# Patient Record
Sex: MALE | Race: WHITE | NOT HISPANIC OR LATINO | URBAN - METROPOLITAN AREA
[De-identification: names, ages, dates, MRNs, and addresses within clinical notes are randomized per-mention and may not be internally consistent; named-entity substitution may affect disease eponyms.]

---

## 2017-01-06 ENCOUNTER — OFFICE (OUTPATIENT)
Dept: URBAN - METROPOLITAN AREA CLINIC 64 | Facility: CLINIC | Age: 67
End: 2017-01-06
Payer: COMMERCIAL

## 2017-01-06 VITALS
HEIGHT: 67 IN | HEART RATE: 109 BPM | DIASTOLIC BLOOD PRESSURE: 72 MMHG | SYSTOLIC BLOOD PRESSURE: 107 MMHG | WEIGHT: 139 LBS

## 2017-01-06 DIAGNOSIS — Z12.11 ENCOUNTER FOR SCREENING FOR MALIGNANT NEOPLASM OF COLON: ICD-10-CM

## 2017-01-06 DIAGNOSIS — R63.4 ABNORMAL WEIGHT LOSS: ICD-10-CM

## 2017-01-06 DIAGNOSIS — K59.1 FUNCTIONAL DIARRHEA: ICD-10-CM

## 2017-01-06 DIAGNOSIS — R11.2 NAUSEA WITH VOMITING, UNSPECIFIED: ICD-10-CM

## 2017-01-06 DIAGNOSIS — R19.4 CHANGE IN BOWEL HABIT: ICD-10-CM

## 2017-01-06 PROCEDURE — 99204 OFFICE O/P NEW MOD 45 MIN: CPT | Performed by: NURSE PRACTITIONER

## 2017-01-20 ENCOUNTER — HOSPITAL ENCOUNTER (OUTPATIENT)
Dept: PREOP | Facility: HOSPITAL | Age: 67
Setting detail: HOSPITAL OUTPATIENT SURGERY
Discharge: HOME | End: 2017-01-20
Attending: INTERNAL MEDICINE | Admitting: INTERNAL MEDICINE

## 2017-01-20 ENCOUNTER — ON CAMPUS - OUTPATIENT (OUTPATIENT)
Dept: URBAN - METROPOLITAN AREA HOSPITAL 85 | Facility: HOSPITAL | Age: 67
End: 2017-01-20
Payer: COMMERCIAL

## 2017-01-20 DIAGNOSIS — R11.2 NAUSEA WITH VOMITING, UNSPECIFIED: ICD-10-CM

## 2017-01-20 DIAGNOSIS — R19.7 DIARRHEA, UNSPECIFIED: ICD-10-CM

## 2017-01-20 DIAGNOSIS — R63.4 ABNORMAL WEIGHT LOSS: ICD-10-CM

## 2017-01-20 PROCEDURE — 45378 DIAGNOSTIC COLONOSCOPY: CPT | Performed by: INTERNAL MEDICINE

## 2017-01-20 PROCEDURE — 43235 EGD DIAGNOSTIC BRUSH WASH: CPT | Performed by: INTERNAL MEDICINE

## 2017-03-13 ENCOUNTER — OFFICE (OUTPATIENT)
Dept: URBAN - METROPOLITAN AREA CLINIC 64 | Facility: CLINIC | Age: 67
End: 2017-03-13
Payer: COMMERCIAL

## 2017-03-13 VITALS
DIASTOLIC BLOOD PRESSURE: 71 MMHG | SYSTOLIC BLOOD PRESSURE: 109 MMHG | HEIGHT: 67 IN | HEART RATE: 99 BPM | WEIGHT: 139 LBS

## 2017-03-13 DIAGNOSIS — K64.8 OTHER HEMORRHOIDS: ICD-10-CM

## 2017-03-13 DIAGNOSIS — K59.00 CONSTIPATION, UNSPECIFIED: ICD-10-CM

## 2017-03-13 DIAGNOSIS — R10.9 UNSPECIFIED ABDOMINAL PAIN: ICD-10-CM

## 2017-03-13 PROCEDURE — 99213 OFFICE O/P EST LOW 20 MIN: CPT | Performed by: NURSE PRACTITIONER

## 2017-03-13 RX ORDER — HYDROCORTISONE ACETATE 25 MG/1
25 SUPPOSITORY RECTAL
Qty: 14 | Refills: 1 | Status: ACTIVE
Start: 2017-03-13

## 2017-05-05 ENCOUNTER — HOSPITAL ENCOUNTER (OUTPATIENT)
Dept: PHYSICAL THERAPY | Facility: HOSPITAL | Age: 67
Setting detail: RECURRING SERIES
Discharge: HOME | End: 2017-06-20
Attending: NURSE PRACTITIONER | Admitting: NURSE PRACTITIONER

## 2017-06-22 ENCOUNTER — HOSPITAL ENCOUNTER (OUTPATIENT)
Dept: PHYSICAL THERAPY | Facility: HOSPITAL | Age: 67
Setting detail: RECURRING SERIES
Discharge: HOME | End: 2017-08-11
Attending: NURSE PRACTITIONER | Admitting: NURSE PRACTITIONER

## 2017-11-30 ENCOUNTER — HOSPITAL ENCOUNTER (OUTPATIENT)
Dept: ORTHOPEDIC SURGERY | Facility: CLINIC | Age: 67
Discharge: HOME | End: 2017-11-30
Attending: PHYSICIAN ASSISTANT | Admitting: PHYSICIAN ASSISTANT

## 2017-12-28 ENCOUNTER — HOSPITAL ENCOUNTER (OUTPATIENT)
Dept: ORTHOPEDIC SURGERY | Facility: CLINIC | Age: 67
Discharge: HOME | End: 2017-12-28
Attending: PHYSICIAN ASSISTANT | Admitting: PHYSICIAN ASSISTANT

## 2022-06-09 ENCOUNTER — OFFICE VISIT (OUTPATIENT)
Dept: FAMILY MEDICINE CLINIC | Facility: CLINIC | Age: 72
End: 2022-06-09

## 2022-06-09 VITALS
HEIGHT: 68 IN | HEART RATE: 58 BPM | WEIGHT: 176 LBS | SYSTOLIC BLOOD PRESSURE: 130 MMHG | OXYGEN SATURATION: 98 % | RESPIRATION RATE: 16 BRPM | DIASTOLIC BLOOD PRESSURE: 71 MMHG | TEMPERATURE: 97.8 F | BODY MASS INDEX: 26.67 KG/M2

## 2022-06-09 DIAGNOSIS — F03.91 DEMENTIA WITH BEHAVIORAL DISTURBANCE, UNSPECIFIED DEMENTIA TYPE: Primary | ICD-10-CM

## 2022-06-09 DIAGNOSIS — I70.90 ATHEROSCLEROSIS: ICD-10-CM

## 2022-06-09 DIAGNOSIS — R25.2 MUSCLE CRAMPS: ICD-10-CM

## 2022-06-09 PROBLEM — S32.020D WEDGE COMPRESSION FRACTURE OF SECOND LUMBAR VERTEBRA, SUBSEQUENT ENCOUNTER FOR FRACTURE WITH ROUTINE HEALING: Status: ACTIVE | Noted: 2017-11-30

## 2022-06-09 PROBLEM — M54.9 BACK PAIN: Status: ACTIVE | Noted: 2017-11-30

## 2022-06-09 PROCEDURE — 99203 OFFICE O/P NEW LOW 30 MIN: CPT | Performed by: PHYSICIAN ASSISTANT

## 2022-06-09 RX ORDER — RISPERIDONE 0.5 MG/1
TABLET ORAL
COMMUNITY
Start: 2022-06-03

## 2022-06-09 RX ORDER — DOCUSATE SODIUM 100 MG/1
CAPSULE, LIQUID FILLED ORAL
COMMUNITY
Start: 2022-05-13

## 2022-06-09 RX ORDER — CHLORAL HYDRATE 500 MG
CAPSULE ORAL
COMMUNITY

## 2022-06-09 RX ORDER — TAMSULOSIN HYDROCHLORIDE 0.4 MG/1
CAPSULE ORAL
COMMUNITY
Start: 2022-05-27

## 2022-06-09 RX ORDER — ATENOLOL 25 MG/1
TABLET ORAL
COMMUNITY
Start: 2022-06-03

## 2022-06-09 RX ORDER — RISPERIDONE 0.25 MG/1
TABLET ORAL
COMMUNITY
Start: 2022-06-03

## 2022-06-09 RX ORDER — DONEPEZIL HYDROCHLORIDE 23 MG/1
TABLET, FILM COATED ORAL
COMMUNITY
Start: 2022-05-13

## 2022-06-09 RX ORDER — MEMANTINE HYDROCHLORIDE 28 MG/1
CAPSULE, EXTENDED RELEASE ORAL
COMMUNITY
Start: 2022-06-03

## 2022-06-09 NOTE — PROGRESS NOTES
Arina Wray is a 71 y.o. male.     Pt presents as a new patient to establish. He is accompanied by his wife.  He has dementia and is a poor historian.  Wife doesn't know who has been managing his current medications but he does have them refilled through the pharmacy at [facility].  He is able to feed himself, bathe, change his clothes but they administer his medications there.  He doesn't cook and eats his meals there.  They are not sure what type of dementia he has but state that it seems stable on his current medications.  She thinks he was prescribed the risperdal for anxiety but isn't sure if he only takes it at night or also takes it during the day.     He complains of muscle cramps in both lower legs in calf area for the past year.  It bothers him the most at night when he goes to bed. It also bothers him if he is walking a lot.  He also complains of headaches but not as bad as they used to be.  He used to have a provider that came to his house.  He now lives at [facility] and has for the past 3 years. He has been getting medications refilled through [facility].  He thinks he had labs done at [facility] about 1 month ago.         The following portions of the patient's history were reviewed and updated as appropriate: allergies, current medications, past family history, past medical history, past social history, past surgical history and problem list.  Past Medical History:   Diagnosis Date   • Dementia (HCC)      History reviewed. No pertinent surgical history.  History reviewed. No pertinent family history.  Social History     Socioeconomic History   • Marital status: Single   Tobacco Use   • Smoking status: Never Smoker   • Smokeless tobacco: Never Used   Vaping Use   • Vaping Use: Never used   Substance and Sexual Activity   • Alcohol use: Yes   • Drug use: Never   • Sexual activity: Defer         Current Outpatient Medications:   •  atenolol (TENORMIN) 25 MG tablet, , Disp: , Rfl:   •  docusate  "sodium (COLACE) 100 MG capsule, , Disp: , Rfl:   •  donepezil (ARICEPT) 23 MG tablet, , Disp: , Rfl:   •  memantine (NAMENDA XR) 28 MG capsule sustained-release 24 hr extended release capsule, , Disp: , Rfl:   •  Omega-3 Fatty Acids (fish oil) 1000 MG capsule capsule, Take  by mouth Daily With Breakfast., Disp: , Rfl:   •  risperiDONE (risperDAL) 0.25 MG tablet, , Disp: , Rfl:   •  risperiDONE (risperDAL) 0.5 MG tablet, , Disp: , Rfl:   •  tamsulosin (FLOMAX) 0.4 MG capsule 24 hr capsule, , Disp: , Rfl:     Review of Systems   Constitutional: Negative for activity change, appetite change, chills, diaphoresis, fatigue, fever, unexpected weight gain and unexpected weight loss.   Eyes: Negative for blurred vision and visual disturbance.   Respiratory: Negative for chest tightness and shortness of breath.    Cardiovascular: Negative for chest pain, palpitations and leg swelling.   Gastrointestinal: Negative for abdominal pain, constipation, diarrhea, nausea and vomiting.   Genitourinary: Negative for difficulty urinating and dysuria.   Musculoskeletal: Positive for myalgias. Negative for arthralgias, back pain, gait problem and bursitis.   Neurological: Positive for memory problem and confusion. Negative for dizziness, tremors, seizures, syncope, speech difficulty, weakness, light-headedness and headache.   Psychiatric/Behavioral: Negative for sleep disturbance and depressed mood. The patient is nervous/anxious.      /71 (BP Location: Left arm, Patient Position: Sitting, Cuff Size: Adult)   Pulse 58   Temp 97.8 °F (36.6 °C) (Temporal)   Resp 16   Ht 171.5 cm (67.5\")   Wt 79.8 kg (176 lb)   SpO2 98%   BMI 27.16 kg/m²       Objective   Physical Exam  Vitals and nursing note reviewed.   Constitutional:       Appearance: Normal appearance. He is overweight.   HENT:      Head: Normocephalic and atraumatic.   Eyes:      Extraocular Movements: Extraocular movements intact.   Neck:      Vascular: No carotid bruit. "   Cardiovascular:      Rate and Rhythm: Normal rate and regular rhythm.      Heart sounds: Normal heart sounds.   Pulmonary:      Effort: Pulmonary effort is normal.      Breath sounds: Normal breath sounds.   Abdominal:      General: Abdomen is flat. Bowel sounds are normal.      Palpations: Abdomen is soft.      Tenderness: There is no abdominal tenderness. There is no right CVA tenderness or left CVA tenderness.   Musculoskeletal:         General: Normal range of motion.      Cervical back: Normal range of motion and neck supple.      Right lower leg: No edema.      Left lower leg: No edema.   Skin:     General: Skin is warm and dry.   Neurological:      General: No focal deficit present.      Mental Status: He is alert and oriented to person, place, and time.      Cranial Nerves: No cranial nerve deficit.      Sensory: No sensory deficit.      Motor: No weakness.      Coordination: Coordination normal.      Gait: Gait normal.      Deep Tendon Reflexes: Reflexes normal.   Psychiatric:         Mood and Affect: Mood normal.         Behavior: Behavior normal.         Thought Content: Thought content normal.         Procedures       Diagnoses and all orders for this visit:    1. Dementia with behavioral disturbance, unspecified dementia type (HCC) (Primary)  Comments:  Will try to get records from [facility] to see if we can get last labs, med list, diagnosis list.  Orders:  -     CBC w AUTO Differential  -     POCT urinalysis dipstick, automated    2. Muscle cramps  Comments:  Will check labs and call with results.  Orders:  -     Comprehensive metabolic panel  -     Magnesium  -     CK    3. Atherosclerosis  Comments:  Will check labs and call with results.  Orders:  -     Lipid panel        I spent 30 minutes of patient care including reviewing pt's previous hx, reviewing current symptoms, performing exam, ordering tests, discussing treatment plan and completing my note.

## 2022-06-10 ENCOUNTER — LAB (OUTPATIENT)
Dept: FAMILY MEDICINE CLINIC | Facility: CLINIC | Age: 72
End: 2022-06-10

## 2022-06-10 PROCEDURE — 85025 COMPLETE CBC W/AUTO DIFF WBC: CPT | Performed by: PHYSICIAN ASSISTANT

## 2022-06-10 PROCEDURE — 80061 LIPID PANEL: CPT | Performed by: PHYSICIAN ASSISTANT

## 2022-06-10 PROCEDURE — 82550 ASSAY OF CK (CPK): CPT | Performed by: PHYSICIAN ASSISTANT

## 2022-06-10 PROCEDURE — 36415 COLL VENOUS BLD VENIPUNCTURE: CPT | Performed by: PHYSICIAN ASSISTANT

## 2022-06-10 PROCEDURE — 80053 COMPREHEN METABOLIC PANEL: CPT | Performed by: PHYSICIAN ASSISTANT

## 2022-06-10 PROCEDURE — 83735 ASSAY OF MAGNESIUM: CPT | Performed by: PHYSICIAN ASSISTANT

## 2022-06-11 DIAGNOSIS — E78.00 HYPERCHOLESTEROLEMIA: ICD-10-CM

## 2022-06-11 DIAGNOSIS — R79.89 SERUM CREATININE RAISED: Primary | ICD-10-CM

## 2022-06-11 LAB
ALBUMIN SERPL-MCNC: 3.8 G/DL (ref 3.5–5.2)
ALBUMIN/GLOB SERPL: 1 G/DL
ALP SERPL-CCNC: 78 U/L (ref 39–117)
ALT SERPL W P-5'-P-CCNC: 19 U/L (ref 1–41)
ANION GAP SERPL CALCULATED.3IONS-SCNC: 11.5 MMOL/L (ref 5–15)
AST SERPL-CCNC: 27 U/L (ref 1–40)
BASOPHILS # BLD AUTO: 0.05 10*3/MM3 (ref 0–0.2)
BASOPHILS NFR BLD AUTO: 0.6 % (ref 0–1.5)
BILIRUB SERPL-MCNC: 0.3 MG/DL (ref 0–1.2)
BUN SERPL-MCNC: 14 MG/DL (ref 8–23)
BUN/CREAT SERPL: 10.6 (ref 7–25)
CALCIUM SPEC-SCNC: 9.4 MG/DL (ref 8.6–10.5)
CHLORIDE SERPL-SCNC: 104 MMOL/L (ref 98–107)
CHOLEST SERPL-MCNC: 199 MG/DL (ref 0–200)
CK SERPL-CCNC: 38 U/L (ref 20–200)
CO2 SERPL-SCNC: 23.5 MMOL/L (ref 22–29)
CREAT SERPL-MCNC: 1.32 MG/DL (ref 0.76–1.27)
DEPRECATED RDW RBC AUTO: 38.9 FL (ref 37–54)
EGFRCR SERPLBLD CKD-EPI 2021: 58.4 ML/MIN/1.73
EOSINOPHIL # BLD AUTO: 0.19 10*3/MM3 (ref 0–0.4)
EOSINOPHIL NFR BLD AUTO: 2.1 % (ref 0.3–6.2)
ERYTHROCYTE [DISTWIDTH] IN BLOOD BY AUTOMATED COUNT: 12.8 % (ref 12.3–15.4)
GLOBULIN UR ELPH-MCNC: 3.7 GM/DL
GLUCOSE SERPL-MCNC: 81 MG/DL (ref 65–99)
HCT VFR BLD AUTO: 41.9 % (ref 37.5–51)
HDLC SERPL-MCNC: 38 MG/DL (ref 40–60)
HGB BLD-MCNC: 14 G/DL (ref 13–17.7)
IMM GRANULOCYTES # BLD AUTO: 0.04 10*3/MM3 (ref 0–0.05)
IMM GRANULOCYTES NFR BLD AUTO: 0.4 % (ref 0–0.5)
LDLC SERPL CALC-MCNC: 101 MG/DL (ref 0–100)
LDLC/HDLC SERPL: 2.36 {RATIO}
LYMPHOCYTES # BLD AUTO: 1.79 10*3/MM3 (ref 0.7–3.1)
LYMPHOCYTES NFR BLD AUTO: 19.9 % (ref 19.6–45.3)
MAGNESIUM SERPL-MCNC: 2.3 MG/DL (ref 1.6–2.4)
MCH RBC QN AUTO: 28.3 PG (ref 26.6–33)
MCHC RBC AUTO-ENTMCNC: 33.4 G/DL (ref 31.5–35.7)
MCV RBC AUTO: 84.6 FL (ref 79–97)
MONOCYTES # BLD AUTO: 0.68 10*3/MM3 (ref 0.1–0.9)
MONOCYTES NFR BLD AUTO: 7.5 % (ref 5–12)
NEUTROPHILS NFR BLD AUTO: 6.26 10*3/MM3 (ref 1.7–7)
NEUTROPHILS NFR BLD AUTO: 69.5 % (ref 42.7–76)
NRBC BLD AUTO-RTO: 0 /100 WBC (ref 0–0.2)
PLATELET # BLD AUTO: 467 10*3/MM3 (ref 140–450)
PMV BLD AUTO: 9.7 FL (ref 6–12)
POTASSIUM SERPL-SCNC: 4.9 MMOL/L (ref 3.5–5.2)
PROT SERPL-MCNC: 7.5 G/DL (ref 6–8.5)
RBC # BLD AUTO: 4.95 10*6/MM3 (ref 4.14–5.8)
SODIUM SERPL-SCNC: 139 MMOL/L (ref 136–145)
TRIGL SERPL-MCNC: 357 MG/DL (ref 0–150)
VLDLC SERPL-MCNC: 60 MG/DL (ref 5–40)
WBC NRBC COR # BLD: 9.01 10*3/MM3 (ref 3.4–10.8)

## 2022-09-15 ENCOUNTER — LAB (OUTPATIENT)
Dept: FAMILY MEDICINE CLINIC | Facility: CLINIC | Age: 72
End: 2022-09-15

## 2022-09-15 DIAGNOSIS — E78.00 HYPERCHOLESTEROLEMIA: ICD-10-CM

## 2022-09-15 DIAGNOSIS — R79.89 SERUM CREATININE RAISED: ICD-10-CM

## 2022-09-15 LAB
ANION GAP SERPL CALCULATED.3IONS-SCNC: 10.2 MMOL/L (ref 5–15)
BUN SERPL-MCNC: 8 MG/DL (ref 8–23)
BUN/CREAT SERPL: 5.8 (ref 7–25)
CALCIUM SPEC-SCNC: 9.5 MG/DL (ref 8.6–10.5)
CHLORIDE SERPL-SCNC: 100 MMOL/L (ref 98–107)
CHOLEST SERPL-MCNC: 182 MG/DL (ref 0–200)
CO2 SERPL-SCNC: 27.8 MMOL/L (ref 22–29)
CREAT SERPL-MCNC: 1.39 MG/DL (ref 0.76–1.27)
EGFRCR SERPLBLD CKD-EPI 2021: 54.9 ML/MIN/1.73
GLUCOSE SERPL-MCNC: 96 MG/DL (ref 65–99)
HDLC SERPL-MCNC: 34 MG/DL (ref 40–60)
LDLC SERPL CALC-MCNC: 99 MG/DL (ref 0–100)
LDLC/HDLC SERPL: 2.65 {RATIO}
POTASSIUM SERPL-SCNC: 4.8 MMOL/L (ref 3.5–5.2)
SODIUM SERPL-SCNC: 138 MMOL/L (ref 136–145)
TRIGL SERPL-MCNC: 290 MG/DL (ref 0–150)
VLDLC SERPL-MCNC: 49 MG/DL (ref 5–40)

## 2022-09-15 PROCEDURE — 36415 COLL VENOUS BLD VENIPUNCTURE: CPT

## 2022-09-15 PROCEDURE — 80048 BASIC METABOLIC PNL TOTAL CA: CPT | Performed by: PHYSICIAN ASSISTANT

## 2022-09-15 PROCEDURE — 80061 LIPID PANEL: CPT | Performed by: PHYSICIAN ASSISTANT

## 2022-09-18 DIAGNOSIS — E78.00 HYPERCHOLESTEROLEMIA: Primary | ICD-10-CM

## 2022-09-18 DIAGNOSIS — R79.89 SERUM CREATININE RAISED: ICD-10-CM

## 2022-09-19 ENCOUNTER — TELEPHONE (OUTPATIENT)
Dept: FAMILY MEDICINE CLINIC | Facility: CLINIC | Age: 72
End: 2022-09-19

## 2022-09-19 DIAGNOSIS — E78.00 HYPERCHOLESTEROLEMIA: Primary | ICD-10-CM

## 2022-09-19 RX ORDER — ATORVASTATIN CALCIUM 20 MG/1
20 TABLET, FILM COATED ORAL DAILY
Qty: 90 TABLET | Refills: 1 | Status: SHIPPED | OUTPATIENT
Start: 2022-09-19

## 2022-09-19 NOTE — TELEPHONE ENCOUNTER
Caller: GINA COLLAZO    Relationship: Emergency Contact    What was the call regarding: PATIENTS SIGNIFICANT OTHER CALLING TO GIVE A FAX NUMBER FOR [facility] TO SEND ANY PRESCRIPTIONS THAT IS NEEDED FOR THE PATIENT.    FAX:[facility]  [phone]

## 2022-10-21 ENCOUNTER — TELEPHONE (OUTPATIENT)
Dept: FAMILY MEDICINE CLINIC | Facility: CLINIC | Age: 72
End: 2022-10-21

## 2022-10-21 NOTE — TELEPHONE ENCOUNTER
Caller: GINA COLLAZO    Relationship: Emergency Contact    Best call back number: [phone]    What is the best time to reach you: ASAP    Who are you requesting to speak with (clinical staff, provider,  specific staff member)  STAFF    Do you know the name of the person who called: GINA    What was the call regarding: PATIENT WOULD LIKE TO COME IN AND GET A SHINGLE SHOT AND A PNEUMONIA SHOT THIS AFTERNOON.  PLEASE CALL AND ADVISE IF THAT IS OK.     Do you require a callback: YES

## 2023-01-13 ENCOUNTER — OFFICE VISIT (OUTPATIENT)
Dept: FAMILY MEDICINE CLINIC | Facility: CLINIC | Age: 73
End: 2023-01-13
Payer: MEDICARE

## 2023-01-13 ENCOUNTER — LAB (OUTPATIENT)
Dept: FAMILY MEDICINE CLINIC | Facility: CLINIC | Age: 73
End: 2023-01-13
Payer: MEDICARE

## 2023-01-13 VITALS
SYSTOLIC BLOOD PRESSURE: 118 MMHG | RESPIRATION RATE: 16 BRPM | HEART RATE: 57 BPM | DIASTOLIC BLOOD PRESSURE: 73 MMHG | TEMPERATURE: 97.5 F | OXYGEN SATURATION: 98 % | HEIGHT: 68 IN | WEIGHT: 166 LBS | BODY MASS INDEX: 25.16 KG/M2

## 2023-01-13 DIAGNOSIS — G89.29 CHRONIC BILATERAL LOW BACK PAIN WITHOUT SCIATICA: ICD-10-CM

## 2023-01-13 DIAGNOSIS — E78.00 HYPERCHOLESTEROLEMIA: ICD-10-CM

## 2023-01-13 DIAGNOSIS — R25.2 MUSCLE CRAMPS: ICD-10-CM

## 2023-01-13 DIAGNOSIS — I73.9 CLAUDICATION: Primary | ICD-10-CM

## 2023-01-13 DIAGNOSIS — R79.89 SERUM CREATININE RAISED: ICD-10-CM

## 2023-01-13 DIAGNOSIS — R35.0 FREQUENT URINATION: ICD-10-CM

## 2023-01-13 DIAGNOSIS — M54.50 CHRONIC BILATERAL LOW BACK PAIN WITHOUT SCIATICA: ICD-10-CM

## 2023-01-13 LAB
BILIRUB BLD-MCNC: NEGATIVE MG/DL
CLARITY, POC: CLEAR
COLOR UR: YELLOW
EXPIRATION DATE: NORMAL
GLUCOSE UR STRIP-MCNC: NEGATIVE MG/DL
KETONES UR QL: NEGATIVE
LEUKOCYTE EST, POC: NEGATIVE
Lab: NORMAL
NITRITE UR-MCNC: NEGATIVE MG/ML
PH UR: 6 [PH] (ref 5–8)
PROT UR STRIP-MCNC: NEGATIVE MG/DL
RBC # UR STRIP: NEGATIVE /UL
SP GR UR: 1.01 (ref 1–1.03)
UROBILINOGEN UR QL: NORMAL

## 2023-01-13 PROCEDURE — 81003 URINALYSIS AUTO W/O SCOPE: CPT | Performed by: PHYSICIAN ASSISTANT

## 2023-01-13 PROCEDURE — 80053 COMPREHEN METABOLIC PANEL: CPT | Performed by: PHYSICIAN ASSISTANT

## 2023-01-13 PROCEDURE — 99213 OFFICE O/P EST LOW 20 MIN: CPT | Performed by: PHYSICIAN ASSISTANT

## 2023-01-13 PROCEDURE — 36415 COLL VENOUS BLD VENIPUNCTURE: CPT | Performed by: PHYSICIAN ASSISTANT

## 2023-01-13 PROCEDURE — 80061 LIPID PANEL: CPT | Performed by: PHYSICIAN ASSISTANT

## 2023-01-13 PROCEDURE — 82550 ASSAY OF CK (CPK): CPT | Performed by: PHYSICIAN ASSISTANT

## 2023-01-13 RX ORDER — ACETAMINOPHEN 160 MG
TABLET,DISINTEGRATING ORAL
COMMUNITY
Start: 2022-12-17

## 2023-01-13 NOTE — PROGRESS NOTES
Arina Wray is a 72 y.o. male.     History of Present Illness  Pt presents with leg pain in thighs and calves after walking.  Episodes mostly occur when he is walking for a while like in the grocery store.  Pain resolves with rest.  He does complain of back pain in lower back.  Denies any bowel or bladder issues.  He denies any new injury.  He also complains of feet feeling cold at times.         The following portions of the patient's history were reviewed and updated as appropriate: allergies, current medications, past family history, past medical history, past social history, past surgical history and problem list.  Past Medical History:   Diagnosis Date   • Dementia (HCC)      No past surgical history on file.  No family history on file.  Social History     Socioeconomic History   • Marital status: Single   Tobacco Use   • Smoking status: Never   • Smokeless tobacco: Never   Vaping Use   • Vaping Use: Never used   Substance and Sexual Activity   • Alcohol use: Yes   • Drug use: Never   • Sexual activity: Defer         Current Outpatient Medications:   •  atenolol (TENORMIN) 25 MG tablet, , Disp: , Rfl:   •  atorvastatin (LIPITOR) 20 MG tablet, Take 1 tablet by mouth Daily., Disp: 90 tablet, Rfl: 1  •  docusate sodium (COLACE) 100 MG capsule, , Disp: , Rfl:   •  donepezil (ARICEPT) 23 MG tablet, , Disp: , Rfl:   •  memantine (NAMENDA XR) 28 MG capsule sustained-release 24 hr extended release capsule, , Disp: , Rfl:   •  Omega-3 Fatty Acids (fish oil) 1000 MG capsule capsule, Take  by mouth Daily With Breakfast., Disp: , Rfl:   •  risperiDONE (risperDAL) 0.25 MG tablet, , Disp: , Rfl:   •  risperiDONE (risperDAL) 0.5 MG tablet, , Disp: , Rfl:   •  tamsulosin (FLOMAX) 0.4 MG capsule 24 hr capsule, , Disp: , Rfl:   •  Cholecalciferol (Vitamin D3) 50 MCG (2000 UT) capsule, , Disp: , Rfl:     Review of Systems   Constitutional: Negative for activity change, appetite change, chills, diaphoresis,  "fatigue, fever, unexpected weight gain and unexpected weight loss.   HENT: Negative for trouble swallowing.    Respiratory: Negative for chest tightness and shortness of breath.    Cardiovascular: Negative for chest pain, palpitations and leg swelling.   Gastrointestinal: Negative for abdominal pain, constipation, diarrhea, nausea and vomiting.   Genitourinary: Negative for urinary incontinence.   Musculoskeletal: Positive for back pain, gait problem and myalgias.   Neurological: Negative for dizziness, tremors, syncope, weakness, light-headedness, headache and confusion.   Psychiatric/Behavioral: Negative for sleep disturbance.     /73 (BP Location: Left arm, Patient Position: Sitting, Cuff Size: Large Adult)   Pulse 57   Temp 97.5 °F (36.4 °C) (Temporal)   Resp 16   Ht 171.5 cm (67.5\")   Wt 75.3 kg (166 lb)   SpO2 98%   BMI 25.62 kg/m²       Objective   Physical Exam  Vitals and nursing note reviewed.   Constitutional:       Appearance: Normal appearance.   HENT:      Head: Normocephalic and atraumatic.   Eyes:      Conjunctiva/sclera: Conjunctivae normal.      Pupils: Pupils are equal, round, and reactive to light.   Neck:      Thyroid: No thyroid mass, thyromegaly or thyroid tenderness.      Vascular: No carotid bruit.   Cardiovascular:      Rate and Rhythm: Normal rate and regular rhythm.      Heart sounds: Normal heart sounds.   Pulmonary:      Effort: Pulmonary effort is normal.      Breath sounds: Normal breath sounds.   Musculoskeletal:      Cervical back: Normal range of motion and neck supple.      Lumbar back: Decreased range of motion. Negative right straight leg raise test and negative left straight leg raise test.      Right lower leg: No edema.      Left lower leg: No edema.   Skin:     General: Skin is warm and dry.   Neurological:      General: No focal deficit present.      Mental Status: He is alert and oriented to person, place, and time.   Psychiatric:         Mood and Affect: Mood " normal.         Behavior: Behavior normal.         Thought Content: Thought content normal.         Procedures       Diagnoses and all orders for this visit:    1. Claudication (HCC) (Primary)  Comments:  Possible neurogenic vs vascular claudication.  Will check labs to ensure no other reason as well as lumbar xrays.  May need MELANIE study.    2. Hypercholesterolemia  Comments:  Pt to get labs done and will contact with results.  Orders:  -     Lipid panel    3. Serum creatinine raised  Comments:  Will recheck.  Orders:  -     Comprehensive metabolic panel    4. Muscle cramps  Comments:  Will check labs.  Orders:  -     CK    5. Frequent urination  Comments:  Will check urine for any signs of infection.  Urine dip normal.  Orders:  -     POCT urinalysis dipstick, automated    6. Chronic bilateral low back pain without sciatica  Comments:  Will check xray to look for any signs of stenosis.  Orders:  -     XR Spine Lumbar 2 or 3 View; Future              Call Liane with results.

## 2023-01-14 LAB
ALBUMIN SERPL-MCNC: 4.4 G/DL (ref 3.5–5.2)
ALBUMIN/GLOB SERPL: 1.4 G/DL
ALP SERPL-CCNC: 116 U/L (ref 39–117)
ALT SERPL W P-5'-P-CCNC: 12 U/L (ref 1–41)
ANION GAP SERPL CALCULATED.3IONS-SCNC: 9 MMOL/L (ref 5–15)
AST SERPL-CCNC: 19 U/L (ref 1–40)
BILIRUB SERPL-MCNC: 0.4 MG/DL (ref 0–1.2)
BUN SERPL-MCNC: 10 MG/DL (ref 8–23)
BUN/CREAT SERPL: 8.1 (ref 7–25)
CALCIUM SPEC-SCNC: 9.7 MG/DL (ref 8.6–10.5)
CHLORIDE SERPL-SCNC: 99 MMOL/L (ref 98–107)
CHOLEST SERPL-MCNC: 120 MG/DL (ref 0–200)
CK SERPL-CCNC: 34 U/L (ref 20–200)
CO2 SERPL-SCNC: 29 MMOL/L (ref 22–29)
CREAT SERPL-MCNC: 1.24 MG/DL (ref 0.76–1.27)
EGFRCR SERPLBLD CKD-EPI 2021: 62.5 ML/MIN/1.73
GLOBULIN UR ELPH-MCNC: 3.1 GM/DL
GLUCOSE SERPL-MCNC: 87 MG/DL (ref 65–99)
HDLC SERPL-MCNC: 39 MG/DL (ref 40–60)
LDLC SERPL CALC-MCNC: 58 MG/DL (ref 0–100)
LDLC/HDLC SERPL: 1.42 {RATIO}
POTASSIUM SERPL-SCNC: 4.4 MMOL/L (ref 3.5–5.2)
PROT SERPL-MCNC: 7.5 G/DL (ref 6–8.5)
SODIUM SERPL-SCNC: 137 MMOL/L (ref 136–145)
TRIGL SERPL-MCNC: 129 MG/DL (ref 0–150)
VLDLC SERPL-MCNC: 23 MG/DL (ref 5–40)

## 2023-01-17 DIAGNOSIS — M54.50 CHRONIC BILATERAL LOW BACK PAIN WITHOUT SCIATICA: ICD-10-CM

## 2023-01-17 DIAGNOSIS — G89.29 CHRONIC BILATERAL LOW BACK PAIN WITHOUT SCIATICA: ICD-10-CM

## 2023-01-19 ENCOUNTER — TELEPHONE (OUTPATIENT)
Dept: FAMILY MEDICINE CLINIC | Facility: CLINIC | Age: 73
End: 2023-01-19
Payer: MEDICARE

## 2023-01-19 DIAGNOSIS — I70.0 ATHEROSCLEROSIS OF ABDOMINAL AORTA: ICD-10-CM

## 2023-01-19 DIAGNOSIS — I73.9 CLAUDICATION: ICD-10-CM

## 2023-01-19 DIAGNOSIS — I70.8 ATHEROSCLEROSIS OF AORTIC BIFURCATION AND COMMON ILIAC ARTERIES: ICD-10-CM

## 2023-01-19 DIAGNOSIS — I70.0 ATHEROSCLEROSIS OF AORTIC BIFURCATION AND COMMON ILIAC ARTERIES: ICD-10-CM

## 2023-01-19 DIAGNOSIS — S32.020A COMPRESSION FRACTURE OF L2 VERTEBRA, INITIAL ENCOUNTER: Primary | ICD-10-CM

## 2023-01-19 NOTE — TELEPHONE ENCOUNTER
Hub staff attempted to follow warm transfer process and was unsuccessful     Caller: GINA COLLAZO    Relationship to patient: Emergency Contact    Best call back number:[phone]     Patient is needing: TO SPEAK WITH OFFICE REGARDING HIS LAB RESULTS, HE MISSED THE PHONE CALL FROM OFFICE

## 2023-02-22 ENCOUNTER — HOSPITAL ENCOUNTER (OUTPATIENT)
Dept: CT IMAGING | Facility: HOSPITAL | Age: 73
Discharge: HOME | End: 2023-02-22
Payer: MEDICARE

## 2023-02-22 ENCOUNTER — HOSPITAL ENCOUNTER (OUTPATIENT)
Dept: MRI IMAGING | Facility: HOSPITAL | Age: 73
Discharge: HOME | End: 2023-02-22
Payer: MEDICARE

## 2023-02-22 DIAGNOSIS — I73.9 CLAUDICATION: ICD-10-CM

## 2023-02-22 DIAGNOSIS — S32.020A COMPRESSION FRACTURE OF L2 VERTEBRA, INITIAL ENCOUNTER: ICD-10-CM

## 2023-02-22 DIAGNOSIS — I77.1 ILIAC ARTERY STENOSIS, BILATERAL: Primary | ICD-10-CM

## 2023-02-22 DIAGNOSIS — I70.0 ATHEROSCLEROSIS OF AORTIC BIFURCATION AND COMMON ILIAC ARTERIES: ICD-10-CM

## 2023-02-22 DIAGNOSIS — I70.0 ATHEROSCLEROSIS OF ABDOMINAL AORTA: ICD-10-CM

## 2023-02-22 DIAGNOSIS — I70.8 ATHEROSCLEROSIS OF AORTIC BIFURCATION AND COMMON ILIAC ARTERIES: ICD-10-CM

## 2023-02-22 LAB
CREAT BLDA-MCNC: 1.4 MG/DL (ref 0.6–1.3)
EGFRCR SERPLBLD CKD-EPI 2021: 54.1 ML/MIN/1.73

## 2023-02-22 PROCEDURE — 82565 ASSAY OF CREATININE: CPT

## 2023-02-22 PROCEDURE — 0 IOPAMIDOL PER 1 ML: Performed by: PHYSICIAN ASSISTANT

## 2023-02-22 PROCEDURE — 72148 MRI LUMBAR SPINE W/O DYE: CPT

## 2023-02-22 PROCEDURE — 75635 CT ANGIO ABDOMINAL ARTERIES: CPT

## 2023-02-22 RX ADMIN — IOPAMIDOL 100 ML: 755 INJECTION, SOLUTION INTRAVENOUS at 14:59

## 2023-02-28 DIAGNOSIS — M54.50 CHRONIC BILATERAL LOW BACK PAIN WITHOUT SCIATICA: Primary | ICD-10-CM

## 2023-02-28 DIAGNOSIS — G89.29 CHRONIC BILATERAL LOW BACK PAIN WITHOUT SCIATICA: Primary | ICD-10-CM

## 2023-02-28 RX ORDER — CELECOXIB 100 MG/1
100 CAPSULE ORAL 2 TIMES DAILY PRN
Qty: 60 CAPSULE | Refills: 1 | Status: SHIPPED | OUTPATIENT
Start: 2023-02-28 | End: 2023-03-06

## 2023-03-06 ENCOUNTER — TELEPHONE (OUTPATIENT)
Dept: FAMILY MEDICINE CLINIC | Facility: CLINIC | Age: 73
End: 2023-03-06
Payer: MEDICARE

## 2023-03-06 ENCOUNTER — APPOINTMENT (OUTPATIENT)
Dept: VASCULAR SURGERY | Facility: HOSPITAL | Age: 73
End: 2023-03-06
Payer: MEDICARE

## 2023-03-06 ENCOUNTER — TRANSCRIBE ORDERS (OUTPATIENT)
Dept: ADMINISTRATIVE | Facility: HOSPITAL | Age: 73
End: 2023-03-06
Payer: MEDICARE

## 2023-03-06 DIAGNOSIS — M54.50 CHRONIC BILATERAL LOW BACK PAIN WITHOUT SCIATICA: ICD-10-CM

## 2023-03-06 DIAGNOSIS — G89.29 CHRONIC BILATERAL LOW BACK PAIN WITHOUT SCIATICA: ICD-10-CM

## 2023-03-06 DIAGNOSIS — I73.9 PERIPHERAL VASCULAR DISEASE, UNSPECIFIED: Primary | ICD-10-CM

## 2023-03-06 PROCEDURE — G0463 HOSPITAL OUTPT CLINIC VISIT: HCPCS

## 2023-03-06 PROCEDURE — A9270 NON-COVERED ITEM OR SERVICE: HCPCS | Performed by: SURGERY

## 2023-03-06 PROCEDURE — 63710000001 ASPIRIN 81 MG TABLET DELAYED-RELEASE: Performed by: SURGERY

## 2023-03-06 RX ORDER — CILOSTAZOL 100 MG/1
100 TABLET ORAL 2 TIMES DAILY
Qty: 60 TABLET | Refills: 11 | Status: SHIPPED | OUTPATIENT
Start: 2023-03-06

## 2023-03-06 RX ORDER — CELECOXIB 100 MG/1
100 CAPSULE ORAL 2 TIMES DAILY
Qty: 60 CAPSULE | Refills: 1 | Status: SHIPPED | OUTPATIENT
Start: 2023-03-06 | End: 2023-03-10 | Stop reason: SDUPTHER

## 2023-03-06 RX ORDER — ASPIRIN 81 MG/1
81 TABLET ORAL DAILY
Status: DISPENSED | OUTPATIENT
Start: 2023-03-06

## 2023-03-06 NOTE — TELEPHONE ENCOUNTER
Per nurse at his assisted living [facility] did not receive the script for Celebrex. I have tried calling [facility] several times and can not get through to a human. Can this be resent?

## 2023-03-10 ENCOUNTER — LAB (OUTPATIENT)
Dept: FAMILY MEDICINE CLINIC | Facility: CLINIC | Age: 73
End: 2023-03-10
Payer: MEDICARE

## 2023-03-10 ENCOUNTER — TELEPHONE (OUTPATIENT)
Dept: FAMILY MEDICINE CLINIC | Facility: CLINIC | Age: 73
End: 2023-03-10
Payer: MEDICARE

## 2023-03-10 DIAGNOSIS — E78.00 HYPERCHOLESTEROLEMIA: ICD-10-CM

## 2023-03-10 DIAGNOSIS — R79.89 SERUM CREATININE RAISED: ICD-10-CM

## 2023-03-10 DIAGNOSIS — G89.29 CHRONIC BILATERAL LOW BACK PAIN WITHOUT SCIATICA: ICD-10-CM

## 2023-03-10 DIAGNOSIS — M54.50 CHRONIC BILATERAL LOW BACK PAIN WITHOUT SCIATICA: ICD-10-CM

## 2023-03-10 LAB
ALBUMIN SERPL-MCNC: 4.6 G/DL (ref 3.5–5.2)
ALBUMIN/GLOB SERPL: 1.4 G/DL
ALP SERPL-CCNC: 113 U/L (ref 39–117)
ALT SERPL W P-5'-P-CCNC: 18 U/L (ref 1–41)
ANION GAP SERPL CALCULATED.3IONS-SCNC: 13.4 MMOL/L (ref 5–15)
AST SERPL-CCNC: 20 U/L (ref 1–40)
BILIRUB SERPL-MCNC: 0.4 MG/DL (ref 0–1.2)
BILIRUB UR QL STRIP: NEGATIVE
BUN SERPL-MCNC: 15 MG/DL (ref 8–23)
BUN/CREAT SERPL: 10.5 (ref 7–25)
CALCIUM SPEC-SCNC: 9.7 MG/DL (ref 8.6–10.5)
CHLORIDE SERPL-SCNC: 100 MMOL/L (ref 98–107)
CHOLEST SERPL-MCNC: 123 MG/DL (ref 0–200)
CLARITY UR: CLEAR
CO2 SERPL-SCNC: 25.6 MMOL/L (ref 22–29)
COLOR UR: ABNORMAL
CREAT SERPL-MCNC: 1.43 MG/DL (ref 0.76–1.27)
EGFRCR SERPLBLD CKD-EPI 2021: 52.7 ML/MIN/1.73
GLOBULIN UR ELPH-MCNC: 3.3 GM/DL
GLUCOSE SERPL-MCNC: 78 MG/DL (ref 65–99)
GLUCOSE UR STRIP-MCNC: NEGATIVE MG/DL
HDLC SERPL-MCNC: 47 MG/DL (ref 40–60)
HGB UR QL STRIP.AUTO: NEGATIVE
KETONES UR QL STRIP: ABNORMAL
LDLC SERPL CALC-MCNC: 50 MG/DL (ref 0–100)
LDLC/HDLC SERPL: 0.95 {RATIO}
LEUKOCYTE ESTERASE UR QL STRIP.AUTO: NEGATIVE
NITRITE UR QL STRIP: NEGATIVE
PH UR STRIP.AUTO: 6 [PH] (ref 5–8)
POTASSIUM SERPL-SCNC: 4.3 MMOL/L (ref 3.5–5.2)
PROT SERPL-MCNC: 7.9 G/DL (ref 6–8.5)
PROT UR QL STRIP: ABNORMAL
SODIUM SERPL-SCNC: 139 MMOL/L (ref 136–145)
SP GR UR STRIP: >=1.03 (ref 1–1.03)
TRIGL SERPL-MCNC: 156 MG/DL (ref 0–150)
UROBILINOGEN UR QL STRIP: ABNORMAL
VLDLC SERPL-MCNC: 26 MG/DL (ref 5–40)

## 2023-03-10 PROCEDURE — 81003 URINALYSIS AUTO W/O SCOPE: CPT | Performed by: PHYSICIAN ASSISTANT

## 2023-03-10 PROCEDURE — 80061 LIPID PANEL: CPT | Performed by: PHYSICIAN ASSISTANT

## 2023-03-10 PROCEDURE — 80053 COMPREHEN METABOLIC PANEL: CPT | Performed by: PHYSICIAN ASSISTANT

## 2023-03-10 PROCEDURE — 36415 COLL VENOUS BLD VENIPUNCTURE: CPT

## 2023-03-10 RX ORDER — CELECOXIB 100 MG/1
100 CAPSULE ORAL 2 TIMES DAILY
Qty: 60 CAPSULE | Refills: 1 | Status: SHIPPED | OUTPATIENT
Start: 2023-03-10

## 2023-03-10 NOTE — TELEPHONE ENCOUNTER
Pt states that pharmacy still has not received RX for Celebrex.  Can we please resend to [facility], attn : Hardy Amaya, fax# [phone].

## 2023-03-14 ENCOUNTER — TELEPHONE (OUTPATIENT)
Dept: FAMILY MEDICINE CLINIC | Facility: CLINIC | Age: 73
End: 2023-03-14
Payer: MEDICARE

## 2023-03-14 NOTE — TELEPHONE ENCOUNTER
LM to return call about patient getting his new medication sent to the nursing home. Patient s/o (Liane) said they were having issues getting his medication delivered to there.

## 2023-03-15 NOTE — TELEPHONE ENCOUNTER
I have left 2 message for the DON [facility]. This regarding patient getting his medication sent to where he lives. Patient s/o stated that patient having a hard time getting medication sent there. I'm trying to get their help with this matter. Patient provider would like to put him on a medication. I need the information that will help patient get his medication.    Today I called and left a message on the  line. No response yet.

## 2023-03-16 NOTE — TELEPHONE ENCOUNTER
Called and LM for Yuli MARTINEZ of [facility] about his provider want to add a medication called Farxiga

## 2023-03-17 DIAGNOSIS — N18.31 STAGE 3A CHRONIC KIDNEY DISEASE: Primary | ICD-10-CM

## 2023-03-17 RX ORDER — DAPAGLIFLOZIN 10 MG/1
1 TABLET, FILM COATED ORAL DAILY
Qty: 30 TABLET | Refills: 5 | Status: SHIPPED | OUTPATIENT
Start: 2023-03-17

## 2023-03-17 NOTE — TELEPHONE ENCOUNTER
Spoke to the DON of [facility] to figure out a way to send patient medication there. MICHELLE Roldan said she just need the order fax over to her and she will take care of it.   Fax# [phone], Attn: Yuli

## 2023-04-07 ENCOUNTER — OFFICE VISIT (OUTPATIENT)
Dept: FAMILY MEDICINE CLINIC | Facility: CLINIC | Age: 73
End: 2023-04-07
Payer: MEDICARE

## 2023-04-07 VITALS
HEART RATE: 85 BPM | DIASTOLIC BLOOD PRESSURE: 70 MMHG | BODY MASS INDEX: 27.13 KG/M2 | SYSTOLIC BLOOD PRESSURE: 111 MMHG | OXYGEN SATURATION: 94 % | WEIGHT: 175.8 LBS | TEMPERATURE: 97.5 F

## 2023-04-07 DIAGNOSIS — Z23 IMMUNIZATION DUE: ICD-10-CM

## 2023-04-07 DIAGNOSIS — Z00.00 MEDICARE ANNUAL WELLNESS VISIT, SUBSEQUENT: Primary | ICD-10-CM

## 2023-04-07 NOTE — PROGRESS NOTES
The ABCs of the Annual Wellness Visit  Subsequent Medicare Wellness Visit    Arina Wray is a 72 y.o. male who presents for a Subsequent Medicare Wellness Visit.    The following portions of the patient's history were reviewed and   updated as appropriate: allergies, current medications, past family history, past medical history, past social history, past surgical history and problem list.    Compared to one year ago, the patient feels his physical   health is better.    Compared to one year ago, the patient feels his mental   health is the same.    Recent Hospitalizations:  He was not admitted to the hospital during the last year.       Current Medical Providers:  Patient Care Team:  Kajal Horowitz PA as PCP - General (Physician Assistant)    Outpatient Medications Prior to Visit   Medication Sig Dispense Refill   • atenolol (TENORMIN) 25 MG tablet      • atorvastatin (LIPITOR) 20 MG tablet Take 1 tablet by mouth Daily. 90 tablet 1   • celecoxib (CeleBREX) 100 MG capsule Take 1 capsule by mouth 2 (Two) Times a Day. 60 capsule 1   • Cholecalciferol (Vitamin D3) 50 MCG (2000 UT) capsule      • cilostazol (PLETAL) 100 MG tablet Take 1 tablet by mouth 2 (Two) Times a Day. 60 tablet 11   • dapagliflozin Propanediol (Farxiga) 10 MG tablet Take 10 mg by mouth Daily. 30 tablet 5   • docusate sodium (COLACE) 100 MG capsule      • donepezil (ARICEPT) 23 MG tablet      • memantine (NAMENDA XR) 28 MG capsule sustained-release 24 hr extended release capsule      • Omega-3 Fatty Acids (fish oil) 1000 MG capsule capsule Take  by mouth Daily With Breakfast.     • risperiDONE (risperDAL) 0.25 MG tablet      • risperiDONE (risperDAL) 0.5 MG tablet      • tamsulosin (FLOMAX) 0.4 MG capsule 24 hr capsule        Facility-Administered Medications Prior to Visit   Medication Dose Route Frequency Provider Last Rate Last Admin   • aspirin EC tablet 81 mg  81 mg Oral Daily David Carson MD           No opioid  "medication identified on active medication list. I have reviewed chart for other potential  high risk medication/s and harmful drug interactions in the elderly.          Aspirin is on active medication list. Aspirin use is indicated based on review of current medical condition/s. Pros and cons of this therapy have been discussed today. Benefits of this medication outweigh potential harm.  Patient has been encouraged to continue taking this medication.  .      Patient Active Problem List   Diagnosis   • Back pain   • Wedge compression fracture of second lumbar vertebra, subsequent encounter for fracture with routine healing     Advance Care Planning   Advance Care Planning     Advance Directive is not on file.  ACP discussion was held with the patient during this visit. Patient does not have an advance directive, information provided.     Objective    Vitals:    04/07/23 1258   BP: 111/70   BP Location: Left arm   Patient Position: Sitting   Cuff Size: Large Adult   Pulse: 85   Temp: 97.5 °F (36.4 °C)   TempSrc: Temporal   SpO2: 94%   Weight: 79.7 kg (175 lb 12.8 oz)     Estimated body mass index is 27.13 kg/m² as calculated from the following:    Height as of 1/13/23: 171.5 cm (67.5\").    Weight as of this encounter: 79.7 kg (175 lb 12.8 oz).    BMI is >= 25 and <30. (Overweight) The following options were offered after discussion;: exercise counseling/recommendations and nutrition counseling/recommendations      Does the patient have evidence of cognitive impairment?   Yes: Continue current medications.    Lab Results   Component Value Date    TRIG 156 (H) 03/10/2023    HDL 47 03/10/2023    LDL 50 03/10/2023    VLDL 26 03/10/2023          HEALTH RISK ASSESSMENT    Smoking Status:  Social History     Tobacco Use   Smoking Status Never   Smokeless Tobacco Never     Alcohol Consumption:  Social History     Substance and Sexual Activity   Alcohol Use Yes     Fall Risk Screen:    SHUKRIADI Fall Risk Assessment was " completed, and patient is at LOW risk for falls.Assessment completed on:4/7/2023    Depression Screening:  PHQ-2/PHQ-9 Depression Screening 4/7/2023   Little Interest or Pleasure in Doing Things 0-->not at all   Feeling Down, Depressed or Hopeless 0-->not at all   PHQ-9: Brief Depression Severity Measure Score 0       Health Habits and Functional and Cognitive Screening:  Functional & Cognitive Status 4/7/2023   Do you have difficulty preparing food and eating? Yes   Do you have difficulty bathing yourself, getting dressed or grooming yourself? No   Do you have difficulty using the toilet? No   Do you have difficulty moving around from place to place? No   Do you have trouble with steps or getting out of a bed or a chair? No   Dental Exam Up to date        Dental Exam Comment wears dentures   Eye Exam Not up to date   Exercise (times per week) 0 times per week   Current Exercises Include Walking   Do you need help using the phone?  No   Are you deaf or do you have serious difficulty hearing?  No   Do you need help with transportation? No   Do you need help shopping? Yes   Do you need help preparing meals?  Yes   Do you need help with housework?  Yes   Do you need help with laundry? Yes   Do you need help taking your medications? Yes   Do you need help managing money? Yes   Do you ever drive or ride in a car without wearing a seat belt? No   Have you felt unusual stress, anger or loneliness in the last month? No   Who do you live with? Community   If you need help, do you have trouble finding someone available to you? No   Have you been bothered in the last four weeks by sexual problems? No   Do you have difficulty concentrating, remembering or making decisions? Yes       Age-appropriate Screening Schedule:  Refer to the list below for future screening recommendations based on patient's age, sex and/or medical conditions. Orders for these recommended tests are listed in the plan section. The patient has been provided  with a written plan.    Health Maintenance   Topic Date Due   • TDAP/TD VACCINES (1 - Tdap) Never done   • ZOSTER VACCINE (1 of 2) Never done   • HEPATITIS C SCREENING  Never done   • ANNUAL WELLNESS VISIT  Never done   • COVID-19 Vaccine (4 - Booster) 07/22/2022   • INFLUENZA VACCINE  08/01/2023   • LIPID PANEL  03/10/2024   • COLORECTAL CANCER SCREENING  01/20/2027   • Pneumococcal Vaccine 65+  Completed                  CMS Preventative Services Quick Reference  Risk Factors Identified During Encounter:    Immunizations Discussed/Encouraged: Tdap, Prevnar 20 (Pneumococcal 20-valent conjugate), Shingrix and COVID19    The above risks/problems have been discussed with the patient.  Pertinent information has been shared with the patient in the After Visit Summary.    Diagnoses and all orders for this visit:    1. Medicare annual wellness visit, subsequent (Primary)  Comments:  Pt is doing well without any current complaints.  Recommended staying as active as possible for 150 minutes per week and healthy diet.      2. Immunization due  Comments:  Updated on pneumonia vaccine today.  Orders:  -     Pneumococcal Conjugate Vaccine 20-Valent All        Follow Up:   Next Medicare Wellness visit to be scheduled in 1 year.      An After Visit Summary and PPPS were made available to the patient.

## 2023-04-13 ENCOUNTER — TELEPHONE (OUTPATIENT)
Dept: FAMILY MEDICINE CLINIC | Facility: CLINIC | Age: 73
End: 2023-04-13
Payer: MEDICARE

## 2023-04-13 NOTE — TELEPHONE ENCOUNTER
Pts wife says that you needed to know the pharmacy.. it is     [facility]   [address] [place], [place]    Phone# [phone]

## 2023-06-07 ENCOUNTER — HOSPITAL ENCOUNTER (OUTPATIENT)
Dept: CARDIOLOGY | Facility: HOSPITAL | Age: 73
End: 2023-06-07
Payer: MEDICARE

## 2023-06-07 ENCOUNTER — HOSPITAL ENCOUNTER (OUTPATIENT)
Dept: CARDIOLOGY | Facility: HOSPITAL | Age: 73
Discharge: HOME | End: 2023-06-07
Admitting: SURGERY
Payer: MEDICARE

## 2023-06-07 DIAGNOSIS — I73.9 PERIPHERAL VASCULAR DISEASE, UNSPECIFIED: ICD-10-CM

## 2023-06-07 LAB
BH CV LOWER ARTERIAL LEFT ABI RATIO: 0.57
BH CV LOWER ARTERIAL LEFT CALF RATIO: 0.58
BH CV LOWER ARTERIAL LEFT DORSALIS PEDIS SYS MAX: 65
BH CV LOWER ARTERIAL LEFT GREAT TOE SYS MAX: 108
BH CV LOWER ARTERIAL LEFT LOW THIGH RATIO: 0.87
BH CV LOWER ARTERIAL LEFT LOW THIGH SYS MAX: 111
BH CV LOWER ARTERIAL LEFT POPLITEAL SYS MAX: 74
BH CV LOWER ARTERIAL LEFT POST TIBIAL SYS MAX: 73
BH CV LOWER ARTERIAL LEFT TBI RATIO: 0.85
BH CV LOWER ARTERIAL RIGHT ABI RATIO: 0.49
BH CV LOWER ARTERIAL RIGHT CALF RATIO: 0.94
BH CV LOWER ARTERIAL RIGHT DORSALIS PEDIS SYS MAX: 18
BH CV LOWER ARTERIAL RIGHT GREAT TOE SYS MAX: 71
BH CV LOWER ARTERIAL RIGHT LOW THIGH RATIO: 1.59
BH CV LOWER ARTERIAL RIGHT LOW THIGH SYS MAX: 202
BH CV LOWER ARTERIAL RIGHT POPLITEAL SYS MAX: 120
BH CV LOWER ARTERIAL RIGHT POST TIBIAL SYS MAX: 62
BH CV LOWER ARTERIAL RIGHT TBI RATIO: 0.56
UPPER ARTERIAL LEFT ARM BRACHIAL SYS MAX: 127 MMHG
UPPER ARTERIAL RIGHT ARM BRACHIAL SYS MAX: 104 MMHG

## 2023-06-07 PROCEDURE — 93923 UPR/LXTR ART STDY 3+ LVLS: CPT

## 2023-06-14 ENCOUNTER — TRANSCRIBE ORDERS (OUTPATIENT)
Dept: ADMINISTRATIVE | Facility: HOSPITAL | Age: 73
End: 2023-06-14
Payer: MEDICARE

## 2023-06-14 ENCOUNTER — APPOINTMENT (OUTPATIENT)
Dept: VASCULAR SURGERY | Facility: HOSPITAL | Age: 73
End: 2023-06-14
Payer: MEDICARE

## 2023-06-14 DIAGNOSIS — I73.9 PERIPHERAL VASCULAR DISEASE, UNSPECIFIED: Primary | ICD-10-CM

## 2023-06-14 PROCEDURE — G0463 HOSPITAL OUTPT CLINIC VISIT: HCPCS

## 2023-10-12 ENCOUNTER — LAB (OUTPATIENT)
Dept: FAMILY MEDICINE CLINIC | Facility: CLINIC | Age: 73
End: 2023-10-12
Payer: MEDICARE

## 2023-10-12 ENCOUNTER — OFFICE VISIT (OUTPATIENT)
Dept: FAMILY MEDICINE CLINIC | Facility: CLINIC | Age: 73
End: 2023-10-12
Payer: MEDICARE

## 2023-10-12 VITALS
RESPIRATION RATE: 18 BRPM | HEIGHT: 68 IN | SYSTOLIC BLOOD PRESSURE: 103 MMHG | BODY MASS INDEX: 24.86 KG/M2 | HEART RATE: 101 BPM | DIASTOLIC BLOOD PRESSURE: 69 MMHG | WEIGHT: 164 LBS | TEMPERATURE: 98.7 F

## 2023-10-12 DIAGNOSIS — G30.0 MODERATE EARLY ONSET ALZHEIMER'S DEMENTIA WITHOUT BEHAVIORAL DISTURBANCE, PSYCHOTIC DISTURBANCE, MOOD DISTURBANCE, OR ANXIETY: ICD-10-CM

## 2023-10-12 DIAGNOSIS — R35.0 FREQUENT URINATION: ICD-10-CM

## 2023-10-12 DIAGNOSIS — F02.B0 MODERATE EARLY ONSET ALZHEIMER'S DEMENTIA WITHOUT BEHAVIORAL DISTURBANCE, PSYCHOTIC DISTURBANCE, MOOD DISTURBANCE, OR ANXIETY: ICD-10-CM

## 2023-10-12 DIAGNOSIS — G89.29 CHRONIC BILATERAL LOW BACK PAIN WITHOUT SCIATICA: Primary | ICD-10-CM

## 2023-10-12 DIAGNOSIS — Z12.5 SCREENING FOR PROSTATE CANCER: ICD-10-CM

## 2023-10-12 DIAGNOSIS — M51.36 DEGENERATIVE DISC DISEASE, LUMBAR: ICD-10-CM

## 2023-10-12 DIAGNOSIS — I73.9 CLAUDICATION: ICD-10-CM

## 2023-10-12 DIAGNOSIS — M54.50 CHRONIC BILATERAL LOW BACK PAIN WITHOUT SCIATICA: Primary | ICD-10-CM

## 2023-10-12 DIAGNOSIS — N18.31 STAGE 3A CHRONIC KIDNEY DISEASE: ICD-10-CM

## 2023-10-12 PROBLEM — M51.369 DEGENERATIVE DISC DISEASE, LUMBAR: Status: ACTIVE | Noted: 2023-10-12

## 2023-10-12 LAB
ALBUMIN SERPL-MCNC: 4.6 G/DL (ref 3.5–5.2)
ALBUMIN/GLOB SERPL: 1.4 G/DL
ALP SERPL-CCNC: 108 U/L (ref 39–117)
ALT SERPL W P-5'-P-CCNC: 19 U/L (ref 1–41)
ANION GAP SERPL CALCULATED.3IONS-SCNC: 11.8 MMOL/L (ref 5–15)
AST SERPL-CCNC: 25 U/L (ref 1–40)
BILIRUB BLD-MCNC: ABNORMAL MG/DL
BILIRUB SERPL-MCNC: 0.7 MG/DL (ref 0–1.2)
BUN SERPL-MCNC: 13 MG/DL (ref 8–23)
BUN/CREAT SERPL: 9.2 (ref 7–25)
CALCIUM SPEC-SCNC: 10 MG/DL (ref 8.6–10.5)
CHLORIDE SERPL-SCNC: 103 MMOL/L (ref 98–107)
CLARITY, POC: CLEAR
CO2 SERPL-SCNC: 23.2 MMOL/L (ref 22–29)
COLOR UR: YELLOW
CREAT SERPL-MCNC: 1.42 MG/DL (ref 0.76–1.27)
EGFRCR SERPLBLD CKD-EPI 2021: 52.8 ML/MIN/1.73
EXPIRATION DATE: ABNORMAL
GLOBULIN UR ELPH-MCNC: 3.2 GM/DL
GLUCOSE SERPL-MCNC: 85 MG/DL (ref 65–99)
GLUCOSE UR STRIP-MCNC: ABNORMAL MG/DL
KETONES UR QL: NEGATIVE
LEUKOCYTE EST, POC: NEGATIVE
Lab: ABNORMAL
NITRITE UR-MCNC: NEGATIVE MG/ML
PH UR: 5 [PH] (ref 5–8)
POTASSIUM SERPL-SCNC: 4.6 MMOL/L (ref 3.5–5.2)
PROT SERPL-MCNC: 7.8 G/DL (ref 6–8.5)
PROT UR STRIP-MCNC: ABNORMAL MG/DL
PSA SERPL-MCNC: 0.39 NG/ML (ref 0–4)
RBC # UR STRIP: NEGATIVE /UL
SODIUM SERPL-SCNC: 138 MMOL/L (ref 136–145)
SP GR UR: 1.2 (ref 1–1.03)
UROBILINOGEN UR QL: NORMAL

## 2023-10-12 PROCEDURE — 81003 URINALYSIS AUTO W/O SCOPE: CPT | Performed by: PHYSICIAN ASSISTANT

## 2023-10-12 PROCEDURE — 36415 COLL VENOUS BLD VENIPUNCTURE: CPT | Performed by: PHYSICIAN ASSISTANT

## 2023-10-12 PROCEDURE — G0103 PSA SCREENING: HCPCS | Performed by: PHYSICIAN ASSISTANT

## 2023-10-12 PROCEDURE — 1159F MED LIST DOCD IN RCRD: CPT | Performed by: PHYSICIAN ASSISTANT

## 2023-10-12 PROCEDURE — 1160F RVW MEDS BY RX/DR IN RCRD: CPT | Performed by: PHYSICIAN ASSISTANT

## 2023-10-12 PROCEDURE — 99214 OFFICE O/P EST MOD 30 MIN: CPT | Performed by: PHYSICIAN ASSISTANT

## 2023-10-12 PROCEDURE — 80053 COMPREHEN METABOLIC PANEL: CPT | Performed by: PHYSICIAN ASSISTANT

## 2023-10-12 NOTE — PROGRESS NOTES
Arina Wray is a 73 y.o. male.     History of Present Illness  Pt presents to follow up on his leg pain and back pain. He has been seeing vascular but is unsure when he sees them next.  He was prescribed pletal but not sure if he is taking it or not.  Will called Levi to confirm medication list and pharmacy address since meds have been sent to two different [facility] pharmacy locations. Will resend anything needed at that time.    He continues to have mainly left leg pain and lower back pain. Pain is burning in nature. He does get pain that is worse when walking and does improve some after rest.  He also complains of numbness and tingling of left foot.    Last MRI lumbar:  MRI LUMBAR SPINE WO CONTRAST     Date of Exam: 2/22/2023 1:30 PM EST     Indication: wedge compression deformity lumbar L2.     Comparison: X-ray lumbar spine January 16, 2023     Technique:  Routine multiplanar/multisequence sequence images of the lumbar spine were obtained without contrast administration.       Findings:  There is a wedge compression deformity of L2 of 46%. The signal within the marrow of this area suggests that this is more chronic. The conus is around the T12-L1 level. There is signal in the L4 vertebral body on the right that could relate to   hemangioma.     T12-L1: There is no definite disc herniation or intervertebral foraminal stenosis.     L1-L2: There is bulging of the annulus. The intervertebral foramina appear patent. There is some facet arthropathy.     L2-L3: There is minimal bulging of the annulus. The intervertebral foramina appear patent. There is some facet arthropathy.     L3-L4: No definite disc herniation or intervertebral foraminal stenosis. There is some facet arthropathy.     L4-L5: There is at least mild narrowing of the right intervertebral foramina. The left intervertebral foramen appears patent. There is some facet arthropathy. There is no large disc herniation.     L5-S1: There is  broad-based bulging of the annulus superimposed on probably more of an osseous bar. This extends asymmetrically laterally to the left where there may be a marginal osteophyte. There is moderate narrowing of the left intervertebral   foramina. There is mild narrowing of the right intervertebral foramina.     IMPRESSION:  Impression:  1.Wedge compression deformity of L2 that appears more chronic.  2.Underlying degenerative changes as noted.     Electronically Signed: Rupert Haile    2/22/2023 3:56 PM EST    Workstation ID: [id number]           Past Medical History:   Diagnosis Date    Dementia      No past surgical history on file.  No family history on file.  Social History     Socioeconomic History    Marital status: Single   Tobacco Use    Smoking status: Former     Types: Cigarettes    Smokeless tobacco: Never   Vaping Use    Vaping Use: Never used   Substance and Sexual Activity    Alcohol use: Yes    Drug use: Never    Sexual activity: Defer         Current Outpatient Medications:     atenolol (TENORMIN) 25 MG tablet, , Disp: , Rfl:     atorvastatin (LIPITOR) 20 MG tablet, Take 1 tablet by mouth Daily., Disp: 90 tablet, Rfl: 1    celecoxib (CeleBREX) 100 MG capsule, Take 1 capsule by mouth 2 (Two) Times a Day., Disp: 60 capsule, Rfl: 1    Cholecalciferol (Vitamin D3) 50 MCG (2000 UT) capsule, , Disp: , Rfl:     cilostazol (PLETAL) 100 MG tablet, Take 1 tablet by mouth 2 (Two) Times a Day., Disp: 60 tablet, Rfl: 11    dapagliflozin Propanediol (Farxiga) 10 MG tablet, Take 10 mg by mouth Daily., Disp: 30 tablet, Rfl: 5    docusate sodium (COLACE) 100 MG capsule, , Disp: , Rfl:     donepezil (ARICEPT) 23 MG tablet, , Disp: , Rfl:     memantine (NAMENDA XR) 28 MG capsule sustained-release 24 hr extended release capsule, , Disp: , Rfl:     Omega-3 Fatty Acids (fish oil) 1000 MG capsule capsule, Take  by mouth Daily With Breakfast., Disp: , Rfl:     risperiDONE (risperDAL) 0.25 MG tablet, , Disp: , Rfl:     risperiDONE  "(risperDAL) 0.5 MG tablet, , Disp: , Rfl:     tamsulosin (FLOMAX) 0.4 MG capsule 24 hr capsule, , Disp: , Rfl:     Current Facility-Administered Medications:     aspirin EC tablet 81 mg, 81 mg, Oral, Daily, David Carson MD    Review of Systems   Constitutional:  Negative for activity change, appetite change, chills, diaphoresis, fatigue, fever, unexpected weight gain and unexpected weight loss.   Eyes:  Negative for visual disturbance.   Respiratory:  Negative for chest tightness, shortness of breath and wheezing.    Cardiovascular:  Negative for chest pain, palpitations and leg swelling.   Gastrointestinal:  Negative for abdominal pain, constipation, diarrhea, nausea and vomiting.   Genitourinary:  Positive for nocturia. Negative for difficulty urinating and dysuria.   Musculoskeletal:  Positive for arthralgias and back pain.   Neurological:  Positive for memory problem. Negative for dizziness, tremors, weakness, light-headedness, headache and confusion.   Psychiatric/Behavioral:  Negative for sleep disturbance and depressed mood. The patient is not nervous/anxious.      /69 (BP Location: Left arm, Patient Position: Sitting, Cuff Size: Adult)   Pulse 101   Temp 98.7 øF (37.1 øC) (Temporal)   Resp 18   Ht 171.5 cm (67.52\")   Wt 74.4 kg (164 lb)   BMI 25.29 kg/mý       Objective   Physical Exam  Vitals and nursing note reviewed.   Constitutional:       Appearance: Normal appearance.   HENT:      Head: Normocephalic and atraumatic.   Eyes:      Conjunctiva/sclera: Conjunctivae normal.      Pupils: Pupils are equal, round, and reactive to light.   Neck:      Thyroid: No thyroid mass, thyromegaly or thyroid tenderness.      Vascular: No carotid bruit.   Cardiovascular:      Rate and Rhythm: Normal rate and regular rhythm.      Heart sounds: Normal heart sounds.   Pulmonary:      Effort: Pulmonary effort is normal.      Breath sounds: Normal breath sounds.   Musculoskeletal:         General: Normal range " of motion.      Cervical back: Normal range of motion and neck supple.      Lumbar back: Tenderness present. Decreased range of motion.      Right lower leg: No edema.      Left lower leg: No edema.   Skin:     General: Skin is warm and dry.   Neurological:      General: No focal deficit present.      Mental Status: He is alert and oriented to person, place, and time.   Psychiatric:         Mood and Affect: Mood normal.         Behavior: Behavior normal.         Thought Content: Thought content normal.         Procedures     Assessment    Diagnoses and all orders for this visit:    1. Chronic bilateral low back pain without sciatica (Primary)  Comments:  Pt would benefit from a lift chair due to difficulty going from sitting to standing with his chronic low back pain from degenerative disc disease.  Orders:  -     Patient Lift    2. Degenerative disc disease, lumbar  Comments:  Pt would benefit from a lift chair due to difficulty going from sitting to standing with his chronic low back pain from degenerative disc disease.  Orders:  -     Patient Lift    3. Stage 3a chronic kidney disease  Comments:  Will recheck labs.  Orders:  -     Comprehensive metabolic panel    4. Frequent urination  Comments:  Will check urine today.  Orders:  -     POCT urinalysis dipstick, automated    5. Moderate early onset Alzheimer's dementia without behavioral disturbance, psychotic disturbance, mood disturbance, or anxiety  -     Ambulatory Referral to Neurology    6. Claudication  Comments:  Possibly vascular vs neurogenic from lumbar disc disease.  Will see if he is taking the pletal.  If not, will send it.  If he is taking it, will add lyrica.  Orders:  -     Patient Lift    7. Screening for prostate cancer  Comments:  Will check labs .  Orders:  -     PSA SCREENING                I spent 34 minutes caring for Oscar on this date of service. This time includes time spent by me in the following activities: preparing for the visit,  reviewing tests, obtaining and/or reviewing a separately obtained history, performing a medically appropriate examination and/or evaluation, counseling and educating the patient/family/caregiver, ordering medications, tests, or procedures, referring and communicating with other health care professionals, and documenting information in the medical record.

## 2023-10-16 DIAGNOSIS — M79.2 NEUROPATHIC PAIN: Primary | ICD-10-CM

## 2023-10-16 RX ORDER — PREGABALIN 25 MG/1
25 CAPSULE ORAL 2 TIMES DAILY
Qty: 60 CAPSULE | Refills: 2 | Status: SHIPPED | OUTPATIENT
Start: 2023-10-16

## 2023-10-17 ENCOUNTER — TELEPHONE (OUTPATIENT)
Dept: FAMILY MEDICINE CLINIC | Facility: CLINIC | Age: 73
End: 2023-10-17
Payer: MEDICARE

## 2023-10-17 NOTE — TELEPHONE ENCOUNTER
Caller: GINA COLLAZO    Relationship: Emergency Contact    Best call back number:     [phone] (       What was the call regarding: RETURNED CALL TO YOSEF

## 2023-12-27 ENCOUNTER — HOSPITAL ENCOUNTER (OUTPATIENT)
Dept: CARDIOLOGY | Facility: HOSPITAL | Age: 73
Discharge: HOME | End: 2023-12-27
Payer: MEDICARE

## 2023-12-27 ENCOUNTER — HOSPITAL ENCOUNTER (INPATIENT)
Facility: HOSPITAL | Age: 73
LOS: 12 days | Discharge: NURSING, REHAB OR LONG-TERM CARE | DRG: 271 | End: 2024-01-08
Attending: EMERGENCY MEDICINE | Admitting: INTERNAL MEDICINE
Payer: MEDICARE

## 2023-12-27 ENCOUNTER — APPOINTMENT (OUTPATIENT)
Dept: CARDIOLOGY | Facility: HOSPITAL | Age: 73
DRG: 271 | End: 2023-12-27
Payer: MEDICARE

## 2023-12-27 ENCOUNTER — APPOINTMENT (OUTPATIENT)
Dept: CT IMAGING | Facility: HOSPITAL | Age: 73
DRG: 271 | End: 2023-12-27
Payer: MEDICARE

## 2023-12-27 DIAGNOSIS — I73.9 PERIPHERAL VASCULAR DISEASE, UNSPECIFIED: ICD-10-CM

## 2023-12-27 DIAGNOSIS — I73.9 PVD (PERIPHERAL VASCULAR DISEASE): ICD-10-CM

## 2023-12-27 DIAGNOSIS — I99.8 ISCHEMIA OF BOTH LOWER EXTREMITIES: ICD-10-CM

## 2023-12-27 DIAGNOSIS — I77.9 PERIPHERAL ARTERIAL OCCLUSIVE DISEASE: Primary | ICD-10-CM

## 2023-12-27 PROBLEM — I70.223 CRITICAL LIMB ISCHEMIA OF BOTH LOWER EXTREMITIES: Status: ACTIVE | Noted: 2023-12-27

## 2023-12-27 LAB
ALBUMIN SERPL-MCNC: 4.3 G/DL (ref 3.5–5.2)
ALBUMIN/GLOB SERPL: 1.3 G/DL
ALP SERPL-CCNC: 106 U/L (ref 39–117)
ALT SERPL W P-5'-P-CCNC: 13 U/L (ref 1–41)
ANION GAP SERPL CALCULATED.3IONS-SCNC: 15 MMOL/L (ref 5–15)
APTT PPP: 113 SECONDS (ref 61–76.5)
APTT PPP: 31.6 SECONDS (ref 61–76.5)
AST SERPL-CCNC: 18 U/L (ref 1–40)
BASOPHILS # BLD AUTO: 0.1 10*3/MM3 (ref 0–0.2)
BASOPHILS NFR BLD AUTO: 0.8 % (ref 0–1.5)
BH CV LOWER ARTERIAL LEFT ABI RATIO: 0
BH CV LOWER ARTERIAL LEFT DORSALIS PEDIS SYS MAX: 0
BH CV LOWER ARTERIAL LEFT GREAT TOE SYS MAX: 0
BH CV LOWER ARTERIAL LEFT POST TIBIAL SYS MAX: 0
BH CV LOWER ARTERIAL LEFT TBI RATIO: 0
BH CV LOWER ARTERIAL RIGHT ABI RATIO: 0.2
BH CV LOWER ARTERIAL RIGHT DORSALIS PEDIS SYS MAX: 0
BH CV LOWER ARTERIAL RIGHT GREAT TOE SYS MAX: 0
BH CV LOWER ARTERIAL RIGHT POST TIBIAL SYS MAX: 33
BH CV LOWER ARTERIAL RIGHT TBI RATIO: 0
BH CV XLRA MEAS - DIST GSV CALF DIST LEFT: 0.32 CM
BH CV XLRA MEAS - DIST GSV CALF DIST RIGHT: 0.3 CM
BH CV XLRA MEAS - DIST GSV THIGH DIST LEFT: 0.41 CM
BH CV XLRA MEAS - DIST GSV THIGH DIST RIGHT: 0.25 CM
BH CV XLRA MEAS - MID GSV CALF LEFT: 0.28 CM
BH CV XLRA MEAS - MID GSV CALF RIGHT: 0.25 CM
BH CV XLRA MEAS - MID GSV THIGH  LEFT: 0.33 CM
BH CV XLRA MEAS - MID GSV THIGH  RIGHT: 0.33 CM
BH CV XLRA MEAS - PROX GSV CALF DIST LEFT: 0.28 CM
BH CV XLRA MEAS - PROX GSV CALF DIST RIGHT: 0.24 CM
BH CV XLRA MEAS - PROX GSV THIGH  LEFT: 0.38 CM
BH CV XLRA MEAS - PROX GSV THIGH  RIGHT: 0.41 CM
BILIRUB SERPL-MCNC: 0.6 MG/DL (ref 0–1.2)
BUN SERPL-MCNC: 15 MG/DL (ref 8–23)
BUN/CREAT SERPL: 10 (ref 7–25)
CALCIUM SPEC-SCNC: 9.7 MG/DL (ref 8.6–10.5)
CHLORIDE SERPL-SCNC: 97 MMOL/L (ref 98–107)
CO2 SERPL-SCNC: 23 MMOL/L (ref 22–29)
CREAT SERPL-MCNC: 1.5 MG/DL (ref 0.76–1.27)
DEPRECATED RDW RBC AUTO: 44.6 FL (ref 37–54)
EGFRCR SERPLBLD CKD-EPI 2021: 49.5 ML/MIN/1.73
EOSINOPHIL # BLD AUTO: 0.1 10*3/MM3 (ref 0–0.4)
EOSINOPHIL NFR BLD AUTO: 0.6 % (ref 0.3–6.2)
ERYTHROCYTE [DISTWIDTH] IN BLOOD BY AUTOMATED COUNT: 14.9 % (ref 12.3–15.4)
GLOBULIN UR ELPH-MCNC: 3.3 GM/DL
GLUCOSE SERPL-MCNC: 134 MG/DL (ref 65–99)
HBA1C MFR BLD: 5.6 % (ref 4.8–5.6)
HCT VFR BLD AUTO: 43.9 % (ref 37.5–51)
HGB BLD-MCNC: 14.3 G/DL (ref 13–17.7)
HOLD SPECIMEN: NORMAL
INR PPP: 1.03 (ref 0.93–1.1)
LYMPHOCYTES # BLD AUTO: 0.9 10*3/MM3 (ref 0.7–3.1)
LYMPHOCYTES NFR BLD AUTO: 6.5 % (ref 19.6–45.3)
MCH RBC QN AUTO: 28.3 PG (ref 26.6–33)
MCHC RBC AUTO-ENTMCNC: 32.5 G/DL (ref 31.5–35.7)
MCV RBC AUTO: 87 FL (ref 79–97)
MONOCYTES # BLD AUTO: 1 10*3/MM3 (ref 0.1–0.9)
MONOCYTES NFR BLD AUTO: 7.1 % (ref 5–12)
NEUTROPHILS NFR BLD AUTO: 12.1 10*3/MM3 (ref 1.7–7)
NEUTROPHILS NFR BLD AUTO: 85 % (ref 42.7–76)
NRBC BLD AUTO-RTO: 0.1 /100 WBC (ref 0–0.2)
PLATELET # BLD AUTO: 646 10*3/MM3 (ref 140–450)
PMV BLD AUTO: 7.6 FL (ref 6–12)
POTASSIUM SERPL-SCNC: 4.2 MMOL/L (ref 3.5–5.2)
PROT SERPL-MCNC: 7.6 G/DL (ref 6–8.5)
PROTHROMBIN TIME: 11.2 SECONDS (ref 9.6–11.7)
RBC # BLD AUTO: 5.05 10*6/MM3 (ref 4.14–5.8)
SODIUM SERPL-SCNC: 135 MMOL/L (ref 136–145)
UPPER ARTERIAL LEFT ARM BRACHIAL SYS MAX: 169
UPPER ARTERIAL RIGHT ARM BRACHIAL SYS MAX: 152
WBC NRBC COR # BLD AUTO: 14.3 10*3/MM3 (ref 3.4–10.8)

## 2023-12-27 PROCEDURE — 83036 HEMOGLOBIN GLYCOSYLATED A1C: CPT | Performed by: HOSPITALIST

## 2023-12-27 PROCEDURE — 25010000002 HEPARIN (PORCINE) 25000-0.45 UT/250ML-% SOLUTION: Performed by: EMERGENCY MEDICINE

## 2023-12-27 PROCEDURE — 85730 THROMBOPLASTIN TIME PARTIAL: CPT | Performed by: STUDENT IN AN ORGANIZED HEALTH CARE EDUCATION/TRAINING PROGRAM

## 2023-12-27 PROCEDURE — 80053 COMPREHEN METABOLIC PANEL: CPT | Performed by: PHYSICIAN ASSISTANT

## 2023-12-27 PROCEDURE — 85610 PROTHROMBIN TIME: CPT | Performed by: PHYSICIAN ASSISTANT

## 2023-12-27 PROCEDURE — 99285 EMERGENCY DEPT VISIT HI MDM: CPT

## 2023-12-27 PROCEDURE — 85025 COMPLETE CBC W/AUTO DIFF WBC: CPT | Performed by: PHYSICIAN ASSISTANT

## 2023-12-27 PROCEDURE — 25510000001 IOPAMIDOL PER 1 ML: Performed by: EMERGENCY MEDICINE

## 2023-12-27 PROCEDURE — 75635 CT ANGIO ABDOMINAL ARTERIES: CPT

## 2023-12-27 PROCEDURE — 93922 UPR/L XTREMITY ART 2 LEVELS: CPT

## 2023-12-27 PROCEDURE — 93970 EXTREMITY STUDY: CPT

## 2023-12-27 RX ORDER — MULTIPLE VITAMINS W/ MINERALS TAB 9MG-400MCG
1 TAB ORAL DAILY
COMMUNITY

## 2023-12-27 RX ORDER — BISACODYL 5 MG/1
5 TABLET, DELAYED RELEASE ORAL DAILY PRN
Status: DISCONTINUED | OUTPATIENT
Start: 2023-12-27 | End: 2023-12-30

## 2023-12-27 RX ORDER — SODIUM CHLORIDE 0.9 % (FLUSH) 0.9 %
10 SYRINGE (ML) INJECTION AS NEEDED
Status: DISCONTINUED | OUTPATIENT
Start: 2023-12-27 | End: 2024-01-08 | Stop reason: HOSPADM

## 2023-12-27 RX ORDER — ACETAMINOPHEN 325 MG/1
650 TABLET ORAL EVERY 6 HOURS PRN
COMMUNITY

## 2023-12-27 RX ORDER — BISACODYL 10 MG
10 SUPPOSITORY, RECTAL RECTAL DAILY PRN
Status: DISCONTINUED | OUTPATIENT
Start: 2023-12-27 | End: 2023-12-30

## 2023-12-27 RX ORDER — ACETAMINOPHEN 325 MG/1
650 TABLET ORAL EVERY 4 HOURS PRN
Status: DISCONTINUED | OUTPATIENT
Start: 2023-12-27 | End: 2024-01-01

## 2023-12-27 RX ORDER — AMOXICILLIN 250 MG
2 CAPSULE ORAL 2 TIMES DAILY
Status: DISCONTINUED | OUTPATIENT
Start: 2023-12-27 | End: 2023-12-30

## 2023-12-27 RX ORDER — ACETAMINOPHEN 650 MG/1
650 SUPPOSITORY RECTAL EVERY 4 HOURS PRN
Status: DISCONTINUED | OUTPATIENT
Start: 2023-12-27 | End: 2024-01-01

## 2023-12-27 RX ORDER — HEPARIN SODIUM 10000 [USP'U]/100ML
16.8 INJECTION, SOLUTION INTRAVENOUS
Status: DISCONTINUED | OUTPATIENT
Start: 2023-12-27 | End: 2023-12-30 | Stop reason: ALTCHOICE

## 2023-12-27 RX ORDER — SODIUM CHLORIDE 9 MG/ML
40 INJECTION, SOLUTION INTRAVENOUS AS NEEDED
Status: DISCONTINUED | OUTPATIENT
Start: 2023-12-27 | End: 2024-01-08 | Stop reason: HOSPADM

## 2023-12-27 RX ORDER — NITROGLYCERIN 0.4 MG/1
0.4 TABLET SUBLINGUAL
Status: DISCONTINUED | OUTPATIENT
Start: 2023-12-27 | End: 2024-01-04

## 2023-12-27 RX ORDER — SODIUM CHLORIDE 0.9 % (FLUSH) 0.9 %
10 SYRINGE (ML) INJECTION EVERY 12 HOURS SCHEDULED
Status: DISCONTINUED | OUTPATIENT
Start: 2023-12-27 | End: 2024-01-08 | Stop reason: HOSPADM

## 2023-12-27 RX ORDER — POLYETHYLENE GLYCOL 3350 17 G/17G
17 POWDER, FOR SOLUTION ORAL DAILY PRN
Status: DISCONTINUED | OUTPATIENT
Start: 2023-12-27 | End: 2023-12-30

## 2023-12-27 RX ORDER — ACETAMINOPHEN 160 MG/5ML
650 SOLUTION ORAL EVERY 4 HOURS PRN
Status: DISCONTINUED | OUTPATIENT
Start: 2023-12-27 | End: 2024-01-01

## 2023-12-27 RX ORDER — HEPARIN SODIUM 10000 [USP'U]/100ML
16.8 INJECTION, SOLUTION INTRAVENOUS
Status: DISCONTINUED | OUTPATIENT
Start: 2023-12-27 | End: 2023-12-27

## 2023-12-27 RX ADMIN — IOPAMIDOL 100 ML: 755 INJECTION, SOLUTION INTRAVENOUS at 18:08

## 2023-12-27 RX ADMIN — Medication 10 ML: at 21:30

## 2023-12-27 RX ADMIN — HEPARIN SODIUM 16.8 UNITS/KG/HR: 10000 INJECTION, SOLUTION INTRAVENOUS at 17:26

## 2023-12-27 NOTE — ED PROVIDER NOTES
Subjective   Provider in Triage Note  Patient is a 73-year-old male who present to the ED with complaints of bilateral leg pain states has been ongoing but significantly worse today patient did have outpatient ABIs ordered out of facility and came to the ER after the imaging secondary to his pain.  He rates it a 9/10 in severity he denies any discoloration of his legs that he is noticed.  He does report some weakness in his legs that caused him to fall back on [place].  Patient is on no blood thinners.  No chest pain or shortness of breath.  No new swelling.  No injury from this fall.  ABIs were reviewed which showed  Right Sided Findings:  · Right Posterior Tibial: The pulse is detected w/ Doppler.   · Right Dorsalis Pedis: The pulse is 0.   Left Sided Findings:  · Left Posterior Tibial: The pulse is 0.   · Left Dorsalis Pedis: The pulse is 0.     Labs were ordered and vascular consult was placed while in triage.    Due to significant overcrowding in the emergency department patient was initially seen and evaluated in triage.  Provider in triage recommended patient placed in treatment area to initiate therapy and movement to an ER bed as soon as possible.        History of Present Illness  I interviewed the patient HPI and agree with the nurse practitioner provided triage note as noted above  Review of Systems    Past Medical History:   Diagnosis Date    Dementia        No Known Allergies    No past surgical history on file.    No family history on file.    Social History     Socioeconomic History    Marital status: Single   Tobacco Use    Smoking status: Former     Types: Cigarettes    Smokeless tobacco: Never   Vaping Use    Vaping Use: Never used   Substance and Sexual Activity    Alcohol use: Yes    Drug use: Never    Sexual activity: Defer           Objective   Physical Exam  Lungs are clear.  Heart has regular rhythm without murmur.  Abdomen soft.  Extremity exam shows coolness and paleness to both lower  legs with some discoloration of his left foot.  There is no palpable pulses.  Procedures           ED Course  ED Course as of 12/27/23 1704   Wed Dec 27, 2023   1521 Spoke to Dr. Salgado on-call for vascular who states he will come see the patient [AA]      ED Course User Index  [AA] Nancy Pepe PA      Results for orders placed or performed during the hospital encounter of 12/27/23   Comprehensive Metabolic Panel    Specimen: Blood   Result Value Ref Range    Glucose 134 (H) 65 - 99 mg/dL    BUN 15 8 - 23 mg/dL    Creatinine 1.50 (H) 0.76 - 1.27 mg/dL    Sodium 135 (L) 136 - 145 mmol/L    Potassium 4.2 3.5 - 5.2 mmol/L    Chloride 97 (L) 98 - 107 mmol/L    CO2 23.0 22.0 - 29.0 mmol/L    Calcium 9.7 8.6 - 10.5 mg/dL    Total Protein 7.6 6.0 - 8.5 g/dL    Albumin 4.3 3.5 - 5.2 g/dL    ALT (SGPT) 13 1 - 41 U/L    AST (SGOT) 18 1 - 40 U/L    Alkaline Phosphatase 106 39 - 117 U/L    Total Bilirubin 0.6 0.0 - 1.2 mg/dL    Globulin 3.3 gm/dL    A/G Ratio 1.3 g/dL    BUN/Creatinine Ratio 10.0 7.0 - 25.0    Anion Gap 15.0 5.0 - 15.0 mmol/L    eGFR 49.5 (L) >60.0 mL/min/1.73   Protime-INR    Specimen: Blood   Result Value Ref Range    Protime 11.2 9.6 - 11.7 Seconds    INR 1.03 0.93 - 1.10   CBC Auto Differential    Specimen: Blood   Result Value Ref Range    WBC 14.30 (H) 3.40 - 10.80 10*3/mm3    RBC 5.05 4.14 - 5.80 10*6/mm3    Hemoglobin 14.3 13.0 - 17.7 g/dL    Hematocrit 43.9 37.5 - 51.0 %    MCV 87.0 79.0 - 97.0 fL    MCH 28.3 26.6 - 33.0 pg    MCHC 32.5 31.5 - 35.7 g/dL    RDW 14.9 12.3 - 15.4 %    RDW-SD 44.6 37.0 - 54.0 fl    MPV 7.6 6.0 - 12.0 fL    Platelets 646 (H) 140 - 450 10*3/mm3    Neutrophil % 85.0 (H) 42.7 - 76.0 %    Lymphocyte % 6.5 (L) 19.6 - 45.3 %    Monocyte % 7.1 5.0 - 12.0 %    Eosinophil % 0.6 0.3 - 6.2 %    Basophil % 0.8 0.0 - 1.5 %    Neutrophils, Absolute 12.10 (H) 1.70 - 7.00 10*3/mm3    Lymphocytes, Absolute 0.90 0.70 - 3.10 10*3/mm3    Monocytes, Absolute 1.00 (H) 0.10 - 0.90 10*3/mm3     Eosinophils, Absolute 0.10 0.00 - 0.40 10*3/mm3    Basophils, Absolute 0.10 0.00 - 0.20 10*3/mm3    nRBC 0.1 0.0 - 0.2 /100 WBC   aPTT    Specimen: Blood   Result Value Ref Range    PTT 31.6 (L) 61.0 - 76.5 seconds   Gold Top - SST   Result Value Ref Range    Extra Tube Hold for add-ons.    Duplex Vein Mapping Lower Extremity - Bilateral CAR   Result Value Ref Range    PROX GSV THIGH  RIGHT 0.41 cm    MID GSV THIGH  RIGHT 0.33 cm    DIST GSV THIGH DIST RIGHT 0.25 cm    PROX GSV CALF DIST RIGHT 0.24 cm    MID GSV CALF RIGHT 0.25 cm    DIST GSV CALF DIST RIGHT 0.30 cm    PROX GSV THIGH  LEFT 0.38 cm    MID GSV THIGH  LEFT 0.33 cm    DIST GSV THIGH DIST LEFT 0.41 cm    PROX GSV CALF DIST LEFT 0.28 cm    MID GSV CALF LEFT 0.28 cm    DIST GSV CALF DIST LEFT 0.32 cm                                            Medical Decision Making  Patient has MELANIE as noted above with Doppler pulses as noted above.  Patient will have CTA of his aorta with bilateral femoral runoff.  I did speak to the on-call vascular surgeon who recommends admission with IV heparin.  I did speak to the on-call hospitalist.    Amount and/or Complexity of Data Reviewed  Labs: ordered. Decision-making details documented in ED Course.  Radiology: ordered and independent interpretation performed.    Risk  Prescription drug management.  Decision regarding hospitalization.        Final diagnoses:   Peripheral arterial occlusive disease       ED Disposition  ED Disposition       ED Disposition   Decision to Admit    Condition   --    Comment   --               No follow-up provider specified.       Medication List      No changes were made to your prescriptions during this visit.            Isael Thornton MD  12/27/23 [id number]

## 2023-12-27 NOTE — H&P
[facility] Medicine Services  History & Physical    Patient Name: Oscar Wray  : 1950  MRN: [id number]  Primary Care Physician:  Kajal Horowitz PA  Date of admission: 2023  Date and Time of Service: 2023 at 5 pm    Subjective      Chief Complaint: Bilateral lower extremity pain left more than right    History of Present Illness: Oscar Wray is a 73 y.o. male with a CMH of PAD who presented to [facility] on 2023 with pain in the left foot more than right.  ABIs 0 bilaterally but did have some tibial flow noted, however it was quite minimal and is developing ischemic ulcers on toes, is having severe pain at rest.   He has a CTA already ordered by the ER  Vascular surgery has reviewed his prior CTA and he likely will need L femoral endarterectomy and bilateral iliac stents, possible bypass. check vein mapping and plan to get stress test soon as well, started on heparin gtt for now.  Continue home aspirin.   Review of Systems   Unable to perform ROS: Dementia       Personal History     Past Medical History:   Diagnosis Date    Dementia        No past surgical history on file.    Family History: family history is not on file. Otherwise pertinent FHx was reviewed and not pertinent to current issue.    Social History:  reports that he has quit smoking. His smoking use included cigarettes. He has never used smokeless tobacco. He reports current alcohol use. He reports that he does not use drugs.    Home Medications:  Prior to Admission Medications       Prescriptions Last Dose Informant Patient Reported? Taking?    aspirin EC tablet 81 mg   No No    atenolol (TENORMIN) 25 MG tablet   Yes No    atorvastatin (LIPITOR) 20 MG tablet   No No    Take 1 tablet by mouth Daily.    celecoxib (CeleBREX) 100 MG capsule   No No    Take 1 capsule by mouth 2 (Two) Times a Day.    Cholecalciferol (Vitamin D3) 50 MCG (2000) capsule   Yes No    cilostazol (PLETAL) 100 MG tablet   No No     Take 1 tablet by mouth 2 (Two) Times a Day.    dapagliflozin Propanediol (Farxiga) 10 MG tablet   No No    Take 10 mg by mouth Daily.    docusate sodium (COLACE) 100 MG capsule   Yes No    donepezil (ARICEPT) 23 MG tablet   Yes No    memantine (NAMENDA XR) 28 MG capsule sustained-release 24 hr extended release capsule   Yes No    Omega-3 Fatty Acids (fish oil) 1000 MG capsule capsule   Yes No    Take  by mouth Daily With Breakfast.    pregabalin (Lyrica) 25 MG capsule   No No    Take 1 capsule by mouth 2 (Two) Times a Day.    risperiDONE (risperDAL) 0.25 MG tablet   Yes No    risperiDONE (risperDAL) 0.5 MG tablet   Yes No    tamsulosin (FLOMAX) 0.4 MG capsule 24 hr capsule   Yes No              Allergies:  No Known Allergies    Objective      Vitals:   Temp:  [97.3 °F (36.3 °C)] 97.3 °F (36.3 °C)  Heart Rate:  [77-80] 77  Resp:  [20] 20  BP: (102-135)/(59-86) 135/86  Body mass index is 30.9 kg/m².  Physical Exam  Constitutional:       Appearance: Normal appearance. He is obese.   HENT:      Head: Normocephalic.      Right Ear: External ear normal.      Left Ear: External ear normal.   Eyes:      Extraocular Movements: Extraocular movements intact.      Pupils: Pupils are equal, round, and reactive to light.   Cardiovascular:      Heart sounds: Normal heart sounds.   Pulmonary:      Effort: Pulmonary effort is normal.   Abdominal:      General: Abdomen is flat.   Musculoskeletal:      Cervical back: Normal range of motion and neck supple.      Right lower leg: No edema.      Left lower leg: No edema.      Comments: Quality lower extremities/feet, absence of pedal pulses, dusky appearing toes.  Bilaterally   Neurological:      General: No focal deficit present.      Mental Status: He is alert.               Diagnostic Data:  Lab Results (last 24 hours)       Procedure Component Value Units Date/Time    aPTT [[id number]]  (Abnormal) Collected: 12/27/23 1529    Specimen: Blood Updated: 12/27/23 1656     PTT 31.6  seconds     Extra Tubes [[id number]] Collected: 12/27/23 1529    Specimen: Blood, Venous Line Updated: 12/27/23 1631    Narrative:      The following orders were created for panel order Extra Tubes.  Procedure                               Abnormality         Status                     ---------                               -----------         ------                     Gold Top - SST[[id number]]                                   Final result                 Please view results for these tests on the individual orders.    [facility] - SST [[id number]] Collected: 12/27/23 1529    Specimen: Blood Updated: 12/27/23 1631     Extra Tube Hold for add-ons.     Comment: Auto resulted.       Comprehensive Metabolic Panel [[id number]]  (Abnormal) Collected: 12/27/23 1529    Specimen: Blood Updated: 12/27/23 1602     Glucose 134 mg/dL      BUN 15 mg/dL      Creatinine 1.50 mg/dL      Sodium 135 mmol/L      Potassium 4.2 mmol/L      Chloride 97 mmol/L      CO2 23.0 mmol/L      Calcium 9.7 mg/dL      Total Protein 7.6 g/dL      Albumin 4.3 g/dL      ALT (SGPT) 13 U/L      AST (SGOT) 18 U/L      Alkaline Phosphatase 106 U/L      Total Bilirubin 0.6 mg/dL      Globulin 3.3 gm/dL      A/G Ratio 1.3 g/dL      BUN/Creatinine Ratio 10.0     Anion Gap 15.0 mmol/L      eGFR 49.5 mL/min/1.73     Narrative:      GFR Normal >60  Chronic Kidney Disease <60  Kidney Failure <15    The GFR formula is only valid for adults with stable renal function between ages 18 and 70.    Protime-INR [[id number]]  (Normal) Collected: 12/27/23 1529    Specimen: Blood Updated: 12/27/23 1548     Protime 11.2 Seconds      INR 1.03    CBC & Differential [[id number]]  (Abnormal) Collected: 12/27/23 1529    Specimen: Blood Updated: 12/27/23 1544    Narrative:      The following orders were created for panel order CBC & Differential.  Procedure                               Abnormality         Status                     ---------                               -----------          ------                     CBC Auto Differential[[id number]]        Abnormal            Final result                 Please view results for these tests on the individual orders.    CBC Auto Differential [[id number]]  (Abnormal) Collected: 12/27/23 1529    Specimen: Blood Updated: 12/27/23 1544     WBC 14.30 10*3/mm3      RBC 5.05 10*6/mm3      Hemoglobin 14.3 g/dL      Hematocrit 43.9 %      MCV 87.0 fL      MCH 28.3 pg      MCHC 32.5 g/dL      RDW 14.9 %      RDW-SD 44.6 fl      MPV 7.6 fL      Platelets 646 10*3/mm3      Neutrophil % 85.0 %      Lymphocyte % 6.5 %      Monocyte % 7.1 %      Eosinophil % 0.6 %      Basophil % 0.8 %      Neutrophils, Absolute 12.10 10*3/mm3      Lymphocytes, Absolute 0.90 10*3/mm3      Monocytes, Absolute 1.00 10*3/mm3      Eosinophils, Absolute 0.10 10*3/mm3      Basophils, Absolute 0.10 10*3/mm3      nRBC 0.1 /100 WBC              Imaging Results (Last 24 Hours)       ** No results found for the last 24 hours. **              Assessment & Plan        This is a 73 y.o. male with: Critical limb ischemia bilateral lower extremity    Active and Resolved Problems  Active Hospital Problems    Diagnosis  POA    Degenerative disc disease, lumbar [M51.36]  Yes      Resolved Hospital Problems   No resolved problems to display.   Acute on chronic critical limb ischemia bilateral; continue IV heparin and aspirin, vascular surgery following, await CTA results  Dementia; resume home medications when verified  Hyperlipidemia; resume home medications when verified  Hyperglycemia; no history of diabetes, check A1c.  Elevated serum creatinine; KARINA versus CKD, nephrology consulted.      DVT prophylaxis:  Medical DVT prophylaxis orders are present.    The patient desires to be as follows:    CODE STATUS:       Full code    Admission Status:  I believe this patient meets inpatient status.    Expected Length of Stay: 3 to 5 days    PDMP and Medication Dispenses via Sidebar reviewed and consistent  with patient reported medications.    I discussed the patient's findings and my recommendations with patient and family.      Signature:   Electronically signed by Margareth Geiger MD, 12/27/23, 5:27 PM EST.      This document has been electronically signed by Margareth Geiger MD on December 27, 2023 16:56 EST   [facility] Team

## 2023-12-27 NOTE — CONSULTS
Name: Oscar Wray ADMIT: 2023   : 1950  PCP: Kajal Horowitz PA    MRN: [id number] LOS: 0 days   AGE/SEX: 73 y.o. male  ROOM:    [facility]      Patient Care Team:  Kajal Horowitz PA as PCP - General (Physician Assistant)  David Carson MD as Surgeon (Vascular Surgery)  Chief Complaint   Patient presents with    Leg Pain     Pt has bilateral leg weakness, burning to feet pt states it started this morning. Pt had ultrasound on legs today here.  Family brought him over here for the pain. Fall          CC:bilateral leg pain/weakness    Subjective     Inpatient Vascular Surgery Consult  Consult performed by: Nura Salgado II, MD  Consult ordered by: Nancy Pepe PA        Leg Pain       Patient is a pleasant 73-year-old gentleman with a known history of peripheral arterial disease previously with claudication that has progressed to rest pain over the last month.  The patient had previously been seen by one of my partners earlier this year and had routine follow-up ABIs ordered for today.  At the time of his ABIs he was noted to have minimal flow in both feet with severe pain.  Patient was subsequently brought to the emergency room due to the severity of the pain.  The patient reports constant pain in both of his feet slightly worse on the left than the right.  He is also developing ischemic sores on the toes bilaterally. He denies any significatn prior medical history. He denies having any prior vascular interventions/surgeries.     Review of Systems    Past Medical History:   Diagnosis Date    Dementia      No past surgical history on file.  No family history on file.    Social History     Tobacco Use    Smoking status: Former     Types: Cigarettes    Smokeless tobacco: Never   Vaping Use    Vaping Use: Never used   Substance Use Topics    Alcohol use: Yes    Drug use: Never     (Not in a hospital admission)    aspirin EC, 81 mg, Oral, Daily           [COMPLETED] Insert  "Peripheral IV **AND** sodium chloride  Patient has no known allergies.    Objective     Physical Exam:  Physical Exam  Constitutional:       General: He is not in acute distress.  HENT:      Head: Normocephalic and atraumatic.   Eyes:      General: No scleral icterus.  Cardiovascular:      Rate and Rhythm: Normal rate and regular rhythm.      Comments: Monophasic R PT signal present  Unable to insonate L DP or PT signal  Pulmonary:      Effort: Pulmonary effort is normal. No respiratory distress.   Abdominal:      General: There is no distension.      Palpations: Abdomen is soft.      Tenderness: There is no abdominal tenderness.   Musculoskeletal:         General: No swelling.   Skin:     Comments: Legs equally warm to the ankle, L foot slightly cooler than right.   Pallid upon removal of socks but color improves thereafter. Toes mildly cyanotic bilaterally but worse on left than right.    Neurological:      Mental Status: He is alert.      Comments: 5/5 strength on plantarflexion and dorsiflexion bilaterally.   Sensation intact to light touch in both feet.    Psychiatric:         Mood and Affect: Mood normal.         Behavior: Behavior normal.                   Vital Signs and Labs:  Vital Signs Patient Vitals for the past 24 hrs:   BP Temp Temp src Pulse Resp SpO2 Height Weight   12/27/23 1617 -- -- -- 77 -- 95 % -- --   12/27/23 1616 135/86 -- -- -- -- -- -- --   12/27/23 1602 -- -- -- 80 -- 94 % -- --   12/27/23 1601 102/68 -- -- -- -- -- -- --   12/27/23 1553 102/64 -- -- -- -- -- -- --   12/27/23 1509 123/59 -- -- -- -- -- -- --   12/27/23 1507 -- -- -- -- -- -- 170.2 cm (67\") 89.5 kg (197 lb 5 oz)   12/27/23 1505 -- 97.3 °F (36.3 °C) Oral 77 20 95 % -- --     I/O:  No intake/output data recorded.    CBC    Results from last 7 days   Lab Units 12/27/23  1529   WBC 10*3/mm3 14.30*   HEMOGLOBIN g/dL 14.3   PLATELETS 10*3/mm3 646*     BMP   Results from last 7 days   Lab Units 12/27/23  1529   SODIUM mmol/L 135* " "  POTASSIUM mmol/L 4.2   CHLORIDE mmol/L 97*   CO2 mmol/L 23.0   BUN mg/dL 15   CREATININE mg/dL 1.50*   GLUCOSE mg/dL 134*     Cr Clearance Estimated Creatinine Clearance: 48.2 mL/min (A) (by C-G formula based on SCr of 1.5 mg/dL (H)).  Coag   Results from last 7 days   Lab Units 12/27/23  1529   INR  1.03     HbA1C No results found for: \"HGBA1C\"  Blood Glucose No results found for: \"POCGLU\"  Infection     CMP   Results from last 7 days   Lab Units 12/27/23  1529   SODIUM mmol/L 135*   POTASSIUM mmol/L 4.2   CHLORIDE mmol/L 97*   CO2 mmol/L 23.0   BUN mg/dL 15   CREATININE mg/dL 1.50*   GLUCOSE mg/dL 134*   ALBUMIN g/dL 4.3   BILIRUBIN mg/dL 0.6   ALK PHOS U/L 106   AST (SGOT) U/L 18   ALT (SGPT) U/L 13     ABG      UA      NANI  No results found for: \"POCMETH\", \"POCAMPHET\", \"POCBARBITUR\", \"POCBENZO\", \"POCCOCAINE\", \"POCOPIATES\", \"POCOXYCODO\", \"POCPHENCYC\", \"POCPROPOXY\", \"POCTHC\", \"POCTRICYC\"  Lysis Labs   Results from last 7 days   Lab Units 12/27/23  1529   INR  1.03   HEMOGLOBIN g/dL 14.3   PLATELETS 10*3/mm3 646*   CREATININE mg/dL 1.50*     Radiology(recent) No radiology results for the last day    There are no hospital problems to display for this patient.    Problem Points:  2:  Patient has an established problem, worsening  4:  Patient has a new problem, with additional work-up planned  Total problem points:4 or more    Data Points:  1:  I have reviewed or order clinical lab test  1:  I have reviewed or order radiology test (except heart catheterization or echo)  2:  I have personally and independently review of image, tracing, or specimen  2:  I have reviewed and summation of old records and/or discussed the patients care with another health care provider  Total data points:4 or more    Risk Points:  High:  Any illness that poses threat to life or body funciton    MDM requires 2/3 (Problem points, Data points and Risk)  MDM Prob point Data point Risk   SF 1 1 Minimal   Low 2 2 Low   Mod 3 3 Moderate   High 4 " 4 High     Code requires 3/3 (MDM, History and Exam)  Code MDM History Exam Time   12796 SF/Low Detailed Detailed 30   79612 Mod Comprehensive Comprehensive 50   01708 High Comprehensive Comprehensive 70     Detailed history:  4 elements HPI or status of 3 chronic problems; 2-9 system ROS  Comprehensive:  4 elements HPI or status of 3 chronic problems;  10 system ROS    Detailed Exam:  12 findings from any organ system  Comprehensive Exam:  2 findings from each of 9 systems.   47720    Assessment & Plan       * No active hospital problems. *      73 y.o. male with severe chronic limb threatening ischemia of his legs, worse on the left than right.   ABIs 0 bilaterally but did have some tibial flow noted, however it was quite minimal and is developing ischemic ulcers on toes, is having severe pain at rest.   I discussed potential outpatient management vs hospital admission with the patient and although he would prefer to go home I do think that considering the rapid progression of his symptoms in the last month as well as the coolness and discoloration of the left foot that the safest route forward in terms of maximizing likelihood of limb salvage will be to admit patient to the hospital.   He has a CTA already ordered by the ER and I will follow up the results of that. I've reviewed his prior CTA and I think he likely will need L femoral endarterectomy and bilateral iliac stents, possible bypass.   Will check vein mapping and plan to get stress test soon as well.   Recommend initiating heparin gtt for now. Will order.   Continue home aspirin.   Will follow. Anticipate surgery later this week or early next week.     I discussed the patients findings and my recommendations with patient, family, and nursing staff.    Nura Salgado II, MD  12/27/23  16:27 EST    Please call my office with any question: [phone]

## 2023-12-27 NOTE — ED NOTES
PTT for 1736 was a duplicate order. Collected specimen. Will contact hospitalist to d/c duplicate order.

## 2023-12-28 ENCOUNTER — APPOINTMENT (OUTPATIENT)
Dept: NUCLEAR MEDICINE | Facility: HOSPITAL | Age: 73
DRG: 271 | End: 2023-12-28
Payer: MEDICARE

## 2023-12-28 ENCOUNTER — ANESTHESIA EVENT (OUTPATIENT)
Dept: PERIOP | Facility: HOSPITAL | Age: 73
End: 2023-12-28
Payer: MEDICARE

## 2023-12-28 LAB
ALBUMIN SERPL-MCNC: 4.2 G/DL (ref 3.5–5.2)
ALBUMIN/GLOB SERPL: 1.4 G/DL
ALP SERPL-CCNC: 110 U/L (ref 39–117)
ALT SERPL W P-5'-P-CCNC: 15 U/L (ref 1–41)
ANION GAP SERPL CALCULATED.3IONS-SCNC: 15 MMOL/L (ref 5–15)
ANION GAP SERPL CALCULATED.3IONS-SCNC: 16 MMOL/L (ref 5–15)
APTT PPP: 42.6 SECONDS (ref 61–76.5)
APTT PPP: 48.4 SECONDS (ref 61–76.5)
APTT PPP: 90.9 SECONDS (ref 61–76.5)
AST SERPL-CCNC: 23 U/L (ref 1–40)
BILIRUB SERPL-MCNC: 0.6 MG/DL (ref 0–1.2)
BUN SERPL-MCNC: 16 MG/DL (ref 8–23)
BUN SERPL-MCNC: 18 MG/DL (ref 8–23)
BUN/CREAT SERPL: 11.9 (ref 7–25)
BUN/CREAT SERPL: 12.1 (ref 7–25)
CALCIUM SPEC-SCNC: 8.9 MG/DL (ref 8.6–10.5)
CALCIUM SPEC-SCNC: 9.3 MG/DL (ref 8.6–10.5)
CHLORIDE SERPL-SCNC: 98 MMOL/L (ref 98–107)
CHLORIDE SERPL-SCNC: 99 MMOL/L (ref 98–107)
CO2 SERPL-SCNC: 20 MMOL/L (ref 22–29)
CO2 SERPL-SCNC: 21 MMOL/L (ref 22–29)
CREAT SERPL-MCNC: 1.35 MG/DL (ref 0.76–1.27)
CREAT SERPL-MCNC: 1.49 MG/DL (ref 0.76–1.27)
EGFRCR SERPLBLD CKD-EPI 2021: 49.9 ML/MIN/1.73
EGFRCR SERPLBLD CKD-EPI 2021: 56.1 ML/MIN/1.73
GLOBULIN UR ELPH-MCNC: 3.1 GM/DL
GLUCOSE SERPL-MCNC: 81 MG/DL (ref 65–99)
GLUCOSE SERPL-MCNC: 89 MG/DL (ref 65–99)
POTASSIUM SERPL-SCNC: 4 MMOL/L (ref 3.5–5.2)
POTASSIUM SERPL-SCNC: 4.1 MMOL/L (ref 3.5–5.2)
PROT SERPL-MCNC: 7.3 G/DL (ref 6–8.5)
SODIUM SERPL-SCNC: 134 MMOL/L (ref 136–145)
SODIUM SERPL-SCNC: 135 MMOL/L (ref 136–145)

## 2023-12-28 PROCEDURE — 80053 COMPREHEN METABOLIC PANEL: CPT | Performed by: HOSPITALIST

## 2023-12-28 PROCEDURE — A9502 TC99M TETROFOSMIN: HCPCS | Performed by: HOSPITALIST

## 2023-12-28 PROCEDURE — 85730 THROMBOPLASTIN TIME PARTIAL: CPT | Performed by: HOSPITALIST

## 2023-12-28 PROCEDURE — 78452 HT MUSCLE IMAGE SPECT MULT: CPT | Performed by: INTERNAL MEDICINE

## 2023-12-28 PROCEDURE — 78452 HT MUSCLE IMAGE SPECT MULT: CPT

## 2023-12-28 PROCEDURE — 93017 CV STRESS TEST TRACING ONLY: CPT

## 2023-12-28 PROCEDURE — 0 TECHNETIUM TETROFOSMIN KIT: Performed by: HOSPITALIST

## 2023-12-28 PROCEDURE — 25010000002 REGADENOSON 0.4 MG/5ML SOLUTION: Performed by: HOSPITALIST

## 2023-12-28 PROCEDURE — 85007 BL SMEAR W/DIFF WBC COUNT: CPT | Performed by: HOSPITALIST

## 2023-12-28 PROCEDURE — 25010000002 HEPARIN (PORCINE) 25000-0.45 UT/250ML-% SOLUTION: Performed by: HOSPITALIST

## 2023-12-28 PROCEDURE — 93018 CV STRESS TEST I&R ONLY: CPT | Performed by: INTERNAL MEDICINE

## 2023-12-28 PROCEDURE — 85025 COMPLETE CBC W/AUTO DIFF WBC: CPT | Performed by: HOSPITALIST

## 2023-12-28 RX ORDER — MULTIPLE VITAMINS W/ MINERALS TAB 9MG-400MCG
1 TAB ORAL DAILY
Status: DISCONTINUED | OUTPATIENT
Start: 2023-12-28 | End: 2024-01-08 | Stop reason: HOSPADM

## 2023-12-28 RX ORDER — RISPERIDONE 0.25 MG/1
0.5 TABLET ORAL NIGHTLY
Status: DISCONTINUED | OUTPATIENT
Start: 2023-12-28 | End: 2024-01-08 | Stop reason: HOSPADM

## 2023-12-28 RX ORDER — DONEPEZIL HYDROCHLORIDE 5 MG/1
20 TABLET, FILM COATED ORAL NIGHTLY
Status: DISCONTINUED | OUTPATIENT
Start: 2023-12-28 | End: 2024-01-08 | Stop reason: HOSPADM

## 2023-12-28 RX ORDER — ACETAMINOPHEN 325 MG/1
650 TABLET ORAL EVERY 6 HOURS PRN
Status: DISCONTINUED | OUTPATIENT
Start: 2023-12-28 | End: 2023-12-28 | Stop reason: SDUPTHER

## 2023-12-28 RX ORDER — RISPERIDONE 0.25 MG/1
0.25 TABLET ORAL DAILY
Status: DISCONTINUED | OUTPATIENT
Start: 2023-12-28 | End: 2024-01-08 | Stop reason: HOSPADM

## 2023-12-28 RX ORDER — MEMANTINE HYDROCHLORIDE 10 MG/1
10 TABLET ORAL EVERY 12 HOURS SCHEDULED
Status: DISCONTINUED | OUTPATIENT
Start: 2023-12-28 | End: 2024-01-08 | Stop reason: HOSPADM

## 2023-12-28 RX ORDER — PREGABALIN 25 MG/1
25 CAPSULE ORAL 2 TIMES DAILY
Status: DISCONTINUED | OUTPATIENT
Start: 2023-12-28 | End: 2024-01-02

## 2023-12-28 RX ORDER — ATORVASTATIN CALCIUM 20 MG/1
20 TABLET, FILM COATED ORAL DAILY
Status: DISCONTINUED | OUTPATIENT
Start: 2023-12-28 | End: 2024-01-08 | Stop reason: HOSPADM

## 2023-12-28 RX ORDER — TAMSULOSIN HYDROCHLORIDE 0.4 MG/1
0.4 CAPSULE ORAL DAILY
Status: DISCONTINUED | OUTPATIENT
Start: 2023-12-28 | End: 2024-01-08 | Stop reason: HOSPADM

## 2023-12-28 RX ORDER — DOCUSATE SODIUM 100 MG/1
100 CAPSULE, LIQUID FILLED ORAL DAILY
Status: DISCONTINUED | OUTPATIENT
Start: 2023-12-28 | End: 2024-01-08 | Stop reason: HOSPADM

## 2023-12-28 RX ORDER — ASPIRIN 81 MG/1
81 TABLET ORAL DAILY
Status: DISCONTINUED | OUTPATIENT
Start: 2023-12-28 | End: 2024-01-08 | Stop reason: HOSPADM

## 2023-12-28 RX ORDER — CILOSTAZOL 100 MG/1
100 TABLET ORAL 2 TIMES DAILY
Status: DISCONTINUED | OUTPATIENT
Start: 2023-12-28 | End: 2024-01-05

## 2023-12-28 RX ORDER — ATENOLOL 25 MG/1
12.5 TABLET ORAL DAILY
Status: DISCONTINUED | OUTPATIENT
Start: 2023-12-28 | End: 2023-12-31

## 2023-12-28 RX ORDER — MELATONIN
2000 DAILY
Status: DISCONTINUED | OUTPATIENT
Start: 2023-12-28 | End: 2024-01-08 | Stop reason: HOSPADM

## 2023-12-28 RX ORDER — REGADENOSON 0.08 MG/ML
0.4 INJECTION, SOLUTION INTRAVENOUS
Status: COMPLETED | OUTPATIENT
Start: 2023-12-28 | End: 2023-12-28

## 2023-12-28 RX ADMIN — Medication 10 ML: at 07:25

## 2023-12-28 RX ADMIN — Medication 10 ML: at 21:06

## 2023-12-28 RX ADMIN — ATORVASTATIN CALCIUM 20 MG: 20 TABLET, FILM COATED ORAL at 09:45

## 2023-12-28 RX ADMIN — ACETAMINOPHEN 650 MG: 325 TABLET, FILM COATED ORAL at 23:08

## 2023-12-28 RX ADMIN — TAMSULOSIN HYDROCHLORIDE 0.4 MG: 0.4 CAPSULE ORAL at 10:43

## 2023-12-28 RX ADMIN — MEMANTINE 10 MG: 10 TABLET ORAL at 10:43

## 2023-12-28 RX ADMIN — ACETAMINOPHEN 650 MG: 325 TABLET, FILM COATED ORAL at 14:08

## 2023-12-28 RX ADMIN — PREGABALIN 25 MG: 25 CAPSULE ORAL at 21:06

## 2023-12-28 RX ADMIN — Medication 1 TABLET: at 10:43

## 2023-12-28 RX ADMIN — RISPERIDONE 0.25 MG: 0.25 TABLET, FILM COATED ORAL at 10:42

## 2023-12-28 RX ADMIN — PREGABALIN 25 MG: 25 CAPSULE ORAL at 10:42

## 2023-12-28 RX ADMIN — REGADENOSON 0.4 MG: 0.08 INJECTION, SOLUTION INTRAVENOUS at 09:10

## 2023-12-28 RX ADMIN — HEPARIN SODIUM 15.8 UNITS/KG/HR: 10000 INJECTION, SOLUTION INTRAVENOUS at 06:45

## 2023-12-28 RX ADMIN — ASPIRIN 81 MG: 81 TABLET, COATED ORAL at 09:45

## 2023-12-28 RX ADMIN — Medication 2000 UNITS: at 10:43

## 2023-12-28 RX ADMIN — RISPERIDONE 0.5 MG: 0.25 TABLET, FILM COATED ORAL at 21:06

## 2023-12-28 RX ADMIN — HEPARIN SODIUM 17.8 UNITS/KG/HR: 10000 INJECTION, SOLUTION INTRAVENOUS at 21:41

## 2023-12-28 RX ADMIN — MEMANTINE 10 MG: 10 TABLET ORAL at 21:06

## 2023-12-28 RX ADMIN — DONEPEZIL HYDROCHLORIDE 20 MG: 5 TABLET, FILM COATED ORAL at 21:06

## 2023-12-28 RX ADMIN — CILOSTAZOL 100 MG: 100 TABLET ORAL at 21:06

## 2023-12-28 RX ADMIN — TETROFOSMIN 1 DOSE: 1.38 INJECTION, POWDER, LYOPHILIZED, FOR SOLUTION INTRAVENOUS at 06:30

## 2023-12-28 RX ADMIN — TETROFOSMIN 1 DOSE: 1.38 INJECTION, POWDER, LYOPHILIZED, FOR SOLUTION INTRAVENOUS at 09:10

## 2023-12-28 RX ADMIN — ATENOLOL 12.5 MG: 25 TABLET ORAL at 10:43

## 2023-12-28 RX ADMIN — DOCUSATE SODIUM 100 MG: 100 CAPSULE, LIQUID FILLED ORAL at 10:43

## 2023-12-28 RX ADMIN — CILOSTAZOL 100 MG: 100 TABLET ORAL at 10:43

## 2023-12-28 NOTE — CASE MANAGEMENT/SOCIAL WORK
Discharge Planning Assessment   Peter     Patient Name: Oscar Wray  MRN: [id number]  Today's Date: 12/28/2023    Admit Date: 12/27/2023    Plan: DC PLAN: From [facility]. Pending PT/OT eval.     Discharge Needs Assessment       Row Name 12/28/23 1300       Living Environment    Current Living Arrangements assisted living facility    Potentially Unsafe Housing Conditions none    In the past 12 months has the electric, gas, oil, or water company threatened to shut off services in your home? No    Primary Care Provided by self    Able to Return to Prior Arrangements yes       Resource/Environmental Concerns    Resource/Environmental Concerns none       Food Insecurity    Within the past 12 months, you worried that your food would run out before you got the money to buy more. Never true    Within the past 12 months, the food you bought just didn't last and you didn't have money to get more. Never true       Transition Planning    Patient/Family Anticipates Transition to home    Patient/Family Anticipated Services at Transition none    Transportation Anticipated family or friend will provide       Discharge Needs Assessment    Equipment Currently Used at Home none                   Discharge Plan       Row Name 12/28/23 1301       Plan    Plan DC PLAN: From [facility]. Pending PT/OT eval.    Patient/Family in Agreement with Plan yes    Plan Comments Met with patient at bedside, From [email or web address]. Independent with ADL's no DME. PCP is [facility] and unsure of pharmacy. Agreeable to use [facility]. Pending PT/OT eval regarding return. Denies any further concerns Pending Surgery. Possible Bypass for 12/29 or early this week.                  Continued Care and Services - Admitted Since 12/27/2023    Coordination has not been started for this encounter.       Expected Discharge Date and Time       Expected Discharge Date Expected Discharge Time    Dec 29, 2023            Demographic  Summary       Row Name 12/28/23 1259       General Information    Admission Type inpatient    Referral Source admission list    Reason for Consult discharge planning    Preferred Language English       Contact Information    Permission Granted to Share Info With     Contact Information Obtained for                    Functional Status       Row Name 12/28/23 1300       Functional Status    Usual Activity Tolerance moderate    Current Activity Tolerance moderate       Assessment of Health Literacy    How often do you have someone help you read hospital materials? Sometimes    How often do you have problems learning about your medical condition because of difficulty understanding written information? Sometimes    How often do you have a problem understanding what is told to you about your medical condition? Sometimes    How confident are you filling out medical forms by yourself? Somewhat    Health Literacy Moderate       Functional Status, IADL    Medications assistive person    Meal Preparation assistive person    Housekeeping assistive person    Laundry assistive person    Shopping assistive person       Mental Status    General Appearance WDL WDL                       Patient Forms       Row Name 12/28/23 1246       Patient Forms    Important Message from Medicare (IMM) Delivered  IMM 12/27/23 per registration    Delivered to Patient    Method of delivery In person                    Ju Welch RN

## 2023-12-28 NOTE — PROGRESS NOTES
[facility] Medicine Services  Daily progress note  Patient Name: Oscar Wray  : 1950  MRN: [id number]  Primary Care Physician:  Kajal Horowitz PA  Date of admission: 2023       Subjective       Chief Complaint: Bilateral lower extremity pain left more than right     History of Present Illness: Oscar Wray is a 73 y.o. male with a CMH of PAD who presented to [facility] on 2023 with pain in the left foot more than right.  ABIs 0 bilaterally but did have some tibial flow noted, however it was quite minimal and is developing ischemic ulcers on toes, is having severe pain at rest.   He has a CTA already ordered by the ER  Vascular surgery has reviewed his prior CTA and he likely will need L femoral endarterectomy and bilateral iliac stents, possible bypass. check vein mapping and plan to get stress test soon as well, started on heparin gtt for now.  Continue home aspirin.   Review of Systems/Hospital course  Unable to perform ROS: Dementia    2023; afebrile vital signs are stable lying in treatment comfortably, patient is pleasantly confused no family at bedside, spoke with the bedside RN.     Personal History      Medical History        Past Medical History:   Diagnosis Date    Dementia              Surgical History   No past surgical history on file.        Family History: family history is not on file. Otherwise pertinent FHx was reviewed and not pertinent to current issue.     Social History:  reports that he has quit smoking. His smoking use included cigarettes. He has never used smokeless tobacco. He reports current alcohol use. He reports that he does not use drugs.     Home Medications:  Prior to Admission Medications         Prescriptions Last Dose Informant Patient Reported? Taking?     aspirin EC tablet 81 mg     No No     atenolol (TENORMIN) 25 MG tablet     Yes No     atorvastatin (LIPITOR) 20 MG tablet     No No     Take 1 tablet by mouth Daily.      celecoxib (CeleBREX) 100 MG capsule     No No     Take 1 capsule by mouth 2 (Two) Times a Day.     Cholecalciferol (Vitamin D3) 50 MCG (2000 UT) capsule     Yes No     cilostazol (PLETAL) 100 MG tablet     No No     Take 1 tablet by mouth 2 (Two) Times a Day.     dapagliflozin Propanediol (Farxiga) 10 MG tablet     No No     Take 10 mg by mouth Daily.     docusate sodium (COLACE) 100 MG capsule     Yes No     donepezil (ARICEPT) 23 MG tablet     Yes No     memantine (NAMENDA XR) 28 MG capsule sustained-release 24 hr extended release capsule     Yes No     Omega-3 Fatty Acids (fish oil) 1000 MG capsule capsule     Yes No     Take  by mouth Daily With Breakfast.     pregabalin (Lyrica) 25 MG capsule     No No     Take 1 capsule by mouth 2 (Two) Times a Day.     risperiDONE (risperDAL) 0.25 MG tablet     Yes No     risperiDONE (risperDAL) 0.5 MG tablet     Yes No     tamsulosin (FLOMAX) 0.4 MG capsule 24 hr capsule     Yes No                   Allergies:  No Known Allergies     Objective       Vitals:   Temp:  [97.3 °F (36.3 °C)-98.1 °F (36.7 °C)] 97.8 °F (36.6 °C)  Heart Rate:  [77-85] 84  Resp:  [8-24] 19  BP: ()/(43-86) 124/67   Physical Exam  Constitutional:       Appearance: Normal appearance. He is obese.   HENT:      Head: Normocephalic.      Right Ear: External ear normal.      Left Ear: External ear normal.   Eyes:      Extraocular Movements: Extraocular movements intact.      Pupils: Pupils are equal, round, and reactive to light.   Cardiovascular:      Heart sounds: Normal heart sounds.   Pulmonary:      Effort: Pulmonary effort is normal.   Abdominal:      General: Abdomen is flat.   Musculoskeletal:      Cervical back: Normal range of motion and neck supple.      Right lower leg: No edema.      Left lower leg: No edema.      Comments: COLD  lower extremities/feet, absence of pedal pulses, dusky appearing toes.  Bilaterally   Neurological:      General: No focal deficit present.      Mental Status: He  is alert.                   Diagnostic Data:  Lab Results (last 24 hours)         Procedure Component Value Units Date/Time     aPTT [[id number]]  (Abnormal) Collected: 12/27/23 1529     Specimen: Blood Updated: 12/27/23 1656       PTT 31.6 seconds       Extra Tubes [[id number]] Collected: 12/27/23 1529     Specimen: Blood, Venous Line Updated: 12/27/23 1631     Narrative:       The following orders were created for panel order Extra Tubes.  Procedure                               Abnormality         Status                     ---------                               -----------         ------                     Gold Top - SST[[id number]]                                   Final result                  Please view results for these tests on the individual orders.     Gold Top - SST [[id number]] Collected: 12/27/23 1529     Specimen: Blood Updated: 12/27/23 1631       Extra Tube Hold for add-ons.       Comment: Auto resulted.        Comprehensive Metabolic Panel [[id number]]  (Abnormal) Collected: 12/27/23 1529     Specimen: Blood Updated: 12/27/23 1602       Glucose 134 mg/dL         BUN 15 mg/dL         Creatinine 1.50 mg/dL         Sodium 135 mmol/L         Potassium 4.2 mmol/L         Chloride 97 mmol/L         CO2 23.0 mmol/L         Calcium 9.7 mg/dL         Total Protein 7.6 g/dL         Albumin 4.3 g/dL         ALT (SGPT) 13 U/L         AST (SGOT) 18 U/L         Alkaline Phosphatase 106 U/L         Total Bilirubin 0.6 mg/dL         Globulin 3.3 gm/dL         A/G Ratio 1.3 g/dL         BUN/Creatinine Ratio 10.0       Anion Gap 15.0 mmol/L         eGFR 49.5 mL/min/1.73       Narrative:       GFR Normal >60  Chronic Kidney Disease <60  Kidney Failure <15     The GFR formula is only valid for adults with stable renal function between ages 18 and 70.     Protime-INR [[id number]]  (Normal) Collected: 12/27/23 1529     Specimen: Blood Updated: 12/27/23 1548       Protime 11.2 Seconds         INR 1.03     CBC & Differential  [[id number]]  (Abnormal) Collected: 12/27/23 1529     Specimen: Blood Updated: 12/27/23 1544     Narrative:       The following orders were created for panel order CBC & Differential.  Procedure                               Abnormality         Status                     ---------                               -----------         ------                     CBC Auto Differential[[id number]]        Abnormal            Final result                  Please view results for these tests on the individual orders.     CBC Auto Differential [[id number]]  (Abnormal) Collected: 12/27/23 1529     Specimen: Blood Updated: 12/27/23 1544       WBC 14.30 10*3/mm3         RBC 5.05 10*6/mm3         Hemoglobin 14.3 g/dL         Hematocrit 43.9 %         MCV 87.0 fL         MCH 28.3 pg         MCHC 32.5 g/dL         RDW 14.9 %         RDW-SD 44.6 fl         MPV 7.6 fL         Platelets 646 10*3/mm3         Neutrophil % 85.0 %         Lymphocyte % 6.5 %         Monocyte % 7.1 %         Eosinophil % 0.6 %         Basophil % 0.8 %         Neutrophils, Absolute 12.10 10*3/mm3         Lymphocytes, Absolute 0.90 10*3/mm3         Monocytes, Absolute 1.00 10*3/mm3         Eosinophils, Absolute 0.10 10*3/mm3         Basophils, Absolute 0.10 10*3/mm3         nRBC 0.1 /100 WBC                             Imaging Results (Last 24 Hours)         ** No results found for the last 24 hours. **           CT Angio Abdominal Aorta Bilateral Iliofem Runoff    Result Date: 12/27/2023  Impression: 1. 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 Bilateral common, internal and external iliac arteries are nonopacified. Distal reconstitution at the level of the bilateral common femoral arteries. 2. Poor runoff to left foot with peroneal artery and posterior tibial artery visualized to lower leg with no significant contrast below  the level of the left ankle, worsened from prior study. 3. Two-vessel runoff to the right foot via peroneal and posterior tibial arteries. 4. Segmental occlusion of left and right superficial femoral arteries and left popliteal artery as seen on prior study. 5. Additional incidental findings above. Electronically Signed: Cristian Woo MD  12/27/2023 7:22 PM EST  Workstation ID: [id number]          Assessment & Plan          This is a 73 y.o. male with: Critical limb ischemia bilateral lower extremity     Active and Resolved Problems        Active Hospital Problems     Diagnosis   POA    Degenerative disc disease, lumbar [M51.36]   Yes       Resolved Hospital Problems   No resolved problems to display.   Acute critical limb ischemia bilateral; continue IV heparin and aspirin, vascular surgery following, CTA results as above, stress myocardial perfusion test results awaited.  Dementia; resume home medications;  Hyperlipidemia; resume home medications  History of diabetes mellitus; hold oral hypoglycemic agents Accu-Chek and SSI; A1c 5.60  Elevated serum creatinine; KARINA versus CKD, nephrology consulted.        DVT prophylaxis:  Medical DVT prophylaxis orders are present.     The patient desires to be as follows:     CODE STATUS:    Full code     Admission Status:  I believe this patient meets inpatient status.     Expected Length of Stay: 3 to 5 days     PDMP and Medication Dispenses via Sidebar reviewed and consistent with patient reported medications.     I discussed the patient's findings and my recommendations with patient and family.        Signature:   Electronically signed by Margareth Geiger MD, 12/28/23, 10:25 AM EST.

## 2023-12-28 NOTE — NURSING NOTE
Pt admitted via ER with heparin gtt running, based on weight entered in ER. Upon admission, pt weight found to be only 74 kg using standing scale, difference of 15.5 kg from weight entered in ER. Contacted pharmacy to see if MAR orders needed to be adjusted for gtt, per pharmacy continuing running & titrating based on labs results, and pharmacist will look at orders tomorrow for possible need to adjust based on corrected weight.

## 2023-12-28 NOTE — PLAN OF CARE
Problem: Adult Inpatient Plan of Care  Goal: Plan of Care Review  Outcome: Ongoing, Progressing   Goal Outcome Evaluation:        Pt admitted via ER for BLE ischemia, started on heparin gtt in ER. VSS. Pt had no c/o pain. Pt made NPO at midnight for pending myoview. Pt educated on current plan of care, verbalized understanding.

## 2023-12-28 NOTE — CONSULTS
Consults  [facility] Consult Note      Patient Name: Oscar Wray  : 1950  MRN: [id number]  Primary Care Physician:  Kajal Horowitz PA  Referring Physician: PERI Sims  Date of admission: 2023    Subjective     Reason for Consult:  krystal  HPI:   Oscar Wray is a 73 y.o. male admitted with bilateral foot pain.  He was found to have an elevated serum creatinine level prompting renal consultation. He suffers from dementia and is a pleasantly poor historian. He is frustrated because he doesn't currently have his usual clothes and doesn't like hospital gowns. He was found by cta to have severe pad and dr Salgado is following.  A stress test is planned for operative risk stratification. He was taking no nsaids.  He has had no soa n/v or cp.  He denies dizziness or orthopnea.    Review of Systems:   14 point review of systems is otherwise negative except for mentioned above on HPI    Personal History     Past Medical History:   Diagnosis Date    Dementia        History reviewed. No pertinent surgical history.    Family History: family history is not on file.    Social History:  reports that he has quit smoking. His smoking use included cigarettes. He has never used smokeless tobacco. He reports that he does not currently use alcohol. He reports that he does not use drugs.    Home Medications:  Prior to Admission medications    Medication Sig Start Date End Date Taking? Authorizing Provider   atenolol (TENORMIN) 25 MG tablet Take 0.5 tablets by mouth Daily. 6/3/22  Yes ProviderMolly MD   atorvastatin (LIPITOR) 20 MG tablet Take 1 tablet by mouth Daily. 22  Yes Kajal Horowitz PA   celecoxib (CeleBREX) 100 MG capsule Take 1 capsule by mouth 2 (Two) Times a Day. 3/10/23  Yes Kajal Horowitz PA   Cholecalciferol (Vitamin D3) 50 MCG ( UT) capsule Take 1 capsule by mouth Daily. 22  Yes ProviderMolly MD   cilostazol (PLETAL) 100 MG tablet Take 1 tablet  by mouth 2 (Two) Times a Day. 3/6/23  Yes David Carson MD   dapagliflozin Propanediol (Farxiga) 10 MG tablet Take 10 mg by mouth Daily. 3/17/23  Yes Kajal Horowitz PA   docusate sodium (COLACE) 100 MG capsule Take 1 capsule by mouth Daily. 5/13/22  Yes Molly Vora MD   donepezil (ARICEPT) 23 MG tablet Take 1 tablet by mouth Every Night. 5/13/22  Yes Molly Vora MD   memantine (NAMENDA XR) 28 MG capsule sustained-release 24 hr extended release capsule Take 1 capsule by mouth Daily. 6/3/22  Yes Molly Vora MD   multivitamin with minerals tablet tablet Take 1 tablet by mouth Daily.   Yes Molly Vora MD   Omega-3 Fatty Acids (fish oil) 1000 MG capsule capsule Take 2 capsules by mouth 2 (Two) Times a Day With Meals.   Yes Molly Vora MD   pregabalin (Lyrica) 25 MG capsule Take 1 capsule by mouth 2 (Two) Times a Day. 10/16/23  Yes Kajal Horowitz PA   risperiDONE (risperDAL) 0.25 MG tablet Take 1 tablet by mouth Daily. 6/3/22  Yes Molly Vora MD   risperiDONE (risperDAL) 0.5 MG tablet Take 1 tablet by mouth Every Night. 6/3/22  Yes Molly Vora MD   tamsulosin (FLOMAX) 0.4 MG capsule 24 hr capsule Take 1 capsule by mouth Daily. 5/27/22  Yes Molly Vora MD   acetaminophen (TYLENOL) 325 MG tablet Take 2 tablets by mouth Every 6 (Six) Hours As Needed for Mild Pain.    Molly Vora MD       Allergies:  No Known Allergies    Objective     Vitals:   Temp:  [97.2 °F (36.2 °C)-98.1 °F (36.7 °C)] 97.2 °F (36.2 °C)  Heart Rate:  [] 78  Resp:  [8-24] 12  BP: ()/(43-86) 113/67    Intake/Output Summary (Last 24 hours) at 12/28/2023 1404  Last data filed at 12/28/2023 1327  Gross per 24 hour   Intake 460 ml   Output 770 ml   Net -310 ml       Physical Exam:    General Appearance: alert, oriented x 3, no acute distress   Skin: warm and dry  HEENT: oral mucosa normal, nonicteric sclera  Neck: supple, no JVD  Lungs: CTA  Heart: RRR,  normal S1 and S2  Abdomen: soft, nontender, nondistended  : no palpable bladder  Extremities: no edema, cyanosis or clubbing  Neuro: normal speech and mental status     Scheduled Meds:     aspirin, 81 mg, Oral, Daily  atenolol, 12.5 mg, Oral, Daily  atorvastatin, 20 mg, Oral, Daily  cholecalciferol, 2,000 Units, Oral, Daily  cilostazol, 100 mg, Oral, BID  docusate sodium, 100 mg, Oral, Daily  donepezil, 20 mg, Oral, Nightly  memantine, 10 mg, Oral, Q12H  multivitamin with minerals, 1 tablet, Oral, Daily  pregabalin, 25 mg, Oral, BID  risperiDONE, 0.25 mg, Oral, Daily  risperiDONE, 0.5 mg, Oral, Nightly  senna-docusate sodium, 2 tablet, Oral, BID  sodium chloride, 10 mL, Intravenous, Q12H  tamsulosin, 0.4 mg, Oral, Daily      IV Meds:   heparin, 16.8 Units/kg/hr, Last Rate: 15.8 Units/kg/hr (12/28/23 0645)        Results Reviewed:   I have personally reviewed the results from the time of this admission to 12/28/2023 14:04 EST     Lab Results   Component Value Date    GLUCOSE 89 12/28/2023    CALCIUM 9.3 12/28/2023     (L) 12/28/2023    K 4.0 12/28/2023    CO2 21.0 (L) 12/28/2023    CL 98 12/28/2023    BUN 16 12/28/2023    CREATININE 1.35 (H) 12/28/2023    BCR 11.9 12/28/2023    ANIONGAP 16.0 (H) 12/28/2023      Lab Results   Component Value Date    MG 2.3 06/10/2022    ALBUMIN 4.2 12/28/2023           Assessment / Plan     ASSESSMENT:  Paul-prerenal, resolving with ivf; no evidence of contrast nephropathy   Benign hypertension with ckd-labile, mostly controlled  Dementia with altered mental status  Bilateral lower limb ischemia    PLAN:  Avoid nephrotoxins as able  Renally dose medicines  Continue ivf  Check lab in am    Thank you for involving us in the care of Oscar Wray.  Please feel free to call with any questions.    Mahamed Graham MD  12/28/23  14:04 EST    [facility]  [phone]

## 2023-12-28 NOTE — PROGRESS NOTES
"Name: Oscar Wray ADMIT: 2023   : 1950  PCP: Kajal Horowitz PA    MRN: [id number] LOS: 1 days   AGE/SEX: 73 y.o. male  ROOM:  [facility] 266/1     CC: Bilateral leg/foot pain  Interval History: Patient reports pain is improved this morning.  No acute events overnight  Subjective   Subjective     Review of Systems  Objective   Objective     Vitals:   Temp:  [97.3 °F (36.3 °C)-98.1 °F (36.7 °C)] 97.8 °F (36.6 °C)  Heart Rate:  [77-85] 84  Resp:  [8-24] 19  BP: ()/(43-86) 124/67    No intake/output data recorded.    Scheduled Meds:     senna-docusate sodium, 2 tablet, Oral, BID  sodium chloride, 10 mL, Intravenous, Q12H  technetium tetrofosmin, 1 dose, Intravenous, Once in imaging      IV Meds:   heparin, 16.8 Units/kg/hr, Last Rate: 15.8 Units/kg/hr (23 0645)        Physical Exam  NAD  NCAT  RRR  Respirations unlabored  Legs warm to level of ankle, left foot still cooler than right.   5/5 strength in both feet, equal bilaterally and equal sensation to light touch bilaterally.     Data Reviewed:  CBC    Results from last 7 days   Lab Units 23  1529   WBC 10*3/mm3 14.30*   HEMOGLOBIN g/dL 14.3   PLATELETS 10*3/mm3 646*     BMP   Results from last 7 days   Lab Units 23  0337 23  1529   SODIUM mmol/L 135* 135*   POTASSIUM mmol/L 4.0 4.2   CHLORIDE mmol/L 98 97*   CO2 mmol/L 21.0* 23.0   BUN mg/dL 16 15   CREATININE mg/dL 1.35* 1.50*   GLUCOSE mg/dL 89 134*     Cr Clearance Estimated Creatinine Clearance: 52.5 mL/min (A) (by C-G formula based on SCr of 1.35 mg/dL (H)).  Coag   Results from last 7 days   Lab Units 23  0337 23  1925 23  1529   INR   --   --  1.03   APTT seconds 42.6* 113.0* 31.6*     HbA1C   Lab Results   Component Value Date    HGBA1C 5.60 2023     Blood Glucose No results found for: \"POCGLU\"  Infection     CMP   Results from last 7 days   Lab Units 23  0337 23  1529   SODIUM mmol/L 135* 135*   POTASSIUM mmol/L 4.0 4.2 " "  CHLORIDE mmol/L 98 97*   CO2 mmol/L 21.0* 23.0   BUN mg/dL 16 15   CREATININE mg/dL 1.35* 1.50*   GLUCOSE mg/dL 89 134*   ALBUMIN g/dL 4.2 4.3   BILIRUBIN mg/dL 0.6 0.6   ALK PHOS U/L 110 106   AST (SGOT) U/L 23 18   ALT (SGPT) U/L 15 13     ABG      UA      NANI  No results found for: \"POCMETH\", \"POCAMPHET\", \"POCBARBITUR\", \"POCBENZO\", \"POCCOCAINE\", \"POCOPIATES\", \"POCOXYCODO\", \"POCPHENCYC\", \"POCPROPOXY\", \"POCTHC\", \"POCTRICYC\"  Lysis Labs   Results from last 7 days   Lab Units 12/28/23  0337 12/27/23  1925 12/27/23  1529   INR   --   --  1.03   APTT seconds 42.6* 113.0* 31.6*   HEMOGLOBIN g/dL  --   --  14.3   PLATELETS 10*3/mm3  --   --  646*   CREATININE mg/dL 1.35*  --  1.50*     Radiology(recent) CT Angio Abdominal Aorta Bilateral Iliofem Runoff    Result Date: 12/27/2023  Impression: 1. 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 Bilateral common, internal and external iliac arteries are nonopacified. Distal reconstitution at the level of the bilateral common femoral arteries. 2. Poor runoff to left foot with peroneal artery and posterior tibial artery visualized to lower leg with no significant contrast below the level of the left ankle, worsened from prior study. 3. Two-vessel runoff to the right foot via peroneal and posterior tibial arteries. 4. Segmental occlusion of left and right superficial femoral arteries and left popliteal artery as seen on prior study. 5. Additional incidental findings above. Electronically Signed: Cristian Woo MD  12/27/2023 7:22 PM EST  Workstation ID: [id number]     Most notable findings include: Previously noted aortoiliac disease now occluded bilaterally on CTA.  Reconstitution at common femoral arteries noted.  Mild KARINA noted.     Active Hospital Problems:   Active Hospital Problems    Diagnosis  POA    **Critical limb ischemia of both lower " extremities [I70.223]  Yes    Degenerative disc disease, lumbar [M51.36]  Yes      Resolved Hospital Problems   No resolved problems to display.      Assessment & Plan   Billin, Subsequent Hospital Care  Assessment / Plan     73-year-old gentleman with subacute presentation of acute on chronic peripheral arterial disease with rest pain in his feet bilaterally.  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  Pain is improved this morning, exam largely unchanged.  Anticipate patient will need bilateral femoral artery cutdowns, attempted thrombectomy, bilateral iliac stents and femoral endarterectomies.   Stress test pending today  Discussed with OR, questionable availability tomorrow.  Have tentatively added on but seems more likely he will end up having surgery early next week.  Continue heparin gtt  I've ordered his home does of aspirin and lipitor.   Ok from my standpoint to have a diet once his stress test is completed. Will need cardiology evaluation if stress test is abnormal.   Will follow up primary team plans.     Nura Salgado II, MD  23  08:23 EST  Office Number [phone]

## 2023-12-28 NOTE — PLAN OF CARE
Goal Outcome Evaluation:  Plan of Care Reviewed With: patient        Progress: no change  Outcome Evaluation: Pt. with discoloration & ischemia to BLE. Vascular surgery following. Myoview completed this AM & was negative. To have surgery in near future. Wants to return to [facility] upon d/c

## 2023-12-29 ENCOUNTER — ANCILLARY PROCEDURE (OUTPATIENT)
Dept: PERIOP | Facility: HOSPITAL | Age: 73
DRG: 271 | End: 2023-12-29
Payer: MEDICARE

## 2023-12-29 ENCOUNTER — ANESTHESIA (OUTPATIENT)
Dept: PERIOP | Facility: HOSPITAL | Age: 73
End: 2023-12-29
Payer: MEDICARE

## 2023-12-29 LAB
ACT BLD: 120 SECONDS (ref 89–137)
ACT BLD: 239 SECONDS (ref 89–137)
ACT BLD: 244 SECONDS (ref 89–137)
ACT BLD: 255 SECONDS (ref 89–137)
ACT BLD: 260 SECONDS (ref 89–137)
ACT BLD: 266 SECONDS (ref 89–137)
ACT BLD: 304 SECONDS (ref 89–137)
ACT BLD: 325 SECONDS (ref 89–137)
ACT BLD: 363 SECONDS (ref 89–137)
ACT BLD: 363 SECONDS (ref 89–137)
ANION GAP SERPL CALCULATED.3IONS-SCNC: 15 MMOL/L (ref 5–15)
ANISOCYTOSIS BLD QL: ABNORMAL
APTT PPP: 54.7 SECONDS (ref 61–76.5)
APTT PPP: >139 SECONDS (ref 61–76.5)
ARTERIAL PATENCY WRIST A: ABNORMAL
ATMOSPHERIC PRESS: ABNORMAL MM[HG]
BASE DEFICIT: ABNORMAL
BASE EXCESS BLDA CALC-SCNC: -6.5 MMOL/L (ref 0–3)
BASE EXCESS BLDA CALC-SCNC: <0 MMOL/L (ref 0–3)
BASOPHILS # BLD MANUAL: 0.12 10*3/MM3 (ref 0–0.2)
BASOPHILS NFR BLD MANUAL: 1 % (ref 0–1.5)
BDY SITE: ABNORMAL
BH CV REST NUCLEAR ISOTOPE DOSE: 11 MCI
BH CV STRESS BP STAGE 1: NORMAL
BH CV STRESS BP STAGE 2: NORMAL
BH CV STRESS COMMENTS STAGE 1: NORMAL
BH CV STRESS COMMENTS STAGE 2: NORMAL
BH CV STRESS DOSE REGADENOSON STAGE 1: 0.4
BH CV STRESS DURATION MIN STAGE 1: 0
BH CV STRESS DURATION MIN STAGE 2: 4
BH CV STRESS DURATION SEC STAGE 1: 10
BH CV STRESS DURATION SEC STAGE 2: 0
BH CV STRESS HR STAGE 1: 81
BH CV STRESS HR STAGE 2: 90
BH CV STRESS NUCLEAR ISOTOPE DOSE: 33 MCI
BH CV STRESS PROTOCOL 1: NORMAL
BH CV STRESS RECOVERY BP: NORMAL MMHG
BH CV STRESS RECOVERY HR: 90 BPM
BH CV STRESS STAGE 1: 1
BH CV STRESS STAGE 2: 2
BUN SERPL-MCNC: 13 MG/DL (ref 8–23)
BUN/CREAT SERPL: 11.5 (ref 7–25)
BURR CELLS BLD QL SMEAR: ABNORMAL
CA-I BLDA-SCNC: 1.1 MMOL/L (ref 1.12–1.32)
CALCIUM SPEC-SCNC: 9 MG/DL (ref 8.6–10.5)
CHLORIDE SERPL-SCNC: 101 MMOL/L (ref 98–107)
CO2 BLDA-SCNC: 20.2 MMOL/L (ref 22–29)
CO2 BLDA-SCNC: 23 MMOL/L (ref 23–27)
CO2 SERPL-SCNC: 22 MMOL/L (ref 22–29)
CREAT SERPL-MCNC: 1.13 MG/DL (ref 0.76–1.27)
D-LACTATE SERPL-SCNC: 1.3 MMOL/L (ref 0.5–2)
D-LACTATE SERPL-SCNC: 2.1 MMOL/L (ref 0.5–2)
DACRYOCYTES BLD QL SMEAR: ABNORMAL
DEPRECATED RDW RBC AUTO: 45.9 FL (ref 37–54)
DEPRECATED RDW RBC AUTO: 45.9 FL (ref 37–54)
EGFRCR SERPLBLD CKD-EPI 2021: 69.5 ML/MIN/1.73
EOSINOPHIL # BLD MANUAL: 0.24 10*3/MM3 (ref 0–0.4)
EOSINOPHIL NFR BLD MANUAL: 2 % (ref 0.3–6.2)
ERYTHROCYTE [DISTWIDTH] IN BLOOD BY AUTOMATED COUNT: 14.9 % (ref 12.3–15.4)
ERYTHROCYTE [DISTWIDTH] IN BLOOD BY AUTOMATED COUNT: 15.2 % (ref 12.3–15.4)
GLUCOSE BLDC GLUCOMTR-MCNC: 124 MG/DL (ref 70–105)
GLUCOSE SERPL-MCNC: 132 MG/DL (ref 65–99)
HCO3 BLDA-SCNC: 19 MMOL/L (ref 21–28)
HCO3 BLDA-SCNC: 21.9 MMOL/L (ref 22–26)
HCT VFR BLD AUTO: 39 % (ref 37.5–51)
HCT VFR BLD AUTO: 43.4 % (ref 37.5–51)
HCT VFR BLDA CALC: 30 % (ref 38–51)
HEMODILUTION: NO
HGB BLD-MCNC: 13.3 G/DL (ref 13–17.7)
HGB BLD-MCNC: 14.1 G/DL (ref 13–17.7)
HGB BLDA-MCNC: 10.2 G/DL (ref 12–17)
INHALED O2 CONCENTRATION: 21 %
INR PPP: 1.1 (ref 0.93–1.1)
LARGE PLATELETS: ABNORMAL
LV EF NUC BP: 69 %
LYMPHOCYTES # BLD MANUAL: 1.19 10*3/MM3 (ref 0.7–3.1)
LYMPHOCYTES NFR BLD MANUAL: 7 % (ref 5–12)
MAXIMAL PREDICTED HEART RATE: 149 BPM
MCH RBC QN AUTO: 28.5 PG (ref 26.6–33)
MCH RBC QN AUTO: 28.7 PG (ref 26.6–33)
MCHC RBC AUTO-ENTMCNC: 32.6 G/DL (ref 31.5–35.7)
MCHC RBC AUTO-ENTMCNC: 34 G/DL (ref 31.5–35.7)
MCV RBC AUTO: 84.4 FL (ref 79–97)
MCV RBC AUTO: 87.7 FL (ref 79–97)
MICROCYTES BLD QL: ABNORMAL
MODALITY: ABNORMAL
MONOCYTES # BLD: 0.83 10*3/MM3 (ref 0.1–0.9)
NEUTROPHILS # BLD AUTO: 9.52 10*3/MM3 (ref 1.7–7)
NEUTROPHILS NFR BLD MANUAL: 74 % (ref 42.7–76)
NEUTS BAND NFR BLD MANUAL: 6 % (ref 0–5)
PCO2 BLDA: 37.3 MM HG (ref 35–48)
PCO2 BLDA: 47.3 MM HG (ref 35–45)
PERCENT MAX PREDICTED HR: 69.13 %
PH BLDA: 7.27 PH UNITS (ref 7.35–7.45)
PH BLDA: 7.32 PH UNITS (ref 7.35–7.45)
PLATELET # BLD AUTO: 561 10*3/MM3 (ref 140–450)
PLATELET # BLD AUTO: 586 10*3/MM3 (ref 140–450)
PMV BLD AUTO: 7.4 FL (ref 6–12)
PMV BLD AUTO: 8.1 FL (ref 6–12)
PO2 BLDA: 182 MM HG (ref 80–105)
PO2 BLDA: 89.4 MM HG (ref 83–108)
POIKILOCYTOSIS BLD QL SMEAR: ABNORMAL
POTASSIUM BLDA-SCNC: 3.9 MMOL/L (ref 3.5–4.9)
POTASSIUM SERPL-SCNC: 4.2 MMOL/L (ref 3.5–5.2)
PROTHROMBIN TIME: 11.9 SECONDS (ref 9.6–11.7)
RBC # BLD AUTO: 4.63 10*6/MM3 (ref 4.14–5.8)
RBC # BLD AUTO: 4.95 10*6/MM3 (ref 4.14–5.8)
SAO2 % BLDCOA: 96.1 % (ref 94–98)
SAO2 % BLDCOA: 99 % (ref 95–98)
SCAN SLIDE: NORMAL
SMALL PLATELETS BLD QL SMEAR: ABNORMAL
SODIUM BLD-SCNC: 136 MMOL/L (ref 138–146)
SODIUM SERPL-SCNC: 138 MMOL/L (ref 136–145)
STRESS BASELINE BP: NORMAL MMHG
STRESS BASELINE HR: 81 BPM
STRESS PERCENT HR: 81 %
STRESS POST PEAK BP: NORMAL MMHG
STRESS POST PEAK HR: 103 BPM
STRESS TARGET HR: 127 BPM
VARIANT LYMPHS NFR BLD MANUAL: 10 % (ref 19.6–45.3)
WBC MORPH BLD: NORMAL
WBC NRBC COR # BLD AUTO: 11.9 10*3/MM3 (ref 3.4–10.8)
WBC NRBC COR # BLD AUTO: 17.9 10*3/MM3 (ref 3.4–10.8)
WHOLE BLOOD HOLD SPECIMEN: NORMAL

## 2023-12-29 PROCEDURE — C1725 CATH, TRANSLUMIN NON-LASER: HCPCS | Performed by: STUDENT IN AN ORGANIZED HEALTH CARE EDUCATION/TRAINING PROGRAM

## 2023-12-29 PROCEDURE — 04CL0ZZ EXTIRPATION OF MATTER FROM LEFT FEMORAL ARTERY, OPEN APPROACH: ICD-10-PCS | Performed by: STUDENT IN AN ORGANIZED HEALTH CARE EDUCATION/TRAINING PROGRAM

## 2023-12-29 PROCEDURE — 25010000002 HEPARIN (PORCINE) 25000-0.45 UT/250ML-% SOLUTION: Performed by: HOSPITALIST

## 2023-12-29 PROCEDURE — 25010000002 MORPHINE PER 10 MG: Performed by: ANESTHESIOLOGY

## 2023-12-29 PROCEDURE — 25010000002 CEFAZOLIN PER 500 MG: Performed by: STUDENT IN AN ORGANIZED HEALTH CARE EDUCATION/TRAINING PROGRAM

## 2023-12-29 PROCEDURE — 25010000002 DEXAMETHASONE PER 1 MG: Performed by: NURSE ANESTHETIST, CERTIFIED REGISTERED

## 2023-12-29 PROCEDURE — 25810000003 LACTATED RINGERS PER 1000 ML: Performed by: NURSE ANESTHETIST, CERTIFIED REGISTERED

## 2023-12-29 PROCEDURE — 85014 HEMATOCRIT: CPT

## 2023-12-29 PROCEDURE — 04HJ3DZ INSERTION OF INTRALUMINAL DEVICE INTO LEFT EXTERNAL ILIAC ARTERY, PERCUTANEOUS APPROACH: ICD-10-PCS | Performed by: STUDENT IN AN ORGANIZED HEALTH CARE EDUCATION/TRAINING PROGRAM

## 2023-12-29 PROCEDURE — C1894 INTRO/SHEATH, NON-LASER: HCPCS | Performed by: STUDENT IN AN ORGANIZED HEALTH CARE EDUCATION/TRAINING PROGRAM

## 2023-12-29 PROCEDURE — C1768 GRAFT, VASCULAR: HCPCS | Performed by: STUDENT IN AN ORGANIZED HEALTH CARE EDUCATION/TRAINING PROGRAM

## 2023-12-29 PROCEDURE — C1769 GUIDE WIRE: HCPCS | Performed by: STUDENT IN AN ORGANIZED HEALTH CARE EDUCATION/TRAINING PROGRAM

## 2023-12-29 PROCEDURE — 25010000002 ONDANSETRON PER 1 MG: Performed by: NURSE ANESTHETIST, CERTIFIED REGISTERED

## 2023-12-29 PROCEDURE — 82947 ASSAY GLUCOSE BLOOD QUANT: CPT

## 2023-12-29 PROCEDURE — 85730 THROMBOPLASTIN TIME PARTIAL: CPT | Performed by: HOSPITALIST

## 2023-12-29 PROCEDURE — B41G1ZZ FLUOROSCOPY OF LEFT LOWER EXTREMITY ARTERIES USING LOW OSMOLAR CONTRAST: ICD-10-PCS | Performed by: STUDENT IN AN ORGANIZED HEALTH CARE EDUCATION/TRAINING PROGRAM

## 2023-12-29 PROCEDURE — 25810000003 SODIUM CHLORIDE 0.9 % SOLUTION 250 ML FLEX CONT: Performed by: NURSE ANESTHETIST, CERTIFIED REGISTERED

## 2023-12-29 PROCEDURE — 25010000002 HEPARIN (PORCINE) PER 1000 UNITS: Performed by: STUDENT IN AN ORGANIZED HEALTH CARE EDUCATION/TRAINING PROGRAM

## 2023-12-29 PROCEDURE — 04UL0KZ SUPPLEMENT LEFT FEMORAL ARTERY WITH NONAUTOLOGOUS TISSUE SUBSTITUTE, OPEN APPROACH: ICD-10-PCS | Performed by: STUDENT IN AN ORGANIZED HEALTH CARE EDUCATION/TRAINING PROGRAM

## 2023-12-29 PROCEDURE — 25010000002 PHENYLEPHRINE 10 MG/ML SOLUTION: Performed by: NURSE ANESTHETIST, CERTIFIED REGISTERED

## 2023-12-29 PROCEDURE — 85347 COAGULATION TIME ACTIVATED: CPT

## 2023-12-29 PROCEDURE — 04CJ0ZZ EXTIRPATION OF MATTER FROM LEFT EXTERNAL ILIAC ARTERY, OPEN APPROACH: ICD-10-PCS | Performed by: STUDENT IN AN ORGANIZED HEALTH CARE EDUCATION/TRAINING PROGRAM

## 2023-12-29 PROCEDURE — 25010000002 ALBUMIN HUMAN 5% PER 50 ML: Performed by: NURSE ANESTHETIST, CERTIFIED REGISTERED

## 2023-12-29 PROCEDURE — 04UK0KZ SUPPLEMENT RIGHT FEMORAL ARTERY WITH NONAUTOLOGOUS TISSUE SUBSTITUTE, OPEN APPROACH: ICD-10-PCS | Performed by: STUDENT IN AN ORGANIZED HEALTH CARE EDUCATION/TRAINING PROGRAM

## 2023-12-29 PROCEDURE — C1887 CATHETER, GUIDING: HCPCS | Performed by: STUDENT IN AN ORGANIZED HEALTH CARE EDUCATION/TRAINING PROGRAM

## 2023-12-29 PROCEDURE — 25010000002 VANCOMYCIN 1 G RECONSTITUTED SOLUTION 1 EACH VIAL: Performed by: STUDENT IN AN ORGANIZED HEALTH CARE EDUCATION/TRAINING PROGRAM

## 2023-12-29 PROCEDURE — 84295 ASSAY OF SERUM SODIUM: CPT

## 2023-12-29 PROCEDURE — 25010000002 SUGAMMADEX 200 MG/2ML SOLUTION: Performed by: NURSE ANESTHETIST, CERTIFIED REGISTERED

## 2023-12-29 PROCEDURE — C1874 STENT, COATED/COV W/DEL SYS: HCPCS | Performed by: STUDENT IN AN ORGANIZED HEALTH CARE EDUCATION/TRAINING PROGRAM

## 2023-12-29 PROCEDURE — 82803 BLOOD GASES ANY COMBINATION: CPT

## 2023-12-29 PROCEDURE — 25810000003 LACTATED RINGERS SOLUTION: Performed by: STUDENT IN AN ORGANIZED HEALTH CARE EDUCATION/TRAINING PROGRAM

## 2023-12-29 PROCEDURE — 85610 PROTHROMBIN TIME: CPT | Performed by: STUDENT IN AN ORGANIZED HEALTH CARE EDUCATION/TRAINING PROGRAM

## 2023-12-29 PROCEDURE — 83605 ASSAY OF LACTIC ACID: CPT | Performed by: STUDENT IN AN ORGANIZED HEALTH CARE EDUCATION/TRAINING PROGRAM

## 2023-12-29 PROCEDURE — 88304 TISSUE EXAM BY PATHOLOGIST: CPT | Performed by: STUDENT IN AN ORGANIZED HEALTH CARE EDUCATION/TRAINING PROGRAM

## 2023-12-29 PROCEDURE — 82330 ASSAY OF CALCIUM: CPT

## 2023-12-29 PROCEDURE — 04CK0ZZ EXTIRPATION OF MATTER FROM RIGHT FEMORAL ARTERY, OPEN APPROACH: ICD-10-PCS | Performed by: STUDENT IN AN ORGANIZED HEALTH CARE EDUCATION/TRAINING PROGRAM

## 2023-12-29 PROCEDURE — 25510000001 IOPAMIDOL PER 1 ML: Performed by: STUDENT IN AN ORGANIZED HEALTH CARE EDUCATION/TRAINING PROGRAM

## 2023-12-29 PROCEDURE — B41C1ZZ FLUOROSCOPY OF PELVIC ARTERIES USING LOW OSMOLAR CONTRAST: ICD-10-PCS | Performed by: STUDENT IN AN ORGANIZED HEALTH CARE EDUCATION/TRAINING PROGRAM

## 2023-12-29 PROCEDURE — 25010000002 HEPARIN (PORCINE) PER 1000 UNITS: Performed by: NURSE ANESTHETIST, CERTIFIED REGISTERED

## 2023-12-29 PROCEDURE — 25810000003 LACTATED RINGERS PER 1000 ML: Performed by: STUDENT IN AN ORGANIZED HEALTH CARE EDUCATION/TRAINING PROGRAM

## 2023-12-29 PROCEDURE — 25010000002 FENTANYL CITRATE (PF) 100 MCG/2ML SOLUTION: Performed by: NURSE ANESTHETIST, CERTIFIED REGISTERED

## 2023-12-29 PROCEDURE — 25810000003 SODIUM CHLORIDE 0.9 % SOLUTION: Performed by: NURSE ANESTHETIST, CERTIFIED REGISTERED

## 2023-12-29 PROCEDURE — 85730 THROMBOPLASTIN TIME PARTIAL: CPT | Performed by: STUDENT IN AN ORGANIZED HEALTH CARE EDUCATION/TRAINING PROGRAM

## 2023-12-29 PROCEDURE — 80048 BASIC METABOLIC PNL TOTAL CA: CPT | Performed by: STUDENT IN AN ORGANIZED HEALTH CARE EDUCATION/TRAINING PROGRAM

## 2023-12-29 PROCEDURE — P9041 ALBUMIN (HUMAN),5%, 50ML: HCPCS | Performed by: NURSE ANESTHETIST, CERTIFIED REGISTERED

## 2023-12-29 PROCEDURE — 25010000002 PHENYLEPHRINE 10 MG/ML SOLUTION 5 ML VIAL: Performed by: NURSE ANESTHETIST, CERTIFIED REGISTERED

## 2023-12-29 PROCEDURE — 04HH3DZ INSERTION OF INTRALUMINAL DEVICE INTO RIGHT EXTERNAL ILIAC ARTERY, PERCUTANEOUS APPROACH: ICD-10-PCS | Performed by: STUDENT IN AN ORGANIZED HEALTH CARE EDUCATION/TRAINING PROGRAM

## 2023-12-29 PROCEDURE — C1757 CATH, THROMBECTOMY/EMBOLECT: HCPCS | Performed by: STUDENT IN AN ORGANIZED HEALTH CARE EDUCATION/TRAINING PROGRAM

## 2023-12-29 PROCEDURE — 84132 ASSAY OF SERUM POTASSIUM: CPT

## 2023-12-29 PROCEDURE — 85027 COMPLETE CBC AUTOMATED: CPT | Performed by: STUDENT IN AN ORGANIZED HEALTH CARE EDUCATION/TRAINING PROGRAM

## 2023-12-29 PROCEDURE — 25010000002 PROPOFOL 200 MG/20ML EMULSION: Performed by: NURSE ANESTHETIST, CERTIFIED REGISTERED

## 2023-12-29 DEVICE — STENTGR ENDOPROSTH VIABAHN RO HEP 7F 8MM 7.5X120CM: Type: IMPLANTABLE DEVICE | Status: FUNCTIONAL

## 2023-12-29 DEVICE — PTCH VASC VASCUGUARD TPR/END 0.8X8CM STRL: Type: IMPLANTABLE DEVICE | Site: LEG | Status: FUNCTIONAL

## 2023-12-29 DEVICE — STENTGR ENDOPROSTH VIABAHN VBX EXP 8F 8X16X59MM 135CM: Type: IMPLANTABLE DEVICE | Site: GROIN | Status: FUNCTIONAL

## 2023-12-29 DEVICE — ABSORBABLE HEMOSTAT (OXIDIZED REGENERATED CELLULOSE, U.S.P.)
Type: IMPLANTABLE DEVICE | Site: LEG | Status: FUNCTIONAL
Brand: SURGICEL FIBRILLAR

## 2023-12-29 DEVICE — STENTGR ENDOPROSTH VIABAHN RO HEP 7F 8MM 10X120CM: Type: IMPLANTABLE DEVICE | Site: GROIN | Status: FUNCTIONAL

## 2023-12-29 RX ORDER — DROPERIDOL 2.5 MG/ML
0.62 INJECTION, SOLUTION INTRAMUSCULAR; INTRAVENOUS
Status: DISCONTINUED | OUTPATIENT
Start: 2023-12-29 | End: 2023-12-29 | Stop reason: HOSPADM

## 2023-12-29 RX ORDER — NALOXONE HCL 0.4 MG/ML
0.2 VIAL (ML) INJECTION AS NEEDED
Status: DISCONTINUED | OUTPATIENT
Start: 2023-12-29 | End: 2023-12-29 | Stop reason: HOSPADM

## 2023-12-29 RX ORDER — MORPHINE SULFATE 2 MG/ML
2 INJECTION, SOLUTION INTRAMUSCULAR; INTRAVENOUS ONCE
Status: COMPLETED | OUTPATIENT
Start: 2023-12-29 | End: 2023-12-29

## 2023-12-29 RX ORDER — IPRATROPIUM BROMIDE AND ALBUTEROL SULFATE 2.5; .5 MG/3ML; MG/3ML
3 SOLUTION RESPIRATORY (INHALATION) ONCE AS NEEDED
Status: DISCONTINUED | OUTPATIENT
Start: 2023-12-29 | End: 2023-12-29 | Stop reason: HOSPADM

## 2023-12-29 RX ORDER — ONDANSETRON 2 MG/ML
INJECTION INTRAMUSCULAR; INTRAVENOUS AS NEEDED
Status: DISCONTINUED | OUTPATIENT
Start: 2023-12-29 | End: 2023-12-29 | Stop reason: SURG

## 2023-12-29 RX ORDER — ONDANSETRON 2 MG/ML
4 INJECTION INTRAMUSCULAR; INTRAVENOUS ONCE AS NEEDED
Status: DISCONTINUED | OUTPATIENT
Start: 2023-12-29 | End: 2023-12-29 | Stop reason: HOSPADM

## 2023-12-29 RX ORDER — FENTANYL CITRATE 50 UG/ML
25 INJECTION, SOLUTION INTRAMUSCULAR; INTRAVENOUS
Status: DISCONTINUED | OUTPATIENT
Start: 2023-12-29 | End: 2023-12-29 | Stop reason: HOSPADM

## 2023-12-29 RX ORDER — NITROGLYCERIN 0.4 MG/1
0.4 TABLET SUBLINGUAL
Status: DISCONTINUED | OUTPATIENT
Start: 2023-12-29 | End: 2024-01-04

## 2023-12-29 RX ORDER — SODIUM CHLORIDE 9 MG/ML
INJECTION, SOLUTION INTRAVENOUS CONTINUOUS PRN
Status: DISCONTINUED | OUTPATIENT
Start: 2023-12-29 | End: 2023-12-29 | Stop reason: SURG

## 2023-12-29 RX ORDER — LIDOCAINE HYDROCHLORIDE 20 MG/ML
INJECTION, SOLUTION EPIDURAL; INFILTRATION; INTRACAUDAL; PERINEURAL AS NEEDED
Status: DISCONTINUED | OUTPATIENT
Start: 2023-12-29 | End: 2023-12-29 | Stop reason: SURG

## 2023-12-29 RX ORDER — FLUMAZENIL 0.1 MG/ML
0.2 INJECTION INTRAVENOUS AS NEEDED
Status: DISCONTINUED | OUTPATIENT
Start: 2023-12-29 | End: 2023-12-29 | Stop reason: HOSPADM

## 2023-12-29 RX ORDER — SODIUM CHLORIDE, SODIUM LACTATE, POTASSIUM CHLORIDE, CALCIUM CHLORIDE 600; 310; 30; 20 MG/100ML; MG/100ML; MG/100ML; MG/100ML
100 INJECTION, SOLUTION INTRAVENOUS CONTINUOUS
Status: DISCONTINUED | OUTPATIENT
Start: 2023-12-29 | End: 2024-01-02

## 2023-12-29 RX ORDER — ALBUMIN, HUMAN INJ 5% 5 %
SOLUTION INTRAVENOUS CONTINUOUS PRN
Status: DISCONTINUED | OUTPATIENT
Start: 2023-12-29 | End: 2023-12-29 | Stop reason: SURG

## 2023-12-29 RX ORDER — PROMETHAZINE HYDROCHLORIDE 25 MG/1
25 SUPPOSITORY RECTAL ONCE AS NEEDED
Status: DISCONTINUED | OUTPATIENT
Start: 2023-12-29 | End: 2023-12-29 | Stop reason: HOSPADM

## 2023-12-29 RX ORDER — ROCURONIUM BROMIDE 10 MG/ML
INJECTION, SOLUTION INTRAVENOUS AS NEEDED
Status: DISCONTINUED | OUTPATIENT
Start: 2023-12-29 | End: 2023-12-29 | Stop reason: SURG

## 2023-12-29 RX ORDER — SODIUM CHLORIDE, SODIUM LACTATE, POTASSIUM CHLORIDE, CALCIUM CHLORIDE 600; 310; 30; 20 MG/100ML; MG/100ML; MG/100ML; MG/100ML
INJECTION, SOLUTION INTRAVENOUS CONTINUOUS PRN
Status: DISCONTINUED | OUTPATIENT
Start: 2023-12-29 | End: 2023-12-29 | Stop reason: SURG

## 2023-12-29 RX ORDER — DEXAMETHASONE SODIUM PHOSPHATE 4 MG/ML
INJECTION, SOLUTION INTRA-ARTICULAR; INTRALESIONAL; INTRAMUSCULAR; INTRAVENOUS; SOFT TISSUE AS NEEDED
Status: DISCONTINUED | OUTPATIENT
Start: 2023-12-29 | End: 2023-12-29 | Stop reason: SURG

## 2023-12-29 RX ORDER — PROPOFOL 10 MG/ML
INJECTION, EMULSION INTRAVENOUS AS NEEDED
Status: DISCONTINUED | OUTPATIENT
Start: 2023-12-29 | End: 2023-12-29 | Stop reason: SURG

## 2023-12-29 RX ORDER — HEPARIN SODIUM 1000 [USP'U]/ML
INJECTION, SOLUTION INTRAVENOUS; SUBCUTANEOUS AS NEEDED
Status: DISCONTINUED | OUTPATIENT
Start: 2023-12-29 | End: 2023-12-29 | Stop reason: SURG

## 2023-12-29 RX ORDER — PROMETHAZINE HYDROCHLORIDE 25 MG/1
25 TABLET ORAL ONCE AS NEEDED
Status: DISCONTINUED | OUTPATIENT
Start: 2023-12-29 | End: 2023-12-29 | Stop reason: HOSPADM

## 2023-12-29 RX ORDER — DIPHENHYDRAMINE HYDROCHLORIDE 50 MG/ML
12.5 INJECTION INTRAMUSCULAR; INTRAVENOUS
Status: DISCONTINUED | OUTPATIENT
Start: 2023-12-29 | End: 2023-12-29 | Stop reason: HOSPADM

## 2023-12-29 RX ORDER — FENTANYL CITRATE 50 UG/ML
INJECTION, SOLUTION INTRAMUSCULAR; INTRAVENOUS AS NEEDED
Status: DISCONTINUED | OUTPATIENT
Start: 2023-12-29 | End: 2023-12-29 | Stop reason: SURG

## 2023-12-29 RX ORDER — PHENYLEPHRINE HYDROCHLORIDE 10 MG/ML
INJECTION INTRAVENOUS AS NEEDED
Status: DISCONTINUED | OUTPATIENT
Start: 2023-12-29 | End: 2023-12-29 | Stop reason: SURG

## 2023-12-29 RX ADMIN — SUGAMMADEX 200 MG: 100 INJECTION, SOLUTION INTRAVENOUS at 16:22

## 2023-12-29 RX ADMIN — CEFAZOLIN 2000 MG: 2 INJECTION, POWDER, FOR SOLUTION INTRAMUSCULAR; INTRAVENOUS at 11:48

## 2023-12-29 RX ADMIN — ROCURONIUM BROMIDE 15 MG: 10 INJECTION, SOLUTION INTRAVENOUS at 14:25

## 2023-12-29 RX ADMIN — SODIUM CHLORIDE: 9 INJECTION, SOLUTION INTRAVENOUS at 09:20

## 2023-12-29 RX ADMIN — HEPARIN SODIUM 3000 UNITS: 1000 INJECTION, SOLUTION INTRAVENOUS; SUBCUTANEOUS at 11:33

## 2023-12-29 RX ADMIN — CEFAZOLIN 2000 MG: 2 INJECTION, POWDER, FOR SOLUTION INTRAMUSCULAR; INTRAVENOUS at 13:54

## 2023-12-29 RX ADMIN — HEPARIN SODIUM 10000 UNITS: 1000 INJECTION, SOLUTION INTRAVENOUS; SUBCUTANEOUS at 10:45

## 2023-12-29 RX ADMIN — DEXAMETHASONE SODIUM PHOSPHATE 8 MG: 4 INJECTION, SOLUTION INTRAMUSCULAR; INTRAVENOUS at 09:41

## 2023-12-29 RX ADMIN — ROCURONIUM BROMIDE 10 MG: 10 INJECTION, SOLUTION INTRAVENOUS at 11:00

## 2023-12-29 RX ADMIN — HEPARIN SODIUM 2000 UNITS: 1000 INJECTION, SOLUTION INTRAVENOUS; SUBCUTANEOUS at 12:07

## 2023-12-29 RX ADMIN — PREGABALIN 25 MG: 25 CAPSULE ORAL at 22:39

## 2023-12-29 RX ADMIN — PHENYLEPHRINE HYDROCHLORIDE 0.2 MCG/KG/MIN: 10 INJECTION INTRAVENOUS at 10:21

## 2023-12-29 RX ADMIN — FENTANYL CITRATE 25 MCG: 50 INJECTION, SOLUTION INTRAMUSCULAR; INTRAVENOUS at 09:27

## 2023-12-29 RX ADMIN — PROPOFOL 100 MG: 10 INJECTION, EMULSION INTRAVENOUS at 09:27

## 2023-12-29 RX ADMIN — FENTANYL CITRATE 25 MCG: 50 INJECTION, SOLUTION INTRAMUSCULAR; INTRAVENOUS at 16:16

## 2023-12-29 RX ADMIN — SODIUM CHLORIDE, POTASSIUM CHLORIDE, SODIUM LACTATE AND CALCIUM CHLORIDE 500 ML: 600; 310; 30; 20 INJECTION, SOLUTION INTRAVENOUS at 22:36

## 2023-12-29 RX ADMIN — SODIUM CHLORIDE, POTASSIUM CHLORIDE, SODIUM LACTATE AND CALCIUM CHLORIDE 100 ML/HR: 600; 310; 30; 20 INJECTION, SOLUTION INTRAVENOUS at 22:36

## 2023-12-29 RX ADMIN — SODIUM CHLORIDE 2000 MG: 900 INJECTION INTRAVENOUS at 22:36

## 2023-12-29 RX ADMIN — ONDANSETRON 4 MG: 2 INJECTION INTRAMUSCULAR; INTRAVENOUS at 16:19

## 2023-12-29 RX ADMIN — ALBUMIN (HUMAN): 12.5 INJECTION, SOLUTION INTRAVENOUS at 13:55

## 2023-12-29 RX ADMIN — CEFAZOLIN 2000 MG: 2 INJECTION, POWDER, FOR SOLUTION INTRAMUSCULAR; INTRAVENOUS at 09:35

## 2023-12-29 RX ADMIN — ROCURONIUM BROMIDE 10 MG: 10 INJECTION, SOLUTION INTRAVENOUS at 11:46

## 2023-12-29 RX ADMIN — PHENYLEPHRINE HYDROCHLORIDE 100 MCG: 10 INJECTION INTRAVENOUS at 09:27

## 2023-12-29 RX ADMIN — HEPARIN SODIUM 2000 UNITS: 1000 INJECTION, SOLUTION INTRAVENOUS; SUBCUTANEOUS at 10:58

## 2023-12-29 RX ADMIN — ROCURONIUM BROMIDE 15 MG: 10 INJECTION, SOLUTION INTRAVENOUS at 13:08

## 2023-12-29 RX ADMIN — FENTANYL CITRATE 25 MCG: 50 INJECTION, SOLUTION INTRAMUSCULAR; INTRAVENOUS at 14:32

## 2023-12-29 RX ADMIN — FENTANYL CITRATE 25 MCG: 50 INJECTION, SOLUTION INTRAMUSCULAR; INTRAVENOUS at 16:30

## 2023-12-29 RX ADMIN — ROCURONIUM BROMIDE 50 MG: 10 INJECTION, SOLUTION INTRAVENOUS at 09:27

## 2023-12-29 RX ADMIN — LIDOCAINE HYDROCHLORIDE 50 MG: 20 INJECTION, SOLUTION EPIDURAL; INFILTRATION; INTRACAUDAL; PERINEURAL at 09:27

## 2023-12-29 RX ADMIN — Medication 10 ML: at 06:57

## 2023-12-29 RX ADMIN — HEPARIN SODIUM 12 UNITS/KG/HR: 10000 INJECTION, SOLUTION INTRAVENOUS at 22:38

## 2023-12-29 RX ADMIN — PHENYLEPHRINE HYDROCHLORIDE 100 MCG: 10 INJECTION INTRAVENOUS at 12:29

## 2023-12-29 RX ADMIN — PHENYLEPHRINE HYDROCHLORIDE 100 MCG: 10 INJECTION INTRAVENOUS at 12:15

## 2023-12-29 RX ADMIN — ACETAMINOPHEN 650 MG: 325 TABLET, FILM COATED ORAL at 03:49

## 2023-12-29 RX ADMIN — SODIUM CHLORIDE, SODIUM LACTATE, POTASSIUM CHLORIDE, AND CALCIUM CHLORIDE: .6; .31; .03; .02 INJECTION, SOLUTION INTRAVENOUS at 09:20

## 2023-12-29 RX ADMIN — ACETAMINOPHEN 650 MG: 650 SUPPOSITORY RECTAL at 06:56

## 2023-12-29 RX ADMIN — HEPARIN SODIUM 5000 UNITS: 1000 INJECTION, SOLUTION INTRAVENOUS; SUBCUTANEOUS at 13:12

## 2023-12-29 RX ADMIN — MORPHINE SULFATE 2 MG: 2 INJECTION, SOLUTION INTRAMUSCULAR; INTRAVENOUS at 08:30

## 2023-12-29 NOTE — PROGRESS NOTES
[facility] Medicine Services  Daily progress note  Patient Name: Oscar Wray  : 1950  MRN: [id number]  Primary Care Physician:  Kajal Horowitz PA  Date of admission: 2023       Subjective       Chief Complaint: Bilateral lower extremity pain left more than right     History of Present Illness: Oscar Wray is a 73 y.o. male with a CMH of PAD who presented to [facility] on 2023 with pain in the left foot more than right.  ABIs 0 bilaterally but did have some tibial flow noted, however it was quite minimal and is developing ischemic ulcers on toes, is having severe pain at rest.   He has a CTA already ordered by the ER  Vascular surgery has reviewed his prior CTA and he likely will need L femoral endarterectomy and bilateral iliac stents, possible bypass. check vein mapping and plan to get stress test soon as well, started on heparin gtt for now.  Continue home aspirin.   Review of Systems/Hospital course  Unable to perform ROS: Dementia    2023; afebrile vital signs are stable lying in treatment comfortably, patient is pleasantly confused no family at bedside, spoke with the bedside RN.  2023; vital signs are stable, patient is being taken for surgery by his vascular surgeon     Personal History      Medical History        Past Medical History:   Diagnosis Date    Dementia              Surgical History   No past surgical history on file.        Family History: family history is not on file. Otherwise pertinent FHx was reviewed and not pertinent to current issue.     Social History:  reports that he has quit smoking. His smoking use included cigarettes. He has never used smokeless tobacco. He reports current alcohol use. He reports that he does not use drugs.     Home Medications:  Prior to Admission Medications         Prescriptions Last Dose Informant Patient Reported? Taking?     aspirin EC tablet 81 mg     No No     atenolol (TENORMIN) 25 MG tablet     Yes  No     atorvastatin (LIPITOR) 20 MG tablet     No No     Take 1 tablet by mouth Daily.     celecoxib (CeleBREX) 100 MG capsule     No No     Take 1 capsule by mouth 2 (Two) Times a Day.     Cholecalciferol (Vitamin D3) 50 MCG (2000 UT) capsule     Yes No     cilostazol (PLETAL) 100 MG tablet     No No     Take 1 tablet by mouth 2 (Two) Times a Day.     dapagliflozin Propanediol (Farxiga) 10 MG tablet     No No     Take 10 mg by mouth Daily.     docusate sodium (COLACE) 100 MG capsule     Yes No     donepezil (ARICEPT) 23 MG tablet     Yes No     memantine (NAMENDA XR) 28 MG capsule sustained-release 24 hr extended release capsule     Yes No     Omega-3 Fatty Acids (fish oil) 1000 MG capsule capsule     Yes No     Take  by mouth Daily With Breakfast.     pregabalin (Lyrica) 25 MG capsule     No No     Take 1 capsule by mouth 2 (Two) Times a Day.     risperiDONE (risperDAL) 0.25 MG tablet     Yes No     risperiDONE (risperDAL) 0.5 MG tablet     Yes No     tamsulosin (FLOMAX) 0.4 MG capsule 24 hr capsule     Yes No                   Allergies:  No Known Allergies     Objective       Vitals:   Temp:  [97.1 °F (36.2 °C)-98.1 °F (36.7 °C)] 97.7 °F (36.5 °C)  Heart Rate:  [71-87] 87  Resp:  [14-24] 14  BP: (112-165)/(53-80) 165/80   Physical Exam  Constitutional:       Appearance: Normal appearance. He is obese.   HENT:      Head: Normocephalic.      Right Ear: External ear normal.      Left Ear: External ear normal.   Eyes:      Extraocular Movements: Extraocular movements intact.      Pupils: Pupils are equal, round, and reactive to light.   Cardiovascular:      Heart sounds: Normal heart sounds.   Pulmonary:      Effort: Pulmonary effort is normal.   Abdominal:      General: Abdomen is flat.   Musculoskeletal:      Cervical back: Normal range of motion and neck supple.      Right lower leg: No edema.      Left lower leg: No edema.      Comments: COLD  lower extremities/feet, absence of pedal pulses, dusky appearing  toes.  Bilaterally   Neurological:      General: No focal deficit present.      Mental Status: He is alert.                   Diagnostic Data:  Lab Results (last 24 hours)         Procedure Component Value Units Date/Time     aPTT [[id number]]  (Abnormal) Collected: 12/27/23 1529     Specimen: Blood Updated: 12/27/23 1656       PTT 31.6 seconds       Extra Tubes [[id number]] Collected: 12/27/23 1529     Specimen: Blood, Venous Line Updated: 12/27/23 1631     Narrative:       The following orders were created for panel order Extra Tubes.  Procedure                               Abnormality         Status                     ---------                               -----------         ------                     Gold Top - SST[[id number]]                                   Final result                  Please view results for these tests on the individual orders.     Gold Top - SST [[id number]] Collected: 12/27/23 1529     Specimen: Blood Updated: 12/27/23 1631       Extra Tube Hold for add-ons.       Comment: Auto resulted.        Comprehensive Metabolic Panel [[id number]]  (Abnormal) Collected: 12/27/23 1529     Specimen: Blood Updated: 12/27/23 1602       Glucose 134 mg/dL         BUN 15 mg/dL         Creatinine 1.50 mg/dL         Sodium 135 mmol/L         Potassium 4.2 mmol/L         Chloride 97 mmol/L         CO2 23.0 mmol/L         Calcium 9.7 mg/dL         Total Protein 7.6 g/dL         Albumin 4.3 g/dL         ALT (SGPT) 13 U/L         AST (SGOT) 18 U/L         Alkaline Phosphatase 106 U/L         Total Bilirubin 0.6 mg/dL         Globulin 3.3 gm/dL         A/G Ratio 1.3 g/dL         BUN/Creatinine Ratio 10.0       Anion Gap 15.0 mmol/L         eGFR 49.5 mL/min/1.73       Narrative:       GFR Normal >60  Chronic Kidney Disease <60  Kidney Failure <15     The GFR formula is only valid for adults with stable renal function between ages 18 and 70.     Protime-INR [[id number]]  (Normal) Collected: 12/27/23 1529      Specimen: Blood Updated: 12/27/23 1548       Protime 11.2 Seconds         INR 1.03     CBC & Differential [[id number]]  (Abnormal) Collected: 12/27/23 1529     Specimen: Blood Updated: 12/27/23 1544     Narrative:       The following orders were created for panel order CBC & Differential.  Procedure                               Abnormality         Status                     ---------                               -----------         ------                     CBC Auto Differential[[id number]]        Abnormal            Final result                  Please view results for these tests on the individual orders.     CBC Auto Differential [[id number]]  (Abnormal) Collected: 12/27/23 1529     Specimen: Blood Updated: 12/27/23 1544       WBC 14.30 10*3/mm3         RBC 5.05 10*6/mm3         Hemoglobin 14.3 g/dL         Hematocrit 43.9 %         MCV 87.0 fL         MCH 28.3 pg         MCHC 32.5 g/dL         RDW 14.9 %         RDW-SD 44.6 fl         MPV 7.6 fL         Platelets 646 10*3/mm3         Neutrophil % 85.0 %         Lymphocyte % 6.5 %         Monocyte % 7.1 %         Eosinophil % 0.6 %         Basophil % 0.8 %         Neutrophils, Absolute 12.10 10*3/mm3         Lymphocytes, Absolute 0.90 10*3/mm3         Monocytes, Absolute 1.00 10*3/mm3         Eosinophils, Absolute 0.10 10*3/mm3         Basophils, Absolute 0.10 10*3/mm3         nRBC 0.1 /100 WBC                             Imaging Results (Last 24 Hours)         ** No results found for the last 24 hours. **           CT Angio Abdominal Aorta Bilateral Iliofem Runoff    Result Date: 12/27/2023  Impression: 1. 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 Bilateral common, internal and external iliac arteries are nonopacified. Distal reconstitution at the level of the bilateral common femoral arteries. 2. Poor runoff to left foot  with peroneal artery and posterior tibial artery visualized to lower leg with no significant contrast below the level of the left ankle, worsened from prior study. 3. Two-vessel runoff to the right foot via peroneal and posterior tibial arteries. 4. Segmental occlusion of left and right superficial femoral arteries and left popliteal artery as seen on prior study. 5. Additional incidental findings above. Electronically Signed: Cristian Woo MD  12/27/2023 7:22 PM EST  Workstation ID: [id number]          Assessment & Plan          This is a 73 y.o. male with: Critical limb ischemia bilateral lower extremity     Active and Resolved Problems        Active Hospital Problems     Diagnosis   POA    Degenerative disc disease, lumbar [M51.36]   Yes       Resolved Hospital Problems   No resolved problems to display.   Acute critical limb ischemia bilateral; continue IV heparin and aspirin, vascular surgery following, CTA results as above, stress myocardial perfusion test results normal, patient is being taken for surgery.  Dementia; resume home medications;  Hyperlipidemia; resume home medications  History of diabetes mellitus; hold oral hypoglycemic agents Accu-Chek and SSI; A1c 5.60  KARINA: Prerenal, resolving with IV fluids.  Appreciate nephrology consult.        DVT prophylaxis:  Medical DVT prophylaxis orders are present.     The patient desires to be as follows:     CODE STATUS:    Full code     Admission Status:  I believe this patient meets inpatient status.     Expected Length of Stay: 3 to 5 days     PDMP and Medication Dispenses via Sidebar reviewed and consistent with patient reported medications.     I discussed the patient's findings and my recommendations with patient and family.        Signature:     Electronically signed by Margareth Geiger MD, 12/29/23, 3:59 PM EST.

## 2023-12-29 NOTE — ANESTHESIA PROCEDURE NOTES
Airway  Urgency: elective    Date/Time: 12/29/2023 9:28 AM  Airway not difficult    General Information and Staff    Patient location during procedure: OR  CRNA/CAA: Juan A Spencer CRNA    Indications and Patient Condition  Indications for airway management: airway protection    Preoxygenated: yes  MILS maintained throughout  Mask difficulty assessment: 1 - vent by mask    Final Airway Details  Final airway type: endotracheal airway      Successful airway: ETT  Cuffed: yes   Successful intubation technique: direct laryngoscopy  Endotracheal tube insertion site: oral  Blade: Heena  Blade size: 3  ETT size (mm): 7.5  Cormack-Lehane Classification: grade I - full view of glottis  Placement verified by: chest auscultation   Measured from: gums  ETT/EBT to gums (cm): 22  Number of attempts at approach: 1  Assessment: lips, teeth, and gum same as pre-op and atraumatic intubation

## 2023-12-29 NOTE — BRIEF OP NOTE
ANGIOPLASTY ILIAC ARTERY POSSIBLE STENT  Progress Note    Oscar Wray  12/29/2023    Pre-op Diagnosis:   Acute on chronic peripheral arterial disease with rest pain, left foot       Post-Op Diagnosis Codes:   See op note    Procedure/CPT® Codes:        Procedure(s):  BILATERAL FEMORAL ENDARTECTOMY AND ILIAC ARTERY STENTS, LEFT LEG ANGIOGRAM              Surgeon(s):  Trisha Coffman MD Bush, Charles A II, MD    Anesthesia: General    Staff:   Cell Saver : Kin Soliz  Circulator: Som Maloney RN; Ijeoma Villarreal RN  Radiology Technologist: Mahamed Braun RRT; Dejah Ellsworth  Scrub Person: Jacquelyn Diaz; Mi Goyal         Estimated Blood Loss: 550 mL    Urine Voided: 850 mL    Specimens:                Thrombus from left iliac artery sent to pathology.           Drains:   Urethral Catheter Silicone 16 Fr. (Active)       Findings: See dictated op note        Complications: none intraoperative    Trisha Coffman MD   was responsible for performing the following activities: Retraction, Suction, Irrigation, Suturing, and Closing and their skilled assistance was necessary for the success of this case.    Nura Salgado II, MD     Date: 12/29/2023  Time: 16:53 EST

## 2023-12-29 NOTE — ANESTHESIA PREPROCEDURE EVALUATION
Anesthesia Evaluation     Patient summary reviewed and Nursing notes reviewed   NPO Solid Status: > 8 hours  NPO Liquid Status: > 8 hours           Airway   Mallampati: II  TM distance: >3 FB  Neck ROM: full  No difficulty expected  Dental - normal exam     Pulmonary - negative pulmonary ROS and normal exam    breath sounds clear to auscultation  Cardiovascular - normal exam  Exercise tolerance: unable to assess    ECG reviewed  Rhythm: regular  Rate: normal    (+) PVD    ROS comment: Stress Sresults Pending    NSR    Neuro/Psych  (+) psychiatric history Depression and Anxiety, dementia  GI/Hepatic/Renal/Endo    (+) renal disease- CRI    Musculoskeletal     (+) back pain  Abdominal  - normal exam   Substance History - negative use     OB/GYN negative ob/gyn ROS         Other   arthritis,     ROS/Med Hx Other: Dr. Salgado  73-year-old gentleman with subacute presentation of acute on chronic peripheral arterial disease with rest pain in his feet bilaterally.  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  Pain is improved this morning, exam largely unchanged.  Anticipate patient will need bilateral femoral artery cutdowns, attempted thrombectomy, bilateral iliac stents and femoral endarterectomies.   Stress test pending today  Discussed with OR, questionable availability tomorrow.  Have tentatively added on but seems more likely he will end up having surgery early next week.  Continue heparin gtt  I've ordered his home does of aspirin and lipitor.   Ok from my standpoint to have a diet once his stress test is completed. Will need cardiology evaluation if stress test is abnormal.   Will follow up primary team plans.      Nura Salgado II, MD    Medical Hx  Resolved Hospital Problems  No resolved problems to display.  Acute critical limb ischemia bilateral; continue IV heparin and aspirin, vascular surgery following, CTA results as above, stress  myocardial perfusion test results awaited.  Dementia; resume home medications;  Hyperlipidemia; resume home medications  History of diabetes mellitus; hold oral hypoglycemic agents Accu-Chek and SSI; A1c 5.60  Elevated serum creatinine; KARINA versus CKD, nephrology consulted.                            Anesthesia Plan    ASA 3     general and [place]     (Stress Results pending - NL Per Dr. Serrnao.  GUILLERMINA)  intravenous induction     Anesthetic plan, risks, benefits, and alternatives have been provided, discussed and informed consent has been obtained with: patient.    CODE STATUS:    Level Of Support Discussed With: Patient  Code Status (Patient has no pulse and is not breathing): CPR (Attempt to Resuscitate)  Medical Interventions (Patient has pulse or is breathing): Full Support

## 2023-12-29 NOTE — CONSULTS
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  S/p bilateral femoral enterectomy and iliac artery stents, left leg angiogram  Heparin to re-start at 2200 per Vascular surgery  Monitor blood pressure closely with arterial line   Maintenance IVF LR at 100 per vascular surgery  Pregabalin 25 mg BID, aspirin, Lipitor  STAT labs pending    Chronic:   Dementia -- Donepezil and memantine continued   CKD II -- Monitor electrolytes, Is/O, monitor blood pressure  Chronic low back pain    Level Of Support Discussed With: Patient  Code Status (Patient has no pulse and is not breathing): CPR (Attempt to Resuscitate)  Medical Interventions (Patient has pulse or is breathing): Full Support       Nutrition: Diet: Regular/House Diet; Texture: Regular Texture (IDDSI 7); Fluid Consistency: Thin (IDDSI 0) Patient isn't on Tube Feeding     DVT prophylaxis:  Medical and mechanical DVT prophylaxis orders are present.     History of Present illness     A 73 y.o. old male patient with PMH of CKD, PAD, DM, Hyperlipidemia,  presents to the hospital with complaints of rest pain in the left foot. Vascular surgery was consulted and recommended a left femoral endarterectomy and bilateral iliac stenting with possible bypass. Today to the patient was taken to OR for procedure. Patient underwent bilateral femoral enterectomy and iliac artery stents, left leg angiogram. Patient will be admitted to ICU for post op monitoring.     ACP: CPR, Full Intervention. Patient has a significant other and brother listed as NOK to make decisions for him in the event he is unable.     Patient was seen and examined on 23 at 17:42 EST .    Subjective / Review of systems      Review of Systems   Unable to perform ROS: Dementia        Past Medical/Surgical/Social/Family History & Allergies     Past Medical History:   Diagnosis Date    Dementia       History reviewed. No pertinent surgical history.   Social History     Socioeconomic History    Marital status: Single   Tobacco Use    Smoking status: Former     Types: Cigarettes    Smokeless tobacco: Never   Vaping Use    Vaping Use: Never used   Substance and Sexual Activity    Alcohol use: Not Currently    Drug use: Never    Sexual activity: Defer      History reviewed. No pertinent family history.   No Known Allergies     Home Medications     Prior to Admission medications    Medication Sig Start Date End Date Taking? Authorizing Provider   atenolol (TENORMIN) 25 MG tablet Take 0.5 tablets by mouth Daily. 6/3/22  Yes Molly Vora MD   atorvastatin (LIPITOR) 20 MG tablet Take 1 tablet by mouth Daily. 9/19/22  Yes Kajal Horowitz PA   celecoxib (CeleBREX) 100 MG capsule Take 1 capsule by mouth 2 (Two) Times a Day. 3/10/23  Yes Kajal Horowitz PA   Cholecalciferol (Vitamin D3) 50 MCG (2000 UT) capsule Take 1 capsule by mouth Daily. 12/17/22  Yes Molly Vora MD   cilostazol (PLETAL) 100 MG tablet Take 1 tablet by mouth 2 (Two) Times a Day. 3/6/23  Yes David Carson MD   dapagliflozin Propanediol (Farxiga) 10 MG tablet Take 10 mg by mouth Daily. 3/17/23  Yes Kajal Horowitz PA   docusate sodium (COLACE) 100 MG capsule Take 1 capsule by mouth Daily. 5/13/22  Yes Molly Vora MD   donepezil (ARICEPT) 23 MG tablet Take 1 tablet by mouth Every Night. 5/13/22  Yes Molly Vora MD   memantine (NAMENDA XR) 28 MG capsule sustained-release 24 hr extended release capsule Take 1 capsule by mouth Daily. 6/3/22  Yes Molly Vora MD   multivitamin with minerals tablet tablet Take 1 tablet by mouth Daily.   Yes Molly Vora MD   Omega-3 Fatty Acids (fish oil) 1000 MG capsule capsule Take 2 capsules  by mouth 2 (Two) Times a Day With Meals.   Yes Molly Vora MD   pregabalin (Lyrica) 25 MG capsule Take 1 capsule by mouth 2 (Two) Times a Day. 10/16/23  Yes Kajal Horowitz PA   risperiDONE (risperDAL) 0.25 MG tablet Take 1 tablet by mouth Daily. 6/3/22  Yes Molly Vora MD   risperiDONE (risperDAL) 0.5 MG tablet Take 1 tablet by mouth Every Night. 6/3/22  Yes Molly Vora MD   tamsulosin (FLOMAX) 0.4 MG capsule 24 hr capsule Take 1 capsule by mouth Daily. 5/27/22  Yes Molly Vora MD   acetaminophen (TYLENOL) 325 MG tablet Take 2 tablets by mouth Every 6 (Six) Hours As Needed for Mild Pain.    Molly Vora MD        Objective / Physical Exam     Vital signs:  Temp: 97.2 °F (36.2 °C)  BP: 99/46  Heart Rate: 83  Resp: 13  SpO2: 96 %  Weight: 73.7 kg (162 lb 7.7 oz)    Admission Weight: Weight: 89.5 kg (197 lb 5 oz)    Physical Exam  Constitutional:       General: He is not in acute distress.     Appearance: He is ill-appearing (Chronically).   HENT:      Head: Normocephalic.      Mouth/Throat:      Mouth: Mucous membranes are moist.      Pharynx: Oropharynx is clear.   Eyes:      Extraocular Movements: Extraocular movements intact.      Conjunctiva/sclera: Conjunctivae normal.      Pupils: Pupils are equal, round, and reactive to light.   Cardiovascular:      Rate and Rhythm: Normal rate and regular rhythm.      Heart sounds: Normal heart sounds.   Pulmonary:      Effort: Pulmonary effort is normal.      Breath sounds: Normal breath sounds.   Abdominal:      General: Bowel sounds are normal.      Palpations: Abdomen is soft.   Musculoskeletal:      Right lower leg: No edema.      Left lower leg: No edema.   Skin:     General: Skin is warm and dry.      Findings: No rash.   Neurological:      Mental Status: He is alert. Mental status is at baseline.          Labs     Results from last 7 days   Lab Units 12/29/23  1453 12/28/23  2244 12/27/23  1529   WBC 10*3/mm3  --  11.90*  14.30*   HEMATOCRIT %  --  39.0 43.9   HEMATOCRIT POC % 30*  --   --    PLATELETS 10*3/mm3  --  586* 646*      Results from last 7 days   Lab Units 12/28/23  2244 12/28/23  0337 12/27/23  1529   SODIUM mmol/L 134* 135* 135*   POTASSIUM mmol/L 4.1 4.0 4.2   CHLORIDE mmol/L 99 98 97*   CO2 mmol/L 20.0* 21.0* 23.0   BUN mg/dL 18 16 15   CREATININE mg/dL 1.49* 1.35* 1.50*        Imaging     CT Angio Abdominal Aorta Bilateral Iliofem Runoff    Result Date: 12/27/2023  Impression: 1. 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 Bilateral common, internal and external iliac arteries are nonopacified. Distal reconstitution at the level of the bilateral common femoral arteries. 2. Poor runoff to left foot with peroneal artery and posterior tibial artery visualized to lower leg with no significant contrast below the level of the left ankle, worsened from prior study. 3. Two-vessel runoff to the right foot via peroneal and posterior tibial arteries. 4. Segmental occlusion of left and right superficial femoral arteries and left popliteal artery as seen on prior study. 5. Additional incidental findings above. Electronically Signed: Cristian Woo MD  12/27/2023 7:22 PM EST  Workstation ID: [id number]          Current Medications     Scheduled Meds:  [MAR Hold] aspirin, 81 mg, Oral, Daily  atenolol, 12.5 mg, Oral, Daily  [MAR Hold] atorvastatin, 20 mg, Oral, Daily  [MAR Hold] cholecalciferol, 2,000 Units, Oral, Daily  [MAR Hold] cilostazol, 100 mg, Oral, BID  [MAR Hold] docusate sodium, 100 mg, Oral, Daily  [MAR Hold] donepezil, 20 mg, Oral, Nightly  lactated ringers, 1,000 mL, Intravenous, Once  [MAR Hold] memantine, 10 mg, Oral, Q12H  [MAR Hold] multivitamin with minerals, 1 tablet, Oral, Daily  pregabalin, 25 mg, Oral, BID  [MAR Hold] risperiDONE, 0.25 mg, Oral, Daily  [MAR Hold] risperiDONE, 0.5  mg, Oral, Nightly  [MAR Hold] senna-docusate sodium, 2 tablet, Oral, BID  [MAR Hold] sodium chloride, 10 mL, Intravenous, Q12H  [MAR Hold] tamsulosin, 0.4 mg, Oral, Daily         Continuous Infusions:  heparin, 16.8 Units/kg/hr, Last Rate: Stopped (12/29/23 0604)         ELOY Bagley   Critical Care  12/29/23   17:42 EST

## 2023-12-29 NOTE — OP NOTE
Date of Admission:  12/27/2023  Today's Date:  12/29/23  Nura Salgado II, MD  [facility]    Preoperative Diagnosis:   Peripheral vascular disease with bilateral leg rest pain and acute limb ischemia left leg    Postoperative Diagnosis:   Same    Procedure Performed:   Bilateral common femoral endarterectomy with bovine pericardial patch angioplasty  Thrombectomy left external iliac artery  Nonselective pelvic arteriogram  Bilateral iliac stents, 0Hy31gr Brutus VBX bilateral kissing iliac stents extending into aorta,  two 4w242yf and two 8x75mm Brutus Viabahn extending from common iliac down to distal external iliac bilateral  Left leg arteriogram    CPT:  98518-41 thromboendarterectomy with patch if performed; common femoral, bilateral  38893 - Thromboendarterectomy, with patch if performed; deep(profunda) femoral.  41439-13 Embolectomy/thrombectomy, with or without catheter, femoral-popliteal, aortoiliac artery. By LEG incision.   55409-60 Iliac stent initial bilateral  21701-34 Iliac stent additional bilateral  72204-53 Unilateral extremity runoff arteriogram (no diagnostic quality arterial imaging prior or significant change in vascular status)        Surgeon:   Nura Salgado II, MD    Assistant:    Trisha Coffman MD, Provided critical assistance in exposure, retraction, and suction that overall decrease blood loss and operative time.    Anesthesia:   General    Estimated Blood Loss:   550    Findings:    Patient with completely occluded external iliac arteries bilaterally and high-grade stenosis of common femoral arteries with calcified plaque bilaterally.  Bilateral common femoral endarterectomies performed, endarterectomy with patch angioplasty of left profundofemoral artery performed as well.  Ester mechanical thrombectomy performed approximately from left side with removal of subacute appearing thrombus, still no antegrade in flow.  Ester mechanical thrombectomy attempted from right femoral artery with no  return of thrombus and unable to pass Ester proximally.  We then accessed the open pericardial patches bilaterally and were able to get wires and catheters into the true lumen of the aorta bilaterally.  Balloon angioplasty performed and then bilateral kissing iliac stents were placed stenting down into the distal external iliac arteries bilaterally  Completion angiography showed thrombus within the right iliac stents and so Ester mechanical thrombectomy was performed there with thrombus removed.  Completion angiogram showed patent bilateral kissing iliac stents.  Following revascularization the patient had improved color in his left leg with 3-second cap refill, but no signals.  Angiogram performed which showed flow to the foot via posterior tibial artery.     Pelvic arteriogram: Proximal infrarenal aorta is patent with calcified atherosclerotic disease of distal aorta and aortic bifurcation noted.  Distal infrarenal aorta completely occludes just distal to the inferior mesenteric artery with combination of chronic plaque and acute thrombus.  This was treated with kissing iliac stents up to the level of the inferior mesenteric artery.  Completion angiography showed diminished flow through the inferior mesenteric artery.  Unclear if this was due to partial occlusion of the origin or having embolized distally from some of the thrombus.  Good collateral flow through the pelvis seen via a very large right lumbar artery and bilateral profundofemoral arteries with flow noted in mesentery of sigmoid colon.  Completion angiography also showed thrombus within the right iliac stents.  This was treated with Ester thrombectomy and completion angiogram following this showed no residual thrombus and good flow through iliac stents bilaterally.    Left leg arteriogram: Widely patent common femoral artery, widely patent profundofemoral artery, no apparent defect with endarterectomy and patch angioplasty.  Superficial femoral  artery completely occluded but does reconstitute above the knee via numerous collaterals.  Below-knee popliteal artery completely occludes distally with numerous collaterals in the lower leg.  Appears to reconstitute only a posterior tibial artery and the lower leg via large collateral with single-vessel runoff to the foot.    Implants:    Implant Name Type Inv. Item Serial No.  Lot No. LRB No. Used Action   CLIPAPPLR M/ ENDO LIGACLIP 20CLP 11IN MD - [id number] Implant CLIPAPPLR M/ ENDO LIGACLIP 20CLP 11IN MD  [facility]  DIV OF J AND J [id number]  1 Implanted   CLIPAPPLR M/ ENDO LIGACLIP 9 3/8IN  - [id number] Implant CLIPAPPLR M/ ENDO LIGACLIP 9 3/8IN [facility] ENDO SURGERY  DIV OF J AND J [id number]  1 Implanted   PTCH VASC VASCUGUARD TPR/END 0.8X8CM STRL - [id number] Implant PTCH VASC VASCUGUARD TPR/END 0.8X8CM STRL  [facility] [id number]  1 Implanted   PTCH VASC VASCUGUARD TPR/END 0.8X8CM STRL - [id number] Implant PTCH VASC VASCUGUARD TPR/END 0.8X8CM STRL  [facility] [id number]  1 Implanted   CLIPAPPLR M/ ENDO LIGACLIP 20CLP 11IN MD - [id number] Implant CLIPAPPLR M/ ENDO LIGACLIP 20CLP 11IN MD  [facility]  DIV OF J AND J [id number]  1 Implanted   HEMOST ABS SURGICEL FIBRILLAR 4X4IN - [id number] Implant HEMOST ABS SURGICEL FIBRILLAR 4X4IN  [employer]  DIV OF J AND J [id number]  1 Implanted   STENTGR ENDOPROSTH VIABAHN VBX EXP 8F [id number] 135CM - [id number] - [id number] Implant STENTGR ENDOPROSTH VIABAHN VBX EXP 8F [id number] 135CM [id number] [employer] .  1 Implanted   STENTGR ENDOPROSTH VIABAHN VBX EXP 8F [id number] 135CM - [id number] - [id number] Implant STENTGR ENDOPROSTH VIABAHN VBX EXP 8F [id number] 135CM [id number] [employer] .  1 Implanted   STENTGR ENDOPROSTH VIABAHN RO HEP 7F 8MM [id number] - [id number] - [id number] Implant STENTGR ENDOPROSTH VIABAHN RO HEP 7F 8MM [id number] [employer] .  1 Implanted   STENTGR ENDOPROSTH VIABAHN RO HEP  7F 8MM [id number] - [id number] - [id number] Implant STENTGR ENDOPROSTH VIABAHN RO HEP 7F 8MM [id number] [employer] .  1 Implanted       Staff:   Cell Saver : Kin Soliz  Circulator: Som Maloney, ONI; Ijeoma Villarreal RN  Radiology Technologist: Mahamed Braun, RRT; Dejah Ellsworth  Scrub Person: Jacquelyn Diaz; Mi Goyal    Specimen:   none    Complications:   none    Dispo:   to PACU    Indication for procedure:   73 y.o. male with history of chronic peripheral arterial disease who had acute worsening of pain in his lower legs within the last month, especially in the left leg.  The patient was seen in the ER after he presented for routine follow-up ABIs earlier this week.  Was having rest pain in his lower legs and was admitted to the hospital and started on heparin drip.  Initially he improved symptomatically and plans were made for surgery this morning however interestingly on the day of surgery he was noted to have a significantly worse pain in the left lower leg which appeared mottled from about the mid calf down with some motor weakness consistent with [place] 2B ischemia.  This had not been present on prior exams.  He did have a preoperative cardiac stress test performed which I personally discussed the results of with Dr. Serrano and it was negative.  The nature of the procedure as well as risks benefits and alternatives were discussed with the patient (who has dementia), and his significant other Jeanette.    Description of procedure:   Patient was taken to the OR and placed supine on the OR table.  General endotracheal anesthesia was begun by the anesthesia service.  He had an arterial line placed.  He also had a Mata catheter placed.  His groins were prepped with chlorhexidine bilaterally and his legs were prepped with chlorhexidine bilaterally circumferentially.  He was draped in sterile fashion.  A timeout was performed prior to beginning the surgery.  We  began procedure working into teams making a oblique incisions over each groin.  Electrocautery was used to dissect down through the subcutaneous tissue and the external abdominal oblique aponeurosis was identified.  I then carried dissection down to the inguinal ligament which was dissected free medially and laterally.  The patient did not have a palpable femoral pulse on either side but due to the heavily calcified nature of his common femoral arteries we were able to easily find them and dissect down directly onto them with Metzenbaum scissors.  This dissection was then carried proximally and distally from the external iliac artery down to the femoral bifurcation circumferential control was obtained of superficial femoral, profundofemoral artery, distal external iliac arteries bilaterally.  The crossing circumflex femoral veins were identified bilaterally and ligated and divided.   The patient clearly had significant chronic disease extending down through the external iliac arteries bilaterally.  The patient was given 10,000 units of heparin and ACT was confirmed over 250 and maintained over 250 for the remainder of the procedure.  Clamps were placed on the distal external iliac artery on the left as well as profundofemoral artery.  Longitudinal arteriotomy was made in the common femoral artery which was extended with Lamb scissors.  This arteriotomy was extended down onto the profundofemoral artery to facilitate endarterectomy there.  An endarterectomy was performed of the common femoral artery up to the external iliac artery and down to, and including the profundofemoral artery.  Plaque was removed and discarded.  We then performed eversion endarterectomy of the distal left external iliac artery.  There was still no antegrade inflow from the external iliac artery.  A Ester was passed approximately for 15 cm and thrombectomy was performed that returned some subacute appearing thrombus, however there was still  no antegrade flow through the external iliac.  There was great backbleeding from the profunda, but there was a small plaque that was tacked up with a tacking suture..  We then brought up our bovine pericardial patch and performed patch angioplasty of the common femoral artery down onto the profundofemoral artery with 6-0 Prolene suture in continuous running fashion..  I did insonate the artery with Doppler and there was flow however it was a weak and nonpulsatile.  We then turned attention to the right common femoral artery where a longitudinal arteriotomy was made after we placed clamps.  The SFA had to be clamped on the right as there was some backbleeding in addition to the profunda.  The arteriotomy was extended with Lamb scissors from the common femoral artery down onto the superficial femoral artery.  We then performed a endarterectomy from common femoral artery, and eversion endarterectomy is of superficial femoral artery origin and profundofemoral artery origin.  We then performed eversion endarterectomy of the distal external iliac artery.  As was the case on the left side,  there was still no inline flow from the external iliac on the right side.  We attempted to pass Ester catheter proximal but it would only proceed approximately 7 cm and no thrombus was returned.  We then brought up another 0.8x8cm bovine pericardial patch and performed patch angioplasty of the right side with 6-0 Prolene suture in continuous running fashion.  There was good backbleeding from the profunda prior to completing this patch angioplasty.  There was some flow through the artery from collaterals but no pulse, similar to the left side.  We then accessed each bovine pericardial patch with micro needles and microwire's were inserted.  Micro sheath were advanced over the wires.  We then used 0.035 stiff Glidewire to advance up the right microsheath and were able to advance this into the aorta with relatively little difficulty.   The microsheath was exchanged for a 10cm 8 Polish sheath.  An angled glide cath was then advanced over the wire into the aorta with good pulsatile backbleeding.  Arteriogram was performed.  A stiff angled Glidewire was advanced up the left iliac system that appeared to be in dissection plane.  The microsheath was exchanged for an 8 Polish sheath.  A Martinez catheter used to attempt to navigate the iliac system but still appeared to be in a dissection plane.  Martinez catheter was exchanged for angled glide cath and eventually we were able to get this into the true lumen of the infrarenal aorta.  Angiogram was performed that showed severe chronic disease in the infrarenal aorta and bilateral iliac arteries as well as some thrombus, as well as the dissections in the bilateral iliacs which were not unexpected.  Lateral views were taken to pollo the origin of the inferior mesenteric artery.  Proximal extent of the occlusion, which proceeded up to the inferior aspect of the inferior mesenteric artery was marked.  We then were able to advance 5 mm angioplasty balloons up to each iliac and perform balloon angioplasty of bilateral common and external iliac arteries, extending into the infrarenal aorta.  We then remove these balloons and exchanged our 10 cm 8 Polish sheath for a 45 cm 8 Polish sheaths.  Two 8L x 59mm [place] VBX stents were obtained and advanced into position based on our angiography extending into the infrarenal aorta at the level of the inferior mesenteric artery.  These were deployed without complication.  We then obtained two 8 x 100 mm [place] Viabahn stents and these were advanced into position bilaterally and deployed.  We then obtained two 8x 75mm [place] viabahn stents and these were positioned extending stents from the infrarenal aorta down into the distal external iliac arteries bilaterally.  They were deployed.  We then postdilated with 8 x 100 mm angioplasty balloons.  Completion angiography was  performed that showed some thrombus within the right iliac stents in the midportion, as well as diminished flow through the MASOOD.  There did appear to be good collateralization from lumbars and profundofemoral arteries to the pelvis.  There was some mild narrowing in the common iliac on the left side that was treated with repeat balloon angioplasty with a 10 mm angioplasty balloon.  Completion angiography following this showed no residual stenosis.  At this point there was excellent palpable pulsating flow through the distal external iliacs and into the common femoral arteries.  Our endovascular work in the aortic bifurcation was done with our outflow arteries clamped bilaterally to prevent atheroembolization.  Following this we removed the wire and catheters then all then the 8 Vincentian sheath from the left common femoral artery.  It was allowed to flush antegrade with excellent flow.  It was then clamped proximally allowed to backbleed from the profunda again with excellent flow.  The area where we accessed the patch was closed with 5-0 Prolene suture in a figure-of-eight fashion.  The patch was then hemostatic and there was an excellent Doppler signal in the common femoral artery and profundofemoral artery.  We then removed the wire and catheter and 8 Vincentian sheath from the right common femoral artery.  The area we accessed the patch was extended with Lamb scissors to facilitate Ester thrombectomy of the right iliac stent.  There was thrombus removed.  There was then pulsatile excellent flow through this artery as well as good backbleeding from the profunda and superficial femoral arteries on the right side.  Using a J-tip wire we inserted a sheath back into the right common femoral artery into the external iliac artery to perform completion angiography to confirm no residual thrombus in the right stent and good flow through iliac stents bilaterally, which was obtained.  We then removed the sheath and the patch  defect was closed with 5-0 Prolene suture in continuous running fashion.  It was hemostatic.  We then checked signals in the feet.  The patient had a strong monophasic signal in the right posterior tibial artery, but no signals in the left foot.  The left lower leg and foot were both normal appearing, no longer mottled with decent cap refill.  Ultimately we decided to perform an angiogram of the left leg.  Microneedle was used to access the patch proximal to the previous area we had access and a microwire was inserted.  Needle was exchanged for microsheath and this microsheath was used to perform an angiogram.  This showed excellent flow through the common femoral artery and profundofemoral artery with the known occlusion of the superficial femoral artery but reconstitution of above-knee popliteal artery.  Distal popliteal artery and tibial arteries all appear occluded proximately there is a very large collateral extending from popliteal down to the posterior tibial artery and the lower leg.  The PT did extend through the ankle and into the foot.  With this I was satisfied that he had adequate blood flow to his foot.  The biker sheath was removed and this defect was closed with a 5-0 Prolene suture in a figure-of-eight fashion.  Both wounds were irrigated with warm normal saline and inspected meticulously for hemostasis.  Platelet rich plasma was instilled into both wounds.  Once we are satisfied with hemostasis we then closed the wound in layers with 2-0 Vicryl suture followed by 3-0 Vicryl suture and then 4-0 Vicryl suture in a running subcuticular layer for the skin.  Skin glue was used for dressing in the groins.  Patient was then aroused from anesthesia and transferred to PACU in stable condition.  Patient tolerated procedure well there were no IntraOp complications and all wires catheters sheaths and other devices were removed and found to be whole and intact prior to conclusion of the procedure and all sponge  instrument and needle counts were correct at the end of the case.  After the surgery I discussed the procedure with the patient's significant other Jeanette, and another adult male I presume to be the patient's brother.  Since completion of the procedure I have checked on the patient in the ICU and he now has warm feet bilaterally with a PT signal on the left and PT and peroneal signals on the right, with no evidence of postoperative complication in either of his groins, no leg pain, no abdominal pain.    Nura Salgado II, MD  12/29/23     Active Hospital Problems    Diagnosis  POA    **Critical limb ischemia of both lower extremities [I70.223]  Yes    Degenerative disc disease, lumbar [M51.36]  Yes      Resolved Hospital Problems   No resolved problems to display.

## 2023-12-29 NOTE — PLAN OF CARE
Goal Outcome Evaluation:  Plan of Care Reviewed With: patient     Problem: Adult Inpatient Plan of Care  Goal: Plan of Care Review  Outcome: Ongoing, Progressing  Flowsheets (Taken 12/29/2023 9554)  Progress: improving  Plan of Care Reviewed With: patient        Progress: improving

## 2023-12-30 ENCOUNTER — APPOINTMENT (OUTPATIENT)
Dept: GENERAL RADIOLOGY | Facility: HOSPITAL | Age: 73
DRG: 271 | End: 2023-12-30
Payer: MEDICARE

## 2023-12-30 LAB
ALBUMIN SERPL-MCNC: 3.4 G/DL (ref 3.5–5.2)
ALBUMIN/GLOB SERPL: 1.5 G/DL
ALP SERPL-CCNC: 80 U/L (ref 39–117)
ALT SERPL W P-5'-P-CCNC: 19 U/L (ref 1–41)
ANION GAP SERPL CALCULATED.3IONS-SCNC: 12 MMOL/L (ref 5–15)
APTT PPP: 47.5 SECONDS (ref 61–76.5)
APTT PPP: 55.7 SECONDS (ref 61–76.5)
AST SERPL-CCNC: 101 U/L (ref 1–40)
BILIRUB SERPL-MCNC: 0.2 MG/DL (ref 0–1.2)
BUN SERPL-MCNC: 13 MG/DL (ref 8–23)
BUN/CREAT SERPL: 11.5 (ref 7–25)
CALCIUM SPEC-SCNC: 8.3 MG/DL (ref 8.6–10.5)
CHLORIDE SERPL-SCNC: 103 MMOL/L (ref 98–107)
CO2 SERPL-SCNC: 21 MMOL/L (ref 22–29)
CREAT SERPL-MCNC: 1.13 MG/DL (ref 0.76–1.27)
DEPRECATED RDW RBC AUTO: 44.6 FL (ref 37–54)
EGFRCR SERPLBLD CKD-EPI 2021: 69.5 ML/MIN/1.73
ERYTHROCYTE [DISTWIDTH] IN BLOOD BY AUTOMATED COUNT: 14.5 % (ref 12.3–15.4)
GLOBULIN UR ELPH-MCNC: 2.3 GM/DL
GLUCOSE SERPL-MCNC: 107 MG/DL (ref 65–99)
HCT VFR BLD AUTO: 35.5 % (ref 37.5–51)
HGB BLD-MCNC: 11.7 G/DL (ref 13–17.7)
MCH RBC QN AUTO: 27.9 PG (ref 26.6–33)
MCHC RBC AUTO-ENTMCNC: 33.1 G/DL (ref 31.5–35.7)
MCV RBC AUTO: 84.2 FL (ref 79–97)
PLATELET # BLD AUTO: 464 10*3/MM3 (ref 140–450)
PMV BLD AUTO: 7.5 FL (ref 6–12)
POTASSIUM SERPL-SCNC: 3.9 MMOL/L (ref 3.5–5.2)
PROT SERPL-MCNC: 5.7 G/DL (ref 6–8.5)
RBC # BLD AUTO: 4.21 10*6/MM3 (ref 4.14–5.8)
SODIUM SERPL-SCNC: 136 MMOL/L (ref 136–145)
WBC NRBC COR # BLD AUTO: 14.1 10*3/MM3 (ref 3.4–10.8)

## 2023-12-30 PROCEDURE — 25010000002 CEFAZOLIN PER 500 MG: Performed by: STUDENT IN AN ORGANIZED HEALTH CARE EDUCATION/TRAINING PROGRAM

## 2023-12-30 PROCEDURE — 25010000002 HYDRALAZINE PER 20 MG: Performed by: NURSE PRACTITIONER

## 2023-12-30 PROCEDURE — 25810000003 LACTATED RINGERS PER 1000 ML: Performed by: INTERNAL MEDICINE

## 2023-12-30 PROCEDURE — 71045 X-RAY EXAM CHEST 1 VIEW: CPT

## 2023-12-30 PROCEDURE — 85730 THROMBOPLASTIN TIME PARTIAL: CPT | Performed by: INTERNAL MEDICINE

## 2023-12-30 PROCEDURE — 80053 COMPREHEN METABOLIC PANEL: CPT | Performed by: STUDENT IN AN ORGANIZED HEALTH CARE EDUCATION/TRAINING PROGRAM

## 2023-12-30 PROCEDURE — 85027 COMPLETE CBC AUTOMATED: CPT | Performed by: STUDENT IN AN ORGANIZED HEALTH CARE EDUCATION/TRAINING PROGRAM

## 2023-12-30 PROCEDURE — 25810000003 LACTATED RINGERS PER 1000 ML: Performed by: STUDENT IN AN ORGANIZED HEALTH CARE EDUCATION/TRAINING PROGRAM

## 2023-12-30 PROCEDURE — 25010000002 MORPHINE PER 10 MG: Performed by: STUDENT IN AN ORGANIZED HEALTH CARE EDUCATION/TRAINING PROGRAM

## 2023-12-30 PROCEDURE — 25010000002 MORPHINE PER 10 MG: Performed by: NURSE PRACTITIONER

## 2023-12-30 PROCEDURE — 97162 PT EVAL MOD COMPLEX 30 MIN: CPT

## 2023-12-30 PROCEDURE — 85730 THROMBOPLASTIN TIME PARTIAL: CPT | Performed by: NURSE PRACTITIONER

## 2023-12-30 RX ORDER — OXYCODONE HYDROCHLORIDE 5 MG/1
10 TABLET ORAL EVERY 4 HOURS PRN
Status: DISCONTINUED | OUTPATIENT
Start: 2023-12-30 | End: 2024-01-04

## 2023-12-30 RX ORDER — MORPHINE SULFATE 2 MG/ML
2 INJECTION, SOLUTION INTRAMUSCULAR; INTRAVENOUS EVERY 4 HOURS PRN
Status: DISCONTINUED | OUTPATIENT
Start: 2023-12-30 | End: 2024-01-01

## 2023-12-30 RX ORDER — MORPHINE SULFATE 2 MG/ML
2 INJECTION, SOLUTION INTRAMUSCULAR; INTRAVENOUS ONCE
Status: COMPLETED | OUTPATIENT
Start: 2023-12-30 | End: 2023-12-30

## 2023-12-30 RX ORDER — HYDRALAZINE HYDROCHLORIDE 20 MG/ML
10 INJECTION INTRAMUSCULAR; INTRAVENOUS EVERY 6 HOURS PRN
Status: DISCONTINUED | OUTPATIENT
Start: 2023-12-30 | End: 2024-01-08 | Stop reason: HOSPADM

## 2023-12-30 RX ADMIN — SODIUM CHLORIDE, POTASSIUM CHLORIDE, SODIUM LACTATE AND CALCIUM CHLORIDE 100 ML/HR: 600; 310; 30; 20 INJECTION, SOLUTION INTRAVENOUS at 17:56

## 2023-12-30 RX ADMIN — PREGABALIN 25 MG: 25 CAPSULE ORAL at 09:04

## 2023-12-30 RX ADMIN — TAMSULOSIN HYDROCHLORIDE 0.4 MG: 0.4 CAPSULE ORAL at 09:04

## 2023-12-30 RX ADMIN — APIXABAN 10 MG: 5 TABLET, FILM COATED ORAL at 19:35

## 2023-12-30 RX ADMIN — OXYCODONE HYDROCHLORIDE 10 MG: 5 TABLET ORAL at 22:59

## 2023-12-30 RX ADMIN — Medication 2000 UNITS: at 09:04

## 2023-12-30 RX ADMIN — Medication 1 TABLET: at 09:04

## 2023-12-30 RX ADMIN — ATORVASTATIN CALCIUM 20 MG: 20 TABLET, FILM COATED ORAL at 09:04

## 2023-12-30 RX ADMIN — OXYCODONE HYDROCHLORIDE 10 MG: 5 TABLET ORAL at 13:21

## 2023-12-30 RX ADMIN — RISPERIDONE 0.25 MG: 0.25 TABLET, FILM COATED ORAL at 09:04

## 2023-12-30 RX ADMIN — DONEPEZIL HYDROCHLORIDE 20 MG: 5 TABLET, FILM COATED ORAL at 21:40

## 2023-12-30 RX ADMIN — ACETAMINOPHEN 650 MG: 325 TABLET, FILM COATED ORAL at 09:04

## 2023-12-30 RX ADMIN — MORPHINE SULFATE 2 MG: 2 INJECTION, SOLUTION INTRAMUSCULAR; INTRAVENOUS at 01:58

## 2023-12-30 RX ADMIN — ACETAMINOPHEN 650 MG: 325 TABLET, FILM COATED ORAL at 16:04

## 2023-12-30 RX ADMIN — MORPHINE SULFATE 2 MG: 2 INJECTION, SOLUTION INTRAMUSCULAR; INTRAVENOUS at 12:12

## 2023-12-30 RX ADMIN — CILOSTAZOL 100 MG: 100 TABLET ORAL at 21:39

## 2023-12-30 RX ADMIN — ASPIRIN 81 MG: 81 TABLET, COATED ORAL at 09:04

## 2023-12-30 RX ADMIN — DOCUSATE SODIUM 100 MG: 100 CAPSULE, LIQUID FILLED ORAL at 09:04

## 2023-12-30 RX ADMIN — HYDRALAZINE HYDROCHLORIDE 10 MG: 20 INJECTION INTRAMUSCULAR; INTRAVENOUS at 00:47

## 2023-12-30 RX ADMIN — Medication 10 ML: at 08:33

## 2023-12-30 RX ADMIN — Medication 10 ML: at 21:39

## 2023-12-30 RX ADMIN — RISPERIDONE 0.5 MG: 0.25 TABLET, FILM COATED ORAL at 21:40

## 2023-12-30 RX ADMIN — MEMANTINE 10 MG: 10 TABLET ORAL at 09:04

## 2023-12-30 RX ADMIN — MEMANTINE 10 MG: 10 TABLET ORAL at 21:40

## 2023-12-30 RX ADMIN — SODIUM CHLORIDE, POTASSIUM CHLORIDE, SODIUM LACTATE AND CALCIUM CHLORIDE 100 ML/HR: 600; 310; 30; 20 INJECTION, SOLUTION INTRAVENOUS at 08:13

## 2023-12-30 RX ADMIN — SODIUM CHLORIDE 2000 MG: 900 INJECTION INTRAVENOUS at 04:45

## 2023-12-30 RX ADMIN — PREGABALIN 25 MG: 25 CAPSULE ORAL at 21:39

## 2023-12-30 NOTE — CONSULTS
[facility] Medicine Services  Consult Note    Patient Name: Oscar Wray  : 1950  MRN: [id number]  Primary Care Physician:  Kajal Horowitz PA  Referring Physician: PERI Sims  Date of admission: 2023  Date and Time of Care: 11:am     Inpatient Hospitalist Consult  Consult performed by: Tejas Polk MD  Consult ordered by: Aurelio Nash MD            Reason for Consult/ Chief Complaint:  Limb ischemia    Consult Requested By: Transfer out of ICU    Subjective:     History of Present Illness:   Per the documentation   The patient is a 73 y.o. old male patient with PMH of CKD, PAD, DM, Hyperlipidemia and peripheral arterial disease who had been followed by vascular surgery in the outpatient setting.  He underwent routine, follow-up ABIs which demonstrated minimal flow in both feet and the patient complained of severe foot pain, left greater than right.  He was instructed to come to the emergency department for further evaluation.  Vascular surgery was consulted and due to severe, chronic, limb threatening ischemia to the legs surgical intervention was recommended.  On 2023 the patient underwent bilateral common femoral endarterectomy with bovine pericardial patch angioplasty, thrombectomy left external iliac artery, nonselective pelvic arteriogram, bilateral iliac stents, left leg arteriogram.  The patient was admitted to the ICU postoperatively.   Review of Systems:   Review of Systems  14 point review of system unremarkable except mentioned above  Personal History:     Past Medical History:   Diagnosis Date    Dementia        History reviewed. No pertinent surgical history.    Family History: family history is not on file. Otherwise pertinent FHx was reviewed and not pertinent to current issue.    Social History:  reports that he has quit smoking. His smoking use included cigarettes. He has never used smokeless tobacco. He reports that he does not currently use alcohol.  He reports that he does not use drugs.    Home Medications:   Vitamin D3, acetaminophen, atenolol, atorvastatin, celecoxib, cilostazol, dapagliflozin Propanediol, docusate sodium, donepezil, fish oil, memantine, multivitamin with minerals, pregabalin, risperiDONE, and tamsulosin    Allergies:  No Known Allergies      Objective:     Vital Signs  Temp:  [95.5 °F (35.3 °C)-98.2 °F (36.8 °C)] 97.3 °F (36.3 °C)  Heart Rate:  [] 103  Resp:  [10-17] 17  BP: ()/(41-83) 114/56  Flow (L/min):  [2] 2   Body mass index is 25.45 kg/m².    Physical Exam  Physical Exam  HENT:      Head: Atraumatic.      Mouth/Throat:      Mouth: Mucous membranes are moist.   Cardiovascular:      Rate and Rhythm: Normal rate and regular rhythm.      Pulses: Normal pulses.      Heart sounds: Normal heart sounds.   Pulmonary:      Effort: Pulmonary effort is normal.      Breath sounds: Normal breath sounds.   Abdominal:      General: Bowel sounds are normal.      Palpations: Abdomen is soft.   Musculoskeletal:      Cervical back: Neck supple.      Comments: Pain on LLE   Skin:     General: Skin is warm.   Neurological:      General: No focal deficit present.      Mental Status: He is alert and oriented to person, place, and time.   Psychiatric:         Mood and Affect: Mood normal.         Scheduled Meds   aspirin, 81 mg, Oral, Daily  [Held by provider] atenolol, 12.5 mg, Oral, Daily  atorvastatin, 20 mg, Oral, Daily  cholecalciferol, 2,000 Units, Oral, Daily  cilostazol, 100 mg, Oral, BID  docusate sodium, 100 mg, Oral, Daily  donepezil, 20 mg, Oral, Nightly  memantine, 10 mg, Oral, Q12H  multivitamin with minerals, 1 tablet, Oral, Daily  pregabalin, 25 mg, Oral, BID  risperiDONE, 0.25 mg, Oral, Daily  risperiDONE, 0.5 mg, Oral, Nightly  sodium chloride, 10 mL, Intravenous, Q12H  tamsulosin, 0.4 mg, Oral, Daily       PRN Meds     acetaminophen **OR** acetaminophen **OR** acetaminophen    heparin    heparin    hydrALAZINE     nitroglycerin    nitroglycerin    [COMPLETED] Insert Peripheral IV **AND** sodium chloride    sodium chloride    sodium chloride   Infusions  heparin, 16.8 Units/kg/hr, Last Rate: 14 Units/kg/hr (12/30/23 0639)  lactated ringers, 100 mL/hr, Last Rate: 100 mL/hr (12/30/23 0813)          Diagnostic Data    Results from last 7 days   Lab Units 12/30/23  0442   WBC 10*3/mm3 14.10*   HEMOGLOBIN g/dL 11.7*   HEMATOCRIT % 35.5*   PLATELETS 10*3/mm3 464*   GLUCOSE mg/dL 107*   CREATININE mg/dL 1.13   BUN mg/dL 13   SODIUM mmol/L 136   POTASSIUM mmol/L 3.9   AST (SGOT) U/L 101*   ALT (SGPT) U/L 19   ALK PHOS U/L 80   BILIRUBIN mg/dL 0.2   ANION GAP mmol/L 12.0       XR Chest 1 View    Result Date: 12/30/2023  Impression: No active cardiopulmonary disease Electronically Signed: Willie Alcantara  12/30/2023 10:51 AM EST  Workstation ID: [id number]       I reviewed the patient's new clinical results.    Assessment/Plan:     Active and Resolved Problems  #Acute limb ischemia  #Acute on chronic peripheral arterial disease  #Occlusion  of the distal infrarenal aorta and bilateral iliac arteries  --Status post bilateral femoral endarterectomy and placement of iliac artery stents with left leg angiogram  - Vascular surgery following the patient  - On heparin drip  - Continue maintenance fluid LR at 100 cc/h  - Maintain SBP less than 160  - For pain continue pregabalin  - Continue aspirin Lipitor and cilostazol      Tension hypertension  - Atenolol on hold for hypotension  - Will continue to monitor    #KARINA on CKD stage III  - Renal consult appreciated  - resolved     DVT prophylaxis:  Medical and mechanical DVT prophylaxis orders are present.     Code status is   Code Status and Medical Interventions:   Ordered at: 12/28/23 0752     Level Of Support Discussed With:    Patient     Code Status (Patient has no pulse and is not breathing):    CPR (Attempt to Resuscitate)     Medical Interventions (Patient has pulse or is breathing):    Full  Support       Plan for disposition:SNF  in 2-3 days    Time: 32 minutes        Signature: Electronically signed by Tejas Polk MD, 12/30/23, 11:27 EST.  [facility] Hospitalist Team

## 2023-12-30 NOTE — PLAN OF CARE
Goal Outcome Evaluation:  Plan of Care Reviewed With: patient           Outcome Evaluation: Pt is a 74 YO M admitted with chronic severe ischemia in BLE. POD 1 hiren fem endarectomy. Pt reports living in [facility], typically is independent with ADLs, ambulation without AD and no recent falls. Pt this date with significant pain in LE but willing to participate. Pt requiring MIN A to come to sitting EOB, MOD A to stand due to pain and MOD A for transfer to bedside chair. Pt was provided a RWx to assist with offloading LE and will recommend continued usage. Pt is well below stated baseline and recommendation at this time is SNF. PT to continue to follow and assess as pt progresses.      Anticipated Discharge Disposition (PT): skilled nursing facility

## 2023-12-30 NOTE — PLAN OF CARE
Goal Outcome Evaluation:  Plan of Care Reviewed With: patient, family, significant other     Patient alert throughout the day, confused at baseline.  Tachycardic but vitals otherwise stable on room air/2L nasal cannula.  Adequate urine output, tolerating diet with no BM this shift. BLE pulses present on doppler except for left DP; Dr. Salgado notified.  Per Dr. Salgado, left DP pulse not expected to return due to chronic occlusion.  PT saw patient today, recommending rehab. Patient downgraded to [facility] monitor, awaiting transfer/discharge.     Progress: improving

## 2023-12-30 NOTE — PROGRESS NOTES
Critical Care Progress Note     Oscar Wray : 1950 MRN:[id number] LOS:3  Rm: 2308/1     Principal Problem: Critical limb ischemia of both lower extremities     Reason for follow up: All the medical problems listed below    Summary     The patient is a 73 y.o. old male patient with PMH of CKD, PAD, DM, Hyperlipidemia and peripheral arterial disease who had been followed by vascular surgery in the outpatient setting.  He underwent routine, follow-up ABIs which demonstrated minimal flow in both feet and the patient complained of severe foot pain, left greater than right.  He was instructed to come to the emergency department for further evaluation.  Vascular surgery was consulted and due to severe, chronic, limb threatening ischemia to the legs surgical intervention was recommended.  On 2023 the patient underwent bilateral common femoral endarterectomy with bovine pericardial patch angioplasty, thrombectomy left external iliac artery, nonselective pelvic arteriogram, bilateral iliac stents, left leg arteriogram.  The patient was admitted to the ICU postoperatively.    Significant Events     23 : No acute events overnight.  The patient reports he is still having considerable pain of the left foot.  He is requiring O2 at 2 L, O2 saturations dropped to 88% on room air.  Mata catheter and arterial line have been discontinued.  Stable for transfer out of the ICU    Assessment / Plan       Acute on chronic peripheral arterial disease with rest pain, left foot  Occlusion of distal infrarenal aorta and bilateral iliac arteries  S/p bilateral femoral enterectomy and iliac artery stents, left leg angiogram 2023  Continue heparin drip per Vascular surgery  Maintenance IVF LR at 100 per vascular surgery  SBP goal <160 mmHg  Pregabalin 25 mg BID, aspirin, Lipitor, Pletal  Vascular surgery following closely and managing postsurgical care    Acute Kidney Injury on CKD stage 3A:   Remains non oliguric.    Likely prerenal. Possible intrinsic component due to ATN from contrast, drugs and hypotension.   C/w IV hydration.   Monitor Input/Output very closely.   Nephrology evaluated and has signed off  Net IO Since Admission: 1,671 mL [12/30/23 1333]     Essential Hypertension: well controlled.   Home medication atenolol on hold due to current hypotension  Titrate medications as needed.    Chronic:   Dementia -- Donepezil and memantine continued   Chronic low back pain         Code status:   Level Of Support Discussed With: Patient  Code Status (Patient has no pulse and is not breathing): CPR (Attempt to Resuscitate)  Medical Interventions (Patient has pulse or is breathing): Full Support       Nutrition:   Diet: Regular/House Diet; Texture: Regular Texture (IDDSI 7); Fluid Consistency: Thin (IDDSI 0)   Patient isn't on Tube Feeding     DVT prophylaxis:  Medical and mechanical DVT prophylaxis orders are present.     Subjective / Review of systems     Review of Systems   Respiratory:  Negative for cough and shortness of breath.    Cardiovascular:  Negative for chest pain and palpitations.   Gastrointestinal:  Negative for abdominal pain, nausea and vomiting.   Musculoskeletal:  Positive for arthralgias.        Left foot pain        Objective / Physical Exam     Vital signs:  Temp: 97.3 °F (36.3 °C)  BP: 104/60  Heart Rate: 103  Resp: 17  SpO2: 92 %  Weight: 73.7 kg (162 lb 7.7 oz)    Admission Weight: Weight: 89.5 kg (197 lb 5 oz)  Current Weight: Weight: 73.7 kg (162 lb 7.7 oz)    Input/Output in last 24 hours:    Intake/Output Summary (Last 24 hours) at 12/30/2023 1310  Last data filed at 12/30/2023 0812  Gross per 24 hour   Intake 4646 ml   Output 2875 ml   Net 1771 ml      Net IO Since Admission: 1,671 mL [12/30/23 1310]     Physical Exam  Vitals and nursing note reviewed.   Constitutional:       General: He is not in acute distress.     Appearance: Normal appearance. He is obese.   HENT:      Head: Normocephalic and  atraumatic.      Right Ear: External ear normal.      Left Ear: External ear normal.      Mouth/Throat:      Mouth: Mucous membranes are moist.   Eyes:      Conjunctiva/sclera: Conjunctivae normal.      Pupils: Pupils are equal, round, and reactive to light.   Cardiovascular:      Heart sounds: S1 normal and S2 normal. Murmur heard.      Comments: Sinus rhythm  Pulmonary:      Breath sounds: Normal breath sounds. No wheezing or rhonchi.   Abdominal:      General: There is no distension.      Palpations: Abdomen is soft.      Tenderness: There is no abdominal tenderness.      Comments: Morbid body habitus   Musculoskeletal:      Comments: Bilateral femoral incisions well-approximated  Right groin hematoma and ecchymosis   Skin:     General: Skin is warm and dry.      Comments: Left foot cooler than right and tender to palpate   Neurological:      Mental Status: He is alert.      Comments: No lateralizing deficits  Confused of year, no insight into current situation          Radiology and Labs     Results from last 7 days   Lab Units 12/30/23 0442 12/29/23 1857 12/29/23  1453 12/28/23 2244 12/27/23  1529   WBC 10*3/mm3 14.10* 17.90*  --  11.90* 14.30*   HEMOGLOBIN g/dL 11.7* 14.1  --  13.3 14.3   HEMOGLOBIN, POC g/dL  --   --  10.2*  --   --    HEMATOCRIT % 35.5* 43.4  --  39.0 43.9   HEMATOCRIT POC %  --   --  30*  --   --    PLATELETS 10*3/mm3 464* 561*  --  586* 646*      Results from last 7 days   Lab Units 12/30/23  1233 12/30/23 0442 12/29/23 1857 12/29/23  0624 12/28/23 2244 12/27/23  1925 12/27/23  1529   PROTIME Seconds  --   --  11.9*  --   --   --  11.2   INR   --   --  1.10  --   --   --  1.03   APTT seconds 55.7* 47.5* >139.0* 54.7* 90.9*   < > 31.6*    < > = values in this interval not displayed.      Results from last 7 days   Lab Units 12/30/23 0442 12/29/23 1857 12/28/23 2244 12/28/23  0337 12/27/23  1529   SODIUM mmol/L 136 138 134* 135* 135*   POTASSIUM mmol/L 3.9 4.2 4.1 4.0 4.2   CHLORIDE  mmol/L 103 101 99 98 97*   CO2 mmol/L 21.0* 22.0 20.0* 21.0* 23.0   BUN mg/dL 13 13 18 16 15   CREATININE mg/dL 1.13 1.13 1.49* 1.35* 1.50*   GLUCOSE mg/dL 107* 132* 81 89 134*      Results from last 7 days   Lab Units 12/30/23  0442 12/28/23  0337 12/27/23  1529   ALK PHOS U/L 80 110 106   AST (SGOT) U/L 101* 23 18   ALT (SGPT) U/L 19 15 13     Results from last 7 days   Lab Units 12/29/23  1844 12/29/23  1453   PH, ARTERIAL pH units 7.316* 7.270*   PCO2, ARTERIAL mm Hg 37.3 47.3*   PO2 ART mm Hg 89.4 182.0*   O2 SATURATION ART % 96.1 99.0*   FIO2 % 21  --    HCO3 ART mmol/L 19.0* 21.9*   BASE EXCESS ART mmol/L -6.5* <0.0*         Current medications     Scheduled Meds:   aspirin, 81 mg, Oral, Daily  [Held by provider] atenolol, 12.5 mg, Oral, Daily  atorvastatin, 20 mg, Oral, Daily  cholecalciferol, 2,000 Units, Oral, Daily  cilostazol, 100 mg, Oral, BID  docusate sodium, 100 mg, Oral, Daily  donepezil, 20 mg, Oral, Nightly  memantine, 10 mg, Oral, Q12H  multivitamin with minerals, 1 tablet, Oral, Daily  pregabalin, 25 mg, Oral, BID  risperiDONE, 0.25 mg, Oral, Daily  risperiDONE, 0.5 mg, Oral, Nightly  sodium chloride, 10 mL, Intravenous, Q12H  tamsulosin, 0.4 mg, Oral, Daily        Continuous Infusions:   heparin, 16.8 Units/kg/hr, Last Rate: 14 Units/kg/hr (12/30/23 0639)  lactated ringers, 100 mL/hr, Last Rate: 100 mL/hr (12/30/23 0813)          Plan discussed with RN. Reviewed all other data in the last 24 hours, including but not limited to vitals, labs, microbiology, imaging and pertinent notes from other providers.        ELOY Huerta   Critical Care  12/30/23   13:10 EST

## 2023-12-30 NOTE — PLAN OF CARE
Pt admitted to ICU from PACU around 1730.     BLE pulses absent with doppler with exception of right PT - Dr. Salgado aware and evaluated at bedside. No additional orders at this time.     LR infusing.     VSS at this time. Family updated at bedside.

## 2023-12-30 NOTE — NURSING NOTE
Patient was restarted on Heparin gtt at 2230 tonight. BLE pulses have improved, and can now doppler right DP and PT pulses and left PT pulse. Temp and color have improved in both feet progressively overnight.   VSS overnight. Hypertensive earlier in the night - resolved with one dose of 10mg IV hydralazine.   Pt complaining of severe pain only one time - reports burning sensation in his left foot. Resolved with x1 dose of 2mg IV morphine. Patient does, however, continue report sensitivity to touch - burning/tingling - when touching his left foot.    Will continue to monitor.

## 2023-12-30 NOTE — THERAPY EVALUATION
Patient Name: Oscar Wray  : 1950    MRN: [id number]                              Today's Date: 2023       Admit Date: 2023    Visit Dx:     ICD-10-CM ICD-9-CM   1. Peripheral arterial occlusive disease  I77.9 444.22   2. Ischemia of both lower extremities  I99.8 459.89     Patient Active Problem List   Diagnosis    Back pain    Wedge compression fracture of second lumbar vertebra, subsequent encounter for fracture with routine healing    Moderate early onset Alzheimer's dementia without behavioral disturbance, psychotic disturbance, mood disturbance, or anxiety    Claudication    Stage 3a chronic kidney disease    Degenerative disc disease, lumbar    Critical limb ischemia of both lower extremities     Past Medical History:   Diagnosis Date    Dementia      History reviewed. No pertinent surgical history.   General Information       Row Name 23 1259          Physical Therapy Time and Intention    Document Type evaluation  -EL     Mode of Treatment individual therapy;physical therapy  -EL       Row Name 23 1259          General Information    Patient Profile Reviewed yes  -EL     Prior Level of Function independent:;all household mobility;ADL's  -EL       Row Name 23 1259          Living Environment    People in Home alone;facility resident;other (see comments)  [facility]  -EL       Row Name 23 1259          Home Main Entrance    Number of Stairs, Main Entrance none  -EL       Row Name 23 1259          Stairs Within Home, Primary    Number of Stairs, Within Home, Primary none  -EL       Row Name 23 1259          Cognition    Orientation Status (Cognition) oriented to;person;situation;verbal cues/prompts needed for orientation;place;time  -EL       Row Name 23 1259          Safety Issues, Functional Mobility    Impairments Affecting Function (Mobility) pain;balance;strength  -EL               User Key  (r) = Recorded By, (t) = Taken By, (c) =  Cosigned By      Initials Name Provider Type    Tommy Lopez, PT Physical Therapist                   Mobility       Row Name 12/30/23 1311          Bed Mobility    Bed Mobility supine-sit  -EL     All Activities, [place] (Bed Mobility) minimum assist (75% patient effort)  -EL       Row Name 12/30/23 1311          Bed-Chair Transfer    Bed-Chair [place] (Transfers) moderate assist (50% patient effort)  -EL     Assistive Device (Bed-Chair Transfers) walker, front-wheeled  -EL       Row Name 12/30/23 1311          Sit-Stand Transfer    Sit-Stand [place] (Transfers) moderate assist (50% patient effort)  -EL     Assistive Device (Sit-Stand Transfers) walker, front-wheeled  -EL       Row Name 12/30/23 1311          Gait/Stairs (Locomotion)    Comment, (Gait/Stairs) Pt painful in L foot which was limiting to mobility  -EL               User Key  (r) = Recorded By, (t) = Taken By, (c) = Cosigned By      Initials Name Provider Type    EL Tommy Leong, MELODY Physical Therapist                   Obj/Interventions       Row Name 12/30/23 1312          Range of Motion Comprehensive    General Range of Motion lower extremity range of motion deficits identified  -     Comment, General Range of Motion Hip flexion limited to 90* due to pain  -       Row Name 12/30/23 1312          Strength Comprehensive (MMT)    General Manual Muscle Testing (MMT) Assessment lower extremity strength deficits identified  -       Row Name 12/30/23 1312          Balance    Balance Assessment sitting static balance;standing dynamic balance;standing static balance  -EL     Static Sitting Balance contact guard  -EL     Static Standing Balance minimal assist  -EL     Dynamic Standing Balance moderate assist  -       Row Name 12/30/23 1312          Sensory Assessment (Somatosensory)    Sensory Assessment (Somatosensory) sensation intact  -EL               User Key  (r) = Recorded By, (t) = Taken By, (c) = Cosigned By      Initials Name  Provider Type    Tommy Lopez, PT Physical Therapist                   Goals/Plan       Row Name 12/30/23 1318          Bed Mobility Goal 1 (PT)    Activity/Assistive Device (Bed Mobility Goal 1, PT) bed mobility activities, all  -EL     Roll Level/Cues Needed (Bed Mobility Goal 1, PT) modified independence  -EL     Time Frame (Bed Mobility Goal 1, PT) long term goal (LTG);2 weeks  -EL       Row Name 12/30/23 1318          Transfer Goal 1 (PT)    Activity/Assistive Device (Transfer Goal 1, PT) transfers, all;walker, rolling  -EL     Roll Level/Cues Needed (Transfer Goal 1, PT) modified independence  -EL     Time Frame (Transfer Goal 1, PT) long term goal (LTG);2 weeks  -EL       Row Name 12/30/23 1318          Gait Training Goal 1 (PT)    Activity/Assistive Device (Gait Training Goal 1, PT) gait (walking locomotion);walker, rolling  -EL     Roll Level (Gait Training Goal 1, PT) modified independence  -EL     Distance (Gait Training Goal 1, PT) 150  -EL     Time Frame (Gait Training Goal 1, PT) long term goal (LTG);2 weeks  -EL       Row Name 12/30/23 1318          Therapy Assessment/Plan (PT)    Planned Therapy Interventions (PT) neuromuscular re-education;balance training;bed mobility training;transfer training;gait training;patient/family education;strengthening  -EL               User Key  (r) = Recorded By, (t) = Taken By, (c) = Cosigned By      Initials Name Provider Type    Tommy Lopez, PT Physical Therapist                   Clinical Impression       Row Name 12/30/23 1313          Pain    Pretreatment Pain Rating 5/10  -EL     Posttreatment Pain Rating 7/10  -EL     Pain Location - Side/Orientation Left  -EL     Pain Location - foot  -EL       Row Name 12/30/23 1313          Plan of Care Review    Plan of Care Reviewed With patient  -EL     Outcome Evaluation Pt is a 74 YO M admitted with chronic severe ischemia in BLE. POD 1 hiren fem endarectomy. Pt reports living in [facility],  typically is independent with ADLs, ambulation without AD and no recent falls. Pt this date with significant pain in LE but willing to participate. Pt requiring MIN A to come to sitting EOB, MOD A to stand due to pain and MOD A for transfer to bedside chair. Pt was provided a RWx to assist with offloading LE and will recommend continued usage. Pt is well below stated baseline and recommendation at this time is SNF. PT to continue to follow and assess as pt progresses.  -EL       Row Name 12/30/23 1313          Therapy Assessment/Plan (PT)    Rehab Potential (PT) good, to achieve stated therapy goals  -EL     Criteria for Skilled Interventions Met (PT) yes  -EL     Therapy Frequency (PT) 5 times/wk  -EL     Predicted Duration of Therapy Intervention (PT) until d/c  -EL       Row Name 12/30/23 1313          Vital Signs    O2 Delivery Pre Treatment room air  -EL     O2 Delivery Intra Treatment room air  -EL     O2 Delivery Post Treatment room air  -EL       Row Name 12/30/23 1313          Positioning and Restraints    Pre-Treatment Position in bed  -EL     Post Treatment Position chair  -EL     In Chair notified nsg;sitting;call light within reach;encouraged to call for assist;exit alarm on  -EL               User Key  (r) = Recorded By, (t) = Taken By, (c) = Cosigned By      Initials Name Provider Type    EL Tommy Leong, PT Physical Therapist                   Outcome Measures       Row Name 12/30/23 1321 12/30/23 0830       How much help from another person do you currently need...    Turning from your back to your side while in flat bed without using bedrails? 3  -EL 3  -MN    Moving from lying on back to sitting on the side of a flat bed without bedrails? 3  -EL 3  -MN    Moving to and from a bed to a chair (including a wheelchair)? 2  -EL 3  -MN    Standing up from a chair using your arms (e.g., wheelchair, bedside chair)? 2  -EL 3  -MN    Climbing 3-5 steps with a railing? 1  -EL 3  -MN    To walk in hospital  room? 1  -EL 3  -MN    AM-PAC 6 Clicks Score (PT) 12  -EL 18  -MN    Highest Level of Mobility Goal 4 --> Transfer to chair/commode  -EL 6 --> Walk 10 steps or more  -MN      Row Name 12/30/23 1321          Functional Assessment    Outcome Measure Options AM-PAC 6 Clicks Basic Mobility (PT)  -EL               User Key  (r) = Recorded By, (t) = Taken By, (c) = Cosigned By      Initials Name Provider Type    EL Tommy Leong, PT Physical Therapist    Rika Huynh, RN Registered Nurse                                 Physical Therapy Education       Title: PT OT SLP Therapies (Done)       Topic: Physical Therapy (Done)       Point: Mobility training (Done)       Learning Progress Summary             Patient Acceptance, E,TB, VU by  at 12/30/2023 1321                         Point: Precautions (Done)       Learning Progress Summary             Patient Acceptance, E,TB, VU by  at 12/30/2023 1321                                         User Key       Initials Effective Dates Name Provider Type Discipline     06/23/20 -  Tommy Leong, PT Physical Therapist PT                  PT Recommendation and Plan  Planned Therapy Interventions (PT): neuromuscular re-education, balance training, bed mobility training, transfer training, gait training, patient/family education, strengthening  Plan of Care Reviewed With: patient  Outcome Evaluation: Pt is a 74 YO M admitted with chronic severe ischemia in BLE. POD 1 hiren fem endarectomy. Pt reports living in [facility], typically is independent with ADLs, ambulation without AD and no recent falls. Pt this date with significant pain in LE but willing to participate. Pt requiring MIN A to come to sitting EOB, MOD A to stand due to pain and MOD A for transfer to bedside chair. Pt was provided a RWx to assist with offloading LE and will recommend continued usage. Pt is well below stated baseline and recommendation at this time is SNF. PT to continue to follow and assess as pt  progresses.     Time Calculation:   PT Evaluation Complexity  History, PT Evaluation Complexity: 1-2 personal factors and/or comorbidities  Examination of Body Systems (PT Eval Complexity): total of 3 or more elements  Clinical Presentation (PT Evaluation Complexity): evolving  Clinical Decision Making (PT Evaluation Complexity): moderate complexity  Overall Complexity (PT Evaluation Complexity): moderate complexity     PT Charges       Row Name 12/30/23 1322             Time Calculation    Start Time 0857  -EL      Stop Time 0917  -EL      Time Calculation (min) 20 min  -EL      PT Received On 12/30/23  -EL      PT - Next Appointment 01/02/24  -EL      PT Goal Re-Cert Due Date 01/13/24  -                User Key  (r) = Recorded By, (t) = Taken By, (c) = Cosigned By      Initials Name Provider Type    Tommy Lopez PT Physical Therapist                  Therapy Charges for Today       Code Description Service Date Service Provider Modifiers Qty    [id number]  PT EVAL MOD COMPLEXITY 4 12/30/2023 Tommy Leong PT GP 1            PT G-Codes  Outcome Measure Options: AM-PAC 6 Clicks Basic Mobility (PT)  AM-PAC 6 Clicks Score (PT): 12  PT Discharge Summary  Anticipated Discharge Disposition (PT): skilled nursing facility    Tommy Leong PT  12/30/2023

## 2023-12-30 NOTE — PROGRESS NOTES
[facility] Progress Note      Patient Name: Oscar Wray  : 1950  MRN: [id number]  Primary Care Physician:  Kajal Horowitz PA  Date of admission: 2023    Subjective     Interval History:   Events noted, transferred to the icu postop    Review of Systems:   14 point review of systems is otherwise negative except for mentioned above on HPI    Objective     Vitals:   Temp:  [95.5 °F (35.3 °C)-98.2 °F (36.8 °C)] 97.7 °F (36.5 °C)  Heart Rate:  [] 103  Resp:  [10-17] 17  BP: ()/(41-83) 104/60  Flow (L/min):  [2] 2    Intake/Output Summary (Last 24 hours) at 2023 1327  Last data filed at 2023 0812  Gross per 24 hour   Intake 4646 ml   Output 2875 ml   Net 1771 ml       Physical Exam:    General Appearance: alert, oriented x 3, no acute distress   Skin: warm and dry  HEENT: oral mucosa normal, nonicteric sclera  Neck: supple, no JVD  Lungs: CTA  Heart: RRR, normal S1 and S2  Abdomen: soft, nontender, nondistended  : no palpable bladder  Extremities: no edema, cyanosis or clubbing  Neuro: normal speech and mental status     Scheduled Meds:     aspirin, 81 mg, Oral, Daily  [Held by provider] atenolol, 12.5 mg, Oral, Daily  atorvastatin, 20 mg, Oral, Daily  cholecalciferol, 2,000 Units, Oral, Daily  cilostazol, 100 mg, Oral, BID  docusate sodium, 100 mg, Oral, Daily  donepezil, 20 mg, Oral, Nightly  memantine, 10 mg, Oral, Q12H  multivitamin with minerals, 1 tablet, Oral, Daily  pregabalin, 25 mg, Oral, BID  risperiDONE, 0.25 mg, Oral, Daily  risperiDONE, 0.5 mg, Oral, Nightly  sodium chloride, 10 mL, Intravenous, Q12H  tamsulosin, 0.4 mg, Oral, Daily      IV Meds:   heparin, 16.8 Units/kg/hr, Last Rate: 14 Units/kg/hr (23 0639)  lactated ringers, 100 mL/hr, Last Rate: 100 mL/hr (23 0813)        Results Reviewed:   I have personally reviewed the results from the time of this admission to 2023 13:27 EST     Results from last 7 days   Lab  Units 12/30/23  0442 12/29/23  1857 12/28/23  2244 12/28/23  0337 12/27/23  1529   SODIUM mmol/L 136 138 134* 135* 135*   POTASSIUM mmol/L 3.9 4.2 4.1 4.0 4.2   CHLORIDE mmol/L 103 101 99 98 97*   CO2 mmol/L 21.0* 22.0 20.0* 21.0* 23.0   BUN mg/dL 13 13 18 16 15   CREATININE mg/dL 1.13 1.13 1.49* 1.35* 1.50*   CALCIUM mg/dL 8.3* 9.0 8.9 9.3 9.7   BILIRUBIN mg/dL 0.2  --   --  0.6 0.6   ALK PHOS U/L 80  --   --  110 106   ALT (SGPT) U/L 19  --   --  15 13   AST (SGOT) U/L 101*  --   --  23 18   GLUCOSE mg/dL 107* 132* 81 89 134*     Estimated Creatinine Clearance: 62.5 mL/min (by C-G formula based on SCr of 1.13 mg/dL).          Results from last 7 days   Lab Units 12/30/23  0442 12/29/23  1857 12/29/23  1453 12/28/23  2244 12/27/23  1529   WBC 10*3/mm3 14.10* 17.90*  --  11.90* 14.30*   HEMOGLOBIN g/dL 11.7* 14.1  --  13.3 14.3   HEMOGLOBIN, POC g/dL  --   --  10.2*  --   --    PLATELETS 10*3/mm3 464* 561*  --  586* 646*     Results from last 7 days   Lab Units 12/29/23 1857 12/27/23  1529   INR  1.10 1.03       Assessment / Plan     ASSESSMENT:  Paul-prerenal, resolved with hydration and supportive care    PLAN:  Continue supportive care, will sign of but remain available, thank you    Thank you for involving us in the care of Oscar Wray.  Please feel free to call with any questions.    Mahamed Graham MD  12/30/23  13:27 [facility]    [facility]  [phone]

## 2023-12-31 ENCOUNTER — APPOINTMENT (OUTPATIENT)
Dept: CARDIOLOGY | Facility: HOSPITAL | Age: 73
DRG: 271 | End: 2023-12-31
Payer: MEDICARE

## 2023-12-31 LAB
ANION GAP SERPL CALCULATED.3IONS-SCNC: 11 MMOL/L (ref 5–15)
APTT PPP: 38.2 SECONDS (ref 61–76.5)
BASOPHILS # BLD AUTO: 0.1 10*3/MM3 (ref 0–0.2)
BASOPHILS NFR BLD AUTO: 0.7 % (ref 0–1.5)
BH CV LOWER ARTERIAL LEFT 2ND DIGIT SYS MAX: 0
BH CV LOWER ARTERIAL LEFT 3RD DIGIT SYS MAX: 0
BH CV LOWER ARTERIAL LEFT 4TH DIGIT SYS MAX: 0
BH CV LOWER ARTERIAL LEFT 5TH DIGIT SYS MAX: 0
BH CV LOWER ARTERIAL LEFT ABI RATIO: 0.26
BH CV LOWER ARTERIAL LEFT DORSALIS PEDIS SYS MAX: 0
BH CV LOWER ARTERIAL LEFT GREAT TOE SYS MAX: 0
BH CV LOWER ARTERIAL LEFT POST TIBIAL SYS MAX: 33
BH CV LOWER ARTERIAL LEFT TBI RATIO: 0
BH CV LOWER ARTERIAL RIGHT ABI RATIO: 0.42
BH CV LOWER ARTERIAL RIGHT DORSALIS PEDIS SYS MAX: 49
BH CV LOWER ARTERIAL RIGHT GREAT TOE SYS MAX: 0
BH CV LOWER ARTERIAL RIGHT POST TIBIAL SYS MAX: 52
BH CV LOWER ARTERIAL RIGHT TBI RATIO: 0
BH CV VAS PRELIMINARY FINDINGS SCRIPTING: 1
BUN SERPL-MCNC: 11 MG/DL (ref 8–23)
BUN/CREAT SERPL: 10.4 (ref 7–25)
CALCIUM SPEC-SCNC: 7.8 MG/DL (ref 8.6–10.5)
CHLORIDE SERPL-SCNC: 102 MMOL/L (ref 98–107)
CO2 SERPL-SCNC: 22 MMOL/L (ref 22–29)
CREAT SERPL-MCNC: 1.06 MG/DL (ref 0.76–1.27)
DEPRECATED RDW RBC AUTO: 45.1 FL (ref 37–54)
EGFRCR SERPLBLD CKD-EPI 2021: 75 ML/MIN/1.73
EOSINOPHIL # BLD AUTO: 0.1 10*3/MM3 (ref 0–0.4)
EOSINOPHIL NFR BLD AUTO: 0.6 % (ref 0.3–6.2)
ERYTHROCYTE [DISTWIDTH] IN BLOOD BY AUTOMATED COUNT: 15 % (ref 12.3–15.4)
GLUCOSE SERPL-MCNC: 105 MG/DL (ref 65–99)
HCT VFR BLD AUTO: 32.8 % (ref 37.5–51)
HGB BLD-MCNC: 10.9 G/DL (ref 13–17.7)
INR PPP: 1.28 (ref 0.93–1.1)
LYMPHOCYTES # BLD AUTO: 0.9 10*3/MM3 (ref 0.7–3.1)
LYMPHOCYTES NFR BLD AUTO: 8.6 % (ref 19.6–45.3)
MCH RBC QN AUTO: 28.9 PG (ref 26.6–33)
MCHC RBC AUTO-ENTMCNC: 33.1 G/DL (ref 31.5–35.7)
MCV RBC AUTO: 87.2 FL (ref 79–97)
MONOCYTES # BLD AUTO: 1.2 10*3/MM3 (ref 0.1–0.9)
MONOCYTES NFR BLD AUTO: 11.1 % (ref 5–12)
NEUTROPHILS NFR BLD AUTO: 79 % (ref 42.7–76)
NEUTROPHILS NFR BLD AUTO: 8.4 10*3/MM3 (ref 1.7–7)
NRBC BLD AUTO-RTO: 0 /100 WBC (ref 0–0.2)
PLATELET # BLD AUTO: 393 10*3/MM3 (ref 140–450)
PMV BLD AUTO: 7.8 FL (ref 6–12)
POTASSIUM SERPL-SCNC: 3.7 MMOL/L (ref 3.5–5.2)
PROTHROMBIN TIME: 13.7 SECONDS (ref 9.6–11.7)
RBC # BLD AUTO: 3.77 10*6/MM3 (ref 4.14–5.8)
SODIUM SERPL-SCNC: 135 MMOL/L (ref 136–145)
UPPER ARTERIAL LEFT ARM BRACHIAL SYS MAX: 125
UPPER ARTERIAL RIGHT ARM BRACHIAL SYS MAX: 80
WBC NRBC COR # BLD AUTO: 10.6 10*3/MM3 (ref 3.4–10.8)

## 2023-12-31 PROCEDURE — 25810000003 LACTATED RINGERS PER 1000 ML: Performed by: INTERNAL MEDICINE

## 2023-12-31 PROCEDURE — 85610 PROTHROMBIN TIME: CPT | Performed by: STUDENT IN AN ORGANIZED HEALTH CARE EDUCATION/TRAINING PROGRAM

## 2023-12-31 PROCEDURE — 85025 COMPLETE CBC W/AUTO DIFF WBC: CPT | Performed by: INTERNAL MEDICINE

## 2023-12-31 PROCEDURE — 80048 BASIC METABOLIC PNL TOTAL CA: CPT | Performed by: INTERNAL MEDICINE

## 2023-12-31 PROCEDURE — 93922 UPR/L XTREMITY ART 2 LEVELS: CPT

## 2023-12-31 PROCEDURE — 85730 THROMBOPLASTIN TIME PARTIAL: CPT | Performed by: STUDENT IN AN ORGANIZED HEALTH CARE EDUCATION/TRAINING PROGRAM

## 2023-12-31 PROCEDURE — 25010000002 MORPHINE PER 10 MG: Performed by: STUDENT IN AN ORGANIZED HEALTH CARE EDUCATION/TRAINING PROGRAM

## 2023-12-31 RX ORDER — ATENOLOL 25 MG/1
12.5 TABLET ORAL DAILY
Status: DISCONTINUED | OUTPATIENT
Start: 2023-12-31 | End: 2024-01-08 | Stop reason: HOSPADM

## 2023-12-31 RX ORDER — HEPARIN SODIUM 10000 [USP'U]/100ML
18 INJECTION, SOLUTION INTRAVENOUS
Status: DISCONTINUED | OUTPATIENT
Start: 2024-01-01 | End: 2024-01-01

## 2023-12-31 RX ADMIN — OXYCODONE HYDROCHLORIDE 10 MG: 5 TABLET ORAL at 11:14

## 2023-12-31 RX ADMIN — Medication 1 TABLET: at 08:02

## 2023-12-31 RX ADMIN — Medication 10 ML: at 08:02

## 2023-12-31 RX ADMIN — MORPHINE SULFATE 2 MG: 2 INJECTION, SOLUTION INTRAMUSCULAR; INTRAVENOUS at 00:26

## 2023-12-31 RX ADMIN — PREGABALIN 25 MG: 25 CAPSULE ORAL at 21:33

## 2023-12-31 RX ADMIN — MEMANTINE 10 MG: 10 TABLET ORAL at 21:33

## 2023-12-31 RX ADMIN — OXYCODONE HYDROCHLORIDE 10 MG: 5 TABLET ORAL at 22:34

## 2023-12-31 RX ADMIN — ASPIRIN 81 MG: 81 TABLET, COATED ORAL at 08:01

## 2023-12-31 RX ADMIN — RISPERIDONE 0.25 MG: 0.25 TABLET, FILM COATED ORAL at 08:02

## 2023-12-31 RX ADMIN — CILOSTAZOL 100 MG: 100 TABLET ORAL at 21:33

## 2023-12-31 RX ADMIN — MEMANTINE 10 MG: 10 TABLET ORAL at 08:02

## 2023-12-31 RX ADMIN — MORPHINE SULFATE 2 MG: 2 INJECTION, SOLUTION INTRAMUSCULAR; INTRAVENOUS at 10:15

## 2023-12-31 RX ADMIN — Medication 2000 UNITS: at 08:01

## 2023-12-31 RX ADMIN — DOCUSATE SODIUM 100 MG: 100 CAPSULE, LIQUID FILLED ORAL at 08:01

## 2023-12-31 RX ADMIN — TAMSULOSIN HYDROCHLORIDE 0.4 MG: 0.4 CAPSULE ORAL at 08:01

## 2023-12-31 RX ADMIN — Medication 10 ML: at 21:33

## 2023-12-31 RX ADMIN — RISPERIDONE 0.5 MG: 0.25 TABLET, FILM COATED ORAL at 21:32

## 2023-12-31 RX ADMIN — PREGABALIN 25 MG: 25 CAPSULE ORAL at 08:01

## 2023-12-31 RX ADMIN — DONEPEZIL HYDROCHLORIDE 20 MG: 5 TABLET, FILM COATED ORAL at 21:33

## 2023-12-31 RX ADMIN — APIXABAN 10 MG: 5 TABLET, FILM COATED ORAL at 08:00

## 2023-12-31 RX ADMIN — SODIUM CHLORIDE, POTASSIUM CHLORIDE, SODIUM LACTATE AND CALCIUM CHLORIDE 100 ML/HR: 600; 310; 30; 20 INJECTION, SOLUTION INTRAVENOUS at 23:13

## 2023-12-31 RX ADMIN — MORPHINE SULFATE 2 MG: 2 INJECTION, SOLUTION INTRAMUSCULAR; INTRAVENOUS at 22:05

## 2023-12-31 RX ADMIN — OXYCODONE HYDROCHLORIDE 10 MG: 5 TABLET ORAL at 03:55

## 2023-12-31 RX ADMIN — ATORVASTATIN CALCIUM 20 MG: 20 TABLET, FILM COATED ORAL at 08:01

## 2023-12-31 RX ADMIN — CILOSTAZOL 100 MG: 100 TABLET ORAL at 08:02

## 2023-12-31 RX ADMIN — ATENOLOL 12.5 MG: 25 TABLET ORAL at 08:01

## 2023-12-31 NOTE — PROGRESS NOTES
Name: Oscar Wray ADMIT: 2023   : 1950  PCP: Kajal Horowitz PA    MRN: [id number] LOS: 4 days   AGE/SEX: 73 y.o. male  ROOM: [address]/[facility]    [place], Post Op [facility]    73 y.o. male POD 2 s/p extensive bilateral lower extremity revascularization with bilateral common femoral endarterectomies, aortic and bilateral iliac stents    He is doing well. He is sitting up in a chair and has no complaints during my visit today.  He specifically denies leg pain.      Scheduled Medications:   apixaban, 10 mg, Oral, BID   Followed by  [START ON 2024] apixaban, 5 mg, Oral, BID  aspirin, 81 mg, Oral, Daily  atenolol, 12.5 mg, Oral, Daily  atorvastatin, 20 mg, Oral, Daily  cholecalciferol, 2,000 Units, Oral, Daily  cilostazol, 100 mg, Oral, BID  docusate sodium, 100 mg, Oral, Daily  donepezil, 20 mg, Oral, Nightly  memantine, 10 mg, Oral, Q12H  multivitamin with minerals, 1 tablet, Oral, Daily  pregabalin, 25 mg, Oral, BID  risperiDONE, 0.25 mg, Oral, Daily  risperiDONE, 0.5 mg, Oral, Nightly  sodium chloride, 10 mL, Intravenous, Q12H  tamsulosin, 0.4 mg, Oral, Daily      Active Infusions:  lactated ringers, 100 mL/hr, Last Rate: 100 mL/hr (23 1756)      Vital Signs  Vital Signs Patient Vitals for the past 24 hrs:   BP Temp Temp src Pulse Resp SpO2   23 1407 128/64 -- -- 104 -- 93 %   23 1355 -- 98.3 °F (36.8 °C) Oral -- -- --   23 1200 120/59 -- -- (!) 130 24 95 %   23 0800 92/62 99 °F (37.2 °C) Axillary (!) 127 17 94 %   23 0716 109/62 -- -- (!) 128 -- 94 %   23 0600 (!) 88/48 -- -- 107 -- 98 %   23 0530 115/64 -- -- (!) 129 -- 99 %   23 0500 95/54 -- -- 103 -- 99 %   23 0430 -- -- -- 107 -- 98 %   23 0415 -- -- -- 115 -- (!) 89 %   23 0400 102/62 97.7 °F (36.5 °C) -- (!) 128 14 100 %   23 0345 -- -- -- 119 -- 100 %   23 0330 -- -- -- 110 -- 95 %   23 0300 (!) 91/38 -- -- 109 -- 98 %   23 0230 -- -- --  (!) 123 -- 96 %   12/31/23 0200 97/57 -- -- 118 -- 94 %   12/31/23 0130 -- -- -- 111 -- 95 %   12/31/23 0100 -- -- -- (!) 139 -- 99 %   12/31/23 0030 -- -- -- (!) 124 -- 100 %   12/31/23 0000 97/62 98 °F (36.7 °C) -- (!) 123 -- 100 %   12/30/23 2330 -- -- -- 113 -- 96 %   12/30/23 2300 117/65 -- -- (!) 121 -- 96 %   12/30/23 2235 -- -- -- 110 -- --   12/30/23 2230 -- -- -- 67 -- 97 %   12/30/23 2225 -- -- -- 95 -- --   12/30/23 2200 120/51 -- -- 113 -- 96 %   12/30/23 2130 -- -- -- 110 -- 97 %   12/30/23 2100 96/51 -- -- 105 -- 93 %   12/30/23 2035 -- -- -- 95 -- 99 %   12/30/23 2030 -- -- -- (!) 130 -- 99 %   12/30/23 2025 -- -- -- 110 -- --   12/30/23 2000 -- -- -- 91 -- 95 %   12/30/23 1930 136/65 -- -- 101 15 100 %   12/30/23 1900 -- -- -- 117 -- 96 %     I/O:  I/O last 3 completed shifts:  In: 5648 [P.O.:820; I.V.:6628]  Out: 4515 [Urine:4515]  Physical Exam:    Physical Exam   Bilateral groin incisions with dressings in place, minimal shadowing  Doppler right PT signal, doppler left DP and PT signal  Feet with <2 sec cap refill, left toes appear dusky (see photo)  Normal neurologic exam of bilateral feet      Imaging:  XR Chest 1 View    Result Date: 12/30/2023  Impression: No active cardiopulmonary disease Electronically Signed: Willie Alcantara  12/30/2023 10:51 AM EST  Workstation ID: [id number]                CBC    Results from last 7 days   Lab Units 12/31/23  0548 12/30/23  0442 12/29/23  1857 12/29/23  1453 12/28/23  2244 12/27/23  1529   WBC 10*3/mm3 10.60 14.10* 17.90*  --  11.90* 14.30*   HEMOGLOBIN g/dL 10.9* 11.7* 14.1  --  13.3 14.3   HEMOGLOBIN, POC g/dL  --   --   --  10.2*  --   --    PLATELETS 10*3/mm3 393 464* 561*  --  586* 646*     BMP   Results from last 7 days   Lab Units 12/31/23  0548 12/30/23  0442 12/29/23  1857 12/28/23 2244 12/28/23  0337 12/27/23  1529   SODIUM mmol/L 135* 136 138 134* 135* 135*   POTASSIUM mmol/L 3.7 3.9 4.2 4.1 4.0 4.2   CHLORIDE mmol/L 102 103 101 99 98 97*   CO2  mmol/L 22.0 21.0* 22.0 20.0* 21.0* 23.0   BUN mg/dL 11 13 13 18 16 15   CREATININE mg/dL 1.06 1.13 1.13 1.49* 1.35* 1.50*   GLUCOSE mg/dL 105* 107* 132* 81 89 134*     Coag   Results from last 7 days   Lab Units 12/30/23  1233 12/30/23  0442 12/29/23  1857 12/29/23  0624 12/28/23  2244 12/28/23  1434 12/28/23  0337 12/27/23  1925 12/27/23  1529   INR   --   --  1.10  --   --   --   --   --  1.03   APTT seconds 55.7* 47.5* >139.0* 54.7* 90.9* 48.4* 42.6*   < > 31.6*    < > = values in this interval not displayed.     Assessment & Plan   Assessment & Plan    Critical limb ischemia of both lower extremities    Degenerative disc disease, lumbar    73 y.o. male POD 2 s/p extensive bilateral lower extremity revascularization    -overall doing well post extensive revascularization.  Doppler signals present bilaterally and no rest pain.  Dusky appearance of left toes, he has chronic tibial disease and does not have in line flow to his foot.  ABIs on the left have improved from 0 to 0.26, toe pressures remain 0.  He is already on pletal and full anticoagulation with eliquis plus plavix.  Right sided MELANIE stable.  He has known SFA and popliteal disease on the left.  He has adequate vein for bypass in the left leg.  I will review the angiogram images to determine if a bypass would be possible on the left leg.  I will stop his eliquis and put him back on heparin tomorrow    Personal protective equipment used for this patient encounter:  Patient wearing surgical mask []    Provider wearing a surgical mask [x]    Gloves []    Eye protection []    Face Shield []    Gown []    N 95 respirator or CAPR/PAPR []   Duration of interaction 15 mins    Trisha Coffman MD  Vascular Surgery  [facility]  O: [phone]  F: [phone]      Please call my office with any question: [phone]    Active Hospital Problems    Diagnosis  POA    **Critical limb ischemia of both lower extremities [I70.223]  Yes    Degenerative  disc disease, lumbar [M51.36]  Yes      Resolved Hospital Problems   No resolved problems to display.

## 2023-12-31 NOTE — PLAN OF CARE
Goal Outcome Evaluation:  Plan of Care Reviewed With: patient, significant other     Patient alert and oriented to person, occasionally place; confused at baseline.  Repeat ABIs today with no changes in BLE pulses, right brachial pressure decreased.  Patient tachycardic for most of the day, increased HR with movement/activity.  PRN pain meds given.  Patient experiencing urinary frequency and occasionally strains to void; on Flomax.     Progress: no change

## 2023-12-31 NOTE — PROGRESS NOTES
[facility] MEDICINE SERVICE  DAILY PROGRESS NOTE    NAME: Oscar Wray  : 1950  MRN: [id number]      LOS: 4 days     PROVIDER OF SERVICE: Tejas Polk MD    Chief Complaint: Critical limb ischemia of both lower extremities    Subjective:     Interval History:  History taken from: patient  Patient Complaints: Feeling little bit better, still has left lower extremity tingling and mild pain  Patient Denies: Fever chills or rigors    Review of Systems:   Review of Systems  14 point review of system unremarkable except mentioned above  Objective:     Vital Signs  Temp:  [97.7 °F (36.5 °C)-99 °F (37.2 °C)] 99 °F (37.2 °C)  Heart Rate:  [] 127  Resp:  [14-17] 17  BP: ()/(38-74) 92/62  Flow (L/min):  [2] 2   Body mass index is 25.45 kg/m².    Physical Exam  Physical Exam  HENT:      Head: Atraumatic.      Mouth/Throat:      Mouth: Mucous membranes are moist.   Cardiovascular:      Rate and Rhythm: Normal rate and regular rhythm.      Pulses: Normal pulses.      Heart sounds: Normal heart sounds.   Pulmonary:      Effort: Pulmonary effort is normal.      Breath sounds: Normal breath sounds.   Abdominal:      General: Bowel sounds are normal.      Palpations: Abdomen is soft.   Musculoskeletal:      Cervical back: Neck supple.      Comments: Pain on LLE   Skin:     General: Skin is warm.   Neurological:      General: No focal deficit present.      Mental Status: He is alert and oriented to person, place, and time.   Psychiatric:         Mood and Affect: Mood normal.   Scheduled Meds   apixaban, 10 mg, Oral, BID   Followed by  [START ON 2024] apixaban, 5 mg, Oral, BID  aspirin, 81 mg, Oral, Daily  atenolol, 12.5 mg, Oral, Daily  atorvastatin, 20 mg, Oral, Daily  cholecalciferol, 2,000 Units, Oral, Daily  cilostazol, 100 mg, Oral, BID  docusate sodium, 100 mg, Oral, Daily  donepezil, 20 mg, Oral, Nightly  memantine, 10 mg, Oral, Q12H  multivitamin with minerals, 1 tablet, Oral,  Daily  pregabalin, 25 mg, Oral, BID  risperiDONE, 0.25 mg, Oral, Daily  risperiDONE, 0.5 mg, Oral, Nightly  sodium chloride, 10 mL, Intravenous, Q12H  tamsulosin, 0.4 mg, Oral, Daily       PRN Meds     acetaminophen **OR** acetaminophen **OR** acetaminophen    hydrALAZINE    Morphine    nitroglycerin    nitroglycerin    oxyCODONE    [COMPLETED] Insert Peripheral IV **AND** sodium chloride    sodium chloride    sodium chloride   Infusions  lactated ringers, 100 mL/hr, Last Rate: 100 mL/hr (12/30/23 3376)          Diagnostic Data    Results from last 7 days   Lab Units 12/31/23  0548 12/30/23  0442   WBC 10*3/mm3 10.60 14.10*   HEMOGLOBIN g/dL 10.9* 11.7*   HEMATOCRIT % 32.8* 35.5*   PLATELETS 10*3/mm3 393 464*   GLUCOSE mg/dL 105* 107*   CREATININE mg/dL 1.06 1.13   BUN mg/dL 11 13   SODIUM mmol/L 135* 136   POTASSIUM mmol/L 3.7 3.9   AST (SGOT) U/L  --  101*   ALT (SGPT) U/L  --  19   ALK PHOS U/L  --  80   BILIRUBIN mg/dL  --  0.2   ANION GAP mmol/L 11.0 12.0       XR Chest 1 View    Result Date: 12/30/2023  Impression: No active cardiopulmonary disease Electronically Signed: Willie Alcantara  12/30/2023 10:51 AM EST  Workstation ID: [id number]       I reviewed the patient's new clinical results.    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     Code status is   Code Status and Medical Interventions:   Ordered at: 12/28/23 0752     Level Of Support Discussed With:    Patient     Code Status (Patient has no pulse and is not breathing):    CPR (Attempt to Resuscitate)     Medical Interventions (Patient has pulse or is breathing):    Full Support       Plan for disposition: 1-2 days pending recommendation by vascular surgery     Time: 32 minutes    Signature: Electronically signed by Tejas Polk MD, 12/31/23, 12:35 EST.  [facility] Hospitalist Team

## 2023-12-31 NOTE — NURSING NOTE
Left foot noted to have no DP pulse and dusky toes. Per day RN, Dr Salgado is aware and states that it is unlikely to improve.

## 2023-12-31 NOTE — PROGRESS NOTES
Name: Oscar Wray ADMIT: 2023   : 1950  PCP: Kajal Horowitz PA    MRN: [id number] LOS: 3 days   AGE/SEX: 73 y.o. male  ROOM: 2308/1    Peter    Billin, [facility] [employer]    73 y.o. male POD 1 s/p extensive bilateral lower extremity revascularization with bilateral common femoral endarterectomies, aortic and bilateral iliac stents    He is doing well.  Arterial line and vang out.  Brother at bedside.  Sitting up in the chair.  He has been up to chair but no ambulatory today.  He denies pain in either foot or lower leg.    Scheduled Medications:   apixaban, 10 mg, Oral, BID   Followed by  [START ON 2024] apixaban, 5 mg, Oral, BID  aspirin, 81 mg, Oral, Daily  [Held by provider] atenolol, 12.5 mg, Oral, Daily  atorvastatin, 20 mg, Oral, Daily  cholecalciferol, 2,000 Units, Oral, Daily  cilostazol, 100 mg, Oral, BID  docusate sodium, 100 mg, Oral, Daily  donepezil, 20 mg, Oral, Nightly  memantine, 10 mg, Oral, Q12H  multivitamin with minerals, 1 tablet, Oral, Daily  pregabalin, 25 mg, Oral, BID  risperiDONE, 0.25 mg, Oral, Daily  risperiDONE, 0.5 mg, Oral, Nightly  sodium chloride, 10 mL, Intravenous, Q12H  tamsulosin, 0.4 mg, Oral, Daily      Active Infusions:  lactated ringers, 100 mL/hr, Last Rate: 100 mL/hr (23 1756)      Vital Signs  Vital Signs Patient Vitals for the past 24 hrs:   BP Temp Temp src Pulse Resp SpO2   23 1930 -- -- -- 101 -- 100 %   23 1617 112/65 98.3 °F (36.8 °C) Oral 111 14 95 %   23 1500 103/74 -- -- 116 -- 95 %   23 1425 108/64 -- -- 107 -- 92 %   23 1400 (!) 73/51 -- -- 106 -- 94 %   23 1300 97/62 97.7 °F (36.5 °C) Oral 100 -- 92 %   23 1210 104/60 -- -- 103 -- 92 %   23 1200 (!) 78/53 -- -- 82 -- 91 %   23 1100 (!) 76/54 -- -- 98 -- (!) 89 %   23 1000 114/56 -- -- 103 -- 92 %   23 0900 100/63 -- -- 103 17 94 %   23 0800 (!) 89/67 -- -- 104 12 94 %   23 0700 (!) 87/50 97.3 °F (36.3  °C) Oral 87 -- 90 %   12/30/23 0644 -- -- -- 101 -- (!) 89 %   12/30/23 0600 94/49 -- -- 106 -- 92 %   12/30/23 0530 -- -- -- 97 -- 94 %   12/30/23 0515 -- -- -- 94 -- 93 %   12/30/23 0500 -- -- -- 107 -- 90 %   12/30/23 0445 -- -- -- 119 -- 94 %   12/30/23 0430 -- -- -- 92 -- 94 %   12/30/23 0415 -- -- -- 97 -- 92 %   12/30/23 0400 97/57 98 °F (36.7 °C) Oral 117 -- 92 %   12/30/23 0345 -- -- -- 115 -- 94 %   12/30/23 0330 -- -- -- 105 -- 95 %   12/30/23 0315 -- -- -- 98 -- 93 %   12/30/23 0300 -- -- -- 108 -- 94 %   12/30/23 0245 -- -- -- 98 -- 92 %   12/30/23 0230 -- -- -- 108 -- 94 %   12/30/23 0215 -- -- -- 109 -- 93 %   12/30/23 0200 97/52 -- -- 113 -- 95 %   12/30/23 0145 -- -- -- 102 -- 94 %   12/30/23 0130 -- -- -- 115 -- 94 %   12/30/23 0115 -- -- -- (!) 123 -- 97 %   12/30/23 0100 -- -- -- 109 -- 95 %   12/30/23 0045 -- -- -- 106 -- 95 %   12/30/23 0030 -- -- -- 109 -- 94 %   12/30/23 0015 -- -- -- 109 -- 95 %   12/30/23 0010 112/58 -- -- 112 -- 97 %   12/30/23 0000 107/55 98.2 °F (36.8 °C) Oral 101 -- 93 %   12/29/23 2345 -- -- -- 109 -- 95 %   12/29/23 2330 -- -- -- 110 -- 94 %   12/29/23 2315 -- -- -- 111 -- 96 %   12/29/23 2300 -- -- -- 116 -- 95 %   12/29/23 2245 111/55 -- -- 111 -- 95 %   12/29/23 2230 104/58 -- -- 115 -- 95 %   12/29/23 2215 101/57 -- -- 118 -- 94 %   12/29/23 2200 102/62 -- -- 111 -- 95 %   12/29/23 2145 107/60 -- -- 111 -- 95 %   12/29/23 2130 109/65 -- -- 115 -- 94 %   12/29/23 2115 95/54 -- -- 103 -- 94 %   12/29/23 2100 95/42 -- -- 111 -- 96 %   12/29/23 2045 98/62 -- -- 113 -- 96 %   12/29/23 2030 (!) 83/44 -- -- 108 -- 95 %   12/29/23 2015 117/71 -- -- 113 -- 96 %   12/29/23 2000 121/77 97.2 °F (36.2 °C) Axillary 118 -- 97 %   12/29/23 1945 120/72 -- -- 118 -- 96 %     I/O:  I/O last 3 completed shifts:  In: 7250 [P.O.:820; I.V.:5660; Blood:270; IV Piggyback:500]  Out: 4275 [Urine:3725; Blood:550]  Physical Exam:    Physical Exam   Bilateral groin incisions with dressings in  place, minimal shadowing  Doppler right PT signal, doppler left PT signal  Feet with <2 sec cap refill, left toes appear dusky (see photo)  Normal neurologic exam of bilateral feet      Imaging:  XR Chest 1 View    Result Date: 12/30/2023  Impression: No active cardiopulmonary disease Electronically Signed: Willie Alcantara  12/30/2023 10:51 AM EST  Workstation ID: [id number]      CBC    Results from last 7 days   Lab Units 12/30/23  0442 12/29/23  1857 12/29/23  1453 12/28/23 2244 12/27/23  1529   WBC 10*3/mm3 14.10* 17.90*  --  11.90* 14.30*   HEMOGLOBIN g/dL 11.7* 14.1  --  13.3 14.3   HEMOGLOBIN, POC g/dL  --   --  10.2*  --   --    PLATELETS 10*3/mm3 464* 561*  --  586* 646*     BMP   Results from last 7 days   Lab Units 12/30/23  0442 12/29/23  1857 12/28/23 2244 12/28/23  0337 12/27/23  1529   SODIUM mmol/L 136 138 134* 135* 135*   POTASSIUM mmol/L 3.9 4.2 4.1 4.0 4.2   CHLORIDE mmol/L 103 101 99 98 97*   CO2 mmol/L 21.0* 22.0 20.0* 21.0* 23.0   BUN mg/dL 13 13 18 16 15   CREATININE mg/dL 1.13 1.13 1.49* 1.35* 1.50*   GLUCOSE mg/dL 107* 132* 81 89 134*     Coag   Results from last 7 days   Lab Units 12/30/23  1233 12/30/23  0442 12/29/23  1857 12/29/23  0624 12/28/23  2244 12/28/23  1434 12/28/23  0337 12/27/23  1925 12/27/23  1529   INR   --   --  1.10  --   --   --   --   --  1.03   APTT seconds 55.7* 47.5* >139.0* 54.7* 90.9* 48.4* 42.6*   < > 31.6*    < > = values in this interval not displayed.     Assessment & Plan   Assessment & Plan    Critical limb ischemia of both lower extremities    Degenerative disc disease, lumbar    73 y.o. male POD 1 s/p extensive bilateral lower extremity revascularization    -overall doing well post extensive revascularization.  Doppler signals present bilaterally and no rest pain.  Dusky appearance of left toes, he has chronic tibial disease and does not have in line flow to his foot.  Will check ABIs and digit pressures tomorrow to see where we stand  -on heparin drip, will  transition to eliquis and plan for discharge on eliquis and aspirin  -OOB, ambulate.  PT recommending placement, will consult case management  -OK to transfer out of ICU  -Creatinine stable, H/H 11/36      Personal protective equipment used for this patient encounter:  Patient wearing surgical mask []    Provider wearing a surgical mask [x]    Gloves []    Eye protection []    Face Shield []    Gown []    N 95 respirator or CAPR/PAPR []   Duration of interaction 15 mins    Trisha Coffman MD  Vascular Surgery  [facility]  O: [phone]  F: [phone]      Please call my office with any question: [phone]    Active Hospital Problems    Diagnosis  POA    **Critical limb ischemia of both lower extremities [I70.223]  Yes    Degenerative disc disease, lumbar [M51.36]  Yes      Resolved Hospital Problems   No resolved problems to display.

## 2023-12-31 NOTE — PLAN OF CARE
Goal Outcome Evaluation:  Plan of Care Reviewed With: patient           Outcome Evaluation: Improved pain post-op, however left foot pain increasing throughout night. Left DP pulse absent, left foot warm but dusky. Right foot remains with strong dopplered pulses, good color. Patient  stands to urinate frequently, transfers to chair. Does well with walker, but moderate assist to sit up from bed and lay back into bed. HR quite high with pain during activity. Mr Wray hopes to go home today, though I have expressed to him concern about pain and weakness in LLE.         Problem: Adult Inpatient Plan of Care  Goal: Plan of Care Review  Outcome: Ongoing, Progressing  Flowsheets (Taken 12/31/2023 0500)  Plan of Care Reviewed With: patient  Outcome Evaluation: Improved pain post-op, however left foot pain increasing throughout night. Left DP pulse absent, left foot warm but dusky. Right foot remains with strong dopplered pulses, good color. Patient  stands to urinate frequently, transfers to chair. Does well with walker, but moderate assist to sit up from bed and lay back into bed. HR quite high with pain during activity. Mr Wray hopes to go home today, though I have expressed to him concern about pain and weakness in LLE.  Goal: Patient-Specific Goal (Individualized)  Outcome: Ongoing, Progressing  Goal: Absence of Hospital-Acquired Illness or Injury  Outcome: Ongoing, Progressing  Intervention: Identify and Manage Fall Risk  Recent Flowsheet Documentation  Taken 12/31/2023 0455 by Sudeep Don, RN  Safety Promotion/Fall Prevention: safety round/check completed  Taken 12/31/2023 0355 by Sudeep Don, RN  Safety Promotion/Fall Prevention:   fall prevention program maintained   clutter free environment maintained   activity supervised   lighting adjusted   safety round/check completed   nonskid shoes/slippers when out of bed  Taken 12/31/2023 0254 by Sudeep Don, RN  Safety Promotion/Fall Prevention: safety  round/check completed  Taken 12/30/2023 2352 by Sudeep Don RN  Safety Promotion/Fall Prevention:   fall prevention program maintained   clutter free environment maintained   activity supervised   lighting adjusted   nonskid shoes/slippers when out of bed   safety round/check completed  Taken 12/30/2023 2257 by Sudeep Don RN  Safety Promotion/Fall Prevention: safety round/check completed  Taken 12/30/2023 2200 by Sudeep Don RN  Safety Promotion/Fall Prevention: safety round/check completed  Taken 12/30/2023 2100 by Sudeep Don RN  Safety Promotion/Fall Prevention: safety round/check completed  Taken 12/30/2023 1948 by Sudeep Don RN  Safety Promotion/Fall Prevention:   fall prevention program maintained   clutter free environment maintained   activity supervised   lighting adjusted   nonskid shoes/slippers when out of bed   safety round/check completed  Taken 12/30/2023 1900 by Sudeep Don RN  Safety Promotion/Fall Prevention: safety round/check completed  Intervention: Prevent Skin Injury  Recent Flowsheet Documentation  Taken 12/31/2023 0455 by Sudeep Don RN  Body Position: position changed independently  Taken 12/31/2023 0355 by Sudeep Don RN  Body Position: weight shifting  Skin Protection:   silicone foam dressing in place   tubing/devices free from skin contact   incontinence pads utilized  Taken 12/31/2023 0254 by Sudeep Don RN  Body Position: weight shifting  Taken 12/30/2023 2352 by Sudeep Don RN  Body Position: (up to chair) other (see comments)  Skin Protection:   tubing/devices free from skin contact   incontinence pads utilized   silicone foam dressing in place  Taken 12/30/2023 2257 by Sudeep Don RN  Body Position: weight shifting  Taken 12/30/2023 2100 by Sudeep Don RN  Body Position: (to bed) weight shifting  Taken 12/30/2023 1948 by Sudeep Don RN  Skin Protection:   tubing/devices free from skin contact   incontinence pads utilized   pulse  oximeter probe site changed   silicone foam dressing in place  Taken 12/30/2023 1900 by Sudeep Don RN  Body Position: (in chair) weight shifting  Intervention: Prevent and Manage VTE (Venous Thromboembolism) Risk  Recent Flowsheet Documentation  Taken 12/31/2023 0534 by Sudeep Don RN  Activity Management: (urination)   standing at bedside   back to bed  Taken 12/31/2023 0355 by Sudeep Don RN  Activity Management:   standing at bedside   back to bed  Taken 12/31/2023 0254 by Sudeep Don RN  Activity Management: (up to urinate)   standing at bedside   back to bed   other (see comments)  Taken 12/31/2023 0100 by Sudeep Don RN  Activity Management: back to bed  Taken 12/30/2023 2352 by Sudeep Don RN  Activity Management: up in chair  VTE Prevention/Management: patient refused intervention  Taken 12/30/2023 2210 by Sudeep Don RN  Activity Management: back to bed  Taken 12/30/2023 2200 by Sudeep Don RN  Activity Management: standing at bedside  Taken 12/30/2023 2100 by Sudeep Don RN  Activity Management: back to bed  Taken 12/30/2023 2025 by Sudeep Don RN  Activity Management: (to use urinal)   standing at bedside   other (see comments)  Taken 12/30/2023 1948 by Sudeep Don RN  Activity Management: activity encouraged  VTE Prevention/Management: (educating on importance) patient refused intervention  Range of Motion: active ROM (range of motion) encouraged  Taken 12/30/2023 1900 by Sudeep Don RN  Activity Management: up in chair  Intervention: Prevent Infection  Recent Flowsheet Documentation  Taken 12/30/2023 2352 by Sudeep Don RN  Infection Prevention:   single patient room provided   rest/sleep promoted   hand hygiene promoted  Taken 12/30/2023 1948 by Sudeep Don RN  Infection Prevention:   single patient room provided   rest/sleep promoted   hand hygiene promoted  Goal: Optimal Comfort and Wellbeing  Outcome: Ongoing, Progressing  Intervention:  Monitor Pain and Promote Comfort  Recent Flowsheet Documentation  Taken 12/31/2023 0355 by Sudeep Don RN  Pain Management Interventions: see MAR  Taken 12/31/2023 0026 by Sudeep Don RN  Pain Management Interventions: see MAR  Taken 12/30/2023 2352 by Sudeep Don RN  Pain Management Interventions:   relaxation techniques promoted   position adjusted   quiet environment facilitated   pillow support provided  Taken 12/30/2023 2259 by Sudeep Don RN  Pain Management Interventions:   see MAR   position adjusted   relaxation techniques promoted  Taken 12/30/2023 2200 by Sudeep Don RN  Pain Management Interventions:   pain management plan reviewed with patient/caregiver   no interventions per patient request  Intervention: Provide Person-Centered Care  Recent Flowsheet Documentation  Taken 12/31/2023 0355 by Sudeep Don RN  Trust Relationship/Rapport:   choices provided   questions answered   thoughts/feelings acknowledged   reassurance provided  Taken 12/30/2023 2352 by Sudeep Don RN  Trust Relationship/Rapport:   care explained   reassurance provided  Taken 12/30/2023 1948 by Sudeep Don RN  Trust Relationship/Rapport:   choices provided   care explained   questions answered  Goal: Readiness for Transition of Care  Outcome: Ongoing, Progressing     Problem: Pain Acute  Goal: Acceptable Pain Control and Functional Ability  Outcome: Ongoing, Progressing  Intervention: Prevent or Manage Pain  Recent Flowsheet Documentation  Taken 12/30/2023 2352 by Sudeep Don RN  Medication Review/Management: medications reviewed  Taken 12/30/2023 1948 by Sudeep Don RN  Medication Review/Management: medications reviewed  Intervention: Develop Pain Management Plan  Recent Flowsheet Documentation  Taken 12/31/2023 0355 by Sudeep Don RN  Pain Management Interventions: see MAR  Taken 12/31/2023 0026 by Sudeep Don RN  Pain Management Interventions: see MAR  Taken 12/30/2023 2352 by Arian  Sudeep HODGE RN  Pain Management Interventions:   relaxation techniques promoted   position adjusted   quiet environment facilitated   pillow support provided  Taken 12/30/2023 2259 by Sudeep Don RN  Pain Management Interventions:   see MAR   position adjusted   relaxation techniques promoted  Taken 12/30/2023 2200 by Sudeep Don RN  Pain Management Interventions:   pain management plan reviewed with patient/caregiver   no interventions per patient request     Problem: Hypertension Comorbidity  Goal: Blood Pressure in Desired Range  Outcome: Ongoing, Progressing  Intervention: Maintain Blood Pressure Management  Recent Flowsheet Documentation  Taken 12/30/2023 2352 by Sudeep Don RN  Medication Review/Management: medications reviewed  Taken 12/30/2023 1948 by Sudeep Don RN  Medication Review/Management: medications reviewed     Problem: Fall Injury Risk  Goal: Absence of Fall and Fall-Related Injury  Outcome: Ongoing, Progressing  Intervention: Identify and Manage Contributors  Recent Flowsheet Documentation  Taken 12/30/2023 2352 by Sudeep Don RN  Medication Review/Management: medications reviewed  Taken 12/30/2023 1948 by Sudeep Don RN  Medication Review/Management: medications reviewed  Intervention: Promote Injury-Free Environment  Recent Flowsheet Documentation  Taken 12/31/2023 0455 by Sudeep Don RN  Safety Promotion/Fall Prevention: safety round/check completed  Taken 12/31/2023 0355 by Sudeep Don RN  Safety Promotion/Fall Prevention:   fall prevention program maintained   clutter free environment maintained   activity supervised   lighting adjusted   safety round/check completed   nonskid shoes/slippers when out of bed  Taken 12/31/2023 0254 by Sudeep Don RN  Safety Promotion/Fall Prevention: safety round/check completed  Taken 12/30/2023 2352 by Sudeep Don RN  Safety Promotion/Fall Prevention:   fall prevention program maintained   clutter free environment  maintained   activity supervised   lighting adjusted   nonskid shoes/slippers when out of bed   safety round/check completed  Taken 12/30/2023 2257 by Sudeep Don RN  Safety Promotion/Fall Prevention: safety round/check completed  Taken 12/30/2023 2200 by Sudeep Don RN  Safety Promotion/Fall Prevention: safety round/check completed  Taken 12/30/2023 2100 by Sudeep Don RN  Safety Promotion/Fall Prevention: safety round/check completed  Taken 12/30/2023 1948 by Sudeep Don RN  Safety Promotion/Fall Prevention:   fall prevention program maintained   clutter free environment maintained   activity supervised   lighting adjusted   nonskid shoes/slippers when out of bed   safety round/check completed  Taken 12/30/2023 1900 by Sudeep Don RN  Safety Promotion/Fall Prevention: safety round/check completed     Problem: Skin Injury Risk Increased  Goal: Skin Health and Integrity  Outcome: Ongoing, Progressing  Intervention: Optimize Skin Protection  Recent Flowsheet Documentation  Taken 12/31/2023 0355 by Sudeep Don RN  Pressure Reduction Techniques:   frequent weight shift encouraged   weight shift assistance provided  Head of Bed (HOB) Positioning: HOB at 20-30 degrees  Pressure Reduction Devices: pressure-redistributing mattress utilized  Skin Protection:   silicone foam dressing in place   tubing/devices free from skin contact   incontinence pads utilized  Taken 12/30/2023 2352 by Sudeep Don RN  Pressure Reduction Techniques:   frequent weight shift encouraged   heels elevated off bed   weight shift assistance provided  Pressure Reduction Devices: pressure-redistributing mattress utilized  Skin Protection:   tubing/devices free from skin contact   incontinence pads utilized   silicone foam dressing in place  Taken 12/30/2023 1948 by Sudeep Don RN  Pressure Reduction Techniques: weight shift assistance provided  Pressure Reduction Devices:   pressure-redistributing mattress utilized    positioning supports utilized  Skin Protection:   tubing/devices free from skin contact   incontinence pads utilized   pulse oximeter probe site changed   silicone foam dressing in place

## 2024-01-01 LAB
ANION GAP SERPL CALCULATED.3IONS-SCNC: 10 MMOL/L (ref 5–15)
APTT PPP: 34.7 SECONDS (ref 61–76.5)
APTT PPP: 52.1 SECONDS (ref 61–76.5)
ARTERIAL PATENCY WRIST A: POSITIVE
ATMOSPHERIC PRESS: ABNORMAL MM[HG]
BASE EXCESS BLDA CALC-SCNC: 0.6 MMOL/L (ref 0–3)
BDY SITE: ABNORMAL
BUN SERPL-MCNC: 9 MG/DL (ref 8–23)
BUN/CREAT SERPL: 8.9 (ref 7–25)
CALCIUM SPEC-SCNC: 8.1 MG/DL (ref 8.6–10.5)
CHLORIDE SERPL-SCNC: 99 MMOL/L (ref 98–107)
CO2 BLDA-SCNC: 23.7 MMOL/L (ref 22–29)
CO2 SERPL-SCNC: 25 MMOL/L (ref 22–29)
CREAT SERPL-MCNC: 1.01 MG/DL (ref 0.76–1.27)
DEPRECATED RDW RBC AUTO: 45.9 FL (ref 37–54)
EGFRCR SERPLBLD CKD-EPI 2021: 79.5 ML/MIN/1.73
EOSINOPHIL # BLD MANUAL: 0.22 10*3/MM3 (ref 0–0.4)
EOSINOPHIL NFR BLD MANUAL: 2 % (ref 0.3–6.2)
ERYTHROCYTE [DISTWIDTH] IN BLOOD BY AUTOMATED COUNT: 15.1 % (ref 12.3–15.4)
GIANT PLATELETS: ABNORMAL
GLUCOSE SERPL-MCNC: 107 MG/DL (ref 65–99)
HCO3 BLDA-SCNC: 22.8 MMOL/L (ref 21–28)
HCT VFR BLD AUTO: 31.8 % (ref 37.5–51)
HEMODILUTION: NO
HGB BLD-MCNC: 10.7 G/DL (ref 13–17.7)
INHALED O2 CONCENTRATION: 21 %
LYMPHOCYTES # BLD MANUAL: 1.2 10*3/MM3 (ref 0.7–3.1)
LYMPHOCYTES NFR BLD MANUAL: 5 % (ref 5–12)
MCH RBC QN AUTO: 29.2 PG (ref 26.6–33)
MCHC RBC AUTO-ENTMCNC: 33.6 G/DL (ref 31.5–35.7)
MCV RBC AUTO: 86.9 FL (ref 79–97)
MODALITY: ABNORMAL
MONOCYTES # BLD: 0.55 10*3/MM3 (ref 0.1–0.9)
NEUTROPHILS # BLD AUTO: 8.94 10*3/MM3 (ref 1.7–7)
NEUTROPHILS NFR BLD MANUAL: 71 % (ref 42.7–76)
NEUTS BAND NFR BLD MANUAL: 11 % (ref 0–5)
NEUTS VAC BLD QL SMEAR: ABNORMAL
OVALOCYTES BLD QL SMEAR: ABNORMAL
PCO2 BLDA: 28.4 MM HG (ref 35–48)
PH BLDA: 7.51 PH UNITS (ref 7.35–7.45)
PLATELET # BLD AUTO: 473 10*3/MM3 (ref 140–450)
PMV BLD AUTO: 7.7 FL (ref 6–12)
PO2 BLDA: 79.5 MM HG (ref 83–108)
POIKILOCYTOSIS BLD QL SMEAR: ABNORMAL
POTASSIUM SERPL-SCNC: 4.4 MMOL/L (ref 3.5–5.2)
RBC # BLD AUTO: 3.66 10*6/MM3 (ref 4.14–5.8)
SAO2 % BLDCOA: 97 % (ref 94–98)
SCAN SLIDE: NORMAL
SODIUM SERPL-SCNC: 134 MMOL/L (ref 136–145)
TOXIC GRANULATION: ABNORMAL
VARIANT LYMPHS NFR BLD MANUAL: 11 % (ref 19.6–45.3)
WBC NRBC COR # BLD AUTO: 10.9 10*3/MM3 (ref 3.4–10.8)

## 2024-01-01 PROCEDURE — 25010000002 HYDROMORPHONE 1 MG/ML SOLUTION: Performed by: STUDENT IN AN ORGANIZED HEALTH CARE EDUCATION/TRAINING PROGRAM

## 2024-01-01 PROCEDURE — 85007 BL SMEAR W/DIFF WBC COUNT: CPT | Performed by: INTERNAL MEDICINE

## 2024-01-01 PROCEDURE — 85730 THROMBOPLASTIN TIME PARTIAL: CPT | Performed by: STUDENT IN AN ORGANIZED HEALTH CARE EDUCATION/TRAINING PROGRAM

## 2024-01-01 PROCEDURE — 25810000003 LACTATED RINGERS PER 1000 ML: Performed by: INTERNAL MEDICINE

## 2024-01-01 PROCEDURE — 36600 WITHDRAWAL OF ARTERIAL BLOOD: CPT

## 2024-01-01 PROCEDURE — 25010000002 MORPHINE PER 10 MG: Performed by: STUDENT IN AN ORGANIZED HEALTH CARE EDUCATION/TRAINING PROGRAM

## 2024-01-01 PROCEDURE — 85025 COMPLETE CBC W/AUTO DIFF WBC: CPT | Performed by: INTERNAL MEDICINE

## 2024-01-01 PROCEDURE — 87040 BLOOD CULTURE FOR BACTERIA: CPT | Performed by: STUDENT IN AN ORGANIZED HEALTH CARE EDUCATION/TRAINING PROGRAM

## 2024-01-01 PROCEDURE — 82803 BLOOD GASES ANY COMBINATION: CPT

## 2024-01-01 PROCEDURE — 25010000002 HEPARIN (PORCINE) 25000-0.45 UT/250ML-% SOLUTION: Performed by: STUDENT IN AN ORGANIZED HEALTH CARE EDUCATION/TRAINING PROGRAM

## 2024-01-01 PROCEDURE — 80048 BASIC METABOLIC PNL TOTAL CA: CPT | Performed by: INTERNAL MEDICINE

## 2024-01-01 RX ORDER — ACETAMINOPHEN 160 MG/5ML
650 SOLUTION ORAL EVERY 4 HOURS
Status: DISCONTINUED | OUTPATIENT
Start: 2024-01-01 | End: 2024-01-08 | Stop reason: HOSPADM

## 2024-01-01 RX ORDER — ACETAMINOPHEN 650 MG/1
650 SUPPOSITORY RECTAL EVERY 4 HOURS
Status: DISCONTINUED | OUTPATIENT
Start: 2024-01-01 | End: 2024-01-08 | Stop reason: HOSPADM

## 2024-01-01 RX ORDER — ACETAMINOPHEN 325 MG/1
650 TABLET ORAL EVERY 4 HOURS
Status: DISCONTINUED | OUTPATIENT
Start: 2024-01-01 | End: 2024-01-08 | Stop reason: HOSPADM

## 2024-01-01 RX ORDER — HEPARIN SODIUM 10000 [USP'U]/100ML
18 INJECTION, SOLUTION INTRAVENOUS
Status: DISCONTINUED | OUTPATIENT
Start: 2024-01-01 | End: 2024-01-07

## 2024-01-01 RX ADMIN — TAMSULOSIN HYDROCHLORIDE 0.4 MG: 0.4 CAPSULE ORAL at 10:02

## 2024-01-01 RX ADMIN — CILOSTAZOL 100 MG: 100 TABLET ORAL at 10:02

## 2024-01-01 RX ADMIN — SODIUM CHLORIDE, POTASSIUM CHLORIDE, SODIUM LACTATE AND CALCIUM CHLORIDE 100 ML/HR: 600; 310; 30; 20 INJECTION, SOLUTION INTRAVENOUS at 18:58

## 2024-01-01 RX ADMIN — Medication 2000 UNITS: at 10:02

## 2024-01-01 RX ADMIN — MORPHINE SULFATE 2 MG: 2 INJECTION, SOLUTION INTRAMUSCULAR; INTRAVENOUS at 11:06

## 2024-01-01 RX ADMIN — Medication 1 TABLET: at 10:03

## 2024-01-01 RX ADMIN — ATENOLOL 12.5 MG: 25 TABLET ORAL at 10:02

## 2024-01-01 RX ADMIN — ATORVASTATIN CALCIUM 20 MG: 20 TABLET, FILM COATED ORAL at 10:02

## 2024-01-01 RX ADMIN — Medication 10 ML: at 20:50

## 2024-01-01 RX ADMIN — CILOSTAZOL 100 MG: 100 TABLET ORAL at 20:50

## 2024-01-01 RX ADMIN — HEPARIN SODIUM 20 UNITS/KG/HR: 10000 INJECTION, SOLUTION INTRAVENOUS at 21:59

## 2024-01-01 RX ADMIN — SODIUM CHLORIDE, POTASSIUM CHLORIDE, SODIUM LACTATE AND CALCIUM CHLORIDE 100 ML/HR: 600; 310; 30; 20 INJECTION, SOLUTION INTRAVENOUS at 10:22

## 2024-01-01 RX ADMIN — MEMANTINE 10 MG: 10 TABLET ORAL at 10:03

## 2024-01-01 RX ADMIN — MEMANTINE 10 MG: 10 TABLET ORAL at 20:50

## 2024-01-01 RX ADMIN — HEPARIN SODIUM 18 UNITS/KG/HR: 10000 INJECTION, SOLUTION INTRAVENOUS at 10:17

## 2024-01-01 RX ADMIN — RISPERIDONE 0.5 MG: 0.25 TABLET, FILM COATED ORAL at 20:49

## 2024-01-01 RX ADMIN — DOCUSATE SODIUM 100 MG: 100 CAPSULE, LIQUID FILLED ORAL at 10:03

## 2024-01-01 RX ADMIN — ACETAMINOPHEN 650 MG: 325 TABLET, FILM COATED ORAL at 16:57

## 2024-01-01 RX ADMIN — DONEPEZIL HYDROCHLORIDE 20 MG: 5 TABLET, FILM COATED ORAL at 20:49

## 2024-01-01 RX ADMIN — ACETAMINOPHEN 650 MG: 325 TABLET, FILM COATED ORAL at 20:49

## 2024-01-01 RX ADMIN — PREGABALIN 25 MG: 25 CAPSULE ORAL at 20:49

## 2024-01-01 RX ADMIN — Medication 10 ML: at 10:26

## 2024-01-01 RX ADMIN — RISPERIDONE 0.25 MG: 0.25 TABLET, FILM COATED ORAL at 10:02

## 2024-01-01 RX ADMIN — ASPIRIN 81 MG: 81 TABLET, COATED ORAL at 10:02

## 2024-01-01 RX ADMIN — PREGABALIN 25 MG: 25 CAPSULE ORAL at 10:02

## 2024-01-01 RX ADMIN — HYDROMORPHONE HYDROCHLORIDE 0.5 MG: 1 INJECTION, SOLUTION INTRAMUSCULAR; INTRAVENOUS; SUBCUTANEOUS at 16:57

## 2024-01-01 NOTE — PROGRESS NOTES
[facility] MEDICINE SERVICE  DAILY PROGRESS NOTE    NAME: Oscar Wray  : 1950  MRN: [id number]      LOS: 5 days     PROVIDER OF SERVICE: Tejas Polk MD    Chief Complaint: Critical limb ischemia of both lower extremities    Subjective:     Interval History:  History taken from: patient  Patient Complaints:feels better ; eager to go home .  Vascular surgery possibly considering bypass   Patient Denies: Fever chills or rigors    Review of Systems:   Review of Systems  14 point review of system unremarkable except mentioned above  Objective:     Vital Signs  Temp:  [98.1 °F (36.7 °C)-98.8 °F (37.1 °C)] 98.1 °F (36.7 °C)  Heart Rate:  [] 110  Resp:  [14-20] 15  BP: ()/(53-97) 103/63   Body mass index is 27.31 kg/m².    Physical Exam  Physical Exam  HENT:      Head: Atraumatic.      Mouth/Throat:      Mouth: Mucous membranes are moist.   Cardiovascular:      Rate and Rhythm: Normal rate and regular rhythm.      Pulses: Normal pulses.      Heart sounds: Normal heart sounds.   Pulmonary:      Effort: Pulmonary effort is normal.      Breath sounds: Normal breath sounds.   Abdominal:      General: Bowel sounds are normal.      Palpations: Abdomen is soft.   Musculoskeletal:      Cervical back: Neck supple.      Comments: Pain on LLE   Skin:     General: Skin is warm.   Neurological:      General: No focal deficit present.      Mental Status: He is alert and oriented to person, place, and time.   Psychiatric:         Mood and Affect: Mood normal.   Scheduled Meds   aspirin, 81 mg, Oral, Daily  atenolol, 12.5 mg, Oral, Daily  atorvastatin, 20 mg, Oral, Daily  cholecalciferol, 2,000 Units, Oral, Daily  cilostazol, 100 mg, Oral, BID  docusate sodium, 100 mg, Oral, Daily  donepezil, 20 mg, Oral, Nightly  memantine, 10 mg, Oral, Q12H  multivitamin with minerals, 1 tablet, Oral, Daily  pregabalin, 25 mg, Oral, BID  risperiDONE, 0.25 mg, Oral, Daily  risperiDONE, 0.5 mg, Oral, Nightly  sodium  chloride, 10 mL, Intravenous, Q12H  tamsulosin, 0.4 mg, Oral, Daily       PRN Meds     acetaminophen **OR** acetaminophen **OR** acetaminophen    heparin    heparin    hydrALAZINE    Morphine    nitroglycerin    nitroglycerin    oxyCODONE    [COMPLETED] Insert Peripheral IV **AND** sodium chloride    sodium chloride    sodium chloride   Infusions  heparin, 18 Units/kg/hr, Last Rate: 18 Units/kg/hr (01/01/24 1017)  lactated ringers, 100 mL/hr, Last Rate: 100 mL/hr (01/01/24 1022)          Diagnostic Data    Results from last 7 days   Lab Units 01/01/24  0650 12/31/23  0548 12/30/23  0442   WBC 10*3/mm3 10.90*   < > 14.10*   HEMOGLOBIN g/dL 10.7*   < > 11.7*   HEMATOCRIT % 31.8*   < > 35.5*   PLATELETS 10*3/mm3 473*   < > 464*   GLUCOSE mg/dL 107*   < > 107*   CREATININE mg/dL 1.01   < > 1.13   BUN mg/dL 9   < > 13   SODIUM mmol/L 134*   < > 136   POTASSIUM mmol/L 4.4   < > 3.9   AST (SGOT) U/L  --   --  101*   ALT (SGPT) U/L  --   --  19   ALK PHOS U/L  --   --  80   BILIRUBIN mg/dL  --   --  0.2   ANION GAP mmol/L 10.0   < > 12.0    < > = values in this interval not displayed.       No radiology results for the last day      I reviewed the patient's new clinical results.    Assessment/Plan:     Active and Resolved Problems  #Acute limb ischemia  #Acute on chronic peripheral arterial disease  #Occlusion  of the distal infrarenal aorta and bilateral iliac arteries  --Status post bilateral femoral endarterectomy and placement of iliac artery stents with left leg angiogram  - Vascular surgery following the patient, planning for bypass possibly tomorrow  - On heparin drip  - Continue maintenance fluid LR at 100 cc/h  - Maintain SBP less than 160  - For pain continue pregabalin  - Continue aspirin Lipitor and cilostazol        Tension hypertension  - Atenolol on hold for hypotension  - Will continue to monitor     #KARINA on CKD stage III  - Renal consult appreciated  - resolved     DVT prophylaxis:  Medical and mechanical  DVT prophylaxis orders are present.     Code status is   Code Status and Medical Interventions:   Ordered at: 12/28/23 0752     Level Of Support Discussed With:    Patient     Code Status (Patient has no pulse and is not breathing):    CPR (Attempt to Resuscitate)     Medical Interventions (Patient has pulse or is breathing):    Full Support       Plan for disposition: Pending clear surgery clearance and recommendation;currently planned for bypass am     Time: 32 minutes    Signature: Electronically signed by Tejas Polk MD, 01/01/24, 13:09 [facility].  [facility] Hospitalist Team

## 2024-01-01 NOTE — PLAN OF CARE
Goal Outcome Evaluation:      Pt alert to self, disoriented to place and time. Pt frequently presses call light, RN will leave the room and he'll push call light 2 minutes later forgetting the original encounter. Pt very confused and forgetful; diagnosis of dementia. Pt sundowning from 2000 to around 0300 this morning. Pt would benefit from a sitter, there are none available. Adequate urine output. Pain controlled, see MAR. Right PT and DP pulses dopplerable. Left PT dopplerable, DP not-dopplerable. Vascular surgery is aware. Plan to start heparin gtt today, scheduled Eliquis order stopped. Vital signs stable. Continuing to monitor.

## 2024-01-01 NOTE — PROGRESS NOTES
Name: Oscar Wray ADMIT: 2023   : 1950  PCP: Kajal Horowitz PA    MRN: [id number] LOS: 5 days   AGE/SEX: 73 y.o. male  ROOM: 2308/1    Peter    Billin, [employer] Op [employer]    73 y.o. male POD 3 s/p extensive bilateral lower extremity revascularization with bilateral common femoral endarterectomies, aortic and bilateral iliac stents    He is doing well. He is sitting up in bed.  Girlfriend and brother are at bedside.  He denies pain in either foot.      Scheduled Medications:   acetaminophen, 650 mg, Oral, Q4H   Or  acetaminophen, 650 mg, Oral, Q4H   Or  acetaminophen, 650 mg, Rectal, Q4H  aspirin, 81 mg, Oral, Daily  atenolol, 12.5 mg, Oral, Daily  atorvastatin, 20 mg, Oral, Daily  cholecalciferol, 2,000 Units, Oral, Daily  cilostazol, 100 mg, Oral, BID  docusate sodium, 100 mg, Oral, Daily  donepezil, 20 mg, Oral, Nightly  memantine, 10 mg, Oral, Q12H  multivitamin with minerals, 1 tablet, Oral, Daily  pregabalin, 25 mg, Oral, BID  risperiDONE, 0.25 mg, Oral, Daily  risperiDONE, 0.5 mg, Oral, Nightly  sodium chloride, 10 mL, Intravenous, Q12H  tamsulosin, 0.4 mg, Oral, Daily      Active Infusions:  heparin, 18 Units/kg/hr, Last Rate: 18 Units/kg/hr (24 1017)  lactated ringers, 100 mL/hr, Last Rate: 100 mL/hr (24 1022)      Vital Signs  Vital Signs Patient Vitals for the past 24 hrs:   BP Temp Temp src Pulse Resp SpO2 Weight   24 1400 141/56 -- -- 85 -- 94 % --   24 1300 -- -- -- 100 -- 91 % --   24 1200 135/61 -- -- 97 -- 91 % --   24 0800 107/70 98.1 °F (36.7 °C) Oral 94 -- 93 % --   24 0400 103/63 -- -- 110 15 93 % --   24 0300 -- -- -- 94 -- 92 % --   24 0200 110/53 -- -- 104 -- 91 % --   24 0100 -- -- -- (!) 123 -- 92 % --   24 0000 103/75 98.8 °F (37.1 °C) Oral 107 14 92 % --   23 2300 -- -- -- 108 -- 95 % --   23 2200 91/59 -- -- 107 -- 94 % --   23 -- -- -- 113 -- 93 % --   23 -- -- --  -- -- -- 79.1 kg (174 lb 6.1 oz)   12/31/23 2000 127/97 98.8 °F (37.1 °C) Oral 106 20 97 % --   12/31/23 1900 -- -- -- (!) 143 18 95 % --     I/O:  I/O last 3 completed shifts:  In: 5506.4 [P.O.:1320; I.V.:4186.4]  Out: 2780 [Urine:2780]  Physical Exam:    Physical Exam   Bilateral groin incisions with dressings in place, minimal shadowing  Doppler right PT signal, doppler left DP and PT signal  Feet with <2 sec cap refill, left distal digits 1-3 appear dusky distally, dorsum of foot has normal appearance  Normal neurologic exam of bilateral feet      Imaging:  No radiology results for the last day               CBC    Results from last 7 days   Lab Units 01/01/24  0650 12/31/23  0548 12/30/23  0442 12/29/23  1857 12/29/23  1453 12/28/23  2244 12/27/23  1529   WBC 10*3/mm3 10.90* 10.60 14.10* 17.90*  --  11.90* 14.30*   HEMOGLOBIN g/dL 10.7* 10.9* 11.7* 14.1  --  13.3 14.3   HEMOGLOBIN, POC g/dL  --   --   --   --  10.2*  --   --    PLATELETS 10*3/mm3 473* 393 464* 561*  --  586* 646*     BMP   Results from last 7 days   Lab Units 01/01/24  0650 12/31/23  0548 12/30/23  0442 12/29/23  1857 12/28/23  2244 12/28/23  0337 12/27/23  1529   SODIUM mmol/L 134* 135* 136 138 134* 135* 135*   POTASSIUM mmol/L 4.4 3.7 3.9 4.2 4.1 4.0 4.2   CHLORIDE mmol/L 99 102 103 101 99 98 97*   CO2 mmol/L 25.0 22.0 21.0* 22.0 20.0* 21.0* 23.0   BUN mg/dL 9 11 13 13 18 16 15   CREATININE mg/dL 1.01 1.06 1.13 1.13 1.49* 1.35* 1.50*   GLUCOSE mg/dL 107* 105* 107* 132* 81 89 134*     Coag   Results from last 7 days   Lab Units 01/01/24  0650 12/31/23  2103 12/30/23  1233 12/30/23  0442 12/29/23  1857 12/29/23  0624 12/28/23  2244 12/27/23  1925 12/27/23  1529   INR   --  1.28*  --   --  1.10  --   --   --  1.03   APTT seconds 34.7* 38.2* 55.7* 47.5* >139.0* 54.7* 90.9*   < > 31.6*    < > = values in this interval not displayed.     Assessment & Plan   Assessment & Plan    Critical limb ischemia of both lower extremities    Degenerative disc  disease, lumbar    73 y.o. male POD 3 s/p extensive bilateral lower extremity revascularization    -overall doing well post extensive revascularization.  Doppler signals present bilaterally and no rest pain.  Dusky appearance of left toes, he has chronic tibial disease and does not have in line flow to his foot.  ABIs on the left have improved from 0 to 0.26, toe pressures remain 0.  He is already on pletal and full anticoagulation with eliquis plus plavix.  Right sided MELANIE stable.  He has known SFA and popliteal disease on the left.  He has adequate vein for bypass in the left leg. I have held his eliquis today and put him back on heparin.  He is not currently having any rest pain of the left leg and his left digits are somewhat better looking than yesterday.  Will defer to Dr. Salgado tomorrow regarding further intervention for the left leg.  I explained the situation to the patient and his family at bedside.  If anything further were pursued it would need to be a bypass but given his tibial disease it would be a femoral to posterior tibial bypass.      Personal protective equipment used for this patient encounter:  Patient wearing surgical mask []    Provider wearing a surgical mask [x]    Gloves []    Eye protection []    Face Shield []    Gown []    N 95 respirator or CAPR/PAPR []   Duration of interaction 15 mins    Trisha Coffman MD  Vascular Surgery  [facility]  O: [phone]  F: [phone]      Please call my office with any question: [phone]    Active Hospital Problems    Diagnosis  POA    **Critical limb ischemia of both lower extremities [I70.223]  Yes    Degenerative disc disease, lumbar [M51.36]  Yes      Resolved Hospital Problems   No resolved problems to display.

## 2024-01-02 LAB
ANION GAP SERPL CALCULATED.3IONS-SCNC: 5 MMOL/L (ref 5–15)
APTT PPP: 59.4 SECONDS (ref 61–76.5)
APTT PPP: 66.9 SECONDS (ref 61–76.5)
APTT PPP: 88 SECONDS (ref 61–76.5)
BUN SERPL-MCNC: 9 MG/DL (ref 8–23)
BUN/CREAT SERPL: 8.7 (ref 7–25)
CALCIUM SPEC-SCNC: 8.1 MG/DL (ref 8.6–10.5)
CHLORIDE SERPL-SCNC: 102 MMOL/L (ref 98–107)
CO2 SERPL-SCNC: 26 MMOL/L (ref 22–29)
CREAT SERPL-MCNC: 1.04 MG/DL (ref 0.76–1.27)
DEPRECATED RDW RBC AUTO: 43.3 FL (ref 37–54)
EGFRCR SERPLBLD CKD-EPI 2021: 76.8 ML/MIN/1.73
EOSINOPHIL # BLD MANUAL: 0.28 10*3/MM3 (ref 0–0.4)
EOSINOPHIL NFR BLD MANUAL: 3 % (ref 0.3–6.2)
ERYTHROCYTE [DISTWIDTH] IN BLOOD BY AUTOMATED COUNT: 14.5 % (ref 12.3–15.4)
GLUCOSE SERPL-MCNC: 101 MG/DL (ref 65–99)
HCT VFR BLD AUTO: 30 % (ref 37.5–51)
HGB BLD-MCNC: 10 G/DL (ref 13–17.7)
LARGE PLATELETS: ABNORMAL
LYMPHOCYTES # BLD MANUAL: 1.13 10*3/MM3 (ref 0.7–3.1)
LYMPHOCYTES NFR BLD MANUAL: 7 % (ref 5–12)
MCH RBC QN AUTO: 28.7 PG (ref 26.6–33)
MCHC RBC AUTO-ENTMCNC: 33.3 G/DL (ref 31.5–35.7)
MCV RBC AUTO: 86.1 FL (ref 79–97)
MONOCYTES # BLD: 0.66 10*3/MM3 (ref 0.1–0.9)
MYELOCYTES NFR BLD MANUAL: 1 % (ref 0–0)
NEUTROPHILS # BLD AUTO: 7.24 10*3/MM3 (ref 1.7–7)
NEUTROPHILS NFR BLD MANUAL: 76 % (ref 42.7–76)
NEUTS BAND NFR BLD MANUAL: 1 % (ref 0–5)
PLATELET # BLD AUTO: 479 10*3/MM3 (ref 140–450)
PMV BLD AUTO: 7.7 FL (ref 6–12)
POTASSIUM SERPL-SCNC: 3.6 MMOL/L (ref 3.5–5.2)
RBC # BLD AUTO: 3.48 10*6/MM3 (ref 4.14–5.8)
RBC MORPH BLD: NORMAL
SCAN SLIDE: NORMAL
SMALL PLATELETS BLD QL SMEAR: ABNORMAL
SODIUM SERPL-SCNC: 133 MMOL/L (ref 136–145)
TOXIC GRANULATION: ABNORMAL
VARIANT LYMPHS NFR BLD MANUAL: 12 % (ref 19.6–45.3)
WBC NRBC COR # BLD AUTO: 9.4 10*3/MM3 (ref 3.4–10.8)

## 2024-01-02 PROCEDURE — 25010000002 HYDROMORPHONE 1 MG/ML SOLUTION: Performed by: STUDENT IN AN ORGANIZED HEALTH CARE EDUCATION/TRAINING PROGRAM

## 2024-01-02 PROCEDURE — 85007 BL SMEAR W/DIFF WBC COUNT: CPT | Performed by: INTERNAL MEDICINE

## 2024-01-02 PROCEDURE — 25010000002 HEPARIN (PORCINE) 25000-0.45 UT/250ML-% SOLUTION: Performed by: STUDENT IN AN ORGANIZED HEALTH CARE EDUCATION/TRAINING PROGRAM

## 2024-01-02 PROCEDURE — 85730 THROMBOPLASTIN TIME PARTIAL: CPT | Performed by: STUDENT IN AN ORGANIZED HEALTH CARE EDUCATION/TRAINING PROGRAM

## 2024-01-02 PROCEDURE — 85025 COMPLETE CBC W/AUTO DIFF WBC: CPT | Performed by: INTERNAL MEDICINE

## 2024-01-02 PROCEDURE — 85730 THROMBOPLASTIN TIME PARTIAL: CPT | Performed by: INTERNAL MEDICINE

## 2024-01-02 PROCEDURE — 25810000003 LACTATED RINGERS PER 1000 ML: Performed by: INTERNAL MEDICINE

## 2024-01-02 PROCEDURE — 80048 BASIC METABOLIC PNL TOTAL CA: CPT | Performed by: INTERNAL MEDICINE

## 2024-01-02 RX ORDER — GABAPENTIN 100 MG/1
200 CAPSULE ORAL 3 TIMES DAILY
Status: DISCONTINUED | OUTPATIENT
Start: 2024-01-02 | End: 2024-01-08 | Stop reason: HOSPADM

## 2024-01-02 RX ADMIN — MEMANTINE 10 MG: 10 TABLET ORAL at 08:19

## 2024-01-02 RX ADMIN — TAMSULOSIN HYDROCHLORIDE 0.4 MG: 0.4 CAPSULE ORAL at 08:20

## 2024-01-02 RX ADMIN — RISPERIDONE 0.5 MG: 0.25 TABLET, FILM COATED ORAL at 21:45

## 2024-01-02 RX ADMIN — CILOSTAZOL 100 MG: 100 TABLET ORAL at 08:19

## 2024-01-02 RX ADMIN — GABAPENTIN 200 MG: 100 CAPSULE ORAL at 11:03

## 2024-01-02 RX ADMIN — HEPARIN SODIUM 18 UNITS/KG/HR: 10000 INJECTION, SOLUTION INTRAVENOUS at 13:01

## 2024-01-02 RX ADMIN — MEMANTINE 10 MG: 10 TABLET ORAL at 21:45

## 2024-01-02 RX ADMIN — HYDROMORPHONE HYDROCHLORIDE 0.5 MG: 1 INJECTION, SOLUTION INTRAMUSCULAR; INTRAVENOUS; SUBCUTANEOUS at 12:44

## 2024-01-02 RX ADMIN — Medication 10 ML: at 08:22

## 2024-01-02 RX ADMIN — Medication 2000 UNITS: at 08:20

## 2024-01-02 RX ADMIN — OXYCODONE HYDROCHLORIDE 10 MG: 5 TABLET ORAL at 09:49

## 2024-01-02 RX ADMIN — PREGABALIN 25 MG: 25 CAPSULE ORAL at 08:20

## 2024-01-02 RX ADMIN — SODIUM CHLORIDE, POTASSIUM CHLORIDE, SODIUM LACTATE AND CALCIUM CHLORIDE 100 ML/HR: 600; 310; 30; 20 INJECTION, SOLUTION INTRAVENOUS at 06:30

## 2024-01-02 RX ADMIN — ACETAMINOPHEN 650 MG: 325 TABLET, FILM COATED ORAL at 11:46

## 2024-01-02 RX ADMIN — ATORVASTATIN CALCIUM 20 MG: 20 TABLET, FILM COATED ORAL at 08:20

## 2024-01-02 RX ADMIN — DONEPEZIL HYDROCHLORIDE 20 MG: 5 TABLET, FILM COATED ORAL at 21:45

## 2024-01-02 RX ADMIN — ASPIRIN 81 MG: 81 TABLET, COATED ORAL at 08:19

## 2024-01-02 RX ADMIN — Medication 10 ML: at 22:02

## 2024-01-02 RX ADMIN — GABAPENTIN 200 MG: 100 CAPSULE ORAL at 15:47

## 2024-01-02 RX ADMIN — ACETAMINOPHEN 650 MG: 325 TABLET, FILM COATED ORAL at 17:00

## 2024-01-02 RX ADMIN — HYDROMORPHONE HYDROCHLORIDE 0.5 MG: 1 INJECTION, SOLUTION INTRAMUSCULAR; INTRAVENOUS; SUBCUTANEOUS at 09:10

## 2024-01-02 RX ADMIN — Medication 1 TABLET: at 08:19

## 2024-01-02 RX ADMIN — DOCUSATE SODIUM 100 MG: 100 CAPSULE, LIQUID FILLED ORAL at 08:19

## 2024-01-02 RX ADMIN — ACETAMINOPHEN 650 MG: 325 TABLET, FILM COATED ORAL at 08:19

## 2024-01-02 RX ADMIN — ACETAMINOPHEN 650 MG: 325 TABLET, FILM COATED ORAL at 22:01

## 2024-01-02 RX ADMIN — ACETAMINOPHEN 650 MG: 325 TABLET, FILM COATED ORAL at 04:31

## 2024-01-02 RX ADMIN — ACETAMINOPHEN 650 MG: 325 TABLET, FILM COATED ORAL at 00:55

## 2024-01-02 RX ADMIN — GABAPENTIN 200 MG: 100 CAPSULE ORAL at 21:45

## 2024-01-02 RX ADMIN — RISPERIDONE 0.25 MG: 0.25 TABLET, FILM COATED ORAL at 08:19

## 2024-01-02 RX ADMIN — CILOSTAZOL 100 MG: 100 TABLET ORAL at 21:45

## 2024-01-02 NOTE — CASE MANAGEMENT/SOCIAL WORK
Continued Stay Note   Peter     Patient Name: Oscar Wray  MRN: [id number]  Today's Date: 1/2/2024    Admit Date: 12/27/2023    Plan: DC Plan: SNF pending patient/family choices. List provided. Precert and PASRR will be required. From [facility].   Discharge Plan       Row Name 01/02/24 1419       Plan    Plan DC Plan: SNF pending patient/family choices. List provided. Precert and PASRR will be required. From [facility].    Provided Post Acute Provider List? Yes    Post Acute Provider List Inpatient Rehab    Provided Post Acute Provider Quality & Resource List? Yes    Post Acute Provider Quality and Resource List Inpatient Rehab    Delivered To Patient    Method of Delivery In person    Plan Comments CM spoke to patient at bedside to discuss discharge plans. PT/OT recommending SNF at discharge. Family inquired about [facility], but CM informed acute is not recommended at this time. Family/Patient report Dr. Salgado states patient will need another surgery probably on Thursday 1/4/24. CM povided patient with SNF list to review and informed of Medicare.gov website for ratings. CM will follow up for choices. CM will continue to follow for any further needs and adjust discharge plan accordingly. DC Barriers: Cardiac monitoring, O2@4L nc, Heparin gtt, and pending procedure.               Expected Discharge Date and Time       Expected Discharge Date Expected Discharge Time    Jan 4, 2024               Tonja Collier RN    Office Phone: [phone]  Office Cell:     [phone]

## 2024-01-02 NOTE — PLAN OF CARE
Goal Outcome Evaluation:      Pt remains NSR. Pt is alert to self and place. Mata placed today for urinary retention. UOP- 1200mL.  Several PRNs given today for pain in left groin and foot.     Heparin gtt on.

## 2024-01-02 NOTE — PLAN OF CARE
Goal Outcome Evaluation:      Pt alert to self, disoriented to time and occasionally disoriented to place. Adequate urine output. LR and heparin gtt infusing. Right DP and PT dopplerable, left PT dopplerable; unable to doppler DP pulse in left foot. Pt on 4L O2 while sleeping. Vital signs stable. Continuing to monitor.

## 2024-01-02 NOTE — PROGRESS NOTES
[facility] MEDICINE SERVICE  DAILY PROGRESS NOTE    NAME: Oscar Wray  : 1950  MRN: [id number]      LOS: 6 days     PROVIDER OF SERVICE: Too Cabrera MD    Chief Complaint: Critical limb ischemia of both lower extremities    Subjective:     Interval History: Patient states that he has increased pain of the left lower extremity today, with shock type pain and a burning type pain where he previously had no sensation of the lower extremity.    Review of Systems:   Review of Systems  14 point review of system unremarkable except mentioned above  Objective:     Vital Signs  Temp:  [97.5 °F (36.4 °C)-99 °F (37.2 °C)] 97.5 °F (36.4 °C)  Heart Rate:  [] 96  Resp:  [12-15] 12  BP: (102-154)/(44-78) 151/70  Flow (L/min):  [4] 4   Body mass index is 27.31 kg/m².    Physical Exam  Physical Exam  HENT:      Head: Atraumatic.      Mouth/Throat:      Mouth: Mucous membranes are moist.   Cardiovascular:      Rate and Rhythm: Normal rate and regular rhythm.      Pulses: Normal pulses.      Heart sounds: Normal heart sounds.   Pulmonary:      Effort: Pulmonary effort is normal.      Breath sounds: Normal breath sounds.   Abdominal:      General: Bowel sounds are normal.      Palpations: Abdomen is soft.   Musculoskeletal:      Cervical back: Neck supple.      Comments: Pain on LLE   Skin:     General: Skin is warm.   Neurological:      General: No focal deficit present.      Mental Status: He is alert and oriented to person, place, and time.   Psychiatric:         Mood and Affect: Mood normal.   Scheduled Meds   acetaminophen, 650 mg, Oral, Q4H   Or  acetaminophen, 650 mg, Oral, Q4H   Or  acetaminophen, 650 mg, Rectal, Q4H  aspirin, 81 mg, Oral, Daily  atenolol, 12.5 mg, Oral, Daily  atorvastatin, 20 mg, Oral, Daily  cholecalciferol, 2,000 Units, Oral, Daily  cilostazol, 100 mg, Oral, BID  docusate sodium, 100 mg, Oral, Daily  donepezil, 20 mg, Oral, Nightly  gabapentin, 200 mg, Oral, TID  memantine, 10  mg, Oral, Q12H  multivitamin with minerals, 1 tablet, Oral, Daily  pregabalin, 25 mg, Oral, BID  risperiDONE, 0.25 mg, Oral, Daily  risperiDONE, 0.5 mg, Oral, Nightly  sodium chloride, 10 mL, Intravenous, Q12H  tamsulosin, 0.4 mg, Oral, Daily       PRN Meds     heparin    heparin    hydrALAZINE    HYDROmorphone    nitroglycerin    nitroglycerin    oxyCODONE    [COMPLETED] Insert Peripheral IV **AND** sodium chloride    sodium chloride    sodium chloride   Infusions  heparin, 18 Units/kg/hr, Last Rate: 18 Units/kg/hr (01/02/24 1301)          Diagnostic Data    Results from last 7 days   Lab Units 01/02/24  0315 12/31/23  0548 12/30/23  0442   WBC 10*3/mm3 9.40   < > 14.10*   HEMOGLOBIN g/dL 10.0*   < > 11.7*   HEMATOCRIT % 30.0*   < > 35.5*   PLATELETS 10*3/mm3 479*   < > 464*   GLUCOSE mg/dL 101*   < > 107*   CREATININE mg/dL 1.04   < > 1.13   BUN mg/dL 9   < > 13   SODIUM mmol/L 133*   < > 136   POTASSIUM mmol/L 3.6   < > 3.9   AST (SGOT) U/L  --   --  101*   ALT (SGPT) U/L  --   --  19   ALK PHOS U/L  --   --  80   BILIRUBIN mg/dL  --   --  0.2   ANION GAP mmol/L 5.0   < > 12.0    < > = values in this interval not displayed.       No radiology results for the last day      I reviewed the patient's new clinical results.    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  --Status post bilateral femoral endarterectomy and placement of iliac artery stents with left leg angiogram  - Patient may require left femoral-tibial bypass given that he still has fairly critical limb ischemia  - On heparin drip  - Continue maintenance fluid LR at 100 cc/h  - Maintain SBP less than 160  - Hold pregabalin and initiate gabapentin for neuropathic pain control  - Continue aspirin, Lipitor and cilostazol     Hypertension  -Restart home dose of atenolol     KARINA on CKD stage III  - Renal consult  appreciated  - resolved     Dyslipidemia  -Continue statin therapy    Dementia  -Continue Aricept and Namenda    BPH  -Continue Flomax    DVT prophylaxis:  Medical and mechanical DVT prophylaxis orders are present.     Code status is   Code Status and Medical Interventions:   Ordered at: 12/28/23 0752     Level Of Support Discussed With:    Patient     Code Status (Patient has no pulse and is not breathing):    CPR (Attempt to Resuscitate)     Medical Interventions (Patient has pulse or is breathing):    Full Support       Plan for disposition: Pending clinical course    Time: Greater than 35 minutes    Signature: Electronically signed by Too Cabrera MD, 01/02/24, 13:18 EST.  [facility] Hospitalist Team

## 2024-01-02 NOTE — PROGRESS NOTES
Name: Osacr Wray ADMIT: 2023   : 1950  PCP: Kajal Horowitz PA    MRN: [id number] LOS: 6 days   AGE/SEX: 73 y.o. male  ROOM: 2308/1    Peter    Billin, [employer] Op [employer]    73 y.o. male POD 4 s/p extensive bilateral lower extremity revascularization with bilateral common femoral endarterectomies, aortic and bilateral iliac stents    Patient reports left foot pain has returned. Feet are actually quite warm bilaterally.      Scheduled Medications:   acetaminophen, 650 mg, Oral, Q4H   Or  acetaminophen, 650 mg, Oral, Q4H   Or  acetaminophen, 650 mg, Rectal, Q4H  aspirin, 81 mg, Oral, Daily  atenolol, 12.5 mg, Oral, Daily  atorvastatin, 20 mg, Oral, Daily  cholecalciferol, 2,000 Units, Oral, Daily  cilostazol, 100 mg, Oral, BID  docusate sodium, 100 mg, Oral, Daily  donepezil, 20 mg, Oral, Nightly  gabapentin, 200 mg, Oral, TID  memantine, 10 mg, Oral, Q12H  multivitamin with minerals, 1 tablet, Oral, Daily  risperiDONE, 0.25 mg, Oral, Daily  risperiDONE, 0.5 mg, Oral, Nightly  sodium chloride, 10 mL, Intravenous, Q12H  tamsulosin, 0.4 mg, Oral, Daily      Active Infusions:  heparin, 18 Units/kg/hr, Last Rate: 18 Units/kg/hr (24 1301)      Vital Signs  Vital Signs Patient Vitals for the past 24 hrs:   BP Temp Temp src Pulse Resp SpO2   24 1800 138/71 -- -- 85 -- 100 %   24 1700 -- -- -- 84 -- 96 %   24 1600 121/57 -- -- 76 -- 98 %   24 1500 -- -- -- 86 -- 93 %   24 1405 150/67 -- -- 98 -- 100 %   24 1400 164/61 -- -- 111 -- (!) 87 %   24 1300 -- -- -- 96 -- 96 %   24 1200 151/70 97.5 °F (36.4 °C) Oral 96 16 97 %   24 1100 -- -- -- 106 -- 94 %   24 1000 154/78 -- -- 102 -- 97 %   24 0900 -- -- -- 100 -- 97 %   24 0819 133/52 -- -- 81 -- --   24 0800 102/49 -- -- 76 16 94 %   24 0700 -- 97.5 °F (36.4 °C) Oral 91 -- 98 %   24 0600 140/57 -- -- 85 -- 99 %   24 0500 -- -- -- 90 -- 96 %   24  0400 113/58 -- -- 74 12 95 %   01/02/24 0349 -- 98.1 °F (36.7 °C) Oral -- -- --   01/02/24 0300 -- -- -- 73 -- 98 %   01/02/24 0200 113/44 -- -- 70 -- 95 %   01/02/24 0100 -- -- -- 80 -- 96 %   01/02/24 0000 128/61 99 °F (37.2 °C) Oral 78 14 96 %   01/01/24 2300 -- -- -- 80 -- 91 %   01/01/24 2200 -- -- -- 89 -- 94 %   01/01/24 2100 -- -- -- 103 -- 94 %   01/01/24 2000 -- 98.5 °F (36.9 °C) Oral 119 15 95 %   01/01/24 1900 -- -- -- 108 -- 94 %     I/O:  I/O last 3 completed shifts:  In: 5146.4 [P.O.:1400; I.V.:3746.4]  Out: 1430 [Urine:1430]  Physical Exam:    Physical Exam   Bilateral groin incisions with dressings in place, minimal shadowing  Doppler right PT signal, doppler left PT  Feet with <2 sec cap refill, left distal digits 1-3 appear dusky distally, dorsum of foot has normal appearance  Normal neurologic exam of bilateral feet  Groin incision healing with mild erythema bilaterally, is more tender on left than right.     Does actually appear less cyanotic than the above image today.       Imaging:  No radiology results for the last day               CBC    Results from last 7 days   Lab Units 01/02/24  0315 01/01/24  0650 12/31/23  0548 12/30/23  0442 12/29/23  1857 12/29/23  1453 12/28/23  2244 12/27/23  1529   WBC 10*3/mm3 9.40 10.90* 10.60 14.10* 17.90*  --  11.90* 14.30*   HEMOGLOBIN g/dL 10.0* 10.7* 10.9* 11.7* 14.1  --  13.3 14.3   HEMOGLOBIN, POC g/dL  --   --   --   --   --  10.2*  --   --    PLATELETS 10*3/mm3 479* 473* 393 464* 561*  --  586* 646*     BMP   Results from last 7 days   Lab Units 01/02/24  0315 01/01/24  0650 12/31/23  0548 12/30/23  0442 12/29/23  1857 12/28/23  2244 12/28/23  0337   SODIUM mmol/L 133* 134* 135* 136 138 134* 135*   POTASSIUM mmol/L 3.6 4.4 3.7 3.9 4.2 4.1 4.0   CHLORIDE mmol/L 102 99 102 103 101 99 98   CO2 mmol/L 26.0 25.0 22.0 21.0* 22.0 20.0* 21.0*   BUN mg/dL 9 9 11 13 13 18 16   CREATININE mg/dL 1.04 1.01 1.06 1.13 1.13 1.49* 1.35*   GLUCOSE mg/dL 101* 107* 105*  107* 132* 81 89     Coag   Results from last 7 days   Lab Units 01/02/24  1012 01/02/24  0315 01/01/24  1936 01/01/24  0650 12/31/23  2103 12/30/23  1233 12/30/23  0442 12/29/23  1857 12/27/23  1925 12/27/23  1529   INR   --   --   --   --  1.28*  --   --  1.10  --  1.03   APTT seconds 59.4* 88.0* 52.1* 34.7* 38.2* 55.7* 47.5* >139.0*   < > 31.6*    < > = values in this interval not displayed.     Assessment & Plan   Assessment & Plan    Critical limb ischemia of both lower extremities    Degenerative disc disease, lumbar    73 y.o. male POD 4 s/p extensive bilateral lower extremity revascularization    -Patient has done well overall and feet are considerably improved from prior to his surgery, both on exam and per patients report of his symptoms. He does still report intermittent pain in the left foot, especially in the toes at rest. I had a long discussion with the patient and his girlfriend today. I think with the poor response in his ABIs and ongoing pain and duskiness in his toes that a femoral-PT bypass would be indicated. Patient has not really attempted to ambulate since his surgery on Friday and I encouraged him to try to do so with physical therapy.   Will plan for Left femoral-PT bypass later this week, probably Thursday if OR schedule allows.   Will continue to monitor his groin incisions for now.     Personal protective equipment used for this patient encounter:  Patient wearing surgical mask []    Provider wearing a surgical mask [x]    Gloves []    Eye protection []    Face Shield []    Gown []    N 95 respirator or CAPR/PAPR []   Duration of interaction 15 mins    Nura Salgado MD  Vascular Surgery  [facility]  O: [phone]  F: [phone]      Please call my office with any question: [phone]    Active Hospital Problems    Diagnosis  POA    **Critical limb ischemia of both lower extremities [I70.223]  Yes    Degenerative disc disease, lumbar [M51.36]  Yes      Resolved  Hospital Problems   No resolved problems to display.

## 2024-01-03 ENCOUNTER — ANESTHESIA EVENT (OUTPATIENT)
Dept: PERIOP | Facility: HOSPITAL | Age: 74
DRG: 271 | End: 2024-01-03
Payer: MEDICARE

## 2024-01-03 LAB
ANION GAP SERPL CALCULATED.3IONS-SCNC: 8 MMOL/L (ref 5–15)
APTT PPP: 55.7 SECONDS (ref 61–76.5)
APTT PPP: 61 SECONDS (ref 61–76.5)
APTT PPP: 80.1 SECONDS (ref 61–76.5)
BUN SERPL-MCNC: 10 MG/DL (ref 8–23)
BUN/CREAT SERPL: 10.5 (ref 7–25)
CALCIUM SPEC-SCNC: 8.4 MG/DL (ref 8.6–10.5)
CHLORIDE SERPL-SCNC: 101 MMOL/L (ref 98–107)
CO2 SERPL-SCNC: 25 MMOL/L (ref 22–29)
CREAT SERPL-MCNC: 0.95 MG/DL (ref 0.76–1.27)
DEPRECATED RDW RBC AUTO: 45.1 FL (ref 37–54)
EGFRCR SERPLBLD CKD-EPI 2021: 85.6 ML/MIN/1.73
EOSINOPHIL # BLD MANUAL: 0.35 10*3/MM3 (ref 0–0.4)
EOSINOPHIL NFR BLD MANUAL: 3 % (ref 0.3–6.2)
ERYTHROCYTE [DISTWIDTH] IN BLOOD BY AUTOMATED COUNT: 14.9 % (ref 12.3–15.4)
GIANT PLATELETS: ABNORMAL
GLUCOSE SERPL-MCNC: 99 MG/DL (ref 65–99)
HCT VFR BLD AUTO: 31.1 % (ref 37.5–51)
HGB BLD-MCNC: 10.3 G/DL (ref 13–17.7)
LAB AP CASE REPORT: NORMAL
LYMPHOCYTES # BLD MANUAL: 0.59 10*3/MM3 (ref 0.7–3.1)
LYMPHOCYTES NFR BLD MANUAL: 5 % (ref 5–12)
MCH RBC QN AUTO: 28.8 PG (ref 26.6–33)
MCHC RBC AUTO-ENTMCNC: 33.1 G/DL (ref 31.5–35.7)
MCV RBC AUTO: 87.2 FL (ref 79–97)
METAMYELOCYTES NFR BLD MANUAL: 1 % (ref 0–0)
MONOCYTES # BLD: 0.59 10*3/MM3 (ref 0.1–0.9)
MYELOCYTES NFR BLD MANUAL: 1 % (ref 0–0)
NEUTROPHILS # BLD AUTO: 10.03 10*3/MM3 (ref 1.7–7)
NEUTROPHILS NFR BLD MANUAL: 68 % (ref 42.7–76)
NEUTS BAND NFR BLD MANUAL: 17 % (ref 0–5)
PATH REPORT.FINAL DX SPEC: NORMAL
PATH REPORT.GROSS SPEC: NORMAL
PLATELET # BLD AUTO: 562 10*3/MM3 (ref 140–450)
PMV BLD AUTO: 7.7 FL (ref 6–12)
POTASSIUM SERPL-SCNC: 4.3 MMOL/L (ref 3.5–5.2)
RBC # BLD AUTO: 3.56 10*6/MM3 (ref 4.14–5.8)
RBC MORPH BLD: NORMAL
SCAN SLIDE: NORMAL
SODIUM SERPL-SCNC: 134 MMOL/L (ref 136–145)
VARIANT LYMPHS NFR BLD MANUAL: 5 % (ref 19.6–45.3)
WBC MORPH BLD: NORMAL
WBC NRBC COR # BLD AUTO: 11.8 10*3/MM3 (ref 3.4–10.8)

## 2024-01-03 PROCEDURE — 85730 THROMBOPLASTIN TIME PARTIAL: CPT | Performed by: FAMILY MEDICINE

## 2024-01-03 PROCEDURE — 85730 THROMBOPLASTIN TIME PARTIAL: CPT | Performed by: STUDENT IN AN ORGANIZED HEALTH CARE EDUCATION/TRAINING PROGRAM

## 2024-01-03 PROCEDURE — 25010000002 HEPARIN (PORCINE) 25000-0.45 UT/250ML-% SOLUTION: Performed by: STUDENT IN AN ORGANIZED HEALTH CARE EDUCATION/TRAINING PROGRAM

## 2024-01-03 PROCEDURE — 85007 BL SMEAR W/DIFF WBC COUNT: CPT | Performed by: INTERNAL MEDICINE

## 2024-01-03 PROCEDURE — 86900 BLOOD TYPING SEROLOGIC ABO: CPT

## 2024-01-03 PROCEDURE — 25010000002 HYDROMORPHONE 1 MG/ML SOLUTION: Performed by: STUDENT IN AN ORGANIZED HEALTH CARE EDUCATION/TRAINING PROGRAM

## 2024-01-03 PROCEDURE — 85730 THROMBOPLASTIN TIME PARTIAL: CPT | Performed by: INTERNAL MEDICINE

## 2024-01-03 PROCEDURE — 86901 BLOOD TYPING SEROLOGIC RH(D): CPT

## 2024-01-03 PROCEDURE — 97110 THERAPEUTIC EXERCISES: CPT

## 2024-01-03 PROCEDURE — 85025 COMPLETE CBC W/AUTO DIFF WBC: CPT | Performed by: INTERNAL MEDICINE

## 2024-01-03 PROCEDURE — 97530 THERAPEUTIC ACTIVITIES: CPT

## 2024-01-03 PROCEDURE — 80048 BASIC METABOLIC PNL TOTAL CA: CPT | Performed by: INTERNAL MEDICINE

## 2024-01-03 RX ORDER — POLYETHYLENE GLYCOL 3350 17 G/17G
17 POWDER, FOR SOLUTION ORAL DAILY
Status: DISCONTINUED | OUTPATIENT
Start: 2024-01-03 | End: 2024-01-08 | Stop reason: HOSPADM

## 2024-01-03 RX ADMIN — TAMSULOSIN HYDROCHLORIDE 0.4 MG: 0.4 CAPSULE ORAL at 08:00

## 2024-01-03 RX ADMIN — ATORVASTATIN CALCIUM 20 MG: 20 TABLET, FILM COATED ORAL at 08:01

## 2024-01-03 RX ADMIN — GABAPENTIN 200 MG: 100 CAPSULE ORAL at 21:31

## 2024-01-03 RX ADMIN — HYDROMORPHONE HYDROCHLORIDE 0.5 MG: 1 INJECTION, SOLUTION INTRAMUSCULAR; INTRAVENOUS; SUBCUTANEOUS at 19:23

## 2024-01-03 RX ADMIN — Medication 10 ML: at 08:01

## 2024-01-03 RX ADMIN — ACETAMINOPHEN 650 MG: 325 TABLET, FILM COATED ORAL at 02:53

## 2024-01-03 RX ADMIN — ACETAMINOPHEN 650 MG: 325 TABLET, FILM COATED ORAL at 21:32

## 2024-01-03 RX ADMIN — Medication 2000 UNITS: at 08:00

## 2024-01-03 RX ADMIN — DONEPEZIL HYDROCHLORIDE 20 MG: 5 TABLET, FILM COATED ORAL at 21:31

## 2024-01-03 RX ADMIN — ASPIRIN 81 MG: 81 TABLET, COATED ORAL at 08:01

## 2024-01-03 RX ADMIN — OXYCODONE HYDROCHLORIDE 10 MG: 5 TABLET ORAL at 20:07

## 2024-01-03 RX ADMIN — CILOSTAZOL 100 MG: 100 TABLET ORAL at 08:01

## 2024-01-03 RX ADMIN — OXYCODONE HYDROCHLORIDE 10 MG: 5 TABLET ORAL at 08:03

## 2024-01-03 RX ADMIN — OXYCODONE HYDROCHLORIDE 10 MG: 5 TABLET ORAL at 15:47

## 2024-01-03 RX ADMIN — ACETAMINOPHEN 650 MG: 325 TABLET, FILM COATED ORAL at 17:51

## 2024-01-03 RX ADMIN — ATENOLOL 12.5 MG: 25 TABLET ORAL at 08:00

## 2024-01-03 RX ADMIN — CILOSTAZOL 100 MG: 100 TABLET ORAL at 21:31

## 2024-01-03 RX ADMIN — MEMANTINE 10 MG: 10 TABLET ORAL at 21:32

## 2024-01-03 RX ADMIN — MEMANTINE 10 MG: 10 TABLET ORAL at 08:00

## 2024-01-03 RX ADMIN — GABAPENTIN 200 MG: 100 CAPSULE ORAL at 08:00

## 2024-01-03 RX ADMIN — HEPARIN SODIUM 20 UNITS/KG/HR: 10000 INJECTION, SOLUTION INTRAVENOUS at 19:20

## 2024-01-03 RX ADMIN — Medication 1 TABLET: at 08:00

## 2024-01-03 RX ADMIN — ACETAMINOPHEN 650 MG: 325 TABLET, FILM COATED ORAL at 06:20

## 2024-01-03 RX ADMIN — ACETAMINOPHEN 650 MG: 325 TABLET, FILM COATED ORAL at 10:03

## 2024-01-03 RX ADMIN — ACETAMINOPHEN 650 MG: 325 TABLET, FILM COATED ORAL at 13:36

## 2024-01-03 RX ADMIN — DOCUSATE SODIUM 100 MG: 100 CAPSULE, LIQUID FILLED ORAL at 08:00

## 2024-01-03 RX ADMIN — RISPERIDONE 0.5 MG: 0.25 TABLET, FILM COATED ORAL at 21:31

## 2024-01-03 RX ADMIN — POLYETHYLENE GLYCOL 3350 17 G: 17 POWDER, FOR SOLUTION ORAL at 15:47

## 2024-01-03 RX ADMIN — GABAPENTIN 200 MG: 100 CAPSULE ORAL at 15:47

## 2024-01-03 RX ADMIN — HEPARIN SODIUM 18 UNITS/KG/HR: 10000 INJECTION, SOLUTION INTRAVENOUS at 06:24

## 2024-01-03 RX ADMIN — RISPERIDONE 0.25 MG: 0.25 TABLET, FILM COATED ORAL at 08:01

## 2024-01-03 NOTE — PROGRESS NOTES
[facility] MEDICINE SERVICE  DAILY PROGRESS NOTE    NAME: Oscar Wray  : 1950  MRN: [id number]      LOS: 7 days     PROVIDER OF SERVICE: Pablito Deleon MD    Chief Complaint: Critical limb ischemia of both lower extremities    Subjective:     Interval History:   Did okay overnight.  Awaiting follow-up surgical intervention for Fem-PT bypass on Thursday.  Left foot pain is still present but somewhat improved.  Renal function is slowly improving.  Extensive chart review for service turnover today. Discussed the case with the bedside nursing staff. The patient's case was also discussed with the pharmacy and case management providers at multidisciplinary rounds. No other acute concerns.    Objective:     Vital Signs  Temp:  [97.5 °F (36.4 °C)-98.7 °F (37.1 °C)] 98.7 °F (37.1 °C)  Heart Rate:  [] 102  Resp:  [16] 16  BP: (120-164)/(48-78) 151/70  Flow (L/min):  [4] 4   Body mass index is 29.83 kg/m².    Physical Exam  Physical Exam  HENT:      Head: Atraumatic.      Mouth/Throat:      Mouth: Mucous membranes are moist.   Cardiovascular:      Rate and Rhythm: Normal rate and regular rhythm.      Pulses: Normal pulses.      Heart sounds: Normal heart sounds.   Pulmonary:      Effort: Pulmonary effort is normal.      Breath sounds: Normal breath sounds.   Abdominal:      General: Bowel sounds are normal.      Palpations: Abdomen is soft.   Musculoskeletal:      Cervical back: Neck supple.      Comments: Pain on LLE   Skin:     General: Skin is warm.   Neurological:      General: No focal deficit present.      Mental Status: He is alert and oriented to person, place, and time.   Psychiatric:         Mood and Affect: Mood normal.   Scheduled Meds   acetaminophen, 650 mg, Oral, Q4H   Or  acetaminophen, 650 mg, Oral, Q4H   Or  acetaminophen, 650 mg, Rectal, Q4H  aspirin, 81 mg, Oral, Daily  atenolol, 12.5 mg, Oral, Daily  atorvastatin, 20 mg, Oral, Daily  cholecalciferol, 2,000 Units, Oral,  Daily  cilostazol, 100 mg, Oral, BID  docusate sodium, 100 mg, Oral, Daily  donepezil, 20 mg, Oral, Nightly  gabapentin, 200 mg, Oral, TID  memantine, 10 mg, Oral, Q12H  multivitamin with minerals, 1 tablet, Oral, Daily  risperiDONE, 0.25 mg, Oral, Daily  risperiDONE, 0.5 mg, Oral, Nightly  sodium chloride, 10 mL, Intravenous, Q12H  tamsulosin, 0.4 mg, Oral, Daily       PRN Meds     heparin    heparin    hydrALAZINE    HYDROmorphone    nitroglycerin    nitroglycerin    oxyCODONE    [COMPLETED] Insert Peripheral IV **AND** sodium chloride    sodium chloride    sodium chloride   Infusions  heparin, 18 Units/kg/hr, Last Rate: 20 Units/kg/hr (01/03/24 0923)          Diagnostic Data    Results from last 7 days   Lab Units 01/03/24  0829 12/31/23  0548 12/30/23  0442   WBC 10*3/mm3 11.80*   < > 14.10*   HEMOGLOBIN g/dL 10.3*   < > 11.7*   HEMATOCRIT % 31.1*   < > 35.5*   PLATELETS 10*3/mm3 562*   < > 464*   GLUCOSE mg/dL 99   < > 107*   CREATININE mg/dL 0.95   < > 1.13   BUN mg/dL 10   < > 13   SODIUM mmol/L 134*   < > 136   POTASSIUM mmol/L 4.3   < > 3.9   AST (SGOT) U/L  --   --  101*   ALT (SGPT) U/L  --   --  19   ALK PHOS U/L  --   --  80   BILIRUBIN mg/dL  --   --  0.2   ANION GAP mmol/L 8.0   < > 12.0    < > = values in this interval not displayed.       No radiology results for the last day      I reviewed the patient's new clinical results.    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  --Status post bilateral femoral endarterectomy and placement of iliac artery stents with left leg angiogram  - Planning for follow-up left femoral-tibial bypass on Thursday  - On heparin drip  - Continue maintenance fluid LR at 100 cc/h  - Maintain SBP less than 160  - Hold pregabalin and initiate gabapentin for neuropathic pain control  - Continue aspirin, Lipitor and cilostazol      Hypertension  -Restart home dose of atenolol     KARINA on CKD stage III  - Renal consult appreciated  - resolved     Dyslipidemia  -Continue statin therapy    Dementia  -Continue Aricept and Namenda    BPH  -Continue Flomax    DVT prophylaxis:  Medical and mechanical DVT prophylaxis orders are present.     Code status is   Code Status and Medical Interventions:   Ordered at: 12/28/23 0752     Level Of Support Discussed With:    Patient     Code Status (Patient has no pulse and is not breathing):    CPR (Attempt to Resuscitate)     Medical Interventions (Patient has pulse or is breathing):    Full Support       Plan for disposition: He will likely be here at least through the weekend and will need skilled rehab at discharge.    Signature: Electronically signed by Pablito Deleon MD, 01/03/24, 09:49 EST.  [facility] Hospitalist Team

## 2024-01-03 NOTE — PLAN OF CARE
Goal Outcome Evaluation:           Progress: improving       Pt pulse improved. Got up to chair with PT. Plans for surgery in AM. Pt pleasant, but forget at times. Good urine output. On RA.

## 2024-01-03 NOTE — NURSING NOTE
No significant events overnight. Pt c/o pain to touch LLE  but otherwise fine. No DP pulses on LLE with 0400 assessment but toes feel warmer to touch. 2000 ml UOP. VSS at this time. Heparin gtt infusing, 4L NC.  Continuing to monitor.

## 2024-01-03 NOTE — ANESTHESIA POSTPROCEDURE EVALUATION
Patient: Oscar Wray    Procedure Summary       Date: 12/29/23 Room / Location: [facility] OR 13 / [facility] HYBRID OR    Anesthesia Start: 0920 Anesthesia Stop: 1634    Procedure: BILATERAL FEMORAL ENDARTECTOMY AND ILIAC ARTERY STENTS, LEFT LEG ANGIOGRAM (Bilateral: Thigh) Diagnosis:     Surgeons: Nura Salgado II, MD Provider: Jb Vicente MD    Anesthesia Type: general, [place] ASA Status: 3            Anesthesia Type: general, [place]    Vitals  Vitals Value Taken Time   BP 99/46 12/29/23 1706   Temp 97.2 °F (36.2 °C) 12/29/23 1706   Pulse 83 12/29/23 1706   Resp 13 12/29/23 1706   SpO2 96 % 12/29/23 1706           Post Anesthesia Care and Evaluation    Patient location during evaluation: PACU  Patient participation: complete - patient participated  Level of consciousness: awake  Pain scale: See nurse's notes for pain score.  Pain management: adequate    Airway patency: patent  Anesthetic complications: No anesthetic complications  PONV Status: none  Cardiovascular status: acceptable  Respiratory status: acceptable and spontaneous ventilation  Hydration status: acceptable    Comments: Patient seen and examined postoperatively; vital signs stable; SpO2 greater than or equal to 90%; cardiopulmonary status stable; nausea/vomiting adequately controlled; pain adequately controlled; no apparent anesthesia complications; patient discharged from anesthesia care when discharge criteria were met

## 2024-01-03 NOTE — PROGRESS NOTES
Name: Oscar Wray ADMIT: 2023   : 1950  PCP: Kajal Horowitz PA    MRN: [id number] LOS: 7 days   AGE/SEX: 73 y.o. male  ROOM: 2308/[facility]    [place], Post Op [facility]    73 y.o. male POD 5 s/p extensive bilateral lower extremity revascularization with bilateral common femoral endarterectomies, aortic and bilateral iliac stents    Still with left lower extremity pain.    Scheduled Medications:   acetaminophen, 650 mg, Oral, Q4H   Or  acetaminophen, 650 mg, Oral, Q4H   Or  acetaminophen, 650 mg, Rectal, Q4H  aspirin, 81 mg, Oral, Daily  atenolol, 12.5 mg, Oral, Daily  atorvastatin, 20 mg, Oral, Daily  cholecalciferol, 2,000 Units, Oral, Daily  cilostazol, 100 mg, Oral, BID  docusate sodium, 100 mg, Oral, Daily  donepezil, 20 mg, Oral, Nightly  gabapentin, 200 mg, Oral, TID  memantine, 10 mg, Oral, Q12H  multivitamin with minerals, 1 tablet, Oral, Daily  risperiDONE, 0.25 mg, Oral, Daily  risperiDONE, 0.5 mg, Oral, Nightly  sodium chloride, 10 mL, Intravenous, Q12H  tamsulosin, 0.4 mg, Oral, Daily      Active Infusions:  heparin, 18 Units/kg/hr, Last Rate: 20 Units/kg/hr (24 0923)      Vital Signs  Vital Signs Patient Vitals for the past 24 hrs:   BP Temp Temp src Pulse SpO2 Weight   24 1200 -- -- -- 74 98 % --   24 1158 -- 98.3 °F (36.8 °C) Oral -- -- --   24 1100 114/53 -- -- 84 99 % --   24 1000 118/52 -- -- 95 98 % --   24 0900 -- -- -- 102 98 % --   24 0800 151/70 -- -- 96 100 % --   24 0743 -- 98.7 °F (37.1 °C) Oral -- -- --   24 0700 145/65 -- -- 72 98 % --   24 0600 145/65 -- -- 88 95 % 86.4 kg (190 lb 7.6 oz)   24 0505 127/49 -- -- 74 94 % --   24 0500 -- -- -- 79 94 % --   24 0400 133/51 98.6 °F (37 °C) Oral 79 96 % --   24 0300 -- -- -- 91 96 % --   24 0200 128/53 -- -- 63 98 % --   24 0100 -- -- -- 74 97 % --   24 0017 -- 98.2 °F (36.8 °C) Oral -- -- --   24 0000 120/48 --  -- 76 96 % --   01/02/24 2300 -- -- -- 69 99 % --   01/02/24 2200 134/56 -- -- 91 99 % --   01/02/24 2100 -- -- -- 87 100 % --   01/02/24 2000 136/60 -- -- 76 99 % --   01/02/24 1957 -- 98 °F (36.7 °C) Oral -- -- --   01/02/24 1900 -- -- -- 94 100 % --   01/02/24 1800 138/71 97.9 °F (36.6 °C) Oral 85 100 % --   01/02/24 1700 -- -- -- 84 96 % --   01/02/24 1600 121/57 -- -- 76 98 % --   01/02/24 1500 -- -- -- 86 93 % --   01/02/24 1405 150/67 -- -- 98 100 % --   01/02/24 1400 164/61 -- -- 111 (!) 87 % --   01/02/24 1300 -- -- -- 96 96 % --     I/O:  I/O last 3 completed shifts:  In: 2895 [P.O.:560; I.V.:2335]  Out: 4375 [Urine:4375]    Physical Exam   Bilateral groin incisions with dressings in place, minimal shadowing  Doppler right PT signal, doppler left PT  Feet with <2 sec cap refill, left distal digits 1-3 appear dusky distally, dorsum of foot has normal appearance  Normal neurologic exam of bilateral feet  Groin incision healing with mild erythema bilaterally, is more tender on left than right.       Imaging:  No radiology results for the last day               CBC    Results from last 7 days   Lab Units 01/03/24  0829 01/02/24  0315 01/01/24  0650 12/31/23  0548 12/30/23  0442 12/29/23  1857 12/29/23  1453 12/28/23  2244   WBC 10*3/mm3 11.80* 9.40 10.90* 10.60 14.10* 17.90*  --  11.90*   HEMOGLOBIN g/dL 10.3* 10.0* 10.7* 10.9* 11.7* 14.1  --  13.3   HEMOGLOBIN, POC g/dL  --   --   --   --   --   --  10.2*  --    PLATELETS 10*3/mm3 562* 479* 473* 393 464* 561*  --  586*     BMP   Results from last 7 days   Lab Units 01/03/24  0829 01/02/24  0315 01/01/24  0650 12/31/23  0548 12/30/23  0442 12/29/23  1857 12/28/23  2244   SODIUM mmol/L 134* 133* 134* 135* 136 138 134*   POTASSIUM mmol/L 4.3 3.6 4.4 3.7 3.9 4.2 4.1   CHLORIDE mmol/L 101 102 99 102 103 101 99   CO2 mmol/L 25.0 26.0 25.0 22.0 21.0* 22.0 20.0*   BUN mg/dL 10 9 9 11 13 13 18   CREATININE mg/dL 0.95 1.04 1.01 1.06 1.13 1.13 1.49*   GLUCOSE mg/dL 99  101* 107* 105* 107* 132* 81     Coag   Results from last 7 days   Lab Units 01/03/24  0829 01/03/24  0114 01/02/24  1813 01/02/24  1012 01/02/24  0315 01/01/24  1936 01/01/24  0650 12/31/23  2103 12/30/23  0442 12/29/23  1857 12/27/23  1925 12/27/23  1529   INR   --   --   --   --   --   --   --  1.28*  --  1.10  --  1.03   APTT seconds 55.7* 80.1* 66.9 59.4* 88.0* 52.1* 34.7* 38.2*   < > >139.0*   < > 31.6*    < > = values in this interval not displayed.     Assessment & Plan   Assessment & Plan    Critical limb ischemia of both lower extremities    Degenerative disc disease, lumbar    73 y.o. male POD 6 s/p extensive bilateral lower extremity revascularization    1/2/23:Patient has done well overall and feet are considerably improved from prior to his surgery, both on exam and per patients report of his symptoms. He does still report intermittent pain in the left foot, especially in the toes at rest. I had a long discussion with the patient and his girlfriend today. I think with the poor response in his ABIs and ongoing pain and duskiness in his toes that a femoral-PT bypass would be indicated. Patient has not really attempted to ambulate since his surgery on Friday and I encouraged him to try to do so with physical therapy.   Will plan for Left femoral-PT bypass later this week, probably Thursday if OR schedule allows.   Will continue to monitor his groin incisions for now.     1/3/23: Patient stable.  On a heparin drip.  He is currently on the OR schedule for tomorrow morning at 9:30 AM to do a left femoral to posterior tibial artery bypass with in situ greater saphenous vein.  Will plan to stop heparin drip around 4 AM.      Please call my office with any question: [phone]    Active Hospital Problems    Diagnosis  POA    **Critical limb ischemia of both lower extremities [I70.223]  Yes    Degenerative disc disease, lumbar [M51.36]  Yes      Resolved Hospital Problems   No resolved problems to display.

## 2024-01-03 NOTE — THERAPY TREATMENT NOTE
"Subjective: Pt agreeable to therapeutic plan of care.    Objective:     Bed mobility - SBA to complete supine>sit EOB. Able to sit EOB in SBA>CGA to complete exercises in sitting.   Transfers - Mod-A and with rolling walker for STS transfer  Ambulation - 3 feet Min-A, Mod-A, and with rolling walker    Therapeutic Exercise - 10 Reps B LE AROM unsupported sitting / EOB. Pt completed 1x10 of each of the following exercises sitting EOB: Ankle Pumps, Alternating Marching, and LAQ. Education provided on HEP to help promote blood flow to BLE.    Vitals: WNL  O2 Before: 99% on 4L O2  O2 Durin% on room air  O2 After: 95% on room air    Pain: 6 VAS   Location: L foot  Intervention for pain: Repositioned and Increased Activity    Education: Provided education on the importance of mobility in the acute care setting, Verbal/Tactile Cues, Transfer Training, Gait Training, and Energy conservation strategies    Assessment: Oscar Wray presents with functional mobility impairments which indicate the need for skilled intervention. Tolerating session today without incident. Pt continues to improve with therapy staff this date as he is able to tolerate short distance ambulation bout of 3', req min>mod A with cuing for BUE offloading to prevent pain in the L foot. He tolerates balta bearing well this date and has adequate strength for functional activities, but pain limiting. Educated on exercises to complete while in chair as well as HEP. Planning surgery for left femoral to posterior tibial artery bypass with in situ greater saphenous vein tomorrow AM. PT will follow-up on 23 for potential re-evaluation or treatment session. Will continue to follow and progress as tolerated.     Plan/Recommendations:   If medically appropriate, Moderate Intensity Therapy recommended post-acute care. This is recommended as therapy feels the patient would require 3-4 days per week and wouldn't tolerate \"3 hour daily\" rehab intensity. SNF " would be the preferred choice. If the patient does not agree to SNF, arrange HH or OP depending on home bound status. If patient is medically complex, consider LTACH. Pt requires no DME at discharge.     Pt desires Skilled Rehab placement at discharge. Pt cooperative; agreeable to therapeutic recommendations and plan of care.         Basic Mobility 6-click:  Rollin = Total, A lot = 2, A little = 3; 4 = None  Supine>Sit:   1 = Total, A lot = 2, A little = 3; 4 = None   Sit>Stand with arms:  1 = Total, A lot = 2, A little = 3; 4 = None  Bed>Chair:   1 = Total, A lot = 2, A little = 3; 4 = None  Ambulate in room:  1 = Total, A lot = 2, A little = 3; 4 = None  3-5 Steps with railin = Total, A lot = 2, A little = 3; 4 = None  Score: 15    Modified [place]: 0 = No Symptoms    Post-Tx Position: Up in Chair, Alarms activated, and Call light and personal items within reach  PPE: gloves

## 2024-01-04 ENCOUNTER — ANESTHESIA (OUTPATIENT)
Dept: PERIOP | Facility: HOSPITAL | Age: 74
DRG: 271 | End: 2024-01-04
Payer: MEDICARE

## 2024-01-04 LAB
ABO GROUP BLD: NORMAL
ABO GROUP BLD: NORMAL
ANION GAP SERPL CALCULATED.3IONS-SCNC: 11 MMOL/L (ref 5–15)
APTT PPP: 79.7 SECONDS (ref 61–76.5)
BASOPHILS # BLD AUTO: 0.1 10*3/MM3 (ref 0–0.2)
BASOPHILS NFR BLD AUTO: 1 % (ref 0–1.5)
BLD GP AB SCN SERPL QL: NEGATIVE
BUN SERPL-MCNC: 11 MG/DL (ref 8–23)
BUN/CREAT SERPL: 10.5 (ref 7–25)
CALCIUM SPEC-SCNC: 8.3 MG/DL (ref 8.6–10.5)
CHLORIDE SERPL-SCNC: 101 MMOL/L (ref 98–107)
CO2 SERPL-SCNC: 26 MMOL/L (ref 22–29)
CREAT SERPL-MCNC: 1.05 MG/DL (ref 0.76–1.27)
DEPRECATED RDW RBC AUTO: 47.7 FL (ref 37–54)
EGFRCR SERPLBLD CKD-EPI 2021: 75.9 ML/MIN/1.73
EOSINOPHIL # BLD AUTO: 0.3 10*3/MM3 (ref 0–0.4)
EOSINOPHIL NFR BLD AUTO: 3 % (ref 0.3–6.2)
ERYTHROCYTE [DISTWIDTH] IN BLOOD BY AUTOMATED COUNT: 15.1 % (ref 12.3–15.4)
GLUCOSE SERPL-MCNC: 85 MG/DL (ref 65–99)
HCT VFR BLD AUTO: 31.7 % (ref 37.5–51)
HGB BLD-MCNC: 10.2 G/DL (ref 13–17.7)
LYMPHOCYTES # BLD AUTO: 1.2 10*3/MM3 (ref 0.7–3.1)
LYMPHOCYTES NFR BLD AUTO: 11.1 % (ref 19.6–45.3)
MCH RBC QN AUTO: 27.6 PG (ref 26.6–33)
MCHC RBC AUTO-ENTMCNC: 32.2 G/DL (ref 31.5–35.7)
MCV RBC AUTO: 85.7 FL (ref 79–97)
MONOCYTES # BLD AUTO: 0.7 10*3/MM3 (ref 0.1–0.9)
MONOCYTES NFR BLD AUTO: 6.9 % (ref 5–12)
NEUTROPHILS NFR BLD AUTO: 78 % (ref 42.7–76)
NEUTROPHILS NFR BLD AUTO: 8.2 10*3/MM3 (ref 1.7–7)
NRBC BLD AUTO-RTO: 0 /100 WBC (ref 0–0.2)
PLATELET # BLD AUTO: 634 10*3/MM3 (ref 140–450)
PMV BLD AUTO: 7.7 FL (ref 6–12)
POTASSIUM SERPL-SCNC: 4.2 MMOL/L (ref 3.5–5.2)
RBC # BLD AUTO: 3.7 10*6/MM3 (ref 4.14–5.8)
RH BLD: POSITIVE
RH BLD: POSITIVE
SODIUM SERPL-SCNC: 138 MMOL/L (ref 136–145)
T&S EXPIRATION DATE: NORMAL
WBC NRBC COR # BLD AUTO: 10.6 10*3/MM3 (ref 3.4–10.8)

## 2024-01-04 PROCEDURE — 85347 COAGULATION TIME ACTIVATED: CPT

## 2024-01-04 PROCEDURE — 25010000002 PROTAMINE SULFATE PER 10 MG: Performed by: NURSE ANESTHETIST, CERTIFIED REGISTERED

## 2024-01-04 PROCEDURE — 041L09N BYPASS LEFT FEMORAL ARTERY TO POSTERIOR TIBIAL ARTERY WITH AUTOLOGOUS VENOUS TISSUE, OPEN APPROACH: ICD-10-PCS | Performed by: STUDENT IN AN ORGANIZED HEALTH CARE EDUCATION/TRAINING PROGRAM

## 2024-01-04 PROCEDURE — 25010000002 PHENYLEPHRINE 10 MG/ML SOLUTION 5 ML VIAL: Performed by: NURSE ANESTHETIST, CERTIFIED REGISTERED

## 2024-01-04 PROCEDURE — 80048 BASIC METABOLIC PNL TOTAL CA: CPT | Performed by: INTERNAL MEDICINE

## 2024-01-04 PROCEDURE — 86901 BLOOD TYPING SEROLOGIC RH(D): CPT | Performed by: STUDENT IN AN ORGANIZED HEALTH CARE EDUCATION/TRAINING PROGRAM

## 2024-01-04 PROCEDURE — 25010000002 CEFAZOLIN PER 500 MG: Performed by: STUDENT IN AN ORGANIZED HEALTH CARE EDUCATION/TRAINING PROGRAM

## 2024-01-04 PROCEDURE — 85730 THROMBOPLASTIN TIME PARTIAL: CPT | Performed by: FAMILY MEDICINE

## 2024-01-04 PROCEDURE — 25010000002 FENTANYL CITRATE (PF) 100 MCG/2ML SOLUTION: Performed by: NURSE ANESTHETIST, CERTIFIED REGISTERED

## 2024-01-04 PROCEDURE — P9041 ALBUMIN (HUMAN),5%, 50ML: HCPCS | Performed by: NURSE ANESTHETIST, CERTIFIED REGISTERED

## 2024-01-04 PROCEDURE — 25010000002 ALBUMIN HUMAN 5% PER 50 ML: Performed by: NURSE ANESTHETIST, CERTIFIED REGISTERED

## 2024-01-04 PROCEDURE — 86900 BLOOD TYPING SEROLOGIC ABO: CPT | Performed by: STUDENT IN AN ORGANIZED HEALTH CARE EDUCATION/TRAINING PROGRAM

## 2024-01-04 PROCEDURE — 25010000002 HYDROMORPHONE 1 MG/ML SOLUTION: Performed by: NURSE ANESTHETIST, CERTIFIED REGISTERED

## 2024-01-04 PROCEDURE — 25010000002 HYDROMORPHONE 1 MG/ML SOLUTION: Performed by: STUDENT IN AN ORGANIZED HEALTH CARE EDUCATION/TRAINING PROGRAM

## 2024-01-04 PROCEDURE — 25010000002 PHENYLEPHRINE 10 MG/ML SOLUTION: Performed by: NURSE ANESTHETIST, CERTIFIED REGISTERED

## 2024-01-04 PROCEDURE — 25010000002 CEFAZOLIN PER 500 MG: Performed by: NURSE ANESTHETIST, CERTIFIED REGISTERED

## 2024-01-04 PROCEDURE — 25010000002 SUGAMMADEX 200 MG/2ML SOLUTION: Performed by: NURSE ANESTHETIST, CERTIFIED REGISTERED

## 2024-01-04 PROCEDURE — 25010000002 HEPARIN (PORCINE) PER 1000 UNITS: Performed by: STUDENT IN AN ORGANIZED HEALTH CARE EDUCATION/TRAINING PROGRAM

## 2024-01-04 PROCEDURE — 25810000003 SODIUM CHLORIDE 0.9 % SOLUTION 250 ML FLEX CONT: Performed by: NURSE ANESTHETIST, CERTIFIED REGISTERED

## 2024-01-04 PROCEDURE — 25810000003 SODIUM CHLORIDE 0.9 % SOLUTION: Performed by: NURSE ANESTHETIST, CERTIFIED REGISTERED

## 2024-01-04 PROCEDURE — 25810000003 LACTATED RINGERS PER 1000 ML: Performed by: NURSE ANESTHETIST, CERTIFIED REGISTERED

## 2024-01-04 PROCEDURE — 25010000002 HEPARIN (PORCINE) PER 1000 UNITS: Performed by: NURSE ANESTHETIST, CERTIFIED REGISTERED

## 2024-01-04 PROCEDURE — 25010000002 HYDRALAZINE PER 20 MG: Performed by: STUDENT IN AN ORGANIZED HEALTH CARE EDUCATION/TRAINING PROGRAM

## 2024-01-04 PROCEDURE — 85025 COMPLETE CBC W/AUTO DIFF WBC: CPT | Performed by: INTERNAL MEDICINE

## 2024-01-04 PROCEDURE — 86850 RBC ANTIBODY SCREEN: CPT | Performed by: STUDENT IN AN ORGANIZED HEALTH CARE EDUCATION/TRAINING PROGRAM

## 2024-01-04 PROCEDURE — 25010000002 PROPOFOL 200 MG/20ML EMULSION: Performed by: NURSE ANESTHETIST, CERTIFIED REGISTERED

## 2024-01-04 DEVICE — LIGACLIP MCA MULTIPLE CLIP APPLIERS, 20 MEDIUM CLIPS
Type: IMPLANTABLE DEVICE | Site: LEG | Status: FUNCTIONAL
Brand: LIGACLIP

## 2024-01-04 DEVICE — LIGACLIP MCA MULTIPLE CLIP APPLIERS, 20 SMALL CLIPS
Type: IMPLANTABLE DEVICE | Site: LEG | Status: FUNCTIONAL
Brand: LIGACLIP

## 2024-01-04 RX ORDER — SODIUM CHLORIDE, SODIUM LACTATE, POTASSIUM CHLORIDE, CALCIUM CHLORIDE 600; 310; 30; 20 MG/100ML; MG/100ML; MG/100ML; MG/100ML
50 INJECTION, SOLUTION INTRAVENOUS CONTINUOUS
Status: DISCONTINUED | OUTPATIENT
Start: 2024-01-04 | End: 2024-01-05

## 2024-01-04 RX ORDER — PROPOFOL 10 MG/ML
INJECTION, EMULSION INTRAVENOUS AS NEEDED
Status: DISCONTINUED | OUTPATIENT
Start: 2024-01-04 | End: 2024-01-04 | Stop reason: SURG

## 2024-01-04 RX ORDER — SODIUM CHLORIDE, SODIUM LACTATE, POTASSIUM CHLORIDE, CALCIUM CHLORIDE 600; 310; 30; 20 MG/100ML; MG/100ML; MG/100ML; MG/100ML
INJECTION, SOLUTION INTRAVENOUS CONTINUOUS PRN
Status: DISCONTINUED | OUTPATIENT
Start: 2024-01-04 | End: 2024-01-04 | Stop reason: SURG

## 2024-01-04 RX ORDER — OXYCODONE HYDROCHLORIDE 5 MG/1
5 TABLET ORAL EVERY 4 HOURS PRN
Status: DISCONTINUED | OUTPATIENT
Start: 2024-01-04 | End: 2024-01-08 | Stop reason: HOSPADM

## 2024-01-04 RX ORDER — LIDOCAINE HYDROCHLORIDE 10 MG/ML
INJECTION, SOLUTION EPIDURAL; INFILTRATION; INTRACAUDAL; PERINEURAL AS NEEDED
Status: DISCONTINUED | OUTPATIENT
Start: 2024-01-04 | End: 2024-01-04 | Stop reason: SURG

## 2024-01-04 RX ORDER — ONDANSETRON 2 MG/ML
4 INJECTION INTRAMUSCULAR; INTRAVENOUS ONCE AS NEEDED
Status: DISCONTINUED | OUTPATIENT
Start: 2024-01-04 | End: 2024-01-04 | Stop reason: HOSPADM

## 2024-01-04 RX ORDER — NALOXONE HCL 0.4 MG/ML
0.4 VIAL (ML) INJECTION AS NEEDED
Status: DISCONTINUED | OUTPATIENT
Start: 2024-01-04 | End: 2024-01-04 | Stop reason: HOSPADM

## 2024-01-04 RX ORDER — LABETALOL HYDROCHLORIDE 5 MG/ML
5 INJECTION, SOLUTION INTRAVENOUS
Status: DISCONTINUED | OUTPATIENT
Start: 2024-01-04 | End: 2024-01-04 | Stop reason: HOSPADM

## 2024-01-04 RX ORDER — OXYCODONE HYDROCHLORIDE 5 MG/1
5 TABLET ORAL EVERY 4 HOURS PRN
Status: DISCONTINUED | OUTPATIENT
Start: 2024-01-04 | End: 2024-01-04

## 2024-01-04 RX ORDER — SODIUM CHLORIDE 9 MG/ML
INJECTION, SOLUTION INTRAVENOUS CONTINUOUS PRN
Status: DISCONTINUED | OUTPATIENT
Start: 2024-01-04 | End: 2024-01-04 | Stop reason: SURG

## 2024-01-04 RX ORDER — HYDRALAZINE HYDROCHLORIDE 20 MG/ML
5 INJECTION INTRAMUSCULAR; INTRAVENOUS
Status: DISCONTINUED | OUTPATIENT
Start: 2024-01-04 | End: 2024-01-04 | Stop reason: HOSPADM

## 2024-01-04 RX ORDER — OXYCODONE HYDROCHLORIDE 5 MG/1
10 TABLET ORAL EVERY 4 HOURS PRN
Status: DISCONTINUED | OUTPATIENT
Start: 2024-01-04 | End: 2024-01-08 | Stop reason: HOSPADM

## 2024-01-04 RX ORDER — IPRATROPIUM BROMIDE AND ALBUTEROL SULFATE 2.5; .5 MG/3ML; MG/3ML
3 SOLUTION RESPIRATORY (INHALATION) ONCE AS NEEDED
Status: DISCONTINUED | OUTPATIENT
Start: 2024-01-04 | End: 2024-01-04 | Stop reason: HOSPADM

## 2024-01-04 RX ORDER — DIPHENHYDRAMINE HYDROCHLORIDE 50 MG/ML
12.5 INJECTION INTRAMUSCULAR; INTRAVENOUS
Status: DISCONTINUED | OUTPATIENT
Start: 2024-01-04 | End: 2024-01-04 | Stop reason: HOSPADM

## 2024-01-04 RX ORDER — FLUMAZENIL 0.1 MG/ML
0.1 INJECTION INTRAVENOUS AS NEEDED
Status: DISCONTINUED | OUTPATIENT
Start: 2024-01-04 | End: 2024-01-04 | Stop reason: HOSPADM

## 2024-01-04 RX ORDER — PROTAMINE SULFATE 10 MG/ML
INJECTION, SOLUTION INTRAVENOUS AS NEEDED
Status: DISCONTINUED | OUTPATIENT
Start: 2024-01-04 | End: 2024-01-04 | Stop reason: SURG

## 2024-01-04 RX ORDER — PHENYLEPHRINE HYDROCHLORIDE 10 MG/ML
INJECTION INTRAVENOUS AS NEEDED
Status: DISCONTINUED | OUTPATIENT
Start: 2024-01-04 | End: 2024-01-04 | Stop reason: SURG

## 2024-01-04 RX ORDER — ALBUMIN, HUMAN INJ 5% 5 %
SOLUTION INTRAVENOUS CONTINUOUS PRN
Status: DISCONTINUED | OUTPATIENT
Start: 2024-01-04 | End: 2024-01-04 | Stop reason: SURG

## 2024-01-04 RX ORDER — OXYCODONE HYDROCHLORIDE 5 MG/1
5 TABLET ORAL ONCE AS NEEDED
Status: DISCONTINUED | OUTPATIENT
Start: 2024-01-04 | End: 2024-01-04 | Stop reason: HOSPADM

## 2024-01-04 RX ORDER — FENTANYL CITRATE 50 UG/ML
INJECTION, SOLUTION INTRAMUSCULAR; INTRAVENOUS AS NEEDED
Status: DISCONTINUED | OUTPATIENT
Start: 2024-01-04 | End: 2024-01-04 | Stop reason: SURG

## 2024-01-04 RX ORDER — EPHEDRINE SULFATE 5 MG/ML
5 INJECTION INTRAVENOUS ONCE AS NEEDED
Status: DISCONTINUED | OUTPATIENT
Start: 2024-01-04 | End: 2024-01-04 | Stop reason: HOSPADM

## 2024-01-04 RX ORDER — HEPARIN SODIUM 1000 [USP'U]/ML
INJECTION, SOLUTION INTRAVENOUS; SUBCUTANEOUS AS NEEDED
Status: DISCONTINUED | OUTPATIENT
Start: 2024-01-04 | End: 2024-01-04 | Stop reason: SURG

## 2024-01-04 RX ORDER — DIPHENHYDRAMINE HYDROCHLORIDE 50 MG/ML
12.5 INJECTION INTRAMUSCULAR; INTRAVENOUS ONCE AS NEEDED
Status: DISCONTINUED | OUTPATIENT
Start: 2024-01-04 | End: 2024-01-04 | Stop reason: HOSPADM

## 2024-01-04 RX ORDER — ROCURONIUM BROMIDE 10 MG/ML
INJECTION, SOLUTION INTRAVENOUS AS NEEDED
Status: DISCONTINUED | OUTPATIENT
Start: 2024-01-04 | End: 2024-01-04 | Stop reason: SURG

## 2024-01-04 RX ORDER — NITROGLYCERIN 0.4 MG/1
0.4 TABLET SUBLINGUAL
Status: DISCONTINUED | OUTPATIENT
Start: 2024-01-04 | End: 2024-01-08 | Stop reason: HOSPADM

## 2024-01-04 RX ORDER — OXYCODONE HYDROCHLORIDE 5 MG/1
10 TABLET ORAL EVERY 4 HOURS PRN
Status: DISCONTINUED | OUTPATIENT
Start: 2024-01-04 | End: 2024-01-04 | Stop reason: HOSPADM

## 2024-01-04 RX ADMIN — PHENYLEPHRINE HYDROCHLORIDE 100 MCG: 10 INJECTION INTRAVENOUS at 10:36

## 2024-01-04 RX ADMIN — ROCURONIUM BROMIDE 20 MG: 10 INJECTION, SOLUTION INTRAVENOUS at 11:00

## 2024-01-04 RX ADMIN — SUGAMMADEX 200 MG: 100 INJECTION, SOLUTION INTRAVENOUS at 14:53

## 2024-01-04 RX ADMIN — HEPARIN SODIUM 9000 UNITS: 1000 INJECTION INTRAVENOUS; SUBCUTANEOUS at 12:32

## 2024-01-04 RX ADMIN — FENTANYL CITRATE 25 MCG: 50 INJECTION, SOLUTION INTRAMUSCULAR; INTRAVENOUS at 10:26

## 2024-01-04 RX ADMIN — Medication 10 ML: at 20:22

## 2024-01-04 RX ADMIN — Medication 10 ML: at 21:54

## 2024-01-04 RX ADMIN — MINERAL OIL AND WHITE PETROLATUM: 150; 830 OINTMENT OPHTHALMIC at 17:36

## 2024-01-04 RX ADMIN — ROCURONIUM BROMIDE 10 MG: 10 INJECTION, SOLUTION INTRAVENOUS at 12:00

## 2024-01-04 RX ADMIN — ATENOLOL 12.5 MG: 25 TABLET ORAL at 09:08

## 2024-01-04 RX ADMIN — ROCURONIUM BROMIDE 10 MG: 10 INJECTION, SOLUTION INTRAVENOUS at 12:30

## 2024-01-04 RX ADMIN — ALBUMIN (HUMAN): 12.5 INJECTION, SOLUTION INTRAVENOUS at 10:45

## 2024-01-04 RX ADMIN — ACETAMINOPHEN 650 MG: 325 TABLET, FILM COATED ORAL at 22:10

## 2024-01-04 RX ADMIN — HYDROMORPHONE HYDROCHLORIDE 0.5 MG: 1 INJECTION, SOLUTION INTRAMUSCULAR; INTRAVENOUS; SUBCUTANEOUS at 09:08

## 2024-01-04 RX ADMIN — SODIUM CHLORIDE 2000 MG: 900 INJECTION INTRAVENOUS at 22:11

## 2024-01-04 RX ADMIN — ASPIRIN 81 MG: 81 TABLET, COATED ORAL at 09:08

## 2024-01-04 RX ADMIN — HYDRALAZINE HYDROCHLORIDE 10 MG: 20 INJECTION INTRAMUSCULAR; INTRAVENOUS at 21:53

## 2024-01-04 RX ADMIN — Medication 10 ML: at 03:55

## 2024-01-04 RX ADMIN — PROPOFOL 120 MG: 10 INJECTION, EMULSION INTRAVENOUS at 10:26

## 2024-01-04 RX ADMIN — MEMANTINE 10 MG: 10 TABLET ORAL at 20:18

## 2024-01-04 RX ADMIN — CEFAZOLIN 2 G: 2 INJECTION, POWDER, FOR SOLUTION INTRAMUSCULAR; INTRAVENOUS at 10:28

## 2024-01-04 RX ADMIN — SODIUM CHLORIDE: 9 INJECTION, SOLUTION INTRAVENOUS at 10:18

## 2024-01-04 RX ADMIN — OXYCODONE HYDROCHLORIDE 10 MG: 5 TABLET ORAL at 00:07

## 2024-01-04 RX ADMIN — Medication 10 ML: at 09:13

## 2024-01-04 RX ADMIN — DONEPEZIL HYDROCHLORIDE 20 MG: 5 TABLET, FILM COATED ORAL at 20:19

## 2024-01-04 RX ADMIN — HYDROMORPHONE HYDROCHLORIDE 0.5 MG: 1 INJECTION, SOLUTION INTRAMUSCULAR; INTRAVENOUS; SUBCUTANEOUS at 13:22

## 2024-01-04 RX ADMIN — LIDOCAINE HYDROCHLORIDE 50 MG: 10 INJECTION, SOLUTION EPIDURAL; INFILTRATION; INTRACAUDAL; PERINEURAL at 10:26

## 2024-01-04 RX ADMIN — GABAPENTIN 200 MG: 100 CAPSULE ORAL at 09:08

## 2024-01-04 RX ADMIN — OXYCODONE HYDROCHLORIDE 10 MG: 5 TABLET ORAL at 20:18

## 2024-01-04 RX ADMIN — GABAPENTIN 200 MG: 100 CAPSULE ORAL at 20:18

## 2024-01-04 RX ADMIN — ATORVASTATIN CALCIUM 20 MG: 20 TABLET, FILM COATED ORAL at 09:08

## 2024-01-04 RX ADMIN — PROPOFOL 100 MCG/KG/MIN: 10 INJECTION, EMULSION INTRAVENOUS at 10:45

## 2024-01-04 RX ADMIN — TAMSULOSIN HYDROCHLORIDE 0.4 MG: 0.4 CAPSULE ORAL at 09:08

## 2024-01-04 RX ADMIN — FENTANYL CITRATE 50 MCG: 50 INJECTION, SOLUTION INTRAMUSCULAR; INTRAVENOUS at 11:30

## 2024-01-04 RX ADMIN — ROCURONIUM BROMIDE 20 MG: 10 INJECTION, SOLUTION INTRAVENOUS at 11:30

## 2024-01-04 RX ADMIN — FENTANYL CITRATE 25 MCG: 50 INJECTION, SOLUTION INTRAMUSCULAR; INTRAVENOUS at 11:00

## 2024-01-04 RX ADMIN — HYDROMORPHONE HYDROCHLORIDE 0.5 MG: 1 INJECTION, SOLUTION INTRAMUSCULAR; INTRAVENOUS; SUBCUTANEOUS at 14:27

## 2024-01-04 RX ADMIN — HEPARIN SODIUM 5000 UNITS: 1000 INJECTION INTRAVENOUS; SUBCUTANEOUS at 12:50

## 2024-01-04 RX ADMIN — ROCURONIUM BROMIDE 50 MG: 10 INJECTION, SOLUTION INTRAVENOUS at 10:26

## 2024-01-04 RX ADMIN — CILOSTAZOL 100 MG: 100 TABLET ORAL at 20:19

## 2024-01-04 RX ADMIN — CEFAZOLIN 2 G: 2 INJECTION, POWDER, FOR SOLUTION INTRAMUSCULAR; INTRAVENOUS at 14:27

## 2024-01-04 RX ADMIN — SODIUM CHLORIDE, SODIUM LACTATE, POTASSIUM CHLORIDE, AND CALCIUM CHLORIDE: .6; .31; .03; .02 INJECTION, SOLUTION INTRAVENOUS at 10:18

## 2024-01-04 RX ADMIN — Medication 10 ML: at 06:04

## 2024-01-04 RX ADMIN — ROCURONIUM BROMIDE 10 MG: 10 INJECTION, SOLUTION INTRAVENOUS at 13:00

## 2024-01-04 RX ADMIN — PROTAMINE SULFATE 40 MG: 10 INJECTION, SOLUTION INTRAVENOUS at 14:00

## 2024-01-04 RX ADMIN — HYDROMORPHONE HYDROCHLORIDE 0.5 MG: 1 INJECTION, SOLUTION INTRAMUSCULAR; INTRAVENOUS; SUBCUTANEOUS at 17:36

## 2024-01-04 RX ADMIN — HYDROMORPHONE HYDROCHLORIDE 0.5 MG: 1 INJECTION, SOLUTION INTRAMUSCULAR; INTRAVENOUS; SUBCUTANEOUS at 06:04

## 2024-01-04 RX ADMIN — PHENYLEPHRINE HYDROCHLORIDE 1 MCG/KG/MIN: 10 INJECTION INTRAVENOUS at 10:37

## 2024-01-04 RX ADMIN — RISPERIDONE 0.5 MG: 0.25 TABLET, FILM COATED ORAL at 20:19

## 2024-01-04 NOTE — PROGRESS NOTES
[facility] MEDICINE SERVICE  DAILY PROGRESS NOTE    NAME: Oscar Wray  : 1950  MRN: [id number]      LOS: 8 days     PROVIDER OF SERVICE: Pablito Deleon MD    Chief Complaint: Critical limb ischemia of both lower extremities    Subjective:     Interval History:   Did okay overnight.  Awaiting Fem-PT bypass today. Still with present but improved left foot pain. t foot pain is still present but somewhat improved.  Renal function has normalized today.    Discussed the case with the bedside nursing staff. The patient's case was also discussed with the pharmacy and case management providers at multidisciplinary rounds.  No other acute concerns.    Objective:     Vital Signs  Temp:  [98.3 °F (36.8 °C)-98.4 °F (36.9 °C)] 98.4 °F (36.9 °C)  Heart Rate:  [] 85  Resp:  [11-12] 12  BP: (113-149)/(44-86) 129/59   Body mass index is 29.83 kg/m².    Physical Exam  HENT:      Head: Atraumatic.      Mouth/Throat:      Mouth: Mucous membranes are moist.   Cardiovascular:      Rate and Rhythm: Normal rate and regular rhythm.      Pulses: Normal pulses.      Heart sounds: Normal heart sounds.   Pulmonary:      Effort: Pulmonary effort is normal.      Breath sounds: Normal breath sounds.   Abdominal:      General: Bowel sounds are normal.      Palpations: Abdomen is soft.   Musculoskeletal:      Cervical back: Neck supple.      Comments: Pain on LLE   Skin:     General: Skin is warm.   Neurological:      General: No focal deficit present.      Mental Status: He is alert and oriented to person, place, and time.   Psychiatric:         Mood and Affect: Mood normal.   Scheduled Meds   acetaminophen, 650 mg, Oral, Q4H   Or  acetaminophen, 650 mg, Oral, Q4H   Or  acetaminophen, 650 mg, Rectal, Q4H  aspirin, 81 mg, Oral, Daily  atenolol, 12.5 mg, Oral, Daily  atorvastatin, 20 mg, Oral, Daily  cholecalciferol, 2,000 Units, Oral, Daily  cilostazol, 100 mg, Oral, BID  docusate sodium, 100 mg, Oral, Daily  donepezil,  20 mg, Oral, Nightly  gabapentin, 200 mg, Oral, TID  memantine, 10 mg, Oral, Q12H  multivitamin with minerals, 1 tablet, Oral, Daily  polyethylene glycol, 17 g, Oral, Daily  risperiDONE, 0.25 mg, Oral, Daily  risperiDONE, 0.5 mg, Oral, Nightly  sodium chloride, 10 mL, Intravenous, Q12H  tamsulosin, 0.4 mg, Oral, Daily       PRN Meds     heparin    heparin    hydrALAZINE    HYDROmorphone    nitroglycerin    nitroglycerin    oxyCODONE    [COMPLETED] Insert Peripheral IV **AND** sodium chloride    sodium chloride    sodium chloride   Infusions  [Held by provider] heparin, 18 Units/kg/hr, Last Rate: Stopped (01/04/24 0355)          Diagnostic Data    Results from last 7 days   Lab Units 01/04/24  0739 01/03/24  0829 12/31/23  0548 12/30/23  0442   WBC 10*3/mm3 10.60 11.80*   < > 14.10*   HEMOGLOBIN g/dL 10.2* 10.3*   < > 11.7*   HEMATOCRIT % 31.7* 31.1*   < > 35.5*   PLATELETS 10*3/mm3 634* 562*   < > 464*   GLUCOSE mg/dL  --  99   < > 107*   CREATININE mg/dL  --  0.95   < > 1.13   BUN mg/dL  --  10   < > 13   SODIUM mmol/L  --  134*   < > 136   POTASSIUM mmol/L  --  4.3   < > 3.9   AST (SGOT) U/L  --   --   --  101*   ALT (SGPT) U/L  --   --   --  19   ALK PHOS U/L  --   --   --  80   BILIRUBIN mg/dL  --   --   --  0.2   ANION GAP mmol/L  --  8.0   < > 12.0    < > = values in this interval not displayed.       No radiology results for the last day      I reviewed the patient's new clinical results.    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  --Status post bilateral femoral endarterectomy and placement of iliac artery stents with left leg angiogram  - Planning for follow-up left femoral-tibial bypass this morning  - On heparin drip  - Continue maintenance fluid LR at 100 cc/h  - Maintain SBP less than 160  - Hold pregabalin and initiate gabapentin for neuropathic pain  control  - Continue aspirin, Lipitor and cilostazol     Hypertension  -Restart home dose of atenolol     KARINA on CKD stage III  - Acute component has resolved, nephrology is following     Dyslipidemia  -Continue statin therapy    Dementia  -Continue Aricept and Namenda    BPH  -Continue Flomax    DVT prophylaxis:  Medical and mechanical DVT prophylaxis orders are present.     Code status is   Code Status and Medical Interventions:   Ordered at: 12/28/23 0752     Level Of Support Discussed With:    Patient     Code Status (Patient has no pulse and is not breathing):    CPR (Attempt to Resuscitate)     Medical Interventions (Patient has pulse or is breathing):    Full Support     Plan for disposition: He will likely be here at least through the weekend and will need skilled rehab at discharge.  He may end up in the ICU postoperatively as well.    Signature: Electronically signed by Pablito Deleon MD, 01/04/24, 08:47 EST.  [facility] Hospitalist Team

## 2024-01-04 NOTE — CASE MANAGEMENT/SOCIAL WORK
Continued Stay Note   [facility]     Patient Name: Oscar Wray  MRN: [id number]  Today's Date: 1/4/2024    Admit Date: 12/27/2023    Plan: DC Plan: [facility] pending acceptance and bed availability. Precert will be required. PASRR approved.   Discharge Plan       Row Name 01/04/24 1015       Plan    Plan DC Plan: [facility] pending acceptance and bed availability. Precert will be required. PASRR approved.    Patient/Family in Agreement with Plan yes    Provided Post Acute Provider List? N/A    Provided Post Acute Provider Quality & Resource List? N/A    Plan Comments CM reviewed chart documentation to obtain clinical updates.CM spoke to patient significant other at bedside to discuss discharge plans. Patient has selected 1) [facility], 2) [facility], and 3) [facility]. CM placed referral in basket for [facility] and liaison notified. Awaiting confirmation of acceptance or denial at this time. Patient enroute to OR for procedure at this time. CM will continue to follow for any further needs and adjust discharge plan accordingly. DC Barriers: Cardiac Monitoring, and pending procedure.               Expected Discharge Date and Time       Expected Discharge Date Expected Discharge Time    Jan 8, 2024               Tonja Collier RN     Office Phone: [phone]  Office Cell:     [phone]

## 2024-01-04 NOTE — ACP (ADVANCE CARE PLANNING)
"Social Work Assessment  [facility]     Patient Name: Oscar Wray  MRN: [id number]  Today's Date: 1/4/2024    Admit Date: 12/27/2023     Demographic Summary       Row Name 01/04/24 1557       General Information    Reason for Consult decision-making    General Information Comments SILVIA was notified that pt's brother reports pt's s/o Jeanette has apparently always been the person to consent, but no AD's on file and Jeanette never called this writer back from 2 days ago. SILVIA reached back out to Jeanette who reports she is pt's POA and brought the document, \"to registration last Thursday (12.28.2023).\" POA ppwk is not uploaded as of this date. SILVIA informed Jeanette if the ppwk is not on pt's hard chart (pt in OR so unable to review) then the document would need to be brought back in. SILVIA called Registration who says the documents have already been sent to [facility] in [place] - SILVIA called [facility] ([phone]) to inquire further at 1977 but they are closed.             KATIE Cox, [facility]  Medical Social Worker  Ph [phone]  Fax [phone]  [email or web address]      "

## 2024-01-04 NOTE — ANESTHESIA POSTPROCEDURE EVALUATION
Patient: Oscar Wray    Procedure Summary       Date: 01/04/24 Room / Location: [facility] OR 08 / [facility] MAIN OR    Anesthesia Start: 1018 Anesthesia Stop: 1513    Procedure: FEMORAL TIBIAL BYPASS (Left: Leg Lower) Diagnosis:     Surgeons: Nura Salgado II, MD Provider: Favio Rangel MD    Anesthesia Type: general ASA Status: 3            Anesthesia Type: No value filed.    Vitals  Vitals Value Taken Time   /65 01/04/24 1552   Temp 97.4 °F (36.3 °C) 01/04/24 1545   Pulse 84 01/04/24 1554   Resp 13 01/04/24 1545   SpO2 88 % 01/04/24 1554   Vitals shown include unfiled device data.        Post Anesthesia Care and Evaluation    Patient location during evaluation: PACU  Patient participation: complete - patient participated  Level of consciousness: awake  Pain scale: See nurse's notes for pain score.  Pain management: adequate    Airway patency: patent  Anesthetic complications: No anesthetic complications  PONV Status: none  Cardiovascular status: acceptable  Respiratory status: acceptable and spontaneous ventilation  Hydration status: acceptable    Comments: Patient seen and examined postoperatively; vital signs stable; SpO2 greater than or equal to 90%; cardiopulmonary status stable; nausea/vomiting adequately controlled; pain adequately controlled; no apparent anesthesia complications; patient discharged from anesthesia care when discharge criteria were met

## 2024-01-04 NOTE — CASE MANAGEMENT/SOCIAL WORK
Continued Stay Note   Peter     Patient Name: Oscar Wray  MRN: [id number]  Today's Date: 1/4/2024    Admit Date: 12/27/2023    Plan: DC Plan: [facility] pending acceptance and bed availability. Precert will be required. PASRR approved.   Discharge Plan       Row Name 01/04/24 1500       Plan    Plan DC Plan: [facility] pending acceptance and bed availability. Precert will be required. PASRR approved.    Provided Post Acute Provider List? N/A    Provided Post Acute Provider Quality & Resource List? N/A    Plan Comments Update: [facility] declined patient secondary to no perceived bed availability. [facility] facilities are also full or not in network. CM placed referral in basket for patient second choice [facility] and liaison notified. Awaiting confirmation of acceptance at this time.               Expected Discharge Date and Time       Expected Discharge Date Expected Discharge Time    Jan 8, 2024               Tonja Collier RN    Office Phone: [phone]  Office Cell:     [phone]

## 2024-01-04 NOTE — ANESTHESIA PROCEDURE NOTES
Airway  Urgency: elective    Date/Time: 1/4/2024 10:30 AM  Airway not difficult    General Information and Staff    Patient location during procedure: OR    Indications and Patient Condition  Indications for airway management: airway protection    Preoxygenated: yes  MILS maintained throughout  Mask difficulty assessment: 1 - vent by mask    Final Airway Details  Final airway type: endotracheal airway      Successful airway: ETT  Cuffed: yes   Successful intubation technique: video laryngoscopy  Facilitating devices/methods: intubating stylet  Endotracheal tube insertion site: oral  Blade: Steele  Blade size: 4  ETT size (mm): 7.5  Cormack-Lehane Classification: grade I - full view of glottis  Placement verified by: chest auscultation and capnometry   Cuff volume (mL): 8  Number of attempts at approach: 1  Assessment: lips, teeth, and gum same as pre-op and atraumatic intubation

## 2024-01-04 NOTE — DISCHARGE PLACEMENT REQUEST
"Oscar Christian (73 y.o. Male)       Date of Birth   1950    Social Security Number       Address   [address] [place] [address] [address]  [place] [facility]    Home Phone   [phone]    MRN   [id number]       Temple   Mandaeism    Marital Status   Single                            Admission Date   12/27/23    Admission Type   Emergency    Admitting Provider   Aurelio Nash MD    Attending Provider   Pablito Deleon MD    Department, Room/Bed   [facility] INTENSIVE CARE UNIT, 2308/1       Discharge Date       Discharge Disposition       Discharge Destination                                 Attending Provider: Pablito Deleon MD    Allergies: No Known Allergies    Isolation: None   Infection: None   Code Status: CPR    Ht: 170.2 cm (67\")   Wt: 86.4 kg (190 lb 7.6 oz)    Admission Cmt: None   Principal Problem: Critical limb ischemia of both lower extremities [I70.223]                   Active Insurance as of 12/27/2023       Primary Coverage       Payor Plan Insurance Group Employer/Plan Group    ANTHEM MEDICARE REPLACEMENT ANTHEM MEDICARE ADVANTAGE [id number]       Payor Plan Address Payor Plan Phone Number Payor Plan Fax Number Effective Dates    [address] [phone]  1/1/2022 - None Entered    [facility] [address]         Subscriber Name Subscriber Birth Date Member ID       OSCAR CHRISTIAN 1950 [id number]               Secondary Coverage       Payor Plan Insurance Group Employer/Plan Group    [place]        Payor Plan Address Payor Plan Phone Number Payor Plan Fax Number Effective Dates    [address]   4/15/2022 - None Entered    [place] [address]         Subscriber Name Subscriber Birth Date Member ID       OSCAR CHRISTIAN 1950 [id number]                     Emergency Contacts        (Rel.) Home Phone Work Phone Mobile Phone    ZAYGINA (Significant Other) [phone] -- [phone]    CARLOS CHRISTIAN (Brother) " -- -- [phone]                 History & Physical        Margareth Geiger MD at [address] [place] [address] Medicine Services  History & Physical    Patient Name: Oscar Wray  : 1950  MRN: [id number]  Primary Care Physician:  Kajal Horowitz PA  Date of admission: 2023  Date and Time of Service: 2023 at 5 pm    Subjective      Chief Complaint: Bilateral lower extremity pain left more than right    History of Present Illness: Oscar Wray is a 73 y.o. male with a CMH of PAD who [facility] with pain in the left foot more than right.  ABIs 0 bilaterally but did have some tibial flow noted, however it was quite minimal and is developing ischemic ulcers on toes, is having severe pain at rest.   He has a CTA already ordered by the ER  Vascular surgery has reviewed his prior CTA and he likely will need L femoral endarterectomy and bilateral iliac stents, possible bypass. check vein mapping and plan to get stress test soon as well, started on heparin gtt for now.  Continue home aspirin.   Review of Systems   Unable to perform ROS: Dementia       Personal History     Past Medical History:   Diagnosis Date    Dementia        No past surgical history on file.    Family History: family history is not on file. Otherwise pertinent FHx was reviewed and not pertinent to current issue.    Social History:  reports that he has quit smoking. His smoking use included cigarettes. He has never used smokeless tobacco. He reports current alcohol use. He reports that he does not use drugs.    Home Medications:  Prior to Admission Medications       Prescriptions Last Dose Informant Patient Reported? Taking?    aspirin EC tablet 81 mg   No No    atenolol (TENORMIN) 25 MG tablet   Yes No    atorvastatin (LIPITOR) 20 MG tablet   No No    Take 1 tablet by mouth Daily.    celecoxib (CeleBREX) 100 MG capsule   No No    Take 1 capsule by mouth 2 (Two) Times a Day.     Cholecalciferol (Vitamin D3) 50 MCG (2000 UT) capsule   Yes No    cilostazol (PLETAL) 100 MG tablet   No No    Take 1 tablet by mouth 2 (Two) Times a Day.    dapagliflozin Propanediol (Farxiga) 10 MG tablet   No No    Take 10 mg by mouth Daily.    docusate sodium (COLACE) 100 MG capsule   Yes No    donepezil (ARICEPT) 23 MG tablet   Yes No    memantine (NAMENDA XR) 28 MG capsule sustained-release 24 hr extended release capsule   Yes No    Omega-3 Fatty Acids (fish oil) 1000 MG capsule capsule   Yes No    Take  by mouth Daily With Breakfast.    pregabalin (Lyrica) 25 MG capsule   No No    Take 1 capsule by mouth 2 (Two) Times a Day.    risperiDONE (risperDAL) 0.25 MG tablet   Yes No    risperiDONE (risperDAL) 0.5 MG tablet   Yes No    tamsulosin (FLOMAX) 0.4 MG capsule 24 hr capsule   Yes No              Allergies:  No Known Allergies    Objective      Vitals:   Temp:  [97.3 °F (36.3 °C)] 97.3 °F (36.3 °C)  Heart Rate:  [77-80] 77  Resp:  [20] 20  BP: (102-135)/(59-86) 135/86  Body mass index is 30.9 kg/m².  Physical Exam  Constitutional:       Appearance: Normal appearance. He is obese.   HENT:      Head: Normocephalic.      Right Ear: External ear normal.      Left Ear: External ear normal.   Eyes:      Extraocular Movements: Extraocular movements intact.      Pupils: Pupils are equal, round, and reactive to light.   Cardiovascular:      Heart sounds: Normal heart sounds.   Pulmonary:      Effort: Pulmonary effort is normal.   Abdominal:      General: Abdomen is flat.   Musculoskeletal:      Cervical back: Normal range of motion and neck supple.      Right lower leg: No edema.      Left lower leg: No edema.      Comments: Quality lower extremities/feet, absence of pedal pulses, dusky appearing toes.  Bilaterally   Neurological:      General: No focal deficit present.      Mental Status: He is alert.               Diagnostic Data:  Lab Results (last 24 hours)       Procedure Component Value Units Date/Time    aPTT  [[id number]]  (Abnormal) Collected: 12/27/23 1529    Specimen: Blood Updated: 12/27/23 1656     PTT 31.6 seconds     Extra Tubes [[id number]] Collected: 12/27/23 1529    Specimen: Blood, Venous Line Updated: 12/27/23 1631    Narrative:      The following orders were created for panel order Extra Tubes.  Procedure                               Abnormality         Status                     ---------                               -----------         ------                     Gold Top - SST[[id number]]                                   Final result                 Please view results for these tests on the individual orders.    Gold Top - SST [[id number]] Collected: 12/27/23 1529    Specimen: Blood Updated: 12/27/23 1631     Extra Tube Hold for add-ons.     Comment: Auto resulted.       Comprehensive Metabolic Panel [[id number]]  (Abnormal) Collected: 12/27/23 1529    Specimen: Blood Updated: 12/27/23 1602     Glucose 134 mg/dL      BUN 15 mg/dL      Creatinine 1.50 mg/dL      Sodium 135 mmol/L      Potassium 4.2 mmol/L      Chloride 97 mmol/L      CO2 23.0 mmol/L      Calcium 9.7 mg/dL      Total Protein 7.6 g/dL      Albumin 4.3 g/dL      ALT (SGPT) 13 U/L      AST (SGOT) 18 U/L      Alkaline Phosphatase 106 U/L      Total Bilirubin 0.6 mg/dL      Globulin 3.3 gm/dL      A/G Ratio 1.3 g/dL      BUN/Creatinine Ratio 10.0     Anion Gap 15.0 mmol/L      eGFR 49.5 mL/min/1.73     Narrative:      GFR Normal >60  Chronic Kidney Disease <60  Kidney Failure <15    The GFR formula is only valid for adults with stable renal function between ages 18 and 70.    Protime-INR [[id number]]  (Normal) Collected: 12/27/23 1529    Specimen: Blood Updated: 12/27/23 1548     Protime 11.2 Seconds      INR 1.03    CBC & Differential [[id number]]  (Abnormal) Collected: 12/27/23 1529    Specimen: Blood Updated: 12/27/23 1544    Narrative:      The following orders were created for panel order CBC & Differential.  Procedure                                Abnormality         Status                     ---------                               -----------         ------                     CBC Auto Differential[[id number]]        Abnormal            Final result                 Please view results for these tests on the individual orders.    CBC Auto Differential [[id number]]  (Abnormal) Collected: 12/27/23 1529    Specimen: Blood Updated: 12/27/23 1544     WBC 14.30 10*3/mm3      RBC 5.05 10*6/mm3      Hemoglobin 14.3 g/dL      Hematocrit 43.9 %      MCV 87.0 fL      MCH 28.3 pg      MCHC 32.5 g/dL      RDW 14.9 %      RDW-SD 44.6 fl      MPV 7.6 fL      Platelets 646 10*3/mm3      Neutrophil % 85.0 %      Lymphocyte % 6.5 %      Monocyte % 7.1 %      Eosinophil % 0.6 %      Basophil % 0.8 %      Neutrophils, Absolute 12.10 10*3/mm3      Lymphocytes, Absolute 0.90 10*3/mm3      Monocytes, Absolute 1.00 10*3/mm3      Eosinophils, Absolute 0.10 10*3/mm3      Basophils, Absolute 0.10 10*3/mm3      nRBC 0.1 /100 WBC              Imaging Results (Last 24 Hours)       ** No results found for the last 24 hours. **              Assessment & Plan        This is a 73 y.o. male with: Critical limb ischemia bilateral lower extremity    Active and Resolved Problems  Active Hospital Problems    Diagnosis  POA    Degenerative disc disease, lumbar [M51.36]  Yes      Resolved Hospital Problems   No resolved problems to display.   Acute on chronic critical limb ischemia bilateral; continue IV heparin and aspirin, vascular surgery following, await CTA results  Dementia; resume home medications when verified  Hyperlipidemia; resume home medications when verified  Hyperglycemia; no history of diabetes, check A1c.  Elevated serum creatinine; KARINA versus CKD, nephrology consulted.      DVT prophylaxis:  Medical DVT prophylaxis orders are present.    The patient desires to be as follows:    CODE STATUS:       Full code    Admission Status:  I believe this patient meets inpatient  status.    Expected Length of Stay: 3 to 5 days    PDMP and Medication Dispenses via Sidebar reviewed and consistent with patient reported medications.    I discussed the patient's findings and my recommendations with patient and family.      Signature:   Electronically signed by Margareth Geiger MD, 12/27/23, 5:27 PM EST.      This document has been electronically signed by Margareth Geiger MD on December 27, 2023 16:56 EST   [facility] Hospitalist Team    Electronically signed by Margareth Geiger MD at 12/27/23 3802

## 2024-01-04 NOTE — PLAN OF CARE
Goal Outcome Evaluation:   Patient has constant pain in left leg. Current pain regiment working well for patient. Patient npo since midnight, heparin drip turned off at 0400. Vital signs stable. Pulse in left foot have been inconsistent. At times able to doppler pulse and other times unable to find left foot pulse. Patient aware that surgery is scheduled for today.

## 2024-01-04 NOTE — OP NOTE
Date of Admission:  12/27/2023  Today's Date:  01/04/24  Nura Salgado II, MD  [facility]    Preoperative Diagnosis:   Peripheral vascular disease with rest pain and minor tissue loss    Postoperative Diagnosis:   Same    Procedure Performed:   Left femoral to posterior tibial artery bypass with in situ greater saphenous vein    CPT:  64912 Fem-Tibial bypass with in situ saphenous      Surgeon:   Nura Salgado II, MD    Assistant:    Heath Kwon MD, Provided critical assistance in exposure, retraction, and suction that overall decrease blood loss and operative time.    Anesthesia:   General    Estimated Blood Loss:   200mL    Findings:    Left greater saphenous vein adequate for bypass.  Posterior tibial artery in the lower leg appeared to be good target for bypass.  Left femoral to posterior tibial artery bypass performed with in situ greater saphenous vein.  Excellent multiphasic signal in posterior tibial artery that augmented very well with the bypass.  No intraoperative complications.    Implants:    Implant Name Type Inv. Item Serial No.  Lot No. LRB No. Used Action   CLIPAPPLR M/ ENDO LIGACLIP 9 3/8IN SM - [id number] Implant CLIPAPPLR M/ ENDO LIGACLIP 9 3/8IN SM  [employer]  DIV OF J AND J [id number] Left 1 Implanted   CLIPAPPLR M/ ENDO LIGACLIP 20CLP 11IN MD - [id number] Implant CLIPAPPLR M/ ENDO LIGACLIP 20CLP 11IN MD  [employer]  DIV OF J AND J [id number] Left 1 Implanted   CLIPAPPLR M/ ENDO LIGACLIP 9 3/8IN SM - [id number] Implant CLIPAPPLR M/ ENDO LIGACLIP 9 3/8IN SM  [employer]  DIV OF J AND J [id number] Left 1 Implanted       Staff:   Circulator: Theresa Kasper RN; Pablito Chin RN  Scrub Person: Bev Perez; Deepthi Fernandez  Assistant: Ju Roth CST    Specimen:   none    Complications:   none    Dispo:   to ICU    Indication for procedure:   73 y.o. male with history of acute on chronic peripheral arterial disease he was admitted last  week with rest pain in his left foot.  He underwent bilateral femoral endarterectomies with iliac stents last Friday.  He had done well following the surgery however has had persistent pain in his left foot.  ABIs showed it it only increased to 0.2 from 0 and although his foot was nice and warm his toes still appeared cyanotic.  After long discussion had with patient and his girlfriend the decision was made to proceed with femoral to posterior tibial artery bypass for rest pain and ulceration on his toes.  Risk benefits and alternatives to the procedure were discussed with patient verbalized understanding and agreed to proceed.    Description of procedure:   Patient was brought to the OR and placed supine on the OR table.  General endotracheal anesthesia was begun by the anesthesia service.  Once patient was appropriately asleep his left leg was prepped circumferentially with chlorhexidine and right groin was prepped with chlorhexidine as well.  He was draped in sterile fashion.  A timeout was performed.  We began procedure by using ultrasound with a sterile probe cover to evaluate the greater saphenous vein in the left leg.  It appeared to be adequate size for bypass throughout the entirety of the left leg.  The site of the saphenous vein was marked.  I then made a longitudinal incision over the lower leg at the site of the greater saphenous vein.  Electrocautery was used to dissect down through the subcutaneous tissue.  The greater saphenous vein was easily identified here and circumferentially dissected.  Dissection was carried proximally and distally for distance of approximately 12 cm.  The circumferential dissection of the saphenous vein was carried proximally and distally as well.  I then retracted the saphenous vein inferiorly made incision through the fascia there just inferior to the medial border of the tibia.  I then dissected down between the muscle into the plane of the posterior tibial artery.  The  posterior tibial artery and veins were identified.  It was palpated and felt soft and reasonable as a target for bypass.  I did insonate it with a Doppler to ensure flow in the area we were bypassing to.  Proximal and distal to the area which I would be sewing to I did dissect out enough of the artery to place bulldog clamps.  I then carried my dissection of the saphenous vein proximally up the leg.  Incisions were made with the 15 blade scalpel and electrocautery used to dissect down through subcutaneous tissue.  Metzenbaum scissors were used to dissect down onto the superficial surface of the greater saphenous vein.  All visible vein branches were ligated with either 3-0 silk suture or surgical clips.  This dissection was carried up the leg to the level of his previously made femoral incision.  I then opened up his left femoral incision.  Metzenbaum scissors were used to cut the 4-0 Vicryl subcuticular suture and the 3 oh deep dermal suture.  These were all removed.  Then placed self-retaining retractors and exposed the femoral artery by putting the 2-0 Vicryl suture that been securing the deep tissues overlying it.  The patient's patch angioplasty appeared to be in good working order.  There is a nice strong palpable pulse in the left common femoral artery.  There is no evidence of infection.  There were numerous branches off of the proximal greater saphenous vein that required ligation with 3-0 silk suture.  Once we had the proximal greater saphenous vein dissected circumferentially the patient was given 10,000 units of heparin.  ACT was 239 after that and so he was given another 5000 units of heparin which did bring his ACT over 250.  ACT's were checked routinely and he was redosed with heparin as needed to keep an ACT over 250 for the remainder of the procedure.  A Satinsky clamp was used to clamp the base of the greater saphenous vein as it inserted onto the common femoral vein.  It was transected with an 11  blade scalpel and then the venotomy was closed with 5-0 Prolene suture in a Julio Cesar stitch fashion.  We then examined the proximal greater saphenous vein and the first valve was lysed with Lamb scissors.  It was spatulated and trimmed to the appropriate for our proximal anastomosis.  Clamps were placed on the left profunda femoral artery and common femoral artery, distal to the previously placed stent.  Longitudinal arteriotomy was made through the bovine pericardial patch at the distal common femoral artery.  A small amount of the patch was resected and an ellipse orientation.  We then performed our proximal anastomosis with 6-0 Prolene suture in continuous running fashion.  2 repair stitches were needed after completing the anastomosis for hemostasis.  There is a good palpable pulse on the proximal greater saphenous vein at this time.  I then turned my attention down to the distal greater saphenous vein.  It was transected distally and the distal end was ligated with 2-0 silk suture.  We then used a LeMaitre valvulotome to lyse all of the valves.  There was excellent pulsatile flow through the vein bypass at this time.  We then oriented the vein appropriate for the bypass and it was marked to prevent twisting.  Clamps were placed on the posterior tibial artery proximal and distal to the site of our anastomosis and a longitudinal arteriotomy was ablated with 11 blade scalpel.  It was carried proximally and distally with Lamb scissors.  The leg was straightened and the greater saphenous vein was cut to an appropriate length and then opened up with Lamb scissors.  I then performed the distal anastomosis with 6-0 Prolene suture in continuous running fashion.  It was hemostatic after completing the anastomosis and it was flushed appropriately prior to completing the anastomosis.  I then checked a signal in the bypass graft and in the posterior tibial artery distal to the bypass graft and he had excellent multiphasic  flow through each of them.  I uncinated a posterior tibial artery signal at the level of the ankle and it was nice and multiphasic and augmented appropriately with the bypass.  The patient was then given 40 mg of protamine.  We then used a Doppler to insonate the bypass to check for any vein branches and all vein branches were ligated with either 3-0 silk suture or surgical clips.  The wound was then irrigated with warm normal saline.  Both the groin wound and the leg wound were all evaluated meticulously for hemostasis.  Irrisept was used to irrigate the wound mainly up in the groin wound where the bovine pericardial patch was present.  Platelet rich plasma was instilled into the wounds as well.  Once we are satisfied with hemostasis we then closed the wound in layers with 2-0 Vicryl suture in a deep layer followed by 3-0 Vicryl suture and then staples for the skin.  After closing the incision I did insonate a signal in the posterior tibial artery again which was strong and multiphasic.  The leg was then cleaned and then the incision was dressed with island dressings.  Patient was aroused from anesthesia and transported back to his ICU in stable condition.  He tolerated procedure well there were no intraoperative complications and all sponge instrument and needle counts were correct at the conclusion of the procedure.  I discussed the outcome of the procedure with the patient's girlfriend and brother.    Nura Salgado II, MD  01/04/24     Active Hospital Problems    Diagnosis  POA    **Critical limb ischemia of both lower extremities [I70.223]  Yes    Degenerative disc disease, lumbar [M51.36]  Yes      Resolved Hospital Problems   No resolved problems to display.

## 2024-01-04 NOTE — ANESTHESIA PREPROCEDURE EVALUATION
Anesthesia Evaluation     Patient summary reviewed and Nursing notes reviewed   NPO Solid Status: > 8 hours             Airway   Mallampati: II  TM distance: >3 FB  Neck ROM: full  No difficulty expected  Dental - normal exam     Pulmonary - normal exam   (+) a smoker Former,  Cardiovascular - normal exam    PT is on anticoagulation therapy    (+) PVD  (-) angina, NAJERA      Neuro/Psych- negative ROS  GI/Hepatic/Renal/Endo    (+) renal disease- CRI    Musculoskeletal     (+) back pain  Abdominal  - normal exam    Bowel sounds: normal.   Substance History - negative use     OB/GYN negative ob/gyn ROS         Other                      Anesthesia Plan    ASA 3     general       Anesthetic plan, risks, benefits, and alternatives have been provided, discussed and informed consent has been obtained with: patient.    CODE STATUS:    Level Of Support Discussed With: Patient  Code Status (Patient has no pulse and is not breathing): CPR (Attempt to Resuscitate)  Medical Interventions (Patient has pulse or is breathing): Full Support

## 2024-01-05 LAB
ACT BLD: 239 SECONDS (ref 89–137)
ACT BLD: 239 SECONDS (ref 89–137)
ACT BLD: 260 SECONDS (ref 89–137)
ACT BLD: 260 SECONDS (ref 89–137)
ANION GAP SERPL CALCULATED.3IONS-SCNC: 11 MMOL/L (ref 5–15)
APTT PPP: 47.1 SECONDS (ref 61–76.5)
APTT PPP: 49.3 SECONDS (ref 61–76.5)
APTT PPP: 57.1 SECONDS (ref 61–76.5)
BASOPHILS # BLD MANUAL: 0.13 10*3/MM3 (ref 0–0.2)
BASOPHILS NFR BLD MANUAL: 1 % (ref 0–1.5)
BUN SERPL-MCNC: 13 MG/DL (ref 8–23)
BUN/CREAT SERPL: 12.9 (ref 7–25)
CALCIUM SPEC-SCNC: 8.3 MG/DL (ref 8.6–10.5)
CHLORIDE SERPL-SCNC: 100 MMOL/L (ref 98–107)
CO2 SERPL-SCNC: 24 MMOL/L (ref 22–29)
CREAT SERPL-MCNC: 1.01 MG/DL (ref 0.76–1.27)
DEPRECATED RDW RBC AUTO: 46.8 FL (ref 37–54)
EGFRCR SERPLBLD CKD-EPI 2021: 79.5 ML/MIN/1.73
EOSINOPHIL # BLD MANUAL: 0.13 10*3/MM3 (ref 0–0.4)
EOSINOPHIL NFR BLD MANUAL: 1 % (ref 0.3–6.2)
ERYTHROCYTE [DISTWIDTH] IN BLOOD BY AUTOMATED COUNT: 14.9 % (ref 12.3–15.4)
GLUCOSE SERPL-MCNC: 114 MG/DL (ref 65–99)
HCT VFR BLD AUTO: 28 % (ref 37.5–51)
HGB BLD-MCNC: 9 G/DL (ref 13–17.7)
INR PPP: 1.06 (ref 0.93–1.1)
LARGE PLATELETS: ABNORMAL
LYMPHOCYTES # BLD MANUAL: 0.64 10*3/MM3 (ref 0.7–3.1)
LYMPHOCYTES NFR BLD MANUAL: 2 % (ref 5–12)
MCH RBC QN AUTO: 27.4 PG (ref 26.6–33)
MCHC RBC AUTO-ENTMCNC: 32.2 G/DL (ref 31.5–35.7)
MCV RBC AUTO: 85 FL (ref 79–97)
MONOCYTES # BLD: 0.25 10*3/MM3 (ref 0.1–0.9)
NEUTROPHILS # BLD AUTO: 11.56 10*3/MM3 (ref 1.7–7)
NEUTROPHILS NFR BLD MANUAL: 82 % (ref 42.7–76)
NEUTS BAND NFR BLD MANUAL: 9 % (ref 0–5)
PLATELET # BLD AUTO: 703 10*3/MM3 (ref 140–450)
PMV BLD AUTO: 7.3 FL (ref 6–12)
POTASSIUM SERPL-SCNC: 4.6 MMOL/L (ref 3.5–5.2)
PROTHROMBIN TIME: 11.5 SECONDS (ref 9.6–11.7)
RBC # BLD AUTO: 3.29 10*6/MM3 (ref 4.14–5.8)
RBC MORPH BLD: NORMAL
SCAN SLIDE: NORMAL
SMALL PLATELETS BLD QL SMEAR: ABNORMAL
SODIUM SERPL-SCNC: 135 MMOL/L (ref 136–145)
TOXIC GRANULATION: ABNORMAL
VARIANT LYMPHS NFR BLD MANUAL: 5 % (ref 19.6–45.3)
WBC NRBC COR # BLD AUTO: 12.7 10*3/MM3 (ref 3.4–10.8)

## 2024-01-05 PROCEDURE — 85730 THROMBOPLASTIN TIME PARTIAL: CPT | Performed by: STUDENT IN AN ORGANIZED HEALTH CARE EDUCATION/TRAINING PROGRAM

## 2024-01-05 PROCEDURE — 85007 BL SMEAR W/DIFF WBC COUNT: CPT | Performed by: STUDENT IN AN ORGANIZED HEALTH CARE EDUCATION/TRAINING PROGRAM

## 2024-01-05 PROCEDURE — 85025 COMPLETE CBC W/AUTO DIFF WBC: CPT | Performed by: STUDENT IN AN ORGANIZED HEALTH CARE EDUCATION/TRAINING PROGRAM

## 2024-01-05 PROCEDURE — 25010000002 CEFAZOLIN PER 500 MG: Performed by: STUDENT IN AN ORGANIZED HEALTH CARE EDUCATION/TRAINING PROGRAM

## 2024-01-05 PROCEDURE — 25010000002 FUROSEMIDE PER 20 MG: Performed by: INTERNAL MEDICINE

## 2024-01-05 PROCEDURE — 80048 BASIC METABOLIC PNL TOTAL CA: CPT | Performed by: STUDENT IN AN ORGANIZED HEALTH CARE EDUCATION/TRAINING PROGRAM

## 2024-01-05 PROCEDURE — 25010000002 HYDROMORPHONE 1 MG/ML SOLUTION: Performed by: STUDENT IN AN ORGANIZED HEALTH CARE EDUCATION/TRAINING PROGRAM

## 2024-01-05 PROCEDURE — 85730 THROMBOPLASTIN TIME PARTIAL: CPT | Performed by: FAMILY MEDICINE

## 2024-01-05 PROCEDURE — 25010000002 HEPARIN (PORCINE) 25000-0.45 UT/250ML-% SOLUTION: Performed by: STUDENT IN AN ORGANIZED HEALTH CARE EDUCATION/TRAINING PROGRAM

## 2024-01-05 PROCEDURE — 85610 PROTHROMBIN TIME: CPT | Performed by: STUDENT IN AN ORGANIZED HEALTH CARE EDUCATION/TRAINING PROGRAM

## 2024-01-05 RX ORDER — FUROSEMIDE 10 MG/ML
20 INJECTION INTRAMUSCULAR; INTRAVENOUS ONCE
Status: COMPLETED | OUTPATIENT
Start: 2024-01-05 | End: 2024-01-05

## 2024-01-05 RX ADMIN — ATENOLOL 12.5 MG: 25 TABLET ORAL at 08:31

## 2024-01-05 RX ADMIN — HYDROMORPHONE HYDROCHLORIDE 0.5 MG: 1 INJECTION, SOLUTION INTRAMUSCULAR; INTRAVENOUS; SUBCUTANEOUS at 02:11

## 2024-01-05 RX ADMIN — MEMANTINE 10 MG: 10 TABLET ORAL at 08:33

## 2024-01-05 RX ADMIN — TAMSULOSIN HYDROCHLORIDE 0.4 MG: 0.4 CAPSULE ORAL at 08:32

## 2024-01-05 RX ADMIN — Medication 10 ML: at 20:50

## 2024-01-05 RX ADMIN — DONEPEZIL HYDROCHLORIDE 20 MG: 5 TABLET, FILM COATED ORAL at 20:50

## 2024-01-05 RX ADMIN — ACETAMINOPHEN 650 MG: 325 TABLET, FILM COATED ORAL at 08:32

## 2024-01-05 RX ADMIN — GABAPENTIN 200 MG: 100 CAPSULE ORAL at 15:03

## 2024-01-05 RX ADMIN — ACETAMINOPHEN 650 MG: 325 TABLET, FILM COATED ORAL at 20:49

## 2024-01-05 RX ADMIN — GABAPENTIN 200 MG: 100 CAPSULE ORAL at 20:50

## 2024-01-05 RX ADMIN — Medication 1 TABLET: at 08:36

## 2024-01-05 RX ADMIN — DOCUSATE SODIUM 100 MG: 100 CAPSULE, LIQUID FILLED ORAL at 08:32

## 2024-01-05 RX ADMIN — CILOSTAZOL 100 MG: 100 TABLET ORAL at 08:32

## 2024-01-05 RX ADMIN — ATORVASTATIN CALCIUM 20 MG: 20 TABLET, FILM COATED ORAL at 08:33

## 2024-01-05 RX ADMIN — Medication 10 ML: at 20:48

## 2024-01-05 RX ADMIN — ASPIRIN 81 MG: 81 TABLET, COATED ORAL at 08:32

## 2024-01-05 RX ADMIN — MEMANTINE 10 MG: 10 TABLET ORAL at 20:50

## 2024-01-05 RX ADMIN — ACETAMINOPHEN 650 MG: 325 TABLET, FILM COATED ORAL at 06:26

## 2024-01-05 RX ADMIN — GABAPENTIN 200 MG: 100 CAPSULE ORAL at 08:32

## 2024-01-05 RX ADMIN — RISPERIDONE 0.25 MG: 0.25 TABLET, FILM COATED ORAL at 08:32

## 2024-01-05 RX ADMIN — HYDROMORPHONE HYDROCHLORIDE 0.5 MG: 1 INJECTION, SOLUTION INTRAMUSCULAR; INTRAVENOUS; SUBCUTANEOUS at 00:12

## 2024-01-05 RX ADMIN — Medication 10 ML: at 02:11

## 2024-01-05 RX ADMIN — POLYETHYLENE GLYCOL 3350 17 G: 17 POWDER, FOR SOLUTION ORAL at 08:31

## 2024-01-05 RX ADMIN — RISPERIDONE 0.5 MG: 0.25 TABLET, FILM COATED ORAL at 20:52

## 2024-01-05 RX ADMIN — SODIUM CHLORIDE 2000 MG: 900 INJECTION INTRAVENOUS at 06:26

## 2024-01-05 RX ADMIN — ACETAMINOPHEN 650 MG: 325 TABLET, FILM COATED ORAL at 15:03

## 2024-01-05 RX ADMIN — OXYCODONE HYDROCHLORIDE 10 MG: 5 TABLET ORAL at 17:47

## 2024-01-05 RX ADMIN — ACETAMINOPHEN 650 MG: 325 TABLET, FILM COATED ORAL at 17:47

## 2024-01-05 RX ADMIN — Medication 10 ML: at 08:31

## 2024-01-05 RX ADMIN — ACETAMINOPHEN 650 MG: 325 TABLET, FILM COATED ORAL at 02:11

## 2024-01-05 RX ADMIN — HEPARIN SODIUM 22 UNITS/KG/HR: 10000 INJECTION, SOLUTION INTRAVENOUS at 18:55

## 2024-01-05 RX ADMIN — Medication 2000 UNITS: at 08:32

## 2024-01-05 RX ADMIN — FUROSEMIDE 20 MG: 10 INJECTION, SOLUTION INTRAMUSCULAR; INTRAVENOUS at 20:48

## 2024-01-05 RX ADMIN — Medication 10 ML: at 00:12

## 2024-01-05 NOTE — CASE MANAGEMENT/SOCIAL WORK
Continued Stay Note  LIZBETH Green     Patient Name: Oscar Wray  MRN: [id number]  Today's Date: 1/5/2024    Admit Date: 12/27/2023    Plan: DC Plan: [facility] accepted and following. Precert started 1/05/2024. PASRR approved.   Discharge Plan       Row Name 01/05/24 1536       Plan    Plan DC Plan: [facility] accepted and following. Precert started 1/05/2024. PASRR approved.    Patient/Family in Agreement with Plan yes    Provided Post Acute Provider List? N/A    Provided Post Acute Provider Quality & Resource List? N/A    Plan Comments CM spoke with intensivist, nursing, and NP to obtain clinical upates in morning rounds. Patient downgraded to telemetry level bed. CM reached out to liaison with [facility] to inquire about acceptance. Melinda with [facility] confirmed they can accept. CM requested Antonio GALLARDO CMA to start precert for approval. CM will continue to follow for any further needs and adjust discharge plan accordingly. DC Barriers: Cardiac monitoring, Neurovascular monitoring, Heparin gtt, and monitor labs.               Expected Discharge Date and Time       Expected Discharge Date Expected Discharge Time    Jan 7, 2024               Tonja Collier RN    Office Phone: [phone]  Office Cell:     [phone]

## 2024-01-05 NOTE — PROGRESS NOTES
[facility] MEDICINE SERVICE  DAILY PROGRESS NOTE    NAME: Oscar Wray  : 1950  MRN: [id number]      LOS: 9 days     PROVIDER OF SERVICE: Aurelio Nash MD    Chief Complaint: Critical limb ischemia of both lower extremities    Subjective:     Interval History:   Did okay today, downgraded from ICU.  S/p  Fem-PT bypass yesterday.  Feels much better, his pain in left leg have significantly improved.  Objective:     Vital Signs  Temp:  [97.3 °F (36.3 °C)-98.6 °F (37 °C)] 97.3 °F (36.3 °C)  Heart Rate:  [] 96  Resp:  [13-20] 19  BP: ()/(39-59) 103/48  Flow (L/min):  [2-5] 2   Body mass index is 30.11 kg/m².    Physical Exam  Vitals and nursing note reviewed.   Constitutional:       General: He is not in acute distress.     Appearance: Normal appearance.   HENT:      Mouth/Throat:      Mouth: Mucous membranes are moist.      Pharynx: Oropharynx is clear.   Eyes:      General: No scleral icterus.     Extraocular Movements: Extraocular movements intact.      Conjunctiva/sclera: Conjunctivae normal.   Cardiovascular:      Rate and Rhythm: Normal rate.      Heart sounds: No murmur heard.     No gallop.   Pulmonary:      Breath sounds: Normal breath sounds. No wheezing or rales.   Abdominal:      General: Bowel sounds are normal.      Palpations: Abdomen is soft.      Tenderness: There is no abdominal tenderness.   Genitourinary:     Comments: Mata catheter in place  Musculoskeletal:         General: No tenderness.      Right lower leg: No edema.      Left lower leg: No edema.   Skin:     Comments: Dressing in place from left groin to left tibial area.   Neurological:      General: No focal deficit present.      Mental Status: He is alert and oriented to person, place, and time.         Scheduled Meds   acetaminophen, 650 mg, Oral, Q4H   Or  acetaminophen, 650 mg, Oral, Q4H   Or  acetaminophen, 650 mg, Rectal, Q4H  aspirin, 81 mg, Oral, Daily  atenolol, 12.5 mg, Oral, Daily  atorvastatin,  20 mg, Oral, Daily  cholecalciferol, 2,000 Units, Oral, Daily  docusate sodium, 100 mg, Oral, Daily  donepezil, 20 mg, Oral, Nightly  gabapentin, 200 mg, Oral, TID  memantine, 10 mg, Oral, Q12H  multivitamin with minerals, 1 tablet, Oral, Daily  polyethylene glycol, 17 g, Oral, Daily  risperiDONE, 0.25 mg, Oral, Daily  risperiDONE, 0.5 mg, Oral, Nightly  sodium chloride, 10 mL, Intravenous, Q12H  tamsulosin, 0.4 mg, Oral, Daily       PRN Meds     artificial tears    heparin    heparin    hydrALAZINE    HYDROmorphone    nitroglycerin    [DISCONTINUED] oxyCODONE **OR** oxyCODONE    oxyCODONE    [COMPLETED] Insert Peripheral IV **AND** sodium chloride    sodium chloride    sodium chloride   Infusions  heparin, 18 Units/kg/hr, Last Rate: 24 Units/kg/hr (01/05/24 1856)          Diagnostic Data    Results from last 7 days   Lab Units 01/05/24  0353 12/31/23  0548 12/30/23  0442   WBC 10*3/mm3 12.70*   < > 14.10*   HEMOGLOBIN g/dL 9.0*   < > 11.7*   HEMATOCRIT % 28.0*   < > 35.5*   PLATELETS 10*3/mm3 703*   < > 464*   GLUCOSE mg/dL 114*   < > 107*   CREATININE mg/dL 1.01   < > 1.13   BUN mg/dL 13   < > 13   SODIUM mmol/L 135*   < > 136   POTASSIUM mmol/L 4.6   < > 3.9   AST (SGOT) U/L  --   --  101*   ALT (SGPT) U/L  --   --  19   ALK PHOS U/L  --   --  80   BILIRUBIN mg/dL  --   --  0.2   ANION GAP mmol/L 11.0   < > 12.0    < > = values in this interval not displayed.       No radiology results for the last day      I reviewed the patient's new clinical results.    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 Desaturated in ED at [facility], suspected cause being respiratory muscle collapse, requiring BiPAP and O2 NC, alongside her home COPD medications. Patient has bilateral LE weakness with intact bowel and bladder continence. 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 Patient was placed on steroids, and MR angio ordered to rule out a spinal cord infarct. Acyclovir started by ID. Patient made herself DNR/DNI, and palliative care consulted for further GOC.       aspirin, Lipitor and cilostazol     Hypertension  -Some borderline blood pressures noted, hold atenolol.     KARINA on CKD stage III  - Acute component has resolved, nephrology is following   - DC iv fluids. Will give 1 dose of Lasix today, given significant scrotal edema and generalized anasarca.    Dyslipidemia  -Continue statin therapy    Dementia  -Continue Aricept and Namenda    BPH  -Continue Flomax    DVT prophylaxis:  Medical and mechanical DVT prophylaxis orders are present.     Code status is   Code Status and Medical Interventions:   Ordered at: 12/28/23 0752     Level Of Support Discussed With:    Patient     Code Status (Patient has no pulse and is not breathing):    CPR (Attempt to Resuscitate)     Medical Interventions (Patient has pulse or is breathing):    Full Support     Plan for disposition: Precertification started on 1/5/2024.    Signature: Electronically signed by Aurelio Nash MD, 01/05/24, 19:41 EST.  [facility], operated by [facility] Hospitalist Team

## 2024-01-05 NOTE — PROGRESS NOTES
Critical Care Progress Note     Oscar Wray : 1950 MRN:[id number] LOS:8  Rm: 2308/1     Principal Problem: Critical limb ischemia of both lower extremities     Reason for follow up: All the medical problems listed below    Summary     A 73 y.o. old male patient with PMH of CKD, PAD, DM, Hyperlipidemia,  presents to the hospital with complaints of rest pain in the left foot. Vascular surgery was consulted and recommended a left femoral endarterectomy and bilateral iliac stenting with possible bypass. Today to the patient was taken to OR for procedure. Patient underwent bilateral femoral enterectomy and iliac artery stents, left leg angiogram. Patient will be admitted to ICU for post op monitoring.  Patient was downgraded back to hospitalist service, and vascular surgery continue to follow patient.  Patient ultimately underwent femoral-tibial bypass on 2024, and was postoperatively transitioned back to critical care level for monitoring overnight.  There were no complications noted intraoperatively.    Significant Events     24 : Status post femoral-tibial bypass, hemodynamically stable, transition back to critical care postoperatively.    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  Underwent femoral tibial bypass on 2024 without complications.  Continue aspirin, Lipitor, Pletal    Primary Hypertension: well controlled.   Continue atenolol .   Titrate medications as needed.    Dyslipidemia: Continue statin therapy.  Dementia -- Donepezil and memantine, risperdal continued.  CKD II -- Monitor electrolytes, I/O, monitor blood pressure  Chronic low back pain  Enlarged prostate: Continue Flomax.    Code status:   Level Of Support Discussed With: Patient  Code Status (Patient  has no pulse and is not breathing): CPR (Attempt to Resuscitate)  Medical Interventions (Patient has pulse or is breathing): Full Support       Nutrition:   Diet: Regular/House Diet; Texture: Regular Texture (IDDSI 7); Fluid Consistency: Thin (IDDSI 0)   Patient isn't on Tube Feeding     DVT prophylaxis:  Medical and mechanical DVT prophylaxis orders are present.     Subjective / Review of systems     Review of Systems   Unable to perform ROS: Mental status change (History of dementia.)        Objective / Physical Exam     Vital signs:  Temp: 98.3 °F (36.8 °C)  BP: 131/65  Heart Rate: 96  Resp: 13  SpO2: 98 %  Weight: 86.4 kg (190 lb 7.6 oz)    Admission Weight: Weight: 89.5 kg (197 lb 5 oz)  Current Weight: Weight: 86.4 kg (190 lb 7.6 oz)    Input/Output in last 24 hours:    Intake/Output Summary (Last 24 hours) at 1/4/2024 2014  Last data filed at 1/4/2024 1434  Gross per 24 hour   Intake 1780 ml   Output 3250 ml   Net -1470 ml      Net IO Since Admission: 1,661.4 mL [01/04/24 2014]     Physical Exam  Constitutional:       General: He is not in acute distress.     Appearance: He is not ill-appearing, toxic-appearing or diaphoretic.   HENT:      Head: Normocephalic and atraumatic.      Nose: Nose normal. No congestion.      Mouth/Throat:      Mouth: Mucous membranes are moist.      Pharynx: Oropharynx is clear. No oropharyngeal exudate.   Eyes:      General: No scleral icterus.     Extraocular Movements: Extraocular movements intact.      Conjunctiva/sclera: Conjunctivae normal.      Pupils: Pupils are equal, round, and reactive to light.   Cardiovascular:      Rate and Rhythm: Normal rate and regular rhythm.      Pulses: Normal pulses.           Popliteal pulses are 2+ on the right side and 2+ on the left side.        Dorsalis pedis pulses are 2+ on the right side and 2+ on the left side.      Heart sounds: Normal heart sounds.   Pulmonary:      Effort: Pulmonary effort is normal.      Breath sounds: Normal breath  sounds.   Abdominal:      General: Bowel sounds are normal. There is no distension.      Tenderness: There is no abdominal tenderness.   Musculoskeletal:         General: No swelling.      Right lower leg: No edema.      Left lower leg: No edema.   Skin:     General: Skin is warm and dry.   Neurological:      Mental Status: He is alert.      Comments: Confused, baseline dementia.  Follows commands, no focal deficits observed.   Psychiatric:      Comments: Calm, cooperative.          Radiology and Labs     Results from last 7 days   Lab Units 01/04/24  0739 01/03/24  0829 01/02/24  0315 01/01/24  0650 12/31/23  0548   WBC 10*3/mm3 10.60 11.80* 9.40 10.90* 10.60   HEMOGLOBIN g/dL 10.2* 10.3* 10.0* 10.7* 10.9*   HEMATOCRIT % 31.7* 31.1* 30.0* 31.8* 32.8*   PLATELETS 10*3/mm3 634* 562* 479* 473* 393      Results from last 7 days   Lab Units 01/04/24  0031 01/03/24  1821 01/03/24  0829 01/03/24  0114 01/02/24  1813 01/01/24  0650 12/31/23  2103 12/30/23  0442 12/29/23  1857   PROTIME Seconds  --   --   --   --   --   --  13.7*  --  11.9*   INR   --   --   --   --   --   --  1.28*  --  1.10   APTT seconds 79.7* 61.0 55.7* 80.1* 66.9   < > 38.2*   < > >139.0*    < > = values in this interval not displayed.      Results from last 7 days   Lab Units 01/04/24  0739 01/03/24  0829 01/02/24  0315 01/01/24  0650 12/31/23  0548   SODIUM mmol/L 138 134* 133* 134* 135*   POTASSIUM mmol/L 4.2 4.3 3.6 4.4 3.7   CHLORIDE mmol/L 101 101 102 99 102   CO2 mmol/L 26.0 25.0 26.0 25.0 22.0   BUN mg/dL 11 10 9 9 11   CREATININE mg/dL 1.05 0.95 1.04 1.01 1.06   GLUCOSE mg/dL 85 99 101* 107* 105*      Results from last 7 days   Lab Units 12/30/23  0442   ALK PHOS U/L 80   AST (SGOT) U/L 101*   ALT (SGPT) U/L 19     Results from last 7 days   Lab Units 01/01/24  1117 12/29/23  1844 12/29/23  1453   PH, ARTERIAL pH units 7.512* 7.316* 7.270*   PCO2, ARTERIAL mm Hg 28.4* 37.3 47.3*   PO2 ART mm Hg 79.5* 89.4 182.0*   O2 SATURATION ART % 97.0 96.1  99.0*   FIO2 % 21 21  --    HCO3 ART mmol/L 22.8 19.0* 21.9*   BASE EXCESS ART mmol/L 0.6 -6.5* <0.0*       Current medications     Scheduled Meds:   acetaminophen, 650 mg, Oral, Q4H   Or  acetaminophen, 650 mg, Oral, Q4H   Or  acetaminophen, 650 mg, Rectal, Q4H  aspirin, 81 mg, Oral, Daily  atenolol, 12.5 mg, Oral, Daily  atorvastatin, 20 mg, Oral, Daily  ceFAZolin, 2,000 mg, Intravenous, Q8H  cholecalciferol, 2,000 Units, Oral, Daily  cilostazol, 100 mg, Oral, BID  docusate sodium, 100 mg, Oral, Daily  donepezil, 20 mg, Oral, Nightly  gabapentin, 200 mg, Oral, TID  memantine, 10 mg, Oral, Q12H  multivitamin with minerals, 1 tablet, Oral, Daily  polyethylene glycol, 17 g, Oral, Daily  risperiDONE, 0.25 mg, Oral, Daily  risperiDONE, 0.5 mg, Oral, Nightly  sodium chloride, 10 mL, Intravenous, Q12H  tamsulosin, 0.4 mg, Oral, Daily        Continuous Infusions:   [Held by provider] heparin, 18 Units/kg/hr, Last Rate: Stopped (01/04/24 7632)  lactated ringers, 50 mL/hr, Last Rate: 50 mL/hr (01/04/24 6157)          Plan discussed with RN. Reviewed all other data in the last 24 hours, including but not limited to vitals, labs, microbiology, imaging and pertinent notes from other providers.      ELOY Lim   Critical Care  01/04/24   20:14 EST

## 2024-01-05 NOTE — NURSING NOTE
Heparin drip restarted at 2200 per nursing communication with Dr. Salgado. Heparin drip rate 18 units/kg/hr as per previous order. Pt's bp has been elevated administered hydralazine 10 mg iv. PTT ordered for 0400.

## 2024-01-05 NOTE — PROGRESS NOTES
Name: Oscar Wray ADMIT: 2023   : 1950  PCP: Kajal Horowitz PA    MRN: [id number] LOS: 9 days   AGE/SEX: 73 y.o. male  ROOM: [address] [place] [address]    [place], Post Op [employer]    73 y.o. male POD #7 s/p  bilateral common femoral endarterectomies, aortic and bilateral iliac stents. POD #1 s/p fem-PT with vein    Subjective: Feels okay in bed this morning.  Pain controlled.    Scheduled Medications:   acetaminophen, 650 mg, Oral, Q4H   Or  acetaminophen, 650 mg, Oral, Q4H   Or  acetaminophen, 650 mg, Rectal, Q4H  aspirin, 81 mg, Oral, Daily  atenolol, 12.5 mg, Oral, Daily  atorvastatin, 20 mg, Oral, Daily  cholecalciferol, 2,000 Units, Oral, Daily  cilostazol, 100 mg, Oral, BID  docusate sodium, 100 mg, Oral, Daily  donepezil, 20 mg, Oral, Nightly  gabapentin, 200 mg, Oral, TID  memantine, 10 mg, Oral, Q12H  multivitamin with minerals, 1 tablet, Oral, Daily  polyethylene glycol, 17 g, Oral, Daily  risperiDONE, 0.25 mg, Oral, Daily  risperiDONE, 0.5 mg, Oral, Nightly  sodium chloride, 10 mL, Intravenous, Q12H  tamsulosin, 0.4 mg, Oral, Daily      Active Infusions:  heparin, 18 Units/kg/hr, Last Rate: 20 Units/kg/hr (24 0424)  lactated ringers, 50 mL/hr, Last Rate: 50 mL/hr (24 911)      Vital Signs  Vital Signs Patient Vitals for the past 24 hrs:   BP Temp Temp src Pulse Resp SpO2 Weight   24 0800 -- 97.5 °F (36.4 °C) Oral -- -- -- --   24 0632 -- -- -- 92 -- 94 % --   24 0600 -- -- -- 83 -- 94 % --   24 0500 -- -- -- 97 -- 95 % --   24 0400 -- 98.2 °F (36.8 °C) Oral 99 14 94 % 87.2 kg (192 lb 3.9 oz)   24 0300 -- -- -- 92 -- 94 % --   24 0200 -- -- -- 97 -- 95 % --   24 0100 -- -- -- 103 14 94 % --   24 0030 -- -- -- 102 13 95 % --   24 0015 -- -- -- 112 -- 98 % --   24 0000 -- 98.4 °F (36.9 °C) Oral 105 -- 96 % --   24 2345 -- -- -- 112 -- 97 % --   24 2330 -- -- -- 119 -- 98 % --   24 2315 -- -- -- 110  -- 99 % --   01/04/24 2300 -- -- -- 110 -- 98 % --   01/04/24 2230 -- -- -- 109 -- 99 % --   01/04/24 2200 -- -- -- 98 -- 98 % --   01/04/24 2130 -- -- -- 104 -- 99 % --   01/04/24 2100 -- -- -- 92 -- 99 % --   01/04/24 2030 -- -- -- 92 -- 98 % --   01/04/24 2000 -- 98.6 °F (37 °C) Oral 90 20 97 % --   01/04/24 1930 -- -- -- 93 -- 97 % --   01/04/24 1900 -- -- -- 90 -- 98 % --   01/04/24 1830 -- -- -- 96 -- 98 % --   01/04/24 1800 -- -- -- 80 -- 94 % --   01/04/24 1700 -- -- -- 92 -- 95 % --   01/04/24 1555 -- 98.3 °F (36.8 °C) Oral -- -- -- --   01/04/24 1550 131/65 -- -- 90 -- 90 % --   01/04/24 1545 -- 97.4 °F (36.3 °C) Axillary 84 13 90 % --   01/04/24 1540 121/63 -- -- 83 14 (!) 89 % --   01/04/24 1535 127/62 -- -- 73 14 98 % --   01/04/24 1530 115/59 -- -- 82 12 98 % --   01/04/24 1525 115/57 -- -- 74 13 98 % --   01/04/24 1520 106/54 -- -- 77 12 99 % --   01/04/24 1515 106/53 -- -- 78 12 97 % --   01/04/24 1510 109/47 98.5 °F (36.9 °C) Axillary 72 12 94 % --     I/O:  I/O last 3 completed shifts:  In: 3140 [P.O.:480; I.V.:2410; IV Piggyback:250]  Out: 3750 [Urine:3600; Blood:150]    Physical Exam   Foot improved and is now red and hyperemic.  Some edema so not able to palpate the posterior tibial pulse but has an excellent multiphasic signal.  Also has some branches on the dorsum of the foot that are dopplerable but not in a classic AT location.      Imaging:  No radiology results for the last day       CBC    Results from last 7 days   Lab Units 01/05/24  0353 01/04/24  0739 01/03/24  0829 01/02/24  0315 01/01/24  0650 12/31/23  0548 12/30/23  0442   WBC 10*3/mm3 12.70* 10.60 11.80* 9.40 10.90* 10.60 14.10*   HEMOGLOBIN g/dL 9.0* 10.2* 10.3* 10.0* 10.7* 10.9* 11.7*   PLATELETS 10*3/mm3 703* 634* 562* 479* 473* 393 464*     BMP   Results from last 7 days   Lab Units 01/05/24  0353 01/04/24  0739 01/03/24  0829 01/02/24  0315 01/01/24  0650 12/31/23  0548 12/30/23  0442   SODIUM mmol/L 135* 138 134* 133* 134*  135* 136   POTASSIUM mmol/L 4.6 4.2 4.3 3.6 4.4 3.7 3.9   CHLORIDE mmol/L 100 101 101 102 99 102 103   CO2 mmol/L 24.0 26.0 25.0 26.0 25.0 22.0 21.0*   BUN mg/dL 13 11 10 9 9 11 13   CREATININE mg/dL 1.01 1.05 0.95 1.04 1.01 1.06 1.13   GLUCOSE mg/dL 114* 85 99 101* 107* 105* 107*     Coag   Results from last 7 days   Lab Units 01/05/24  0353 01/04/24  0031 01/03/24  1821 01/03/24  0829 01/03/24  0114 01/02/24  1813 01/02/24  1012 01/01/24  0650 12/31/23  2103 12/30/23  0442 12/29/23  1857   INR  1.06  --   --   --   --   --   --   --  1.28*  --  1.10   APTT seconds 47.1* 79.7* 61.0 55.7* 80.1* 66.9 59.4*   < > 38.2*   < > >139.0*    < > = values in this interval not displayed.     Assessment & Plan   Assessment & Plan    Critical limb ischemia of both lower extremities    Degenerative disc disease, lumbar    73 y.o. male  s/p  bilateral common femoral endarterectomies, aortic and bilateral iliac stents (12/29/23) followed by fem-PT with vein (1/4/24)    1/2/24:Patient has done well overall and feet are considerably improved from prior to his surgery, both on exam and per patients report of his symptoms. He does still report intermittent pain in the left foot, especially in the toes at rest. I had a long discussion with the patient and his girlfriend today. I think with the poor response in his ABIs and ongoing pain and duskiness in his toes that a femoral-PT bypass would be indicated. Patient has not really attempted to ambulate since his surgery on Friday and I encouraged him to try to do so with physical therapy.   Will plan for Left femoral-PT bypass later this week, probably Thursday if OR schedule allows.   Will continue to monitor his groin incisions for now.     1/3/24: Patient stable.  On a heparin drip.  He is currently on the OR schedule for tomorrow morning at 9:30 AM to do a left femoral to posterior tibial artery bypass with in situ greater saphenous vein.  Will plan to stop heparin drip around 4  AM.    1/4/24: OR for Bypass    1/5/24: No major issues overnight.  Heparin restarted and PTT from 79-47 the last 2 checks.  Hemoglobin 9.  Platelet count 703.  Extensive duplications in his order sets.  I reviewed and addressed.  DC A-line.  Hep-Lock IV fluids.  Had a tremendous amount of scrotal edema so I would recommend keeping the Mata catheter for today and have DC'd the order for removal.  May benefit from diuresis.  Will plan to remove the dressings tomorrow.  Excellent multiphasic signal at the PT level.  Hyperemic foot.    Please call my office with any question: [phone]    Active Hospital Problems    Diagnosis  POA    **Critical limb ischemia of both lower extremities [I70.223]  Yes    Degenerative disc disease, lumbar [M51.36]  Yes      Resolved Hospital Problems   No resolved problems to display.

## 2024-01-05 NOTE — PLAN OF CARE
Goal Outcome Evaluation:               Pt A&O except to time and place, not new, patient also has dementia. PD pulses audible with doppler bilaterally along with posterior tibs. Dr. Lozano with vascular rounded on patient today, Mata is to stay in for today and to reevaluate tomorrow. PT/OT consult placed today for post op reeval, not seen patient yet. Pt has had bps right at borderline throughout the day after 12.5mg atenelol dose in the morning. Hep continues to infuse. L Leg dressing with minimal new blood drainage, marked with marker. PTT recheck placed per protocol. Vss

## 2024-01-05 NOTE — PLAN OF CARE
Goal Outcome Evaluation:   Patient's vital signs stable. Currently on room air. Surgical incision dressing assessed and drainage sites marked. Pulses in feet present all night, doppler used to assess pulses and pulse sites marked. Patient resting comfortably.

## 2024-01-05 NOTE — SIGNIFICANT NOTE
01/05/24 1123   OTHER   Discipline physical therapist   Rehab Time/Intention   Session Not Performed other (see comments)  (RN requested hold due to recent LE bypass graft.)   Therapy Assessment/Plan (PT)   Criteria for Skilled Interventions Met (PT) skilled treatment is necessary   Recommendation   PT - Next Appointment 01/06/24

## 2024-01-05 NOTE — CONSULTS
Nutrition Services    Patient Name: Oscar Wray  YOB: 1950  MRN: [id number]  Admission date: 12/27/2023    PPE Documentation        PPE Worn By Provider Did not enter room this encounter   PPE Worn By Patient  N/A     NUTRITION SCREENING      Encounter Information: Check on for LOS x 9 days.  Admitted for bilateral lower extremity pain.  Other conditions include dementia, HLD.  S/P bilateral femoral enterectomy and iliac artery stents with left leg angiogram.  Nephrology and Vascular Surgery following.  Patient unavailable for RD visit x 2 attempts.           PO Diet: Diet: Regular/House Diet; Texture: Regular Texture (IDDSI 7); Fluid Consistency: Thin (IDDSI 0)   PO Supplements: None ordered    PO Intake:  57% average PO intakes x 14 documented meals since admission        Labs (reviewed below): Reviewed, management per attending         GI Function:  Last documented BM 12/29 (x 7 days ago) - bowel regimen noted        Skin: 4 incisions        Weight Hx Review: Wt Readings from Last 40 Encounters:   01/05/24 87.2 kg (192 lb 3.9 oz)   10/12/23 74.4 kg (164 lb)   06/30/23 77.6 kg (171 lb)   04/07/23 79.7 kg (175 lb 12.8 oz)   01/13/23 75.3 kg (166 lb)   06/09/22 79.8 kg (176 lb)          Nutrition Intervention: Continue current diet and encourage good PO intakes.       Results from last 7 days   Lab Units 01/05/24  0353 01/04/24  0739 01/03/24  0829 12/31/23  0548 12/30/23  0442   SODIUM mmol/L 135* 138 134*   < > 136   POTASSIUM mmol/L 4.6 4.2 4.3   < > 3.9   CHLORIDE mmol/L 100 101 101   < > 103   CO2 mmol/L 24.0 26.0 25.0   < > 21.0*   BUN mg/dL 13 11 10   < > 13   CREATININE mg/dL 1.01 1.05 0.95   < > 1.13   CALCIUM mg/dL 8.3* 8.3* 8.4*   < > 8.3*   BILIRUBIN mg/dL  --   --   --   --  0.2   ALK PHOS U/L  --   --   --   --  80   ALT (SGPT) U/L  --   --   --   --  19   AST (SGOT) U/L  --   --   --   --  101*   GLUCOSE mg/dL 114* 85 99   < > 107*    < > = values in this interval not displayed.  "    Results from last 7 days   Lab Units 01/05/24  0353   HEMOGLOBIN g/dL 9.0*   HEMATOCRIT % 28.0*     No results found for: \"COVID19\"  Lab Results   Component Value Date    HGBA1C 5.60 12/27/2023       RD to follow up per protocol.    Electronically signed by:  Donna Fernández, AYESHA  01/05/24 13:07 EST    "

## 2024-01-05 NOTE — DISCHARGE PLACEMENT REQUEST
"Oscar Christian (73 y.o. Male)       Date of Birth   1950    Social Security Number       Address   [address] [place] [address] [address]  [place] [facility]    Home Phone   [phone]    MRN   [id number]       Buddhism   Jehovah's witness    Marital Status   Single                            Admission Date   12/27/23    Admission Type   Emergency    Admitting Provider   Aurelio Nash MD    Attending Provider   Pablito Deleon MD    Department, Room/Bed   [facility] INTENSIVE CARE UNIT, 2308/1       Discharge Date       Discharge Disposition       Discharge Destination                                 Attending Provider: Pablito Deleon MD    Allergies: No Known Allergies    Isolation: None   Infection: None   Code Status: CPR    Ht: 170.2 cm (67\")   Wt: 87.2 kg (192 lb 3.9 oz)    Admission Cmt: None   Principal Problem: Critical limb ischemia of both lower extremities [I70.223]                   Active Insurance as of 12/27/2023       Primary Coverage       Payor Plan Insurance Group Employer/Plan Group    ANTHEM MEDICARE REPLACEMENT ANTHEM MEDICARE ADVANTAGE [id number]       Payor Plan Address Payor Plan Phone Number Payor Plan Fax Number Effective Dates    [address] [phone]  1/1/2022 - None Entered    [facility] [phone]         Subscriber Name Subscriber Birth Date Member ID       OSCAR CHRISTIAN 1950 [id number]               Secondary Coverage       Payor Plan Insurance Group Employer/Plan Group    [place]        Payor Plan Address Payor Plan Phone Number Payor Plan Fax Number Effective Dates    [address]   4/15/2022 - None Entered    [place] [address]         Subscriber Name Subscriber Birth Date Member ID       OSCAR CHRISTIAN 1950 [id number]                     Emergency Contacts        (Rel.) Home Phone Work Phone Mobile Phone    ZAYGINA (Significant Other) [phone] -- [phone]    CARLOS CHRISTIAN (Brother) " -- -- [phone]                 History & Physical        Margareth Geiger MD at [address] [place] [address] Medicine Services  History & Physical    Patient Name: Oscar Wray  : 1950  MRN: [id number]  Primary Care Physician:  Kajal Horowitz PA  Date of admission: 2023  Date and Time of Service: 2023 at 5 pm    Subjective      Chief Complaint: Bilateral lower extremity pain left more than right    History of Present Illness: Oscar Wray is a 73 y.o. male with a CMH of PAD who presented to [facility] on 2023 with pain in the left foot more than right.  ABIs 0 bilaterally but did have some tibial flow noted, however it was quite minimal and is developing ischemic ulcers on toes, is having severe pain at rest.   He has a CTA already ordered by the ER  Vascular surgery has reviewed his prior CTA and he likely will need L femoral endarterectomy and bilateral iliac stents, possible bypass. check vein mapping and plan to get stress test soon as well, started on heparin gtt for now.  Continue home aspirin.   Review of Systems   Unable to perform ROS: Dementia       Personal History     Past Medical History:   Diagnosis Date    Dementia        No past surgical history on file.    Family History: family history is not on file. Otherwise pertinent FHx was reviewed and not pertinent to current issue.    Social History:  reports that he has quit smoking. His smoking use included cigarettes. He has never used smokeless tobacco. He reports current alcohol use. He reports that he does not use drugs.    Home Medications:  Prior to Admission Medications       Prescriptions Last Dose Informant Patient Reported? Taking?    aspirin EC tablet 81 mg   No No    atenolol (TENORMIN) 25 MG tablet   Yes No    atorvastatin (LIPITOR) 20 MG tablet   No No    Take 1 tablet by mouth Daily.    celecoxib (CeleBREX) 100 MG capsule   No No    Take 1 capsule by mouth 2 (Two) Times a Day.     Cholecalciferol (Vitamin D3) 50 MCG (2000 UT) capsule   Yes No    cilostazol (PLETAL) 100 MG tablet   No No    Take 1 tablet by mouth 2 (Two) Times a Day.    dapagliflozin Propanediol (Farxiga) 10 MG tablet   No No    Take 10 mg by mouth Daily.    docusate sodium (COLACE) 100 MG capsule   Yes No    donepezil (ARICEPT) 23 MG tablet   Yes No    memantine (NAMENDA XR) 28 MG capsule sustained-release 24 hr extended release capsule   Yes No    Omega-3 Fatty Acids (fish oil) 1000 MG capsule capsule   Yes No    Take  by mouth Daily With Breakfast.    pregabalin (Lyrica) 25 MG capsule   No No    Take 1 capsule by mouth 2 (Two) Times a Day.    risperiDONE (risperDAL) 0.25 MG tablet   Yes No    risperiDONE (risperDAL) 0.5 MG tablet   Yes No    tamsulosin (FLOMAX) 0.4 MG capsule 24 hr capsule   Yes No              Allergies:  No Known Allergies    Objective      Vitals:   Temp:  [97.3 °F (36.3 °C)] 97.3 °F (36.3 °C)  Heart Rate:  [77-80] 77  Resp:  [20] 20  BP: (102-135)/(59-86) 135/86  Body mass index is 30.9 kg/m².  Physical Exam  Constitutional:       Appearance: Normal appearance. He is obese.   HENT:      Head: Normocephalic.      Right Ear: External ear normal.      Left Ear: External ear normal.   Eyes:      Extraocular Movements: Extraocular movements intact.      Pupils: Pupils are equal, round, and reactive to light.   Cardiovascular:      Heart sounds: Normal heart sounds.   Pulmonary:      Effort: Pulmonary effort is normal.   Abdominal:      General: Abdomen is flat.   Musculoskeletal:      Cervical back: Normal range of motion and neck supple.      Right lower leg: No edema.      Left lower leg: No edema.      Comments: Quality lower extremities/feet, absence of pedal pulses, dusky appearing toes.  Bilaterally   Neurological:      General: No focal deficit present.      Mental Status: He is alert.               Diagnostic Data:  Lab Results (last 24 hours)       Procedure Component Value Units Date/Time    aPTT  [[id number]]  (Abnormal) Collected: 12/27/23 1529    Specimen: Blood Updated: 12/27/23 1656     PTT 31.6 seconds     Extra Tubes [[id number]] Collected: 12/27/23 1529    Specimen: Blood, Venous Line Updated: 12/27/23 1631    Narrative:      The following orders were created for panel order Extra Tubes.  Procedure                               Abnormality         Status                     ---------                               -----------         ------                     Gold Top - SST[[id number]]                                   Final result                 Please view results for these tests on the individual orders.    Gold Top - SST [[id number]] Collected: 12/27/23 1529    Specimen: Blood Updated: 12/27/23 1631     Extra Tube Hold for add-ons.     Comment: Auto resulted.       Comprehensive Metabolic Panel [[id number]]  (Abnormal) Collected: 12/27/23 1529    Specimen: Blood Updated: 12/27/23 1602     Glucose 134 mg/dL      BUN 15 mg/dL      Creatinine 1.50 mg/dL      Sodium 135 mmol/L      Potassium 4.2 mmol/L      Chloride 97 mmol/L      CO2 23.0 mmol/L      Calcium 9.7 mg/dL      Total Protein 7.6 g/dL      Albumin 4.3 g/dL      ALT (SGPT) 13 U/L      AST (SGOT) 18 U/L      Alkaline Phosphatase 106 U/L      Total Bilirubin 0.6 mg/dL      Globulin 3.3 gm/dL      A/G Ratio 1.3 g/dL      BUN/Creatinine Ratio 10.0     Anion Gap 15.0 mmol/L      eGFR 49.5 mL/min/1.73     Narrative:      GFR Normal >60  Chronic Kidney Disease <60  Kidney Failure <15    The GFR formula is only valid for adults with stable renal function between ages 18 and 70.    Protime-INR [[id number]]  (Normal) Collected: 12/27/23 1529    Specimen: Blood Updated: 12/27/23 1548     Protime 11.2 Seconds      INR 1.03    CBC & Differential [[id number]]  (Abnormal) Collected: 12/27/23 1529    Specimen: Blood Updated: 12/27/23 1544    Narrative:      The following orders were created for panel order CBC & Differential.  Procedure                                Abnormality         Status                     ---------                               -----------         ------                     CBC Auto Differential[[id number]]        Abnormal            Final result                 Please view results for these tests on the individual orders.    CBC Auto Differential [[id number]]  (Abnormal) Collected: 12/27/23 1529    Specimen: Blood Updated: 12/27/23 1544     WBC 14.30 10*3/mm3      RBC 5.05 10*6/mm3      Hemoglobin 14.3 g/dL      Hematocrit 43.9 %      MCV 87.0 fL      MCH 28.3 pg      MCHC 32.5 g/dL      RDW 14.9 %      RDW-SD 44.6 fl      MPV 7.6 fL      Platelets 646 10*3/mm3      Neutrophil % 85.0 %      Lymphocyte % 6.5 %      Monocyte % 7.1 %      Eosinophil % 0.6 %      Basophil % 0.8 %      Neutrophils, Absolute 12.10 10*3/mm3      Lymphocytes, Absolute 0.90 10*3/mm3      Monocytes, Absolute 1.00 10*3/mm3      Eosinophils, Absolute 0.10 10*3/mm3      Basophils, Absolute 0.10 10*3/mm3      nRBC 0.1 /100 WBC              Imaging Results (Last 24 Hours)       ** No results found for the last 24 hours. **              Assessment & Plan        This is a 73 y.o. male with: Critical limb ischemia bilateral lower extremity    Active and Resolved Problems  Active Hospital Problems    Diagnosis  POA    Degenerative disc disease, lumbar [M51.36]  Yes      Resolved Hospital Problems   No resolved problems to display.   Acute on chronic critical limb ischemia bilateral; continue IV heparin and aspirin, vascular surgery following, await CTA results  Dementia; resume home medications when verified  Hyperlipidemia; resume home medications when verified  Hyperglycemia; no history of diabetes, check A1c.  Elevated serum creatinine; KARINA versus CKD, nephrology consulted.      DVT prophylaxis:  Medical DVT prophylaxis orders are present.    The patient desires to be as follows:    CODE STATUS:       Full code    Admission Status:  I believe this patient meets inpatient  status.    Expected Length of Stay: 3 to 5 days    PDMP and Medication Dispenses via Sidebar reviewed and consistent with patient reported medications.    I discussed the patient's findings and my recommendations with patient and family.      Signature:   Electronically signed by Margareth Geiger MD, 12/27/23, 5:27 PM EST.      This document has been electronically signed by Margareth Geiger MD on December 27, 2023 16:56 EST   [facility]    Electronically signed by Margareth Geiger MD at 12/27/23 1728          Operative/Procedure Notes (all)        Nura Salgado II, MD at 12/29/23 1014  Version 1 of 1         ANGIOPLASTY ILIAC ARTERY POSSIBLE STENT  Progress Note    Oscar Wray  12/29/2023    Pre-op Diagnosis:   Acute on chronic peripheral arterial disease with rest pain, left foot       Post-Op Diagnosis Codes:   See op note    Procedure/CPT® Codes:        Procedure(s):  BILATERAL FEMORAL ENDARTECTOMY AND ILIAC ARTERY STENTS, LEFT LEG ANGIOGRAM              Surgeon(s):  Trisha Coffman MD Bush, Charles A II, MD    Anesthesia: General    Staff:   Cell Saver : Kin Soliz  Circulator: Som Maloney RN; Ijeoma Villarreal RN  Radiology Technologist: Mahamed Braun, BRYAN; Dejah Ellsworth  Scrub Person: Jacquelyn Diaz Jessica         Estimated Blood Loss: 550 mL    Urine Voided: 850 mL    Specimens:                Thrombus from left iliac artery sent to pathology.           Drains:   Urethral Catheter Silicone 16 Fr. (Active)       Findings: See dictated op note        Complications: none intraoperative    Trisha Coffman MD   was responsible for performing the following activities: Retraction, Suction, Irrigation, Suturing, and Closing and their skilled assistance was necessary for the success of this case.    Nura Salgado II, MD     Date: 12/29/2023  Time: 16:53 EST          Electronically signed by Nura Salgado II, MD at 12/29/23 4534       Damian  Nura VELASQUEZ II, MD at 12/29/23 1014  Version 1 of 1         Date of Admission:  12/27/2023  Today's Date:  12/29/23  Nura Salgado II, MD  [facility]    Preoperative Diagnosis:   Peripheral vascular disease with bilateral leg rest pain and acute limb ischemia left leg    Postoperative Diagnosis:   Same    Procedure Performed:   Bilateral common femoral endarterectomy with bovine pericardial patch angioplasty  Thrombectomy left external iliac artery  Nonselective pelvic arteriogram  Bilateral iliac stents, 7Ne22gk Eugene VBX bilateral kissing iliac stents extending into aorta,  two 0g698hc and two 8x75mm Eugene Viabahn extending from common iliac down to distal external iliac bilateral  Left leg arteriogram    CPT:  83323-71 thromboendarterectomy with patch if performed; common femoral, bilateral  48322 - Thromboendarterectomy, with patch if performed; deep(profunda) femoral.  35067-76 Embolectomy/thrombectomy, with or without catheter, femoral-popliteal, aortoiliac artery. By LEG incision.   26036-76 Iliac stent initial bilateral  77617-30 Iliac stent additional bilateral  08955-15 Unilateral extremity runoff arteriogram (no diagnostic quality arterial imaging prior or significant change in vascular status)        Surgeon:   Nura Salgado II, MD    Assistant:    Trisha Coffman MD, Provided critical assistance in exposure, retraction, and suction that overall decrease blood loss and operative time.    Anesthesia:   General    Estimated Blood Loss:   550    Findings:    Patient with completely occluded external iliac arteries bilaterally and high-grade stenosis of common femoral arteries with calcified plaque bilaterally.  Bilateral common femoral endarterectomies performed, endarterectomy with patch angioplasty of left profundofemoral artery performed as well.  Ester mechanical thrombectomy performed approximately from left side with removal of subacute appearing thrombus, still no antegrade in flow.  Ester mechanical  thrombectomy attempted from right femoral artery with no return of thrombus and unable to pass Ester proximally.  We then accessed the open pericardial patches bilaterally and were able to get wires and catheters into the true lumen of the aorta bilaterally.  Balloon angioplasty performed and then bilateral kissing iliac stents were placed stenting down into the distal external iliac arteries bilaterally  Completion angiography showed thrombus within the right iliac stents and so Ester mechanical thrombectomy was performed there with thrombus removed.  Completion angiogram showed patent bilateral kissing iliac stents.  Following revascularization the patient had improved color in his left leg with 3-second cap refill, but no signals.  Angiogram performed which showed flow to the foot via posterior tibial artery.     Pelvic arteriogram: Proximal infrarenal aorta is patent with calcified atherosclerotic disease of distal aorta and aortic bifurcation noted.  Distal infrarenal aorta completely occludes just distal to the inferior mesenteric artery with combination of chronic plaque and acute thrombus.  This was treated with kissing iliac stents up to the level of the inferior mesenteric artery.  Completion angiography showed diminished flow through the inferior mesenteric artery.  Unclear if this was due to partial occlusion of the origin or having embolized distally from some of the thrombus.  Good collateral flow through the pelvis seen via a very large right lumbar artery and bilateral profundofemoral arteries with flow noted in mesentery of sigmoid colon.  Completion angiography also showed thrombus within the right iliac stents.  This was treated with Ester thrombectomy and completion angiogram following this showed no residual thrombus and good flow through iliac stents bilaterally.    Left leg arteriogram: Widely patent common femoral artery, widely patent profundofemoral artery, no apparent defect with  endarterectomy and patch angioplasty.  Superficial femoral artery completely occluded but does reconstitute above the knee via numerous collaterals.  Below-knee popliteal artery completely occludes distally with numerous collaterals in the lower leg.  Appears to reconstitute only a posterior tibial artery and the lower leg via large collateral with single-vessel runoff to the foot.    Implants:    Implant Name Type Inv. Item Serial No.  Lot No. LRB No. Used Action   CLIPAPPLR M/ ENDO LIGACLIP 20CLP 11IN MD - [id number] Implant CLIPAPPLR M/ ENDO LIGACLIP 20CLP 11IN [facility]  AND J [id number]  1 Implanted   CLIPAPPLR M/ ENDO LIGACLIP 9 3/8IN SM - [id number] Implant CLIPAPPLR M/ ENDO LIGACLIP 9 3/8IN [facility] ENDO SURGERY  DIV OF  AND J [id number]  1 Implanted   PTCH VASC VASCUGUARD TPR/END 0.8X8CM STRL - [id number] Implant PTCH VASC VASCUGUARD TPR/END 0.8X8CM STRL  [facility] [id number]  1 Implanted   PTCH VASC VASCUGUARD TPR/END 0.8X8CM STRL - [id number] Implant PTCH VASC VASCUGUARD TPR/END 0.8X8CM STRL  [facility] [id number]  1 Implanted   CLIPAPPLR M/ ENDO LIGACLIP 20CLP 11IN MD - [id number] Implant CLIPAPPLR M/ ENDO LIGACLIP 20CLP 11IN MD  [employer] [facility]  DIV OF  AND J [id number]  1 Implanted   HEMOST ABS SURGICEL FIBRILLAR 4X4IN - [id number] Implant HEMOST ABS SURGICEL FIBRILLAR 4X4IN  [employer] [facility]  DIV OF  AND J [id number]  1 Implanted   STENTGR ENDOPROSTH VIABAHN VBX EXP 8F [id number] 135CM - [id number] - [id number] Implant STENTGR ENDOPROSTH VIABAHN VBX EXP 8F [id number] 135CM [id number] [employer] .  1 Implanted   STENTGR ENDOPROSTH VIABAHN VBX EXP 8F [id number] 135CM - [id number] - [id number] Implant STENTGR ENDOPROSTH VIABAHN VBX EXP 8F [id number] 135CM [id number] [employer] .  1 Implanted   STENTGR ENDOPROSTH VIABAHN RO HEP 7F 8MM 70H220XH - [id number] - [id number] Implant STENTGR ENDOPROSTH VIABAHN RO HEP 7F 8MM [id number] [employer] .  1 Implanted   STENTGR ENDOPROSTH VIABAHN RO HEP 7F 8MM [id number] - [id number] - [id number] Implant STENTGR ENDOPROSTH VIABAHN RO HEP 7F 8MM [id number] [employer] .  1 Implanted       Staff:   Cell Saver : Kin Soliz  Circulator: Som Maloney RN; Ijeoma Villarreal RN  Radiology Technologist: Mahamed Braun, BRYAN; Dejah Ellsworth  Scrub Person: Jacquelyn Diaz; Mi Goyal    Specimen:   none    Complications:   none    Dispo:   to PACU    Indication for procedure:   73 y.o. male with history of chronic peripheral arterial disease who had acute worsening of pain in his lower legs within the last month, especially in the left leg.  The patient was seen in the ER after he presented for routine follow-up ABIs earlier this week.  Was having rest pain in his lower legs and was admitted to the hospital and started on heparin drip.  Initially he improved symptomatically and plans were made for surgery this morning however interestingly on the day of surgery he was noted to have a significantly worse pain in the left lower leg which appeared mottled from about the mid calf down with some motor weakness consistent with Indore 2B ischemia.  This had not been present on prior exams.  He did have a preoperative cardiac stress test performed which I personally discussed the results of with Dr. Serrano and it was negative.  The nature of the procedure as well as risks benefits and alternatives were discussed with the patient (who has dementia), and his significant other Jeanette.    Description of procedure:   Patient was taken to the OR and placed supine on the OR table.  General endotracheal anesthesia was begun by the anesthesia service.  He had an arterial line placed.  He also had a Mata catheter placed.  His groins were prepped with chlorhexidine bilaterally and his legs were prepped with chlorhexidine bilaterally circumferentially.  He was draped in sterile fashion.  A  timeout was performed prior to beginning the surgery.  We began procedure working into teams making a oblique incisions over each groin.  Electrocautery was used to dissect down through the subcutaneous tissue and the external abdominal oblique aponeurosis was identified.  I then carried dissection down to the inguinal ligament which was dissected free medially and laterally.  The patient did not have a palpable femoral pulse on either side but due to the heavily calcified nature of his common femoral arteries we were able to easily find them and dissect down directly onto them with Metzenbaum scissors.  This dissection was then carried proximally and distally from the external iliac artery down to the femoral bifurcation circumferential control was obtained of superficial femoral, profundofemoral artery, distal external iliac arteries bilaterally.  The crossing circumflex femoral veins were identified bilaterally and ligated and divided.   The patient clearly had significant chronic disease extending down through the external iliac arteries bilaterally.  The patient was given 10,000 units of heparin and ACT was confirmed over 250 and maintained over 250 for the remainder of the procedure.  Clamps were placed on the distal external iliac artery on the left as well as profundofemoral artery.  Longitudinal arteriotomy was made in the common femoral artery which was extended with Lamb scissors.  This arteriotomy was extended down onto the profundofemoral artery to facilitate endarterectomy there.  An endarterectomy was performed of the common femoral artery up to the external iliac artery and down to, and including the profundofemoral artery.  Plaque was removed and discarded.  We then performed eversion endarterectomy of the distal left external iliac artery.  There was still no antegrade inflow from the external iliac artery.  A Ester was passed approximately for 15 cm and thrombectomy was performed that returned  some subacute appearing thrombus, however there was still no antegrade flow through the external iliac.  There was great backbleeding from the profunda, but there was a small plaque that was tacked up with a tacking suture..  We then brought up our bovine pericardial patch and performed patch angioplasty of the common femoral artery down onto the profundofemoral artery with 6-0 Prolene suture in continuous running fashion..  I did insonate the artery with Doppler and there was flow however it was a weak and nonpulsatile.  We then turned attention to the right common femoral artery where a longitudinal arteriotomy was made after we placed clamps.  The SFA had to be clamped on the right as there was some backbleeding in addition to the profunda.  The arteriotomy was extended with Lamb scissors from the common femoral artery down onto the superficial femoral artery.  We then performed a endarterectomy from common femoral artery, and eversion endarterectomy is of superficial femoral artery origin and profundofemoral artery origin.  We then performed eversion endarterectomy of the distal external iliac artery.  As was the case on the left side,  there was still no inline flow from the external iliac on the right side.  We attempted to pass Ester catheter proximal but it would only proceed approximately 7 cm and no thrombus was returned.  We then brought up another 0.8x8cm bovine pericardial patch and performed patch angioplasty of the right side with 6-0 Prolene suture in continuous running fashion.  There was good backbleeding from the profunda prior to completing this patch angioplasty.  There was some flow through the artery from collaterals but no pulse, similar to the left side.  We then accessed each bovine pericardial patch with micro needles and microwire's were inserted.  Micro sheath were advanced over the wires.  We then used 0.035 stiff Glidewire to advance up the right microsheath and were able to advance  this into the aorta with relatively little difficulty.  The microsheath was exchanged for a 10cm 8 Maltese sheath.  An angled glide cath was then advanced over the wire into the aorta with good pulsatile backbleeding.  Arteriogram was performed.  A stiff angled Glidewire was advanced up the left iliac system that appeared to be in dissection plane.  The microsheath was exchanged for an 8 Maltese sheath.  A Martinez catheter used to attempt to navigate the iliac system but still appeared to be in a dissection plane.  Martinez catheter was exchanged for angled glide cath and eventually we were able to get this into the true lumen of the infrarenal aorta.  Angiogram was performed that showed severe chronic disease in the infrarenal aorta and bilateral iliac arteries as well as some thrombus, as well as the dissections in the bilateral iliacs which were not unexpected.  Lateral views were taken to pollo the origin of the inferior mesenteric artery.  Proximal extent of the occlusion, which proceeded up to the inferior aspect of the inferior mesenteric artery was marked.  We then were able to advance 5 mm angioplasty balloons up to each iliac and perform balloon angioplasty of bilateral common and external iliac arteries, extending into the infrarenal aorta.  We then remove these balloons and exchanged our 10 cm 8 Maltese sheath for a 45 cm 8 Maltese sheaths.  Two 8L x 59mm [place] VBX stents were obtained and advanced into position based on our angiography extending into the infrarenal aorta at the level of the inferior mesenteric artery.  These were deployed without complication.  We then obtained two 8 x 100 mm [place] Viabahn stents and these were advanced into position bilaterally and deployed.  We then obtained two 8x 75mm [place] viabahn stents and these were positioned extending stents from the infrarenal aorta down into the distal external iliac arteries bilaterally.  They were deployed.  We then postdilated with 8 x 100 mm  angioplasty balloons.  Completion angiography was performed that showed some thrombus within the right iliac stents in the midportion, as well as diminished flow through the MASOOD.  There did appear to be good collateralization from lumbars and profundofemoral arteries to the pelvis.  There was some mild narrowing in the common iliac on the left side that was treated with repeat balloon angioplasty with a 10 mm angioplasty balloon.  Completion angiography following this showed no residual stenosis.  At this point there was excellent palpable pulsating flow through the distal external iliacs and into the common femoral arteries.  Our endovascular work in the aortic bifurcation was done with our outflow arteries clamped bilaterally to prevent atheroembolization.  Following this we removed the wire and catheters then all then the 8 [place] sheath from the left common femoral artery.  It was allowed to flush antegrade with excellent flow.  It was then clamped proximally allowed to backbleed from the profunda again with excellent flow.  The area where we accessed the patch was closed with 5-0 Prolene suture in a figure-of-eight fashion.  The patch was then hemostatic and there was an excellent Doppler signal in the common femoral artery and profundofemoral artery.  We then removed the wire and catheter and 8 [place] sheath from the right common femoral artery.  The area we accessed the patch was extended with Lamb scissors to facilitate Ester thrombectomy of the right iliac stent.  There was thrombus removed.  There was then pulsatile excellent flow through this artery as well as good backbleeding from the profunda and superficial femoral arteries on the right side.  Using a J-tip wire we inserted a sheath back into the right common femoral artery into the external iliac artery to perform completion angiography to confirm no residual thrombus in the right stent and good flow through iliac stents bilaterally, which was  obtained.  We then removed the sheath and the patch defect was closed with 5-0 Prolene suture in continuous running fashion.  It was hemostatic.  We then checked signals in the feet.  The patient had a strong monophasic signal in the right posterior tibial artery, but no signals in the left foot.  The left lower leg and foot were both normal appearing, no longer mottled with decent cap refill.  Ultimately we decided to perform an angiogram of the left leg.  Microneedle was used to access the patch proximal to the previous area we had access and a microwire was inserted.  Needle was exchanged for microsheath and this microsheath was used to perform an angiogram.  This showed excellent flow through the common femoral artery and profundofemoral artery with the known occlusion of the superficial femoral artery but reconstitution of above-knee popliteal artery.  Distal popliteal artery and tibial arteries all appear occluded proximately there is a very large collateral extending from popliteal down to the posterior tibial artery and the lower leg.  The PT did extend through the ankle and into the foot.  With this I was satisfied that he had adequate blood flow to his foot.  The biker sheath was removed and this defect was closed with a 5-0 Prolene suture in a figure-of-eight fashion.  Both wounds were irrigated with warm normal saline and inspected meticulously for hemostasis.  Platelet rich plasma was instilled into both wounds.  Once we are satisfied with hemostasis we then closed the wound in layers with 2-0 Vicryl suture followed by 3-0 Vicryl suture and then 4-0 Vicryl suture in a running subcuticular layer for the skin.  Skin glue was used for dressing in the groins.  Patient was then aroused from anesthesia and transferred to PACU in stable condition.  Patient tolerated procedure well there were no IntraOp complications and all wires catheters sheaths and other devices were removed and found to be whole and intact  prior to conclusion of the procedure and all sponge instrument and needle counts were correct at the end of the case.  After the surgery I discussed the procedure with the patient's significant other Jeanette, and another adult male I presume to be the patient's brother.  Since completion of the procedure I have checked on the patient in the ICU and he now has warm feet bilaterally with a PT signal on the left and PT and peroneal signals on the right, with no evidence of postoperative complication in either of his groins, no leg pain, no abdominal pain.    Nura Salgado II, MD  12/29/23     Active Hospital Problems    Diagnosis  POA    **Critical limb ischemia of both lower extremities [I70.223]  Yes    Degenerative disc disease, lumbar [M51.36]  Yes      Resolved Hospital Problems   No resolved problems to display.               Electronically signed by Nura Salgado II, MD at 12/29/23 1950       Nura Salgado II, MD at 01/04/24 1059  Version 1 of 1         Date of Admission:  12/27/2023  Today's Date:  01/04/24  Nura Salgado II, MD  [facility]    Preoperative Diagnosis:   Peripheral vascular disease with rest pain and minor tissue loss    Postoperative Diagnosis:   Same    Procedure Performed:   Left femoral to posterior tibial artery bypass with in situ greater saphenous vein    CPT:  06205 Fem-Tibial bypass with in situ saphenous      Surgeon:   Nura Salgado II, MD    Assistant:    Heath Kwon MD, Provided critical assistance in exposure, retraction, and suction that overall decrease blood loss and operative time.    Anesthesia:   General    Estimated Blood Loss:   200mL    Findings:    Left greater saphenous vein adequate for bypass.  Posterior tibial artery in the lower leg appeared to be good target for bypass.  Left femoral to posterior tibial artery bypass performed with in situ greater saphenous vein.  Excellent multiphasic signal in posterior tibial artery that augmented very well with  the bypass.  No intraoperative complications.    Implants:    Implant Name Type Inv. Item Serial No.  Lot No. LRB No. Used Action   CLIPAPPLR M/ ENDO LIGACLIP 9 3/8IN SM - [id number] Implant CLIPAPPLR M/ ENDO LIGACLIP 9 3/8IN SM  [employer]  DIV OF J AND J [id number] Left 1 Implanted   CLIPAPPLR M/ ENDO LIGACLIP 20CLP 11IN MD - [id number] Implant CLIPAPPLR M/ ENDO LIGACLIP 20CLP 11IN MD  [employer]  DIV OF J AND J [id number] Left 1 Implanted   CLIPAPPLR M/ ENDO LIGACLIP 9 3/8IN SM - [id number] Implant CLIPAPPLR M/ ENDO LIGACLIP 9 3/8IN SM  [employer]  DIV OF J AND J [id number] Left 1 Implanted       Staff:   Circulator: Theresa Kasper RN; Pablito Chin RN  Scrub Person: Bev Perez; Deepthi Fernandez  Assistant: Ju Roth CST    Specimen:   none    Complications:   none    Dispo:   to ICU    Indication for procedure:   73 y.o. male with history of acute on chronic peripheral arterial disease he was admitted last week with rest pain in his left foot.  He underwent bilateral femoral endarterectomies with iliac stents last Friday.  He had done well following the surgery however has had persistent pain in his left foot.  ABIs showed it it only increased to 0.2 from 0 and although his foot was nice and warm his toes still appeared cyanotic.  After long discussion had with patient and his girlfriend the decision was made to proceed with femoral to posterior tibial artery bypass for rest pain and ulceration on his toes.  Risk benefits and alternatives to the procedure were discussed with patient verbalized understanding and agreed to proceed.    Description of procedure:   Patient was brought to the OR and placed supine on the OR table.  General endotracheal anesthesia was begun by the anesthesia service.  Once patient was appropriately asleep his left leg was prepped circumferentially with chlorhexidine and right groin was prepped with chlorhexidine as well.  He was draped  in sterile fashion.  A timeout was performed.  We began procedure by using ultrasound with a sterile probe cover to evaluate the greater saphenous vein in the left leg.  It appeared to be adequate size for bypass throughout the entirety of the left leg.  The site of the saphenous vein was marked.  I then made a longitudinal incision over the lower leg at the site of the greater saphenous vein.  Electrocautery was used to dissect down through the subcutaneous tissue.  The greater saphenous vein was easily identified here and circumferentially dissected.  Dissection was carried proximally and distally for distance of approximately 12 cm.  The circumferential dissection of the saphenous vein was carried proximally and distally as well.  I then retracted the saphenous vein inferiorly made incision through the fascia there just inferior to the medial border of the tibia.  I then dissected down between the muscle into the plane of the posterior tibial artery.  The posterior tibial artery and veins were identified.  It was palpated and felt soft and reasonable as a target for bypass.  I did insonate it with a Doppler to ensure flow in the area we were bypassing to.  Proximal and distal to the area which I would be sewing to I did dissect out enough of the artery to place bulldog clamps.  I then carried my dissection of the saphenous vein proximally up the leg.  Incisions were made with the 15 blade scalpel and electrocautery used to dissect down through subcutaneous tissue.  Metzenbaum scissors were used to dissect down onto the superficial surface of the greater saphenous vein.  All visible vein branches were ligated with either 3-0 silk suture or surgical clips.  This dissection was carried up the leg to the level of his previously made femoral incision.  I then opened up his left femoral incision.  Metzenbaum scissors were used to cut the 4-0 Vicryl subcuticular suture and the 3 oh deep dermal suture.  These were all  removed.  Then placed self-retaining retractors and exposed the femoral artery by putting the 2-0 Vicryl suture that been securing the deep tissues overlying it.  The patient's patch angioplasty appeared to be in good working order.  There is a nice strong palpable pulse in the left common femoral artery.  There is no evidence of infection.  There were numerous branches off of the proximal greater saphenous vein that required ligation with 3-0 silk suture.  Once we had the proximal greater saphenous vein dissected circumferentially the patient was given 10,000 units of heparin.  ACT was 239 after that and so he was given another 5000 units of heparin which did bring his ACT over 250.  ACT's were checked routinely and he was redosed with heparin as needed to keep an ACT over 250 for the remainder of the procedure.  A Satinsky clamp was used to clamp the base of the greater saphenous vein as it inserted onto the common femoral vein.  It was transected with an 11 blade scalpel and then the venotomy was closed with 5-0 Prolene suture in a Julio Cesar stitch fashion.  We then examined the proximal greater saphenous vein and the first valve was lysed with Lamb scissors.  It was spatulated and trimmed to the appropriate for our proximal anastomosis.  Clamps were placed on the left profunda femoral artery and common femoral artery, distal to the previously placed stent.  Longitudinal arteriotomy was made through the bovine pericardial patch at the distal common femoral artery.  A small amount of the patch was resected and an ellipse orientation.  We then performed our proximal anastomosis with 6-0 Prolene suture in continuous running fashion.  2 repair stitches were needed after completing the anastomosis for hemostasis.  There is a good palpable pulse on the proximal greater saphenous vein at this time.  I then turned my attention down to the distal greater saphenous vein.  It was transected distally and the distal end was  ligated with 2-0 silk suture.  We then used a LeMaitre valvulotome to lyse all of the valves.  There was excellent pulsatile flow through the vein bypass at this time.  We then oriented the vein appropriate for the bypass and it was marked to prevent twisting.  Clamps were placed on the posterior tibial artery proximal and distal to the site of our anastomosis and a longitudinal arteriotomy was ablated with 11 blade scalpel.  It was carried proximally and distally with Lamb scissors.  The leg was straightened and the greater saphenous vein was cut to an appropriate length and then opened up with Lamb scissors.  I then performed the distal anastomosis with 6-0 Prolene suture in continuous running fashion.  It was hemostatic after completing the anastomosis and it was flushed appropriately prior to completing the anastomosis.  I then checked a signal in the bypass graft and in the posterior tibial artery distal to the bypass graft and he had excellent multiphasic flow through each of them.  I uncinated a posterior tibial artery signal at the level of the ankle and it was nice and multiphasic and augmented appropriately with the bypass.  The patient was then given 40 mg of protamine.  We then used a Doppler to insonate the bypass to check for any vein branches and all vein branches were ligated with either 3-0 silk suture or surgical clips.  The wound was then irrigated with warm normal saline.  Both the groin wound and the leg wound were all evaluated meticulously for hemostasis.  Irrisept was used to irrigate the wound mainly up in the groin wound where the bovine pericardial patch was present.  Platelet rich plasma was instilled into the wounds as well.  Once we are satisfied with hemostasis we then closed the wound in layers with 2-0 Vicryl suture in a deep layer followed by 3-0 Vicryl suture and then staples for the skin.  After closing the incision I did insonate a signal in the posterior tibial artery again  which was strong and multiphasic.  The leg was then cleaned and then the incision was dressed with island dressings.  Patient was aroused from anesthesia and transported back to his ICU in stable condition.  He tolerated procedure well there were no intraoperative complications and all sponge instrument and needle counts were correct at the conclusion of the procedure.  I discussed the outcome of the procedure with the patient's girlfriend and brother.    Nura Salgado II, MD  24     Active Hospital Problems    Diagnosis  POA    **Critical limb ischemia of both lower extremities [I70.223]  Yes    Degenerative disc disease, lumbar [M51.36]  Yes      Resolved Hospital Problems   No resolved problems to display.               Electronically signed by Nura Salgado II, MD at 24 1529          Physician Progress Notes (last 48 hours)        Rc Villarreal MD at 24 2435            Name: Oscar Wray ADMIT: 2023   : 1950  PCP: Kajal Horowitz PA    MRN: [id number] LOS: 9 days   AGE/SEX: 73 y.o. male  ROOM: [address] [place] [address]    [place], Post Op [employer]    73 y.o. male POD #7 s/p  bilateral common femoral endarterectomies, aortic and bilateral iliac stents. POD #1 s/p fem-PT with vein    Subjective: Feels okay in bed this morning.  Pain controlled.    Scheduled Medications:   acetaminophen, 650 mg, Oral, Q4H   Or  acetaminophen, 650 mg, Oral, Q4H   Or  acetaminophen, 650 mg, Rectal, Q4H  aspirin, 81 mg, Oral, Daily  atenolol, 12.5 mg, Oral, Daily  atorvastatin, 20 mg, Oral, Daily  cholecalciferol, 2,000 Units, Oral, Daily  cilostazol, 100 mg, Oral, BID  docusate sodium, 100 mg, Oral, Daily  donepezil, 20 mg, Oral, Nightly  gabapentin, 200 mg, Oral, TID  memantine, 10 mg, Oral, Q12H  multivitamin with minerals, 1 tablet, Oral, Daily  polyethylene glycol, 17 g, Oral, Daily  risperiDONE, 0.25 mg, Oral, Daily  risperiDONE, 0.5 mg, Oral, Nightly  sodium chloride, 10 mL,  Intravenous, Q12H  tamsulosin, 0.4 mg, Oral, Daily      Active Infusions:  heparin, 18 Units/kg/hr, Last Rate: 20 Units/kg/hr (01/05/24 0424)  lactated ringers, 50 mL/hr, Last Rate: 50 mL/hr (01/04/24 1744)      Vital Signs  Vital Signs Patient Vitals for the past 24 hrs:   BP Temp Temp src Pulse Resp SpO2 Weight   01/05/24 0800 -- 97.5 °F (36.4 °C) Oral -- -- -- --   01/05/24 0632 -- -- -- 92 -- 94 % --   01/05/24 0600 -- -- -- 83 -- 94 % --   01/05/24 0500 -- -- -- 97 -- 95 % --   01/05/24 0400 -- 98.2 °F (36.8 °C) Oral 99 14 94 % 87.2 kg (192 lb 3.9 oz)   01/05/24 0300 -- -- -- 92 -- 94 % --   01/05/24 0200 -- -- -- 97 -- 95 % --   01/05/24 0100 -- -- -- 103 14 94 % --   01/05/24 0030 -- -- -- 102 13 95 % --   01/05/24 0015 -- -- -- 112 -- 98 % --   01/05/24 0000 -- 98.4 °F (36.9 °C) Oral 105 -- 96 % --   01/04/24 2345 -- -- -- 112 -- 97 % --   01/04/24 2330 -- -- -- 119 -- 98 % --   01/04/24 2315 -- -- -- 110 -- 99 % --   01/04/24 2300 -- -- -- 110 -- 98 % --   01/04/24 2230 -- -- -- 109 -- 99 % --   01/04/24 2200 -- -- -- 98 -- 98 % --   01/04/24 2130 -- -- -- 104 -- 99 % --   01/04/24 2100 -- -- -- 92 -- 99 % --   01/04/24 2030 -- -- -- 92 -- 98 % --   01/04/24 2000 -- 98.6 °F (37 °C) Oral 90 20 97 % --   01/04/24 1930 -- -- -- 93 -- 97 % --   01/04/24 1900 -- -- -- 90 -- 98 % --   01/04/24 1830 -- -- -- 96 -- 98 % --   01/04/24 1800 -- -- -- 80 -- 94 % --   01/04/24 1700 -- -- -- 92 -- 95 % --   01/04/24 1555 -- 98.3 °F (36.8 °C) Oral -- -- -- --   01/04/24 1550 131/65 -- -- 90 -- 90 % --   01/04/24 1545 -- 97.4 °F (36.3 °C) Axillary 84 13 90 % --   01/04/24 1540 121/63 -- -- 83 14 (!) 89 % --   01/04/24 1535 127/62 -- -- 73 14 98 % --   01/04/24 1530 115/59 -- -- 82 12 98 % --   01/04/24 1525 115/57 -- -- 74 13 98 % --   01/04/24 1520 106/54 -- -- 77 12 99 % --   01/04/24 1515 106/53 -- -- 78 12 97 % --   01/04/24 1510 109/47 98.5 °F (36.9 °C) Axillary 72 12 94 % --     I/O:  I/O last 3 completed  shifts:  In: 3140 [P.O.:480; I.V.:2410; IV Piggyback:250]  Out: 3750 [Urine:3600; Blood:150]    Physical Exam   Foot improved and is now red and hyperemic.  Some edema so not able to palpate the posterior tibial pulse but has an excellent multiphasic signal.  Also has some branches on the dorsum of the foot that are dopplerable but not in a classic AT location.      Imaging:  No radiology results for the last day       CBC    Results from last 7 days   Lab Units 01/05/24  0353 01/04/24  0739 01/03/24  0829 01/02/24  0315 01/01/24  0650 12/31/23  0548 12/30/23  0442   WBC 10*3/mm3 12.70* 10.60 11.80* 9.40 10.90* 10.60 14.10*   HEMOGLOBIN g/dL 9.0* 10.2* 10.3* 10.0* 10.7* 10.9* 11.7*   PLATELETS 10*3/mm3 703* 634* 562* 479* 473* 393 464*     BMP   Results from last 7 days   Lab Units 01/05/24  0353 01/04/24  0739 01/03/24  0829 01/02/24  0315 01/01/24  0650 12/31/23  0548 12/30/23  0442   SODIUM mmol/L 135* 138 134* 133* 134* 135* 136   POTASSIUM mmol/L 4.6 4.2 4.3 3.6 4.4 3.7 3.9   CHLORIDE mmol/L 100 101 101 102 99 102 103   CO2 mmol/L 24.0 26.0 25.0 26.0 25.0 22.0 21.0*   BUN mg/dL 13 11 10 9 9 11 13   CREATININE mg/dL 1.01 1.05 0.95 1.04 1.01 1.06 1.13   GLUCOSE mg/dL 114* 85 99 101* 107* 105* 107*     Coag   Results from last 7 days   Lab Units 01/05/24  0353 01/04/24  0031 01/03/24  1821 01/03/24  0829 01/03/24  0114 01/02/24  1813 01/02/24  1012 01/01/24  0650 12/31/23  2103 12/30/23  0442 12/29/23  1857   INR  1.06  --   --   --   --   --   --   --  1.28*  --  1.10   APTT seconds 47.1* 79.7* 61.0 55.7* 80.1* 66.9 59.4*   < > 38.2*   < > >139.0*    < > = values in this interval not displayed.     Assessment & Plan   Assessment & Plan    Critical limb ischemia of both lower extremities    Degenerative disc disease, lumbar    73 y.o. male  s/p  bilateral common femoral endarterectomies, aortic and bilateral iliac stents (12/29/23) followed by fem-PT with vein (1/4/24)    1/2/24:Patient has done well overall and  feet are considerably improved from prior to his surgery, both on exam and per patients report of his symptoms. He does still report intermittent pain in the left foot, especially in the toes at rest. I had a long discussion with the patient and his girlfriend today. I think with the poor response in his ABIs and ongoing pain and duskiness in his toes that a femoral-PT bypass would be indicated. Patient has not really attempted to ambulate since his surgery on Friday and I encouraged him to try to do so with physical therapy.   Will plan for Left femoral-PT bypass later this week, probably Thursday if OR schedule allows.   Will continue to monitor his groin incisions for now.     1/3/24: Patient stable.  On a heparin drip.  He is currently on the OR schedule for tomorrow morning at 9:30 AM to do a left femoral to posterior tibial artery bypass with in situ greater saphenous vein.  Will plan to stop heparin drip around 4 AM.    1/4/24: OR for Bypass    1/5/24: No major issues overnight.  Heparin restarted and PTT from 79-47 the last 2 checks.  Hemoglobin 9.  Platelet count 703.  Extensive duplications in his order sets.  I reviewed and addressed.  DC A-line.  Hep-Lock IV fluids.  Had a tremendous amount of scrotal edema so I would recommend keeping the Mata catheter for today and have DC'd the order for removal.  May benefit from diuresis.  Will plan to remove the dressings tomorrow.  Excellent multiphasic signal at the PT level.  Hyperemic foot.    Please call my office with any question: [phone]    Active Hospital Problems    Diagnosis  POA    **Critical limb ischemia of both lower extremities [I70.223]  Yes    Degenerative disc disease, lumbar [M51.36]  Yes      Resolved Hospital Problems   No resolved problems to display.          Electronically signed by Rc Villarreal MD at 01/05/24 1108       Wellington Manzano APRN at 01/04/24 2013       Attestation signed by Aurelio Nash MD at 01/05/24  0818 (Updated)    I have reviewed this documentation and agree.    Electronically signed by Aurelio Nash MD, 24, 8:18 AM EST.                      Critical Care Progress Note     Oscar Wray : 1950 MRN:[id number] LOS:8  Rm: 2308/1     Principal Problem: Critical limb ischemia of both lower extremities     Reason for follow up: All the medical problems listed below    Summary     A 73 y.o. old male patient with PMH of CKD, PAD, DM, Hyperlipidemia,  presents to the hospital with complaints of rest pain in the left foot. Vascular surgery was consulted and recommended a left femoral endarterectomy and bilateral iliac stenting with possible bypass. Today to the patient was taken to OR for procedure. Patient underwent bilateral femoral enterectomy and iliac artery stents, left leg angiogram. Patient will be admitted to ICU for post op monitoring.  Patient was downgraded back to hospitalist service, and vascular surgery continue to follow patient.  Patient ultimately underwent femoral-tibial bypass on 2024, and was postoperatively transitioned back to critical care level for monitoring overnight.  There were no complications noted intraoperatively.    Significant Events     24 : Status post femoral-tibial bypass, hemodynamically stable, transition back to critical care postoperatively.    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  Underwent femoral tibial bypass on 2024 without complications.  Continue aspirin, Lipitor, Pletal    Primary Hypertension: well controlled.   Continue atenolol .   Titrate medications as needed.    Dyslipidemia: Continue statin therapy.  Dementia -- Donepezil and memantine, risperdal continued.  CKD II -- Monitor electrolytes, I/O,  monitor blood pressure  Chronic low back pain  Enlarged prostate: Continue Flomax.    Code status:   Level Of Support Discussed With: Patient  Code Status (Patient has no pulse and is not breathing): CPR (Attempt to Resuscitate)  Medical Interventions (Patient has pulse or is breathing): Full Support       Nutrition:   Diet: Regular/House Diet; Texture: Regular Texture (IDDSI 7); Fluid Consistency: Thin (IDDSI 0)   Patient isn't on Tube Feeding     DVT prophylaxis:  Medical and mechanical DVT prophylaxis orders are present.     Subjective / Review of systems     Review of Systems   Unable to perform ROS: Mental status change (History of dementia.)        Objective / Physical Exam     Vital signs:  Temp: 98.3 °F (36.8 °C)  BP: 131/65  Heart Rate: 96  Resp: 13  SpO2: 98 %  Weight: 86.4 kg (190 lb 7.6 oz)    Admission Weight: Weight: 89.5 kg (197 lb 5 oz)  Current Weight: Weight: 86.4 kg (190 lb 7.6 oz)    Input/Output in last 24 hours:    Intake/Output Summary (Last 24 hours) at 1/4/2024 2014  Last data filed at 1/4/2024 1434  Gross per 24 hour   Intake 1780 ml   Output 3250 ml   Net -1470 ml      Net IO Since Admission: 1,661.4 mL [01/04/24 2014]     Physical Exam  Constitutional:       General: He is not in acute distress.     Appearance: He is not ill-appearing, toxic-appearing or diaphoretic.   HENT:      Head: Normocephalic and atraumatic.      Nose: Nose normal. No congestion.      Mouth/Throat:      Mouth: Mucous membranes are moist.      Pharynx: Oropharynx is clear. No oropharyngeal exudate.   Eyes:      General: No scleral icterus.     Extraocular Movements: Extraocular movements intact.      Conjunctiva/sclera: Conjunctivae normal.      Pupils: Pupils are equal, round, and reactive to light.   Cardiovascular:      Rate and Rhythm: Normal rate and regular rhythm.      Pulses: Normal pulses.           Popliteal pulses are 2+ on the right side and 2+ on the left side.        Dorsalis pedis pulses are 2+ on the  right side and 2+ on the left side.      Heart sounds: Normal heart sounds.   Pulmonary:      Effort: Pulmonary effort is normal.      Breath sounds: Normal breath sounds.   Abdominal:      General: Bowel sounds are normal. There is no distension.      Tenderness: There is no abdominal tenderness.   Musculoskeletal:         General: No swelling.      Right lower leg: No edema.      Left lower leg: No edema.   Skin:     General: Skin is warm and dry.   Neurological:      Mental Status: He is alert.      Comments: Confused, baseline dementia.  Follows commands, no focal deficits observed.   Psychiatric:      Comments: Calm, cooperative.          Radiology and Labs     Results from last 7 days   Lab Units 01/04/24  0739 01/03/24  0829 01/02/24  0315 01/01/24  0650 12/31/23  0548   WBC 10*3/mm3 10.60 11.80* 9.40 10.90* 10.60   HEMOGLOBIN g/dL 10.2* 10.3* 10.0* 10.7* 10.9*   HEMATOCRIT % 31.7* 31.1* 30.0* 31.8* 32.8*   PLATELETS 10*3/mm3 634* 562* 479* 473* 393      Results from last 7 days   Lab Units 01/04/24  0031 01/03/24  1821 01/03/24  0829 01/03/24  0114 01/02/24  1813 01/01/24  0650 12/31/23  2103 12/30/23  0442 12/29/23  1857   PROTIME Seconds  --   --   --   --   --   --  13.7*  --  11.9*   INR   --   --   --   --   --   --  1.28*  --  1.10   APTT seconds 79.7* 61.0 55.7* 80.1* 66.9   < > 38.2*   < > >139.0*    < > = values in this interval not displayed.      Results from last 7 days   Lab Units 01/04/24  0739 01/03/24  0829 01/02/24  0315 01/01/24  0650 12/31/23  0548   SODIUM mmol/L 138 134* 133* 134* 135*   POTASSIUM mmol/L 4.2 4.3 3.6 4.4 3.7   CHLORIDE mmol/L 101 101 102 99 102   CO2 mmol/L 26.0 25.0 26.0 25.0 22.0   BUN mg/dL 11 10 9 9 11   CREATININE mg/dL 1.05 0.95 1.04 1.01 1.06   GLUCOSE mg/dL 85 99 101* 107* 105*      Results from last 7 days   Lab Units 12/30/23  0442   ALK PHOS U/L 80   AST (SGOT) U/L 101*   ALT (SGPT) U/L 19     Results from last 7 days   Lab Units 01/01/24  1117 12/29/23  1844  23  1453   PH, ARTERIAL pH units 7.512* 7.316* 7.270*   PCO2, ARTERIAL mm Hg 28.4* 37.3 47.3*   PO2 ART mm Hg 79.5* 89.4 182.0*   O2 SATURATION ART % 97.0 96.1 99.0*   FIO2 % 21 21  --    HCO3 ART mmol/L 22.8 19.0* 21.9*   BASE EXCESS ART mmol/L 0.6 -6.5* <0.0*       Current medications     Scheduled Meds:   acetaminophen, 650 mg, Oral, Q4H   Or  acetaminophen, 650 mg, Oral, Q4H   Or  acetaminophen, 650 mg, Rectal, Q4H  aspirin, 81 mg, Oral, Daily  atenolol, 12.5 mg, Oral, Daily  atorvastatin, 20 mg, Oral, Daily  ceFAZolin, 2,000 mg, Intravenous, Q8H  cholecalciferol, 2,000 Units, Oral, Daily  cilostazol, 100 mg, Oral, BID  docusate sodium, 100 mg, Oral, Daily  donepezil, 20 mg, Oral, Nightly  gabapentin, 200 mg, Oral, TID  memantine, 10 mg, Oral, Q12H  multivitamin with minerals, 1 tablet, Oral, Daily  polyethylene glycol, 17 g, Oral, Daily  risperiDONE, 0.25 mg, Oral, Daily  risperiDONE, 0.5 mg, Oral, Nightly  sodium chloride, 10 mL, Intravenous, Q12H  tamsulosin, 0.4 mg, Oral, Daily        Continuous Infusions:   [Held by provider] heparin, 18 Units/kg/hr, Last Rate: Stopped (24 0375)  lactated ringers, 50 mL/hr, Last Rate: 50 mL/hr (24 5660)          Plan discussed with RN. Reviewed all other data in the last 24 hours, including but not limited to vitals, labs, microbiology, imaging and pertinent notes from other providers.      ELOY Lim   Critical Care  24   20:14 EST       Electronically signed by Aurelio Nash MD at 24 08       Pablito Deleon MD at 24 0847              [facility] MEDICINE SERVICE  DAILY PROGRESS NOTE    NAME: Oscar Wray  : 1950  MRN: [id number]      LOS: 8 days     PROVIDER OF SERVICE: Pablito Deleon MD    Chief Complaint: Critical limb ischemia of both lower extremities    Subjective:     Interval History:   Did okay overnight.  Awaiting Fem-PT bypass today. Still with present but improved left foot pain. t foot  pain is still present but somewhat improved.  Renal function has normalized today.    Discussed the case with the bedside nursing staff. The patient's case was also discussed with the pharmacy and case management providers at multidisciplinary rounds.  No other acute concerns.    Objective:     Vital Signs  Temp:  [98.3 °F (36.8 °C)-98.4 °F (36.9 °C)] 98.4 °F (36.9 °C)  Heart Rate:  [] 85  Resp:  [11-12] 12  BP: (113-149)/(44-86) 129/59   Body mass index is 29.83 kg/m².    Physical Exam  HENT:      Head: Atraumatic.      Mouth/Throat:      Mouth: Mucous membranes are moist.   Cardiovascular:      Rate and Rhythm: Normal rate and regular rhythm.      Pulses: Normal pulses.      Heart sounds: Normal heart sounds.   Pulmonary:      Effort: Pulmonary effort is normal.      Breath sounds: Normal breath sounds.   Abdominal:      General: Bowel sounds are normal.      Palpations: Abdomen is soft.   Musculoskeletal:      Cervical back: Neck supple.      Comments: Pain on LLE   Skin:     General: Skin is warm.   Neurological:      General: No focal deficit present.      Mental Status: He is alert and oriented to person, place, and time.   Psychiatric:         Mood and Affect: Mood normal.   Scheduled Meds   acetaminophen, 650 mg, Oral, Q4H   Or  acetaminophen, 650 mg, Oral, Q4H   Or  acetaminophen, 650 mg, Rectal, Q4H  aspirin, 81 mg, Oral, Daily  atenolol, 12.5 mg, Oral, Daily  atorvastatin, 20 mg, Oral, Daily  cholecalciferol, 2,000 Units, Oral, Daily  cilostazol, 100 mg, Oral, BID  docusate sodium, 100 mg, Oral, Daily  donepezil, 20 mg, Oral, Nightly  gabapentin, 200 mg, Oral, TID  memantine, 10 mg, Oral, Q12H  multivitamin with minerals, 1 tablet, Oral, Daily  polyethylene glycol, 17 g, Oral, Daily  risperiDONE, 0.25 mg, Oral, Daily  risperiDONE, 0.5 mg, Oral, Nightly  sodium chloride, 10 mL, Intravenous, Q12H  tamsulosin, 0.4 mg, Oral, Daily       PRN Meds     heparin    heparin    hydrALAZINE    HYDROmorphone     nitroglycerin    nitroglycerin    oxyCODONE    [COMPLETED] Insert Peripheral IV **AND** sodium chloride    sodium chloride    sodium chloride   Infusions  [Held by provider] heparin, 18 Units/kg/hr, Last Rate: Stopped (01/04/24 0355)          Diagnostic Data    Results from last 7 days   Lab Units 01/04/24  0739 01/03/24  0829 12/31/23  0548 12/30/23  0442   WBC 10*3/mm3 10.60 11.80*   < > 14.10*   HEMOGLOBIN g/dL 10.2* 10.3*   < > 11.7*   HEMATOCRIT % 31.7* 31.1*   < > 35.5*   PLATELETS 10*3/mm3 634* 562*   < > 464*   GLUCOSE mg/dL  --  99   < > 107*   CREATININE mg/dL  --  0.95   < > 1.13   BUN mg/dL  --  10   < > 13   SODIUM mmol/L  --  134*   < > 136   POTASSIUM mmol/L  --  4.3   < > 3.9   AST (SGOT) U/L  --   --   --  101*   ALT (SGPT) U/L  --   --   --  19   ALK PHOS U/L  --   --   --  80   BILIRUBIN mg/dL  --   --   --  0.2   ANION GAP mmol/L  --  8.0   < > 12.0    < > = values in this interval not displayed.       No radiology results for the last day      I reviewed the patient's new clinical results.    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  --Status post bilateral femoral endarterectomy and placement of iliac artery stents with left leg angiogram  - Planning for follow-up left femoral-tibial bypass this morning  - On heparin drip  - Continue maintenance fluid LR at 100 cc/h  - Maintain SBP less than 160  - Hold pregabalin and initiate gabapentin for neuropathic pain control  - Continue aspirin, Lipitor and cilostazol     Hypertension  -Restart home dose of atenolol     KARINA on CKD stage III  - Acute component has resolved, nephrology is following     Dyslipidemia  -Continue statin therapy    Dementia  -Continue Aricept and Namenda    BPH  -Continue Flomax    DVT prophylaxis:  Medical and mechanical DVT prophylaxis orders are present.     Code status is    Code Status and Medical Interventions:   Ordered at: 23 0752     Level Of Support Discussed With:    Patient     Code Status (Patient has no pulse and is not breathing):    CPR (Attempt to Resuscitate)     Medical Interventions (Patient has pulse or is breathing):    Full Support     Plan for disposition: He will likely be here at least through the weekend and will need skilled rehab at discharge.  He may end up in the ICU postoperatively as well.    Signature: Electronically signed by Pablito Deleon MD, 24, 08:47 EST.  [facility] Hospitalist Team     Electronically signed by Pablito Deleon MD at 24 1636       Consult Notes (last 48 hours)  Notes from 24 1611 through 24 1611   No notes of this type exist for this encounter.       Tommy Leong, PT   Physical Therapist  Specialty:  Physical Therapy  Therapy Evaluation      Signed  Date of Service:  23  Creation Time:  23     Signed        Expand All Collapse All  Patient Name: Oscar Wray                   : 1950                        MRN: [id number]                              Today's Date: 2023                                 Admit Date: 2023                      Visit Dx:   Visit Diagnosis       ICD-10-CM ICD-9-CM   1. Peripheral arterial occlusive disease  I77.9 444.22   2. 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 No pertinent surgical history.       General Information         Row Name 12/30/23 1259                 Physical Therapy Time and Intention     Document Type evaluation  -EL       Mode of Treatment individual therapy;physical therapy  -          Row Name 12/30/23 1259                 General Information     Patient Profile Reviewed yes  -EL       Prior Level of Function independent:;all household mobility;ADL's  -          Row Name 12/30/23 1259                 Living Environment     People in Home alone;facility resident;other (see comments)  [place]  -EL          Row Name 12/30/23 1259                 Home Main Entrance     Number of Stairs, Main Entrance none  -EL          Row Name 12/30/23 1259                 Stairs Within Home, Primary     Number of Stairs, Within Home, Primary none  -EL          Row Name 12/30/23 1259                 Cognition     Orientation Status (Cognition) oriented to;person;situation;verbal cues/prompts needed for orientation;place;time  -          Row Name 12/30/23 1259                 Safety Issues, Functional Mobility     Impairments Affecting Function (Mobility) pain;balance;strength  -EL                       User Key  (r) = Recorded By, (t) = Taken By, (c) = Cosigned By        Initials Name Provider Type     EL Tommy Leong, PT Physical Therapist                            Mobility         Row Name 12/30/23 1311                 Bed Mobility     Bed Mobility supine-sit  -EL       All Activities, Wallace (Bed Mobility) minimum assist (75% patient effort)  -          Row Name 12/30/23 1311                 Bed-Chair Transfer     Bed-Chair [place] (Transfers) moderate assist (50% patient effort)  -EL       Assistive Device (Bed-Chair Transfers) walker, front-wheeled  -          Row Name 12/30/23 1311                 Sit-Stand Transfer     Sit-Stand Wallace (Transfers) moderate assist (50% patient effort)  -EL       Assistive  Device (Sit-Stand Transfers) walker, front-wheeled  -          Row Name 12/30/23 1311                 Gait/Stairs (Locomotion)     Comment, (Gait/Stairs) Pt painful in L foot which was limiting to mobility  -                       User Key  (r) = Recorded By, (t) = Taken By, (c) = Cosigned By        Initials Name Provider Type     Tommy Lopez, PT Physical Therapist                            Obj/Interventions         Row Name 12/30/23 1312                 Range of Motion Comprehensive     General Range of Motion lower extremity range of motion deficits identified  -       Comment, General Range of Motion Hip flexion limited to 90* due to pain  -          Row Name 12/30/23 1312                 Strength Comprehensive (MMT)     General Manual Muscle Testing (MMT) Assessment lower extremity strength deficits identified  -          Row Name 12/30/23 1312                 Balance     Balance Assessment sitting static balance;standing dynamic balance;standing static balance  -EL       Static Sitting Balance contact guard  -EL       Static Standing Balance minimal assist  -EL       Dynamic Standing Balance moderate assist  -          Row Name 12/30/23 1312                 Sensory Assessment (Somatosensory)     Sensory Assessment (Somatosensory) sensation intact  -                       User Key  (r) = Recorded By, (t) = Taken By, (c) = Cosigned By        Initials Name Provider Type     Tommy Lopez, PT Physical Therapist                            Goals/Plan         Row Name 12/30/23 1318                 Bed Mobility Goal 1 (PT)     Activity/Assistive Device (Bed Mobility Goal 1, PT) bed mobility activities, all  -EL       [place] Level/Cues Needed (Bed Mobility Goal 1, PT) modified independence  -       Time Frame (Bed Mobility Goal 1, PT) long term goal (LTG);2 weeks  -          Row Name 12/30/23 1318                 Transfer Goal 1 (PT)     Activity/Assistive Device (Transfer Goal 1, PT)  transfers, all;walker, rolling  -EL       [place] Level/Cues Needed (Transfer Goal 1, PT) modified independence  -EL       Time Frame (Transfer Goal 1, PT) long term goal (LTG);2 weeks  -EL          Row Name 12/30/23 1318                 Gait Training Goal 1 (PT)     Activity/Assistive Device (Gait Training Goal 1, PT) gait (walking locomotion);walker, rolling  -EL       [place] Level (Gait Training Goal 1, PT) modified independence  -EL       Distance (Gait Training Goal 1, PT) 150  -EL       Time Frame (Gait Training Goal 1, PT) long term goal (LTG);2 weeks  -EL          Row Name 12/30/23 1318                 Therapy Assessment/Plan (PT)     Planned Therapy Interventions (PT) neuromuscular re-education;balance training;bed mobility training;transfer training;gait training;patient/family education;strengthening  -EL                    User Key  (r) = Recorded By, (t) = Taken By, (c) = Cosigned By        Initials Name Provider Type     EL Tommy Leong, PT Physical Therapist                         Clinical Impression         Row Name 12/30/23 1313                 Pain     Pretreatment Pain Rating 5/10  -EL       Posttreatment Pain Rating 7/10  -EL       Pain Location - Side/Orientation Left  -EL       Pain Location - foot  -EL          Row Name 12/30/23 1313                 Plan of Care Review     Plan of Care Reviewed With patient  -EL       Outcome Evaluation Pt is a 72 YO M admitted with chronic severe ischemia in BLE. POD 1 hiren fem endarectomy. Pt reports living in [facility], typically is independent with ADLs, ambulation without AD and no recent falls. Pt this date with significant pain in LE but willing to participate. Pt requiring MIN A to come to sitting EOB, MOD A to stand due to pain and MOD A for transfer to bedside chair. Pt was provided a RWx to assist with offloading LE and will recommend continued usage. Pt is well below stated baseline and recommendation at this time is SNF. PT to continue  to follow and assess as pt progresses.  -EL          Row Name 12/30/23 1313                 Therapy Assessment/Plan (PT)     Rehab Potential (PT) good, to achieve stated therapy goals  -EL       Criteria for Skilled Interventions Met (PT) yes  -EL       Therapy Frequency (PT) 5 times/wk  -EL       Predicted Duration of Therapy Intervention (PT) until d/c  -EL          Row Name 12/30/23 1313                 Vital Signs     O2 Delivery Pre Treatment room air  -EL       O2 Delivery Intra Treatment room air  -EL       O2 Delivery Post Treatment room air  -EL          Row Name 12/30/23 1313                 Positioning and Restraints     Pre-Treatment Position in bed  -EL       Post Treatment Position chair  -EL       In Chair notified nsg;sitting;call light within reach;encouraged to call for assist;exit alarm on  -EL                       User Key  (r) = Recorded By, (t) = Taken By, (c) = Cosigned By        Initials Name Provider Type     Tommy Lopez, PT Physical Therapist                            Outcome Measures         Row Name 12/30/23 1321 12/30/23 0830            How much help from another person do you currently need...     Turning from your back to your side while in flat bed without using bedrails? 3  -EL 3  -MN     Moving from lying on back to sitting on the side of a flat bed without bedrails? 3  -EL 3  -MN     Moving to and from a bed to a chair (including a wheelchair)? 2  -EL 3  -MN     Standing up from a chair using your arms (e.g., wheelchair, bedside chair)? 2  -EL 3  -MN     Climbing 3-5 steps with a railing? 1  -EL 3  -MN     To walk in hospital room? 1  -EL 3  -MN     AM-PAC 6 Clicks Score (PT) 12  -EL 18  -MN     Highest Level of Mobility Goal 4 --> Transfer to chair/commode  -EL 6 --> Walk 10 steps or more  -MN        Row Name 12/30/23 1321                 Functional Assessment     Outcome Measure Options AM-PAC 6 Clicks Basic Mobility (PT)  -EL                       User Key  (r) = Recorded  By, (t) = Taken By, (c) = Cosigned By        Initials Name Provider Type     Tommy Lopez, MELODY Physical Therapist     Rika Huynh, RN Registered Nurse                          Physical Therapy Education            Title: PT OT SLP Therapies (Done)         Topic: Physical Therapy (Done)         Point: Mobility training (Done)         Learning Progress Summary               Patient Acceptance, E,TB, VU by  at 12/30/2023 1321                               Point: Precautions (Done)         Learning Progress Summary               Patient Acceptance, E,TB, VU by  at 12/30/2023 1321                                                   User Key         Initials Effective Dates Name Provider Type Discipline      06/23/20 -  Tommy Leong, PT Physical Therapist PT                          PT Recommendation and Plan  Planned Therapy Interventions (PT): neuromuscular re-education, balance training, bed mobility training, transfer training, gait training, patient/family education, strengthening  Plan of Care Reviewed With: patient  Outcome Evaluation: Pt is a 72 YO M admitted with chronic severe ischemia in [facility]. POD 1 hiren fem endarectomy. Pt reports living in [facility], typically is independent with ADLs, ambulation without AD and no recent falls. Pt this date with significant pain in LE but willing to participate. Pt requiring MIN A to come to sitting EOB, MOD A to stand due to pain and MOD A for transfer to bedside chair. Pt was provided a RWx to assist with offloading LE and will recommend continued usage. Pt is well below stated baseline and recommendation at this time is SNF. PT to continue to follow and assess as pt progresses.      Time Calculation:   PT Evaluation Complexity  History, PT Evaluation Complexity: 1-2 personal factors and/or comorbidities  Examination of Body Systems (PT Eval Complexity): total of 3 or more elements  Clinical Presentation (PT Evaluation Complexity): evolving  Clinical Decision  Making (PT Evaluation Complexity): moderate complexity  Overall Complexity (PT Evaluation Complexity): moderate complexity       PT Charges         Row Name 23 1322                       Time Calculation     Start Time 0857  -EL         Stop Time 09  -EL         Time Calculation (min) 20 min  -EL         PT Received On 23  -EL         PT - Next Appointment 24  -EL         PT Goal Re-Cert Due Date 24  -EL                      User Key  (r) = Recorded By, (t) = Taken By, (c) = Cosigned By        Initials Name Provider Type     Tommy Lopez PT Physical Therapist                       Therapy Charges for Today         Code Description Service Date Service Provider Modifiers Qty     [id number] HC PT EVAL MOD COMPLEXITY 4 2023 Tommy Leong, PT GP 1                PT G-Codes  Outcome Measure Options: AM-PAC 6 Clicks Basic Mobility (PT)  AM-PAC 6 Clicks Score (PT): 12  PT Discharge Summary  Anticipated Discharge Disposition (PT): skilled nursing facility     Tommy Leong PT             2023                                  Jackson Pressley, MELODY   Physical Therapist  Specialty:  Physical Therapy  Therapy Treatment Note      Signed  Date of Service:  24  Creation Time:  24     Signed        Subjective: Pt agreeable to therapeutic plan of care.     Objective:      Bed mobility - SBA to complete supine>sit EOB. Able to sit EOB in SBA>CGA to complete exercises in sitting.   Transfers - Mod-A and with rolling walker for STS transfer  Ambulation - 3 feet Min-A, Mod-A, and with rolling walker     Therapeutic Exercise - 10 Reps B LE AROM unsupported sitting / EOB. Pt completed 1x10 of each of the following exercises sitting EOB: Ankle Pumps, Alternating Marching, and LAQ. Education provided on HEP to help promote blood flow to BLE.     Vitals: WNL  O2 Before: 99% on 4L O2  O2 Durin% on room air  O2 After: 95% on room air     Pain: 6 VAS   Location: L foot  Intervention for  "pain: Repositioned and Increased Activity     Education: Provided education on the importance of mobility in the acute care setting, Verbal/Tactile Cues, Transfer Training, Gait Training, and Energy conservation strategies     Assessment: Oscar Wray presents with functional mobility impairments which indicate the need for skilled intervention. Tolerating session today without incident. Pt continues to improve with therapy staff this date as he is able to tolerate short distance ambulation bout of 3', req min>mod A with cuing for BUE offloading to prevent pain in the L foot. He tolerates balta bearing well this date and has adequate strength for functional activities, but pain limiting. Educated on exercises to complete while in chair as well as HEP. Planning surgery for left femoral to posterior tibial artery bypass with in situ greater saphenous vein tomorrow AM. PT will follow-up on 23 for potential re-evaluation or treatment session. Will continue to follow and progress as tolerated.      Plan/Recommendations:   If medically appropriate, Moderate Intensity Therapy recommended post-acute care. This is recommended as therapy feels the patient would require 3-4 days per week and wouldn't tolerate \"3 hour daily\" rehab intensity. SNF would be the preferred choice. If the patient does not agree to SNF, arrange HH or OP depending on home bound status. If patient is medically complex, consider LTACH. Pt requires no DME at discharge.      Pt desires Skilled Rehab placement at discharge. Pt cooperative; agreeable to therapeutic recommendations and plan of care.            Basic Mobility 6-click:  Rollin = Total, A lot = 2, A little = 3; 4 = None  Supine>Sit:                      1 = Total, A lot = 2, A little = 3; 4 = None   Sit>Stand with arms:       1 = Total, A lot = 2, A little = 3; 4 = None  Bed>Chair:                      1 = Total, A lot = 2, A little = 3; 4 = None  Ambulate " in room:           1 = Total, A lot = 2, A little = 3; 4 = None  3-5 Steps with railin = Total, A lot = 2, A little = 3; 4 = None  Score: 15     Modified [place]: 0 = No Symptoms     Post-Tx Position: Up in Chair, Alarms activated, and Call light and personal items within reach  PPE: gloves                            No recent OT notes.

## 2024-01-06 LAB
ANION GAP SERPL CALCULATED.3IONS-SCNC: 8 MMOL/L (ref 5–15)
ANION GAP SERPL CALCULATED.3IONS-SCNC: 9 MMOL/L (ref 5–15)
APTT PPP: 26.9 SECONDS (ref 61–76.5)
APTT PPP: 62.4 SECONDS (ref 61–76.5)
APTT PPP: 85.4 SECONDS (ref 61–76.5)
APTT PPP: >139 SECONDS (ref 61–76.5)
BACTERIA SPEC AEROBE CULT: NORMAL
BACTERIA SPEC AEROBE CULT: NORMAL
BUN SERPL-MCNC: 12 MG/DL (ref 8–23)
BUN SERPL-MCNC: 16 MG/DL (ref 8–23)
BUN/CREAT SERPL: 12.5 (ref 7–25)
BUN/CREAT SERPL: 13.6 (ref 7–25)
CALCIUM SPEC-SCNC: 7.9 MG/DL (ref 8.6–10.5)
CALCIUM SPEC-SCNC: 8.3 MG/DL (ref 8.6–10.5)
CHLORIDE SERPL-SCNC: 101 MMOL/L (ref 98–107)
CHLORIDE SERPL-SCNC: 104 MMOL/L (ref 98–107)
CO2 SERPL-SCNC: 26 MMOL/L (ref 22–29)
CO2 SERPL-SCNC: 28 MMOL/L (ref 22–29)
CREAT SERPL-MCNC: 0.96 MG/DL (ref 0.76–1.27)
CREAT SERPL-MCNC: 1.18 MG/DL (ref 0.76–1.27)
DEPRECATED RDW RBC AUTO: 44.6 FL (ref 37–54)
EGFRCR SERPLBLD CKD-EPI 2021: 66 ML/MIN/1.73
EGFRCR SERPLBLD CKD-EPI 2021: 84.5 ML/MIN/1.73
EOSINOPHIL # BLD MANUAL: 0.12 10*3/MM3 (ref 0–0.4)
EOSINOPHIL NFR BLD MANUAL: 1 % (ref 0.3–6.2)
ERYTHROCYTE [DISTWIDTH] IN BLOOD BY AUTOMATED COUNT: 14.9 % (ref 12.3–15.4)
GLUCOSE SERPL-MCNC: 104 MG/DL (ref 65–99)
GLUCOSE SERPL-MCNC: 156 MG/DL (ref 65–99)
HCT VFR BLD AUTO: 28.4 % (ref 37.5–51)
HGB BLD-MCNC: 9.6 G/DL (ref 13–17.7)
LARGE PLATELETS: ABNORMAL
LYMPHOCYTES # BLD MANUAL: 1.24 10*3/MM3 (ref 0.7–3.1)
LYMPHOCYTES NFR BLD MANUAL: 8 % (ref 5–12)
MCH RBC QN AUTO: 29.2 PG (ref 26.6–33)
MCHC RBC AUTO-ENTMCNC: 33.6 G/DL (ref 31.5–35.7)
MCV RBC AUTO: 86.8 FL (ref 79–97)
METAMYELOCYTES NFR BLD MANUAL: 2 % (ref 0–0)
MONOCYTES # BLD: 0.99 10*3/MM3 (ref 0.1–0.9)
NEUTROPHILS # BLD AUTO: 9.8 10*3/MM3 (ref 1.7–7)
NEUTROPHILS NFR BLD MANUAL: 76 % (ref 42.7–76)
NEUTS BAND NFR BLD MANUAL: 3 % (ref 0–5)
PLATELET # BLD AUTO: 715 10*3/MM3 (ref 140–450)
PMV BLD AUTO: 7.3 FL (ref 6–12)
POTASSIUM SERPL-SCNC: 4.2 MMOL/L (ref 3.5–5.2)
POTASSIUM SERPL-SCNC: 4.3 MMOL/L (ref 3.5–5.2)
RBC # BLD AUTO: 3.28 10*6/MM3 (ref 4.14–5.8)
RBC MORPH BLD: NORMAL
SCAN SLIDE: NORMAL
SMALL PLATELETS BLD QL SMEAR: ABNORMAL
SODIUM SERPL-SCNC: 137 MMOL/L (ref 136–145)
SODIUM SERPL-SCNC: 139 MMOL/L (ref 136–145)
VARIANT LYMPHS NFR BLD MANUAL: 10 % (ref 19.6–45.3)
WBC MORPH BLD: NORMAL
WBC NRBC COR # BLD AUTO: 12.4 10*3/MM3 (ref 3.4–10.8)

## 2024-01-06 PROCEDURE — 97116 GAIT TRAINING THERAPY: CPT

## 2024-01-06 PROCEDURE — 97110 THERAPEUTIC EXERCISES: CPT

## 2024-01-06 PROCEDURE — 25010000002 HEPARIN (PORCINE) 25000-0.45 UT/250ML-% SOLUTION: Performed by: STUDENT IN AN ORGANIZED HEALTH CARE EDUCATION/TRAINING PROGRAM

## 2024-01-06 PROCEDURE — 85730 THROMBOPLASTIN TIME PARTIAL: CPT | Performed by: STUDENT IN AN ORGANIZED HEALTH CARE EDUCATION/TRAINING PROGRAM

## 2024-01-06 PROCEDURE — 80048 BASIC METABOLIC PNL TOTAL CA: CPT | Performed by: STUDENT IN AN ORGANIZED HEALTH CARE EDUCATION/TRAINING PROGRAM

## 2024-01-06 PROCEDURE — 85025 COMPLETE CBC W/AUTO DIFF WBC: CPT | Performed by: STUDENT IN AN ORGANIZED HEALTH CARE EDUCATION/TRAINING PROGRAM

## 2024-01-06 PROCEDURE — 85007 BL SMEAR W/DIFF WBC COUNT: CPT | Performed by: STUDENT IN AN ORGANIZED HEALTH CARE EDUCATION/TRAINING PROGRAM

## 2024-01-06 PROCEDURE — 85730 THROMBOPLASTIN TIME PARTIAL: CPT | Performed by: FAMILY MEDICINE

## 2024-01-06 RX ADMIN — MEMANTINE 10 MG: 10 TABLET ORAL at 20:00

## 2024-01-06 RX ADMIN — OXYCODONE HYDROCHLORIDE 10 MG: 5 TABLET ORAL at 00:46

## 2024-01-06 RX ADMIN — OXYCODONE HYDROCHLORIDE 10 MG: 5 TABLET ORAL at 12:29

## 2024-01-06 RX ADMIN — DONEPEZIL HYDROCHLORIDE 20 MG: 5 TABLET, FILM COATED ORAL at 20:00

## 2024-01-06 RX ADMIN — HEPARIN SODIUM 22 UNITS/KG/HR: 10000 INJECTION, SOLUTION INTRAVENOUS at 16:11

## 2024-01-06 RX ADMIN — Medication 2000 UNITS: at 07:51

## 2024-01-06 RX ADMIN — ASPIRIN 81 MG: 81 TABLET, COATED ORAL at 07:51

## 2024-01-06 RX ADMIN — TAMSULOSIN HYDROCHLORIDE 0.4 MG: 0.4 CAPSULE ORAL at 07:51

## 2024-01-06 RX ADMIN — RISPERIDONE 0.25 MG: 0.25 TABLET, FILM COATED ORAL at 07:51

## 2024-01-06 RX ADMIN — ACETAMINOPHEN 650 MG: 325 TABLET, FILM COATED ORAL at 16:44

## 2024-01-06 RX ADMIN — OXYCODONE HYDROCHLORIDE 10 MG: 5 TABLET ORAL at 07:51

## 2024-01-06 RX ADMIN — ATORVASTATIN CALCIUM 20 MG: 20 TABLET, FILM COATED ORAL at 07:51

## 2024-01-06 RX ADMIN — Medication 1 TABLET: at 07:51

## 2024-01-06 RX ADMIN — ACETAMINOPHEN 650 MG: 325 TABLET, FILM COATED ORAL at 22:54

## 2024-01-06 RX ADMIN — OXYCODONE HYDROCHLORIDE 10 MG: 5 TABLET ORAL at 19:24

## 2024-01-06 RX ADMIN — Medication 10 ML: at 07:52

## 2024-01-06 RX ADMIN — GABAPENTIN 200 MG: 100 CAPSULE ORAL at 16:44

## 2024-01-06 RX ADMIN — GABAPENTIN 200 MG: 100 CAPSULE ORAL at 20:00

## 2024-01-06 RX ADMIN — RISPERIDONE 0.5 MG: 0.25 TABLET, FILM COATED ORAL at 20:00

## 2024-01-06 RX ADMIN — ACETAMINOPHEN 650 MG: 325 TABLET, FILM COATED ORAL at 02:09

## 2024-01-06 RX ADMIN — MEMANTINE 10 MG: 10 TABLET ORAL at 07:51

## 2024-01-06 RX ADMIN — GABAPENTIN 200 MG: 100 CAPSULE ORAL at 07:51

## 2024-01-06 RX ADMIN — HEPARIN SODIUM 22 UNITS/KG/HR: 10000 INJECTION, SOLUTION INTRAVENOUS at 07:02

## 2024-01-06 NOTE — PROGRESS NOTES
[facility] MEDICINE SERVICE  DAILY PROGRESS NOTE    NAME: Oscar Wray  : 1950  MRN: [id number]      LOS: 10 days     PROVIDER OF SERVICE: Pablito Deleon MD    Chief Complaint: Critical limb ischemia of both lower extremities    Subjective:     Interval History:   Did okay today, downgraded from ICU.  S/p  Fem-PT bypass . Continues to feel much better with significant improvement in LLE pain/swelling. Discussed the case with the bedside nursing staff. No other acute concerns.    Objective:     Vital Signs  Temp:  [97.3 °F (36.3 °C)-98 °F (36.7 °C)] 97.7 °F (36.5 °C)  Heart Rate:  [83-97] 83  Resp:  [18-20] 20  BP: ()/(39-65) 107/61  Flow (L/min):  [2] 2   Body mass index is 30.11 kg/m².    Physical Exam  Vitals and nursing note reviewed.   Constitutional:       General: He is not in acute distress.     Appearance: Normal appearance.   HENT:      Mouth/Throat:      Mouth: Mucous membranes are moist.      Pharynx: Oropharynx is clear.   Eyes:      General: No scleral icterus.     Extraocular Movements: Extraocular movements intact.      Conjunctiva/sclera: Conjunctivae normal.   Cardiovascular:      Rate and Rhythm: Normal rate.      Heart sounds: No murmur heard.     No gallop.   Pulmonary:      Breath sounds: Normal breath sounds. No wheezing or rales.   Abdominal:      General: Bowel sounds are normal.      Palpations: Abdomen is soft.      Tenderness: There is no abdominal tenderness.   Genitourinary:     Comments: Mata catheter in place  Musculoskeletal:         General: No tenderness.      Right lower leg: No edema.      Left lower leg: No edema.   Skin:     Comments: Dressing in place from left groin to left tibial area.   Neurological:      General: No focal deficit present.      Mental Status: He is alert and oriented to person, place, and time.         Scheduled Meds   acetaminophen, 650 mg, Oral, Q4H   Or  acetaminophen, 650 mg, Oral, Q4H   Or  acetaminophen, 650 mg, Rectal,  Q4H  aspirin, 81 mg, Oral, Daily  [Held by provider] atenolol, 12.5 mg, Oral, Daily  atorvastatin, 20 mg, Oral, Daily  cholecalciferol, 2,000 Units, Oral, Daily  docusate sodium, 100 mg, Oral, Daily  donepezil, 20 mg, Oral, Nightly  gabapentin, 200 mg, Oral, TID  memantine, 10 mg, Oral, Q12H  multivitamin with minerals, 1 tablet, Oral, Daily  polyethylene glycol, 17 g, Oral, Daily  risperiDONE, 0.25 mg, Oral, Daily  risperiDONE, 0.5 mg, Oral, Nightly  sodium chloride, 10 mL, Intravenous, Q12H  tamsulosin, 0.4 mg, Oral, Daily       PRN Meds     artificial tears    heparin    heparin    hydrALAZINE    HYDROmorphone    nitroglycerin    [DISCONTINUED] oxyCODONE **OR** oxyCODONE    oxyCODONE    [COMPLETED] Insert Peripheral IV **AND** sodium chloride    sodium chloride    sodium chloride   Infusions  heparin, 18 Units/kg/hr, Last Rate: Stopped (01/06/24 0752)          Diagnostic Data    Results from last 7 days   Lab Units 01/06/24  0016   WBC 10*3/mm3 12.40*   HEMOGLOBIN g/dL 9.6*   HEMATOCRIT % 28.4*   PLATELETS 10*3/mm3 715*   GLUCOSE mg/dL 156*   CREATININE mg/dL 1.18   BUN mg/dL 16   SODIUM mmol/L 137   POTASSIUM mmol/L 4.3   ANION GAP mmol/L 8.0       No radiology results for the last day      I reviewed the patient's new clinical results.    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    - Maintain SBP less than 160  - Resume Lyrica and gabapentin  - Continue aspirin, Lipitor and cilostazol     Hypertension  -Some borderline blood pressures noted, hold atenolol.     KARINA on CKD stage III  - Acute component has resolved, nephrology is following   - Off IVF, Lasix PRN for  scrotal edema and generalized anasarca.    Dyslipidemia  -Continue statin therapy    Dementia  -Continue Aricept and Namenda    BPH  -Continue Flomax    DVT prophylaxis:  Medical and mechanical DVT prophylaxis orders are present.     Code status is   Code Status and Medical Interventions:   Ordered at: 12/28/23 0752     Level Of Support Discussed With:    Patient     Code Status (Patient has no pulse and is not breathing):    CPR (Attempt to Resuscitate)     Medical Interventions (Patient has pulse or is breathing):    Full Support     Plan for disposition: Precertification started on 1/5/2024 for DC to [facility] for skilled rehab.    Signature: Electronically signed by Pablito Deleon MD, 01/06/24, 09:58 EST.  [facility] Hospitalist Team

## 2024-01-06 NOTE — THERAPY TREATMENT NOTE
"Subjective: Pt agreeable to therapeutic plan of care.    Objective:     Bed mobility - Sitting to supine Mod-A and Assist x 2    Transfers - Min-A, Assist x 2, and with rolling walker    Ambulation - 15 feet Min-A, Assist x 2, and with rolling walker. Pt reports LLE pain while ambulating. Cued to increase weight bearing through walker.     Therapeutic Exercise - 10 Reps B LE AROM supported sitting / chair    Vitals: WNL    Pain: 8 VAS   Location: LLE  Intervention for pain: Repositioned, Increased Activity, and Therapeutic Presence    Education: Provided education on the importance of mobility in the acute care setting, Verbal/Tactile Cues, Transfer Training, and Gait Training    Assessment: Oscar Wray presents with functional mobility impairments which indicate the need for skilled intervention. Tolerating session today without incident. Pt able to increase ambulation distance this date to 15 feet min Ax2 using RW. Recommending subacute rehab placement to return to [facility] prior to returning home. Will continue to follow and progress as tolerated.     Plan/Recommendations:   If medically appropriate, Moderate Intensity Therapy recommended post-acute care. This is recommended as therapy feels the patient would require 3-4 days per week and wouldn't tolerate \"3 hour daily\" rehab intensity. SNF would be the preferred choice. If the patient does not agree to SNF, arrange HH or OP depending on home bound status. If patient is medically complex, consider LTACH. Pt requires no DME at discharge.     Pt desires Skilled Rehab placement at discharge. Pt cooperative; agreeable to therapeutic recommendations and plan of care.         Basic Mobility 6-click:  Rollin = Total, A lot = 2, A little = 3; 4 = None  Supine>Sit:   1 = Total, A lot = 2, A little = 3; 4 = None   Sit>Stand with arms:  1 = Total, A lot = 2, A little = 3; 4 = None  Bed>Chair:   1 = Total, A lot = 2, A little = 3; 4 = None  Ambulate in room:  1 " = Total, A lot = 2, A little = 3; 4 = None  3-5 Steps with railin = Total, A lot = 2, A little = 3; 4 = None  Score: 13    Modified [place]: N/A = No pre-op stroke/TIA    Post-Tx Position: Supine with HOB Elevated, Alarms activated, and Call light and personal items within reach  PPE: gloves

## 2024-01-06 NOTE — PLAN OF CARE
Goal Outcome Evaluation:            Pt alert with confusion, at baseline, but able to make needs known. Tends to be forgetful of vang, and frequently asks if he is okay to void. Complaints of pain treated with PRN pain medication. Patient has been up in the chair and worked with PT this morning. Family/friend at bedside, plan of care ongoing

## 2024-01-06 NOTE — PLAN OF CARE
Goal Outcome Evaluation:            Patient alert oriented forgetful at times. Pain controlled with po pain medication. Patient on Heparin gtt. Continue to monitor. Patient on oxygen 2L/NC. Patient screened positive for Sepsis this shift MD notified. Incision to left leg and right groin area. Moderate amount of drainage at beginning of shift. Dressing changed with continue to monitor dressing. Patient refuses to turn and reposition at times.Educated patient to importance of repositioning and risk of skin breakdown. Cardiac monitored. Bed in low position, call light in reach, bed alarm set, room near nursing station.

## 2024-01-06 NOTE — PROGRESS NOTES
Name: Oscar Wray ADMIT: 2023   : 1950  PCP: Kajal Horowitz PA    MRN: [id number] LOS: 10 days   AGE/SEX: 73 y.o. male  ROOM: [address] [place] [address] Peter    [place], [facility] [employer]    73 y.o. male POD #8 s/p  bilateral common femoral endarterectomies, aortic and bilateral iliac stents. POD #2 s/p fem-PT with vein    Subjective: Having some pain in left leg around incision and in foot that sounds like reperfusion pain.  Overall it is improved from prior to his surgery    Scheduled Medications:   acetaminophen, 650 mg, Oral, Q4H   Or  acetaminophen, 650 mg, Oral, Q4H   Or  acetaminophen, 650 mg, Rectal, Q4H  aspirin, 81 mg, Oral, Daily  [Held by provider] atenolol, 12.5 mg, Oral, Daily  atorvastatin, 20 mg, Oral, Daily  cholecalciferol, 2,000 Units, Oral, Daily  docusate sodium, 100 mg, Oral, Daily  donepezil, 20 mg, Oral, Nightly  gabapentin, 200 mg, Oral, TID  memantine, 10 mg, Oral, Q12H  multivitamin with minerals, 1 tablet, Oral, Daily  polyethylene glycol, 17 g, Oral, Daily  risperiDONE, 0.25 mg, Oral, Daily  risperiDONE, 0.5 mg, Oral, Nightly  sodium chloride, 10 mL, Intravenous, Q12H  tamsulosin, 0.4 mg, Oral, Daily      Active Infusions:  heparin, 18 Units/kg/hr, Last Rate: Stopped (24 0752)      Vital Signs  Vital Signs Patient Vitals for the past 24 hrs:   BP Temp Temp src Pulse Resp SpO2   24 0847 107/61 97.7 °F (36.5 °C) Oral -- 20 97 %   24 0500 122/65 97.8 °F (36.6 °C) -- 83 18 96 %   24 0044 110/52 97.5 °F (36.4 °C) -- -- 18 97 %   24 2044 111/55 98 °F (36.7 °C) Oral 97 20 95 %   24 1654 103/48 97.3 °F (36.3 °C) Oral 96 19 97 %   24 1600 106/57 -- -- 92 -- 96 %   24 1500 115/52 -- -- 90 -- 95 %   24 1400 (!) 98/39 -- -- 90 -- 94 %   24 1300 101/48 -- -- 96 -- 94 %   24 1200 128/56 97.9 °F (36.6 °C) Oral 93 -- 94 %   24 1100 (!) 88/50 -- -- 94 -- 93 %     I/O:  I/O last 3 completed shifts:  In: 1700 [P.O.:1860;  I.V.:880]  Out: 4175 [Urine:4175]    Physical Exam   Foot improved and is now red and hyperemic.  Significant edema in both legs left worse than right.  Palpable PT on the left.  Incision intact and healing well.  Slight sanguinous oozing from incision in distal thigh.      Imaging:  No radiology results for the last day       CBC    Results from last 7 days   Lab Units 01/06/24  0016 01/05/24  0353 01/04/24  0739 01/03/24  0829 01/02/24  0315 01/01/24  0650 12/31/23  0548   WBC 10*3/mm3 12.40* 12.70* 10.60 11.80* 9.40 10.90* 10.60   HEMOGLOBIN g/dL 9.6* 9.0* 10.2* 10.3* 10.0* 10.7* 10.9*   PLATELETS 10*3/mm3 715* 703* 634* 562* 479* 473* 393     BMP   Results from last 7 days   Lab Units 01/06/24  0016 01/05/24  0353 01/04/24  0739 01/03/24  0829 01/02/24  0315 01/01/24  0650 12/31/23  0548   SODIUM mmol/L 137 135* 138 134* 133* 134* 135*   POTASSIUM mmol/L 4.3 4.6 4.2 4.3 3.6 4.4 3.7   CHLORIDE mmol/L 101 100 101 101 102 99 102   CO2 mmol/L 28.0 24.0 26.0 25.0 26.0 25.0 22.0   BUN mg/dL 16 13 11 10 9 9 11   CREATININE mg/dL 1.18 1.01 1.05 0.95 1.04 1.01 1.06   GLUCOSE mg/dL 156* 114* 85 99 101* 107* 105*     Coag   Results from last 7 days   Lab Units 01/06/24  0623 01/06/24  0016 01/05/24  1753 01/05/24  1047 01/05/24  0353 01/04/24  0031 01/03/24  1821 01/01/24  0650 12/31/23  2103   INR   --   --   --   --  1.06  --   --   --  1.28*   APTT seconds 62.4 85.4* 49.3* 57.1* 47.1* 79.7* 61.0   < > 38.2*    < > = values in this interval not displayed.     Assessment & Plan   Assessment & Plan    Critical limb ischemia of both lower extremities    Degenerative disc disease, lumbar    73 y.o. male  s/p  bilateral common femoral endarterectomies, aortic and bilateral iliac stents (12/29/23) followed by fem-PT with vein (1/4/24)    1/2/24:Patient has done well overall and feet are considerably improved from prior to his surgery, both on exam and per patients report of his symptoms. He does still report intermittent pain  in the left foot, especially in the toes at rest. I had a long discussion with the patient and his girlfriend today. I think with the poor response in his ABIs and ongoing pain and duskiness in his toes that a femoral-PT bypass would be indicated. Patient has not really attempted to ambulate since his surgery on Friday and I encouraged him to try to do so with physical therapy.   Will plan for Left femoral-PT bypass later this week, probably Thursday if OR schedule allows.   Will continue to monitor his groin incisions for now.     1/3/24: Patient stable.  On a heparin drip.  He is currently on the OR schedule for tomorrow morning at 9:30 AM to do a left femoral to posterior tibial artery bypass with in situ greater saphenous vein.  Will plan to stop heparin drip around 4 AM.    1/4/24: OR for Bypass    1/5/24: No major issues overnight.  Heparin restarted and PTT from 79-47 the last 2 checks.  Hemoglobin 9.  Platelet count 703.  Extensive duplications in his order sets.  I reviewed and addressed.  DC A-line.  Hep-Lock IV fluids.  Had a tremendous amount of scrotal edema so I would recommend keeping the Mata catheter for today and have DC'd the order for removal.  May benefit from diuresis.  Will plan to remove the dressings tomorrow.  Excellent multiphasic signal at the PT level.  Hyperemic foot.    1/6/24: Doing well.  Can palpate a left PT, however is difficult due to significant edema.  Also with some oozing from his incision.  Discussed with nurse and will hold heparin drip for 8 hours today.  Should resume around 4 PM.  Okay to get out of bed, ambulate from my standpoint.  Leg wrapped with Ace wrap, and recommend keeping it wrapped to help with the edema while his incision heals.  Continue current pain regimen.  Depending on how the incision looks like we will transition to aspirin and Eliquis tomorrow.    Please call my office with any question: [phone]    Active Hospital Problems    Diagnosis  POA     **Critical limb ischemia of both lower extremities [I70.223]  Yes    Degenerative disc disease, lumbar [M51.36]  Yes      Resolved Hospital Problems   No resolved problems to display.

## 2024-01-06 NOTE — PLAN OF CARE
"Assessment: Oscar Wray presents with functional mobility impairments which indicate the need for skilled intervention. Tolerating session today without incident. Pt able to increase ambulation distance this date to 15 feet min Ax2 using RW. Recommending subacute rehab placement to return to P[facility]F prior to returning home. Will continue to follow and progress as tolerated.     Plan/Recommendations:   If medically appropriate, Moderate Intensity Therapy recommended post-acute care. This is recommended as therapy feels the patient would require 3-4 days per week and wouldn't tolerate \"3 hour daily\" rehab intensity. SNF would be the preferred choice. If the patient does not agree to SNF, arrange HH or OP depending on home bound status. If patient is medically complex, consider LTACH. Pt requires no DME at discharge.     Pt desires Skilled Rehab placement at discharge. Pt cooperative; agreeable to therapeutic recommendations and plan of care.   "

## 2024-01-06 NOTE — CASE MANAGEMENT/SOCIAL WORK
Continued Stay Note  LIZBETH Peter     Patient Name: Oscar Wray  MRN: [id number]  Today's Date: 1/6/2024    Admit Date: 12/27/2023    Plan: [place] Plan: [facility] accepted and following. Precert started 1/05/2024. [facility] approved.   Discharge Plan       Row Name 01/06/24 1030       Plan    Plan Comments precert remains pending per [facility] website.                      Expected Discharge Date and Time       Expected Discharge Date Expected Discharge Time    Jan 7, 2024               Rolanda Edward RN

## 2024-01-07 LAB
ANISOCYTOSIS BLD QL: ABNORMAL
DEPRECATED RDW RBC AUTO: 47.3 FL (ref 37–54)
EOSINOPHIL # BLD MANUAL: 0.67 10*3/MM3 (ref 0–0.4)
EOSINOPHIL NFR BLD MANUAL: 7 % (ref 0.3–6.2)
ERYTHROCYTE [DISTWIDTH] IN BLOOD BY AUTOMATED COUNT: 15 % (ref 12.3–15.4)
HCT VFR BLD AUTO: 29.4 % (ref 37.5–51)
HGB BLD-MCNC: 9.6 G/DL (ref 13–17.7)
LARGE PLATELETS: ABNORMAL
LYMPHOCYTES # BLD MANUAL: 1.06 10*3/MM3 (ref 0.7–3.1)
LYMPHOCYTES NFR BLD MANUAL: 9 % (ref 5–12)
MCH RBC QN AUTO: 27.6 PG (ref 26.6–33)
MCHC RBC AUTO-ENTMCNC: 32.5 G/DL (ref 31.5–35.7)
MCV RBC AUTO: 84.9 FL (ref 79–97)
METAMYELOCYTES NFR BLD MANUAL: 1 % (ref 0–0)
MONOCYTES # BLD: 0.86 10*3/MM3 (ref 0.1–0.9)
MYELOCYTES NFR BLD MANUAL: 1 % (ref 0–0)
NEUTROPHILS # BLD AUTO: 6.82 10*3/MM3 (ref 1.7–7)
NEUTROPHILS NFR BLD MANUAL: 71 % (ref 42.7–76)
PLATELET # BLD AUTO: 617 10*3/MM3 (ref 140–450)
PMV BLD AUTO: 7.2 FL (ref 6–12)
RBC # BLD AUTO: 3.46 10*6/MM3 (ref 4.14–5.8)
SCAN SLIDE: NORMAL
TOXIC GRANULATION: ABNORMAL
VARIANT LYMPHS NFR BLD MANUAL: 11 % (ref 19.6–45.3)
WBC NRBC COR # BLD AUTO: 9.6 10*3/MM3 (ref 3.4–10.8)

## 2024-01-07 PROCEDURE — 25010000002 FUROSEMIDE PER 20 MG: Performed by: FAMILY MEDICINE

## 2024-01-07 RX ORDER — FUROSEMIDE 10 MG/ML
40 INJECTION INTRAMUSCULAR; INTRAVENOUS 2 TIMES DAILY
Status: DISCONTINUED | OUTPATIENT
Start: 2024-01-07 | End: 2024-01-08 | Stop reason: HOSPADM

## 2024-01-07 RX ADMIN — OXYCODONE HYDROCHLORIDE 10 MG: 5 TABLET ORAL at 05:26

## 2024-01-07 RX ADMIN — RISPERIDONE 0.25 MG: 0.25 TABLET, FILM COATED ORAL at 09:38

## 2024-01-07 RX ADMIN — APIXABAN 5 MG: 5 TABLET, FILM COATED ORAL at 14:17

## 2024-01-07 RX ADMIN — FUROSEMIDE 40 MG: 10 INJECTION, SOLUTION INTRAMUSCULAR; INTRAVENOUS at 20:29

## 2024-01-07 RX ADMIN — Medication 10 ML: at 20:25

## 2024-01-07 RX ADMIN — MEMANTINE 10 MG: 10 TABLET ORAL at 20:26

## 2024-01-07 RX ADMIN — ACETAMINOPHEN 650 MG: 325 TABLET, FILM COATED ORAL at 21:02

## 2024-01-07 RX ADMIN — FUROSEMIDE 40 MG: 10 INJECTION, SOLUTION INTRAMUSCULAR; INTRAVENOUS at 14:17

## 2024-01-07 RX ADMIN — RISPERIDONE 0.5 MG: 0.25 TABLET, FILM COATED ORAL at 20:26

## 2024-01-07 RX ADMIN — Medication 1 TABLET: at 09:38

## 2024-01-07 RX ADMIN — DONEPEZIL HYDROCHLORIDE 20 MG: 5 TABLET, FILM COATED ORAL at 20:26

## 2024-01-07 RX ADMIN — ATORVASTATIN CALCIUM 20 MG: 20 TABLET, FILM COATED ORAL at 09:39

## 2024-01-07 RX ADMIN — TAMSULOSIN HYDROCHLORIDE 0.4 MG: 0.4 CAPSULE ORAL at 09:38

## 2024-01-07 RX ADMIN — ASPIRIN 81 MG: 81 TABLET, COATED ORAL at 09:38

## 2024-01-07 RX ADMIN — OXYCODONE HYDROCHLORIDE 10 MG: 5 TABLET ORAL at 09:38

## 2024-01-07 RX ADMIN — ACETAMINOPHEN 650 MG: 325 TABLET, FILM COATED ORAL at 09:38

## 2024-01-07 RX ADMIN — OXYCODONE HYDROCHLORIDE 10 MG: 5 TABLET ORAL at 23:09

## 2024-01-07 RX ADMIN — OXYCODONE HYDROCHLORIDE 10 MG: 5 TABLET ORAL at 14:17

## 2024-01-07 RX ADMIN — GABAPENTIN 200 MG: 100 CAPSULE ORAL at 14:18

## 2024-01-07 RX ADMIN — ACETAMINOPHEN 650 MG: 325 TABLET, FILM COATED ORAL at 20:26

## 2024-01-07 RX ADMIN — MEMANTINE 10 MG: 10 TABLET ORAL at 09:38

## 2024-01-07 RX ADMIN — ACETAMINOPHEN 650 MG: 325 TABLET, FILM COATED ORAL at 14:17

## 2024-01-07 RX ADMIN — DOCUSATE SODIUM 100 MG: 100 CAPSULE, LIQUID FILLED ORAL at 09:38

## 2024-01-07 RX ADMIN — Medication 2000 UNITS: at 09:39

## 2024-01-07 RX ADMIN — ACETAMINOPHEN 650 MG: 325 TABLET, FILM COATED ORAL at 05:26

## 2024-01-07 RX ADMIN — Medication 10 ML: at 09:38

## 2024-01-07 RX ADMIN — APIXABAN 5 MG: 5 TABLET, FILM COATED ORAL at 20:26

## 2024-01-07 RX ADMIN — GABAPENTIN 200 MG: 100 CAPSULE ORAL at 09:38

## 2024-01-07 RX ADMIN — GABAPENTIN 200 MG: 100 CAPSULE ORAL at 20:26

## 2024-01-07 NOTE — PROGRESS NOTES
[facility] MEDICINE SERVICE  DAILY PROGRESS NOTE    NAME: Oscar Wray  : 1950  MRN: [id number]      LOS: 11 days     PROVIDER OF SERVICE: Pablito Deleon MD    Chief Complaint: Critical limb ischemia of both lower extremities    Subjective:     Interval History:   No acute issues overnight.  S/P  Fem-PT bypass . Continues to feel better overall.  Pain is reasonable, lower extremity swelling has improved but he still has significant scrotal edema and still has a Mata catheter.  Vascular surgery did restart his Eliquis today.  Discussed the case with the bedside nursing staff. No other acute concerns.    Objective:     Vital Signs  Temp:  [97.4 °F (36.3 °C)-98.2 °F (36.8 °C)] 98.2 °F (36.8 °C)  Heart Rate:  [84-94] 84  Resp:  [14-20] 14  BP: (107-153)/(61-73) 144/73   Body mass index is 30.11 kg/m².    Physical Exam:  GEN: Obese elderly gentleman, sitting in the bedside chair, no acute distress  HEENT: NCAT, PERRLA, moist mucous membranes  NECK: Supple, midline trachea  CARDIAC: RRR, no appreciable M/R/G, peripheral edema  PULM: CTAB, nondistressed  ABD: soft, NTND, normoactive bowel sounds throughout  SKIN: Warm, dry--see below for nursing skin/wound survey  : Mata in place, moderate scrotal edema  NEURO: Grossly intact, nonfocal exam  PSYCH: Pleasant    Wounds (last 24 hours)       LDA Wound       Row Name 24 0837 24 0400 24 0024       Wound 23 1014 Left anterior groin Incision    Wound - Properties Group Placement Date: 23  - Placement Time: 1014  - Side: Left  - Orientation: anterior  - Location: groin  - Primary Wound Type: Incision  -MC    Dressing Appearance -- dry;intact;area marked  -RD dry;intact;area marked  -RD    Closure Spencer  -AH -- --    Base dressing in place, unable to visualize  -AH dressing in place, unable to visualize  -RD dressing in place, unable to visualize  -RD    Dressing Care -- border dressing  -RD border dressing  -RD     Retired Wound - Properties Group Placement Date: 12/29/23  - Placement Time: 1014  - Side: Left  -MC Orientation: anterior  -MC Location: groin  -MC Primary Wound Type: Incision  -MC    Retired Wound - Properties Group Date first assessed: 12/29/23  - Time first assessed: 1014  - Side: Left  -MC Location: groin  -MC Primary Wound Type: Incision  -MC       Wound 12/29/23 1014 Right anterior groin Incision    Wound - Properties Group Placement Date: 12/29/23  - Placement Time: 1014  - Side: Right  -MC Orientation: anterior  -MC Location: groin  -MC Primary Wound Type: Incision  -MC    Dressing Appearance -- open to air  -RD open to air  -RD    Closure Glue;Approximated;Open to air  -AH Glue;Approximated  -RD Glue;Approximated  -RD    Base -- clean;dry  -RD dry  -RD    Periwound Skin Turgor -- firm  -RD --    Retired Wound - Properties Group Placement Date: 12/29/23  - Placement Time: 1014  - Side: Right  -MC Orientation: anterior  -MC Location: groin  -MC Primary Wound Type: Incision  -MC    Retired Wound - Properties Group Date first assessed: 12/29/23  - Time first assessed: 1014  - Side: Right  -MC Location: groin  -MC Primary Wound Type: Incision  -MC       Wound 01/04/24 1059 Left lateral leg Incision    Wound - Properties Group Placement Date: 01/04/24  -TP Placement Time: 1059  -TP Present on Original Admission: N  -TP Side: Left  -TP Orientation: lateral  -TP Location: leg  -TP Primary Wound Type: Incision  -TP    Dressing Appearance -- dry;intact  -RD dry;intact  -RD    Closure Unable to assess  -AH Unable to assess  -RD Unable to assess  -RD    Base dressing in place, unable to visualize  -AH dressing in place, unable to visualize  -RD dressing in place, unable to visualize  -RD    Dressing Care -- elastic bandage;border dressing  -RD --    Retired Wound - Properties Group Placement Date: 01/04/24  -TP Placement Time: 1059  -TP Present on Original Admission: N  -TP Side: Left  -TP  Orientation: lateral  -TP Location: leg  -TP Primary Wound Type: Incision  -TP    Retired Wound - Properties Group Date first assessed: 01/04/24  -TP Time first assessed: 1059  -TP Present on Original Admission: N  -TP Side: Left  -TP Location: leg  -TP Primary Wound Type: Incision  -TP       Wound 01/04/24 1059 Left distal thigh Incision    Wound - Properties Group Placement Date: 01/04/24  -TP Placement Time: 1059  -TP Present on Original Admission: N  -TP Side: Left  -TP Orientation: distal  -TP Location: thigh  -TP Primary Wound Type: Incision  -TP    Dressing Appearance -- dry;intact  -RD dry;intact  -RD    Closure Unable to assess  -AH Unable to assess  -RD Unable to assess  -RD    Base dressing in place, unable to visualize  -AH dressing in place, unable to visualize  -RD dressing in place, unable to visualize  -RD    Dressing Care -- border dressing;elastic bandage  -RD border dressing;elastic bandage  -RD    Retired Wound - Properties Group Placement Date: 01/04/24  -TP Placement Time: 1059  -TP Present on Original Admission: N  -TP Side: Left  -TP Orientation: distal  -TP Location: thigh  -TP Primary Wound Type: Incision  -TP    Retired Wound - Properties Group Date first assessed: 01/04/24  -TP Time first assessed: 1059  -TP Present on Original Admission: N  -TP Side: Left  -TP Location: thigh  -TP Primary Wound Type: Incision  -TP      Row Name 01/06/24 1954             Wound 12/29/23 1014 Left anterior groin Incision    Wound - Properties Group Placement Date: 12/29/23  -MC Placement Time: 1014  -MC Side: Left  -MC Orientation: anterior  -MC Location: groin  -MC Primary Wound Type: Incision  -MC    Dressing Appearance dry;intact;area marked  -RD      Base dressing in place, unable to visualize  -RD      Dressing Care border dressing  -RD      Retired Wound - Properties Group Placement Date: 12/29/23  -MC Placement Time: 1014  -MC Side: Left  -MC Orientation: anterior  -MC Location: groin  -MC Primary  Wound Type: Incision  -MC    Retired Wound - Properties Group Date first assessed: 12/29/23  - Time first assessed: 1014  -MC Side: Left  -MC Location: groin  -MC Primary Wound Type: Incision  -MC       Wound 12/29/23 1014 Right anterior groin Incision    Wound - Properties Group Placement Date: 12/29/23  - Placement Time: 1014  -MC Side: Right  -MC Orientation: anterior  -MC Location: groin  -MC Primary Wound Type: Incision  -MC    Dressing Appearance open to air  -RD      Closure Glue;Approximated  -RD      Base dry;clean  -RD      Periwound Temperature warm  -RD      Periwound Skin Turgor firm  -RD      Retired Wound - Properties Group Placement Date: 12/29/23  - Placement Time: 1014  -MC Side: Right  -MC Orientation: anterior  -MC Location: groin  -MC Primary Wound Type: Incision  -MC    Retired Wound - Properties Group Date first assessed: 12/29/23  - Time first assessed: 1014  -MC Side: Right  -MC Location: groin  -MC Primary Wound Type: Incision  -MC       Wound 01/04/24 1059 Left lateral leg Incision    Wound - Properties Group Placement Date: 01/04/24  -TP Placement Time: 1059  -TP Present on Original Admission: N  -TP Side: Left  -TP Orientation: lateral  -TP Location: leg  -TP Primary Wound Type: Incision  -TP    Dressing Appearance dry;intact  -RD      Closure Unable to assess  -RD      Base dressing in place, unable to visualize  -RD      Drainage Amount none  -RD      Dressing Care elastic bandage;border dressing  -RD      Retired Wound - Properties Group Placement Date: 01/04/24  -TP Placement Time: 1059  -TP Present on Original Admission: N  -TP Side: Left  -TP Orientation: lateral  -TP Location: leg  -TP Primary Wound Type: Incision  -TP    Retired Wound - Properties Group Date first assessed: 01/04/24  -TP Time first assessed: 1059  -TP Present on Original Admission: N  -TP Side: Left  -TP Location: leg  -TP Primary Wound Type: Incision  -TP       Wound 01/04/24 1059 Left distal thigh  Incision    Wound - Properties Group Placement Date: 01/04/24  -TP Placement Time: 1059  -TP Present on Original Admission: N  -TP Side: Left  -TP Orientation: distal  -TP Location: thigh  -TP Primary Wound Type: Incision  -TP    Closure Unable to assess  -RD      Base dressing in place, unable to visualize  -RD      Dressing Care elastic bandage;border dressing  -RD      Retired Wound - Properties Group Placement Date: 01/04/24  -TP Placement Time: 1059  -TP Present on Original Admission: N  -TP Side: Left  -TP Orientation: distal  -TP Location: thigh  -TP Primary Wound Type: Incision  -TP    Retired Wound - Properties Group Date first assessed: 01/04/24  -TP Time first assessed: 1059  -TP Present on Original Admission: N  -TP Side: Left  -TP Location: thigh  -TP Primary Wound Type: Incision  -TP              User Key  (r) = Recorded By, (t) = Taken By, (c) = Cosigned By      Initials Name Provider Type    TP Theresa Kasper RN Registered Nurse    Som Sampson RN Registered Nurse    Esthela Pryor LPN Licensed Nurse    Xiomara Felix RN Registered Nurse                      Scheduled Meds   acetaminophen, 650 mg, Oral, Q4H   Or  acetaminophen, 650 mg, Oral, Q4H   Or  acetaminophen, 650 mg, Rectal, Q4H  aspirin, 81 mg, Oral, Daily  [Held by provider] atenolol, 12.5 mg, Oral, Daily  atorvastatin, 20 mg, Oral, Daily  cholecalciferol, 2,000 Units, Oral, Daily  docusate sodium, 100 mg, Oral, Daily  donepezil, 20 mg, Oral, Nightly  gabapentin, 200 mg, Oral, TID  memantine, 10 mg, Oral, Q12H  multivitamin with minerals, 1 tablet, Oral, Daily  polyethylene glycol, 17 g, Oral, Daily  risperiDONE, 0.25 mg, Oral, Daily  risperiDONE, 0.5 mg, Oral, Nightly  sodium chloride, 10 mL, Intravenous, Q12H  tamsulosin, 0.4 mg, Oral, Daily       PRN Meds     artificial tears    heparin    heparin    hydrALAZINE    HYDROmorphone    nitroglycerin    [DISCONTINUED] oxyCODONE **OR** oxyCODONE    oxyCODONE    [COMPLETED] Insert  Peripheral IV **AND** sodium chloride    sodium chloride    sodium chloride   Infusions  heparin, 18 Units/kg/hr, Last Rate: 22 Units/kg/hr (01/06/24 1611)          Diagnostic Data    Results from last 7 days   Lab Units 01/06/24  2254   WBC 10*3/mm3 9.60   HEMOGLOBIN g/dL 9.6*   HEMATOCRIT % 29.4*   PLATELETS 10*3/mm3 617*   GLUCOSE mg/dL 104*   CREATININE mg/dL 0.96   BUN mg/dL 12   SODIUM mmol/L 139   POTASSIUM mmol/L 4.2   ANION GAP mmol/L 9.0       No radiology results for the last day      I reviewed the patient's new clinical results.    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    Dyslipidemia  -Continue statin therapy    Dementia  -Continue Aricept and Namenda    BPH  -Continue Flomax    DVT prophylaxis:  Medical and mechanical DVT prophylaxis orders are present.     Code status is   Code Status and Medical Interventions:   Ordered at: 12/28/23 0752     Level Of Support Discussed With:    Patient     Code Status (Patient has no pulse and is not breathing):    CPR (Attempt to Resuscitate)     Medical Interventions (Patient has pulse or is breathing):    Full Support     Plan for disposition: Precertification started on 1/5/2024 for DC to [place] for skilled rehab.    Signature: Electronically signed by  Pablito Deleon MD, 01/07/24, 08:30 EST.  [facility] Hospitalist Team

## 2024-01-07 NOTE — PLAN OF CARE
Problem: Adult Inpatient Plan of Care  Goal: Plan of Care Review  Outcome: Ongoing, Progressing  Flowsheets (Taken 1/7/2024 1529)  Progress: improving  Plan of Care Reviewed With:   patient   significant other  Outcome Evaluation: Patient stable and minimal complaints of pain. See MAR. Heparin drip stopped today. Patient started on eliquis. vang catheter to remain. Safety measures in place., Call light within reach. PAtient able to make needs known. Patient is forgetful at times. Plan of care ongoing.  Goal: Patient-Specific Goal (Individualized)  Outcome: Ongoing, Progressing  Goal: Absence of Hospital-Acquired Illness or Injury  Outcome: Ongoing, Progressing  Intervention: Identify and Manage Fall Risk  Recent Flowsheet Documentation  Taken 1/7/2024 1447 by Esthela Quevedo LPN  Safety Promotion/Fall Prevention:   activity supervised   assistive device/personal items within reach   clutter free environment maintained   fall prevention program maintained   gait belt   nonskid shoes/slippers when out of bed   room organization consistent   safety round/check completed  Taken 1/7/2024 1217 by Esthela Quevedo LPN  Safety Promotion/Fall Prevention:   activity supervised   assistive device/personal items within reach   clutter free environment maintained   nonskid shoes/slippers when out of bed   room organization consistent   safety round/check completed   fall prevention program maintained   gait belt  Taken 1/7/2024 1008 by Esthela Quevedo LPN  Safety Promotion/Fall Prevention:   activity supervised   assistive device/personal items within reach   clutter free environment maintained   fall prevention program maintained   gait belt   nonskid shoes/slippers when out of bed   safety round/check completed   room organization consistent  Taken 1/7/2024 0837 by Esthela Quevedo LPN  Safety Promotion/Fall Prevention:   activity supervised   assistive device/personal items within reach   clutter free environment  maintained   nonskid shoes/slippers when out of bed   room organization consistent   safety round/check completed   fall prevention program maintained   gait belt  Intervention: Prevent Skin Injury  Recent Flowsheet Documentation  Taken 1/7/2024 0837 by Esthela Quevedo LPN  Skin Protection: incontinence pads utilized  Intervention: Prevent and Manage VTE (Venous Thromboembolism) Risk  Recent Flowsheet Documentation  Taken 1/7/2024 1217 by Esthela Quevedo LPN  Activity Management: activity encouraged  Taken 1/7/2024 0837 by Esthela Quevedo LPN  Activity Management: activity encouraged  VTE Prevention/Management:   bilateral   sequential compression devices off  Intervention: Prevent Infection  Recent Flowsheet Documentation  Taken 1/7/2024 1217 by Esthela Quevedo LPN  Infection Prevention:   hand hygiene promoted   personal protective equipment utilized   single patient room provided  Taken 1/7/2024 0837 by Esthela Quevedo LPN  Infection Prevention:   hand hygiene promoted   personal protective equipment utilized   single patient room provided  Goal: Optimal Comfort and Wellbeing  Outcome: Ongoing, Progressing  Intervention: Provide Person-Centered Care  Recent Flowsheet Documentation  Taken 1/7/2024 0837 by Esthela Quevedo LPN  Trust Relationship/Rapport:   care explained   questions answered   questions encouraged   reassurance provided   thoughts/feelings acknowledged  Goal: Readiness for Transition of Care  Outcome: Ongoing, Progressing     Problem: Pain Acute  Goal: Acceptable Pain Control and Functional Ability  Outcome: Ongoing, Progressing  Intervention: Prevent or Manage Pain  Recent Flowsheet Documentation  Taken 1/7/2024 1447 by Esthela Quevedo LPN  Medication Review/Management: medications reviewed  Taken 1/7/2024 1008 by Esthela Quevedo LPN  Medication Review/Management: medications reviewed  Taken 1/7/2024 0837 by Esthela Quevedo LPN  Medication Review/Management: medications reviewed  Intervention:  Optimize Psychosocial Wellbeing  Recent Flowsheet Documentation  Taken 1/7/2024 0837 by Esthela Quevedo LPN  Diversional Activities:   television   smartphone     Problem: Hypertension Comorbidity  Goal: Blood Pressure in Desired Range  Outcome: Ongoing, Progressing  Intervention: Maintain Blood Pressure Management  Recent Flowsheet Documentation  Taken 1/7/2024 1447 by Esthela Quevedo LPN  Medication Review/Management: medications reviewed  Taken 1/7/2024 1008 by Esthela Quevedo LPN  Medication Review/Management: medications reviewed  Taken 1/7/2024 0837 by Esthela Quevedo LPN  Medication Review/Management: medications reviewed     Problem: Fall Injury Risk  Goal: Absence of Fall and Fall-Related Injury  Outcome: Ongoing, Progressing  Intervention: Identify and Manage Contributors  Recent Flowsheet Documentation  Taken 1/7/2024 1447 by Esthela Quevedo LPN  Medication Review/Management: medications reviewed  Taken 1/7/2024 1008 by Esthela Quevedo LPN  Medication Review/Management: medications reviewed  Taken 1/7/2024 0837 by Esthela Quevedo LPN  Medication Review/Management: medications reviewed  Intervention: Promote Injury-Free Environment  Recent Flowsheet Documentation  Taken 1/7/2024 1447 by Esthela Quevedo LPN  Safety Promotion/Fall Prevention:   activity supervised   assistive device/personal items within reach   clutter free environment maintained   fall prevention program maintained   gait belt   nonskid shoes/slippers when out of bed   room organization consistent   safety round/check completed  Taken 1/7/2024 1217 by Esthela Quevedo LPN  Safety Promotion/Fall Prevention:   activity supervised   assistive device/personal items within reach   clutter free environment maintained   nonskid shoes/slippers when out of bed   room organization consistent   safety round/check completed   fall prevention program maintained   gait belt  Taken 1/7/2024 1008 by Esthela Quevedo LPN  Safety Promotion/Fall Prevention:    activity supervised   assistive device/personal items within reach   clutter free environment maintained   fall prevention program maintained   gait belt   nonskid shoes/slippers when out of bed   safety round/check completed   room organization consistent  Taken 1/7/2024 0837 by Esthela Quevedo LPN  Safety Promotion/Fall Prevention:   activity supervised   assistive device/personal items within reach   clutter free environment maintained   nonskid shoes/slippers when out of bed   room organization consistent   safety round/check completed   fall prevention program maintained   gait belt     Problem: Skin Injury Risk Increased  Goal: Skin Health and Integrity  Outcome: Ongoing, Progressing  Intervention: Optimize Skin Protection  Recent Flowsheet Documentation  Taken 1/7/2024 0837 by Esthela Quevedo LPN  Pressure Reduction Techniques: frequent weight shift encouraged  Pressure Reduction Devices: pressure-redistributing mattress utilized  Skin Protection: incontinence pads utilized     Problem: Adjustment to Illness (Sepsis/Septic Shock)  Goal: Optimal Coping  Outcome: Ongoing, Progressing  Intervention: Optimize Psychosocial Adjustment to Illness  Recent Flowsheet Documentation  Taken 1/7/2024 0837 by Esthela Quevedo LPN  Family/Support System Care:   self-care encouraged   support provided     Problem: Bleeding (Sepsis/Septic Shock)  Goal: Absence of Bleeding  Outcome: Ongoing, Progressing     Problem: Glycemic Control Impaired (Sepsis/Septic Shock)  Goal: Blood Glucose Level Within Desired Range  Outcome: Ongoing, Progressing     Problem: Infection Progression (Sepsis/Septic Shock)  Goal: Absence of Infection Signs and Symptoms  Outcome: Ongoing, Progressing  Intervention: Initiate Sepsis Management  Recent Flowsheet Documentation  Taken 1/7/2024 1217 by Esthela Quevedo LPN  Infection Prevention:   hand hygiene promoted   personal protective equipment utilized   single patient room provided  Taken 1/7/2024 0837 by  Esthela Quevedo LPN  Infection Prevention:   hand hygiene promoted   personal protective equipment utilized   single patient room provided  Intervention: Promote Recovery  Recent Flowsheet Documentation  Taken 1/7/2024 1217 by Esthela Quevedo LPN  Activity Management: activity encouraged  Taken 1/7/2024 0837 by Esthela Quevedo LPN  Activity Management: activity encouraged     Problem: Nutrition Impaired (Sepsis/Septic Shock)  Goal: Optimal Nutrition Intake  Outcome: Ongoing, Progressing   Goal Outcome Evaluation:  Plan of Care Reviewed With: patient, significant other        Progress: improving  Outcome Evaluation: Patient stable and minimal complaints of pain. See MAR. Heparin drip stopped today. Patient started on eliquis. vang catheter to remain. Safety measures in place., Call light within reach. PAtient able to make needs known. Patient is forgetful at times. Plan of care ongoing.

## 2024-01-07 NOTE — PLAN OF CARE
Goal Outcome Evaluation:      Patient disoriented to time and is forgetful. Frequently seeks out staff with repetitive questions. Pain controlled with po pain medication. Incision to left leg and right and left groin area. Border and elastic bandage in place. Patient on room air. Bed in low position, call light in reach, bed alarm set, room near nursing station.

## 2024-01-07 NOTE — PROGRESS NOTES
Name: Oscar Wray ADMIT: 2023   : 1950  PCP: Kajal Horowitz PA    MRN: [id number] LOS: 11 days   AGE/SEX: 73 y.o. male  ROOM: [address] [place] [address] Peter    Billin, [employer]    73 y.o. male POD #9 s/p  bilateral common femoral endarterectomies, aortic and bilateral iliac stents. POD #3 s/p fem-PT with vein    Subjective: Pain ok at rest but quite sore with movement or manipulation. No acute events.     Scheduled Medications:   acetaminophen, 650 mg, Oral, Q4H   Or  acetaminophen, 650 mg, Oral, Q4H   Or  acetaminophen, 650 mg, Rectal, Q4H  aspirin, 81 mg, Oral, Daily  [Held by provider] atenolol, 12.5 mg, Oral, Daily  atorvastatin, 20 mg, Oral, Daily  cholecalciferol, 2,000 Units, Oral, Daily  docusate sodium, 100 mg, Oral, Daily  donepezil, 20 mg, Oral, Nightly  gabapentin, 200 mg, Oral, TID  memantine, 10 mg, Oral, Q12H  multivitamin with minerals, 1 tablet, Oral, Daily  polyethylene glycol, 17 g, Oral, Daily  risperiDONE, 0.25 mg, Oral, Daily  risperiDONE, 0.5 mg, Oral, Nightly  sodium chloride, 10 mL, Intravenous, Q12H  tamsulosin, 0.4 mg, Oral, Daily      Active Infusions:  heparin, 18 Units/kg/hr, Last Rate: Stopped (24 0939)      Vital Signs  Vital Signs Patient Vitals for the past 24 hrs:   BP Temp Temp src Pulse Resp SpO2   24 0454 144/73 98.2 °F (36.8 °C) -- 84 14 94 %   24 2058 135/68 98 °F (36.7 °C) -- 94 18 93 %   24 1648 153/72 97.4 °F (36.3 °C) Oral -- 17 94 %   24 1203 130/65 97.8 °F (36.6 °C) Oral -- 18 95 %     I/O:  I/O last 3 completed shifts:  In:  [P.O.:]  Out: 7475 [Urine:7475]    Physical Exam   Foot improved and is now red and hyperemic.  .  Palpable PT on the left.  Incision intact and healing well.  Slight sanguinous oozing from incision in distal thigh is improving but still present.       Imaging:  No radiology results for the last day       CBC    Results from last 7 days   Lab Units 24  2254 24  0016 24  0353  01/04/24  0739 01/03/24  0829 01/02/24  0315 01/01/24  0650   WBC 10*3/mm3 9.60 12.40* 12.70* 10.60 11.80* 9.40 10.90*   HEMOGLOBIN g/dL 9.6* 9.6* 9.0* 10.2* 10.3* 10.0* 10.7*   PLATELETS 10*3/mm3 617* 715* 703* 634* 562* 479* 473*     BMP   Results from last 7 days   Lab Units 01/06/24  2254 01/06/24  0016 01/05/24  0353 01/04/24  0739 01/03/24  0829 01/02/24  0315 01/01/24  0650   SODIUM mmol/L 139 137 135* 138 134* 133* 134*   POTASSIUM mmol/L 4.2 4.3 4.6 4.2 4.3 3.6 4.4   CHLORIDE mmol/L 104 101 100 101 101 102 99   CO2 mmol/L 26.0 28.0 24.0 26.0 25.0 26.0 25.0   BUN mg/dL 12 16 13 11 10 9 9   CREATININE mg/dL 0.96 1.18 1.01 1.05 0.95 1.04 1.01   GLUCOSE mg/dL 104* 156* 114* 85 99 101* 107*     Coag   Results from last 7 days   Lab Units 01/06/24 2254 01/06/24  1542 01/06/24  0623 01/06/24  0016 01/05/24  1753 01/05/24  1047 01/05/24  0353 01/01/24  0650 12/31/23  2103   INR   --   --   --   --   --   --  1.06  --  1.28*   APTT seconds >139.0* 26.9* 62.4 85.4* 49.3* 57.1* 47.1*   < > 38.2*    < > = values in this interval not displayed.     Assessment & Plan   Assessment & Plan    Critical limb ischemia of both lower extremities    Degenerative disc disease, lumbar    73 y.o. male  s/p  bilateral common femoral endarterectomies, aortic and bilateral iliac stents (12/29/23) followed by fem-PT with vein (1/4/24)    1/2/24:Patient has done well overall and feet are considerably improved from prior to his surgery, both on exam and per patients report of his symptoms. He does still report intermittent pain in the left foot, especially in the toes at rest. I had a long discussion with the patient and his girlfriend today. I think with the poor response in his ABIs and ongoing pain and duskiness in his toes that a femoral-PT bypass would be indicated. Patient has not really attempted to ambulate since his surgery on Friday and I encouraged him to try to do so with physical therapy.   Will plan for Left femoral-PT  bypass later this week, probably Thursday if OR schedule allows.   Will continue to monitor his groin incisions for now.     1/3/24: Patient stable.  On a heparin drip.  He is currently on the OR schedule for tomorrow morning at 9:30 AM to do a left femoral to posterior tibial artery bypass with in situ greater saphenous vein.  Will plan to stop heparin drip around 4 AM.    1/4/24: OR for Bypass    1/5/24: No major issues overnight.  Heparin restarted and PTT from 79-47 the last 2 checks.  Hemoglobin 9.  Platelet count 703.  Extensive duplications in his order sets.  I reviewed and addressed.  DC A-line.  Hep-Lock IV fluids.  Had a tremendous amount of scrotal edema so I would recommend keeping the Mata catheter for today and have DC'd the order for removal.  May benefit from diuresis.  Will plan to remove the dressings tomorrow.  Excellent multiphasic signal at the PT level.  Hyperemic foot.    1/6/24: Doing well.  Can palpate a left PT, however is difficult due to significant edema.  Also with some oozing from his incision.  Discussed with nurse and will hold heparin drip for 8 hours today.  Should resume around 4 PM.  Okay to get out of bed, ambulate from my standpoint.  Leg wrapped with Ace wrap, and recommend keeping it wrapped to help with the edema while his incision heals.  Continue current pain regimen.  Depending on how the incision looks like we will transition to aspirin and Eliquis tomorrow.    1/7/24: Doing well overall.  Palpable left PT still.  Edema improved with Ace wrap. Will stop heparin gtt and start eliquis this evening.     Please call my office with any question: [phone]    Active Hospital Problems    Diagnosis  POA    **Critical limb ischemia of both lower extremities [I70.223]  Yes    Degenerative disc disease, lumbar [M51.36]  Yes      Resolved Hospital Problems   No resolved problems to display.

## 2024-01-08 VITALS
HEIGHT: 67 IN | WEIGHT: 192.24 LBS | TEMPERATURE: 97.7 F | OXYGEN SATURATION: 95 % | BODY MASS INDEX: 30.17 KG/M2 | RESPIRATION RATE: 19 BRPM | SYSTOLIC BLOOD PRESSURE: 112 MMHG | HEART RATE: 105 BPM | DIASTOLIC BLOOD PRESSURE: 71 MMHG

## 2024-01-08 DIAGNOSIS — M79.2 NEUROPATHIC PAIN: ICD-10-CM

## 2024-01-08 PROBLEM — E78.5 DYSLIPIDEMIA: Chronic | Status: ACTIVE | Noted: 2024-01-08

## 2024-01-08 PROBLEM — N17.9 ACUTE KIDNEY INJURY SUPERIMPOSED ON CKD: Status: RESOLVED | Noted: 2024-01-08 | Resolved: 2024-01-08

## 2024-01-08 PROBLEM — N13.8 BPH WITH OBSTRUCTION/LOWER URINARY TRACT SYMPTOMS: Chronic | Status: ACTIVE | Noted: 2024-01-08

## 2024-01-08 PROBLEM — N17.9 ACUTE KIDNEY INJURY SUPERIMPOSED ON CKD: Status: ACTIVE | Noted: 2024-01-08

## 2024-01-08 PROBLEM — I77.9 PERIPHERAL ARTERIAL OCCLUSIVE DISEASE: Chronic | Status: ACTIVE | Noted: 2024-01-08

## 2024-01-08 PROBLEM — N50.89 SCROTAL EDEMA: Status: ACTIVE | Noted: 2024-01-08

## 2024-01-08 PROBLEM — I70.223 CRITICAL LIMB ISCHEMIA OF BOTH LOWER EXTREMITIES: Status: RESOLVED | Noted: 2023-12-27 | Resolved: 2024-01-08

## 2024-01-08 PROBLEM — N18.9 ACUTE KIDNEY INJURY SUPERIMPOSED ON CKD: Status: ACTIVE | Noted: 2024-01-08

## 2024-01-08 PROBLEM — Z87.891 HISTORY OF CIGARETTE SMOKING: Chronic | Status: ACTIVE | Noted: 2024-01-08

## 2024-01-08 PROBLEM — I10 ESSENTIAL HYPERTENSION: Chronic | Status: ACTIVE | Noted: 2024-01-08

## 2024-01-08 PROBLEM — N18.9 ACUTE KIDNEY INJURY SUPERIMPOSED ON CKD: Status: RESOLVED | Noted: 2024-01-08 | Resolved: 2024-01-08

## 2024-01-08 PROBLEM — N40.1 BPH WITH OBSTRUCTION/LOWER URINARY TRACT SYMPTOMS: Chronic | Status: ACTIVE | Noted: 2024-01-08

## 2024-01-08 LAB
ANION GAP SERPL CALCULATED.3IONS-SCNC: 10 MMOL/L (ref 5–15)
ANISOCYTOSIS BLD QL: ABNORMAL
BUN SERPL-MCNC: 14 MG/DL (ref 8–23)
BUN/CREAT SERPL: 11.4 (ref 7–25)
CALCIUM SPEC-SCNC: 9.3 MG/DL (ref 8.6–10.5)
CHLORIDE SERPL-SCNC: 96 MMOL/L (ref 98–107)
CO2 SERPL-SCNC: 31 MMOL/L (ref 22–29)
CREAT SERPL-MCNC: 1.23 MG/DL (ref 0.76–1.27)
DEPRECATED RDW RBC AUTO: 46.8 FL (ref 37–54)
EGFRCR SERPLBLD CKD-EPI 2021: 62.8 ML/MIN/1.73
EOSINOPHIL # BLD MANUAL: 0.4 10*3/MM3 (ref 0–0.4)
EOSINOPHIL NFR BLD MANUAL: 4 % (ref 0.3–6.2)
ERYTHROCYTE [DISTWIDTH] IN BLOOD BY AUTOMATED COUNT: 14.9 % (ref 12.3–15.4)
GIANT PLATELETS: ABNORMAL
GLUCOSE SERPL-MCNC: 92 MG/DL (ref 65–99)
HCT VFR BLD AUTO: 32.9 % (ref 37.5–51)
HGB BLD-MCNC: 10.8 G/DL (ref 13–17.7)
LARGE PLATELETS: ABNORMAL
LYMPHOCYTES # BLD MANUAL: 1.01 10*3/MM3 (ref 0.7–3.1)
LYMPHOCYTES NFR BLD MANUAL: 6 % (ref 5–12)
MCH RBC QN AUTO: 27.9 PG (ref 26.6–33)
MCHC RBC AUTO-ENTMCNC: 32.7 G/DL (ref 31.5–35.7)
MCV RBC AUTO: 85.1 FL (ref 79–97)
METAMYELOCYTES NFR BLD MANUAL: 6 % (ref 0–0)
MICROCYTES BLD QL: ABNORMAL
MONOCYTES # BLD: 0.61 10*3/MM3 (ref 0.1–0.9)
NEUTROPHILS # BLD AUTO: 7.47 10*3/MM3 (ref 1.7–7)
NEUTROPHILS NFR BLD MANUAL: 72 % (ref 42.7–76)
NEUTS BAND NFR BLD MANUAL: 2 % (ref 0–5)
PLATELET # BLD AUTO: 864 10*3/MM3 (ref 140–450)
PMV BLD AUTO: 7.1 FL (ref 6–12)
POTASSIUM SERPL-SCNC: 4.9 MMOL/L (ref 3.5–5.2)
RBC # BLD AUTO: 3.86 10*6/MM3 (ref 4.14–5.8)
SCAN SLIDE: NORMAL
SMALL PLATELETS BLD QL SMEAR: ABNORMAL
SODIUM SERPL-SCNC: 137 MMOL/L (ref 136–145)
VARIANT LYMPHS NFR BLD MANUAL: 10 % (ref 19.6–45.3)
WBC MORPH BLD: NORMAL
WBC NRBC COR # BLD AUTO: 10.1 10*3/MM3 (ref 3.4–10.8)

## 2024-01-08 PROCEDURE — 97116 GAIT TRAINING THERAPY: CPT

## 2024-01-08 PROCEDURE — 97530 THERAPEUTIC ACTIVITIES: CPT

## 2024-01-08 PROCEDURE — 97110 THERAPEUTIC EXERCISES: CPT

## 2024-01-08 PROCEDURE — 80048 BASIC METABOLIC PNL TOTAL CA: CPT | Performed by: STUDENT IN AN ORGANIZED HEALTH CARE EDUCATION/TRAINING PROGRAM

## 2024-01-08 PROCEDURE — 85007 BL SMEAR W/DIFF WBC COUNT: CPT | Performed by: STUDENT IN AN ORGANIZED HEALTH CARE EDUCATION/TRAINING PROGRAM

## 2024-01-08 PROCEDURE — 97166 OT EVAL MOD COMPLEX 45 MIN: CPT | Performed by: OCCUPATIONAL THERAPIST

## 2024-01-08 PROCEDURE — 25010000002 FUROSEMIDE PER 20 MG: Performed by: FAMILY MEDICINE

## 2024-01-08 PROCEDURE — 85025 COMPLETE CBC W/AUTO DIFF WBC: CPT | Performed by: STUDENT IN AN ORGANIZED HEALTH CARE EDUCATION/TRAINING PROGRAM

## 2024-01-08 RX ORDER — PREGABALIN 25 MG/1
25 CAPSULE ORAL 2 TIMES DAILY
Qty: 60 CAPSULE | Refills: 2 | Status: SHIPPED | OUTPATIENT
Start: 2024-01-08

## 2024-01-08 RX ORDER — FUROSEMIDE 20 MG/1
20 TABLET ORAL DAILY
Qty: 30 TABLET | Refills: 0 | Status: SHIPPED | OUTPATIENT
Start: 2024-01-08 | End: 2024-01-08 | Stop reason: SDUPTHER

## 2024-01-08 RX ORDER — OXYCODONE HYDROCHLORIDE 5 MG/1
5 TABLET ORAL EVERY 4 HOURS PRN
Qty: 18 TABLET | Refills: 0 | Status: SHIPPED | OUTPATIENT
Start: 2024-01-08 | End: 2024-01-11

## 2024-01-08 RX ORDER — OXYCODONE HYDROCHLORIDE 5 MG/1
5 TABLET ORAL EVERY 4 HOURS PRN
Qty: 18 TABLET | Refills: 0 | Status: SHIPPED | OUTPATIENT
Start: 2024-01-08 | End: 2024-01-08 | Stop reason: SDUPTHER

## 2024-01-08 RX ORDER — FUROSEMIDE 20 MG/1
20 TABLET ORAL DAILY
Qty: 30 TABLET | Refills: 0 | Status: SHIPPED | OUTPATIENT
Start: 2024-01-08

## 2024-01-08 RX ADMIN — POLYETHYLENE GLYCOL 3350 17 G: 17 POWDER, FOR SOLUTION ORAL at 08:13

## 2024-01-08 RX ADMIN — Medication 1 TABLET: at 08:13

## 2024-01-08 RX ADMIN — Medication 2000 UNITS: at 08:13

## 2024-01-08 RX ADMIN — ASPIRIN 81 MG: 81 TABLET, COATED ORAL at 08:13

## 2024-01-08 RX ADMIN — FUROSEMIDE 40 MG: 10 INJECTION, SOLUTION INTRAMUSCULAR; INTRAVENOUS at 08:13

## 2024-01-08 RX ADMIN — TAMSULOSIN HYDROCHLORIDE 0.4 MG: 0.4 CAPSULE ORAL at 08:13

## 2024-01-08 RX ADMIN — DOCUSATE SODIUM 100 MG: 100 CAPSULE, LIQUID FILLED ORAL at 08:13

## 2024-01-08 RX ADMIN — GABAPENTIN 200 MG: 100 CAPSULE ORAL at 08:13

## 2024-01-08 RX ADMIN — OXYCODONE HYDROCHLORIDE 10 MG: 5 TABLET ORAL at 06:17

## 2024-01-08 RX ADMIN — RISPERIDONE 0.25 MG: 0.25 TABLET, FILM COATED ORAL at 08:13

## 2024-01-08 RX ADMIN — ACETAMINOPHEN 650 MG: 325 TABLET, FILM COATED ORAL at 02:16

## 2024-01-08 RX ADMIN — ACETAMINOPHEN 650 MG: 325 TABLET, FILM COATED ORAL at 06:16

## 2024-01-08 RX ADMIN — MEMANTINE 10 MG: 10 TABLET ORAL at 08:13

## 2024-01-08 RX ADMIN — APIXABAN 5 MG: 5 TABLET, FILM COATED ORAL at 08:13

## 2024-01-08 RX ADMIN — Medication 10 ML: at 08:13

## 2024-01-08 RX ADMIN — ATORVASTATIN CALCIUM 20 MG: 20 TABLET, FILM COATED ORAL at 08:13

## 2024-01-08 NOTE — CASE MANAGEMENT/SOCIAL WORK
Continued Stay Note  [facility]     Patient Name: Oscar Wray  MRN: [id number]  Today's Date: 1/8/2024    Admit Date: 12/27/2023    Plan: DC Plan: [facility] accepted and following. Precert approved 1/6- 1/8/24. PASRR approved.   Discharge Plan       Row Name 01/08/24 1107       Plan    Plan DC Plan: [facility] accepted and following. Precert approved 1/6- 1/8/24. PASRR approved.    Plan Comments DC Plan: [facility] accepted and following. Precert approved 1/6- 1/8/24. PASRR approved.                 Expected Discharge Date and Time       Expected Discharge Date Expected Discharge Time    Jan 8, 2024               ALEJANDRO Coker RN  SIPS/ICU   O: [phone]  C: [phone]  [email or web address]

## 2024-01-08 NOTE — PLAN OF CARE
Goal Outcome Evaluation:      Oscar Wray presents with functional mobility impairments which indicate the need for skilled intervention. Tolerating session today without incident. Pt progressed to gait training 30 ft x 4 requiring CGA with RW, and completed standing therapeutic exercise. Pt requires frequent verbal cues for safety and techinque for mobility/therapeutic exercise and with short-term memory deficits. Recommending SNF at d/c and will continue to follow and progress as tolerated. PPE: danika Sims, PT, DPT

## 2024-01-08 NOTE — THERAPY TREATMENT NOTE
"Subjective: Pt agreeable to therapeutic plan of care. Pt with short term memory deficit and continues to ask about urinating, though has vang catheter.    Objective:     Bed mobility - CGA (supine to sit)  Transfers - CGA and with rolling walker  Ambulation - 30 ft x 4 CGA and with rolling walker    Therapeutic Exercise - Pt completed 1x10 of the following standing BLE exercises requiring verbal cues and demonstration for techinque: 1) MIP 2) Heel/toe raises 3) Hip abduction 4) Mini squats    Vitals: WNL    Pain: Does not rate but reports \"Not bad\"     Education: Provided education on the importance of mobility in the acute care setting, Verbal/Tactile Cues, Transfer Training, and Gait Training    Assessment: Oscar Wray presents with functional mobility impairments which indicate the need for skilled intervention. Tolerating session today without incident. Pt progressed to gait training 30 ft x 4 requiring CGA with RW, and completed standing therapeutic exercise. Pt requires frequent verbal cues for safety and techinque for mobility/therapeutic exercise and with short-term memory deficits. Recommending SNF at d/c and will continue to follow and progress as tolerated. PPE: gloves    Plan/Recommendations:   If medically appropriate, Moderate Intensity Therapy recommended post-acute care. This is recommended as therapy feels the patient would require 3-4 days per week and wouldn't tolerate \"3 hour daily\" rehab intensity. SNF would be the preferred choice. If the patient does not agree to SNF, arrange HH or OP depending on home bound status. If patient is medically complex, consider LTACH. Pt requires no DME at discharge.     Pt desires Skilled Rehab placement at discharge. Pt cooperative; agreeable to therapeutic recommendations and plan of care.         Basic Mobility 6-click:  Rollin = Total, A lot = 2, A little = 3; 4 = None  Supine>Sit:   1 = Total, A lot = 2, A little = 3; 4 = None   Sit>Stand " with arms:  1 = Total, A lot = 2, A little = 3; 4 = None  Bed>Chair:   1 = Total, A lot = 2, A little = 3; 4 = None  Ambulate in room:  1 = Total, A lot = 2, A little = 3; 4 = None  3-5 Steps with railin = Total, A lot = 2, A little = 3; 4 = None  Score: 19    Post-Tx Position: Up in Chair, Alarms activated, and Call light and personal items within reach  PPE: gloves

## 2024-01-08 NOTE — PROGRESS NOTES
Name: Oscar Wray ADMIT: 2023   : 1950  PCP: Kajal Horowitz PA    MRN: [id number] LOS: 12 days   AGE/SEX: 73 y.o. male  ROOM: [address] [place] [address] Peter    Billin, [employer]    73 y.o. male POD #10 s/p  bilateral common femoral endarterectomies, aortic and bilateral iliac stents. POD #4 s/p fem-PT with vein    Subjective: Pain improving, still quite sore with manipulation. Edema considerably improved.      Scheduled Medications:   acetaminophen, 650 mg, Oral, Q4H   Or  acetaminophen, 650 mg, Oral, Q4H   Or  acetaminophen, 650 mg, Rectal, Q4H  apixaban, 5 mg, Oral, Q12H  aspirin, 81 mg, Oral, Daily  [Held by provider] atenolol, 12.5 mg, Oral, Daily  atorvastatin, 20 mg, Oral, Daily  cholecalciferol, 2,000 Units, Oral, Daily  docusate sodium, 100 mg, Oral, Daily  donepezil, 20 mg, Oral, Nightly  furosemide, 40 mg, Intravenous, BID  gabapentin, 200 mg, Oral, TID  memantine, 10 mg, Oral, Q12H  multivitamin with minerals, 1 tablet, Oral, Daily  polyethylene glycol, 17 g, Oral, Daily  risperiDONE, 0.25 mg, Oral, Daily  risperiDONE, 0.5 mg, Oral, Nightly  sodium chloride, 10 mL, Intravenous, Q12H  tamsulosin, 0.4 mg, Oral, Daily      Active Infusions:       Vital Signs  Vital Signs Patient Vitals for the past 24 hrs:   BP Temp Temp src Resp SpO2   24 0504 146/69 96.9 °F (36.1 °C) Oral 17 96 %   24 0020 126/68 97 °F (36.1 °C) Oral 16 97 %   24 2108 113/69 97.8 °F (36.6 °C) Oral 17 96 %   24 1217 129/71 98 °F (36.7 °C) Oral 16 94 %     I/O:  I/O last 3 completed shifts:  In: 1260 [P.O.:1260]  Out: 9650 [Urine:9650]    Physical Exam   Foot improved and is now red and hyperemic.     Palpable PT on the left.  Incision intact and healing well.  No drainage from incision.   Edema in left leg significantly improved.        Imaging:  No radiology results for the last day       CBC    Results from last 7 days   Lab Units 24  0150 24  2254 24  0016 24  0353  01/04/24  0739 01/03/24  0829 01/02/24  0315   WBC 10*3/mm3 10.10 9.60 12.40* 12.70* 10.60 11.80* 9.40   HEMOGLOBIN g/dL 10.8* 9.6* 9.6* 9.0* 10.2* 10.3* 10.0*   PLATELETS 10*3/mm3 864* 617* 715* 703* 634* 562* 479*     BMP   Results from last 7 days   Lab Units 01/08/24  0150 01/06/24  2254 01/06/24  0016 01/05/24  0353 01/04/24  0739 01/03/24  0829 01/02/24  0315   SODIUM mmol/L 137 139 137 135* 138 134* 133*   POTASSIUM mmol/L 4.9 4.2 4.3 4.6 4.2 4.3 3.6   CHLORIDE mmol/L 96* 104 101 100 101 101 102   CO2 mmol/L 31.0* 26.0 28.0 24.0 26.0 25.0 26.0   BUN mg/dL 14 12 16 13 11 10 9   CREATININE mg/dL 1.23 0.96 1.18 1.01 1.05 0.95 1.04   GLUCOSE mg/dL 92 104* 156* 114* 85 99 101*     Coag   Results from last 7 days   Lab Units 01/06/24 2254 01/06/24  1542 01/06/24  0623 01/06/24  0016 01/05/24  1753 01/05/24  1047 01/05/24  0353   INR   --   --   --   --   --   --  1.06   APTT seconds >139.0* 26.9* 62.4 85.4* 49.3* 57.1* 47.1*     Assessment & Plan   Assessment & Plan    Critical limb ischemia of both lower extremities    Degenerative disc disease, lumbar    73 y.o. male  s/p  bilateral common femoral endarterectomies, aortic and bilateral iliac stents (12/29/23) followed by fem-PT with vein (1/4/24)    1/2/24:Patient has done well overall and feet are considerably improved from prior to his surgery, both on exam and per patients report of his symptoms. He does still report intermittent pain in the left foot, especially in the toes at rest. I had a long discussion with the patient and his girlfriend today. I think with the poor response in his ABIs and ongoing pain and duskiness in his toes that a femoral-PT bypass would be indicated. Patient has not really attempted to ambulate since his surgery on Friday and I encouraged him to try to do so with physical therapy.   Will plan for Left femoral-PT bypass later this week, probably Thursday if OR schedule allows.   Will continue to monitor his groin incisions for now.      1/3/24: Patient stable.  On a heparin drip.  He is currently on the OR schedule for tomorrow morning at 9:30 AM to do a left femoral to posterior tibial artery bypass with in situ greater saphenous vein.  Will plan to stop heparin drip around 4 AM.    1/4/24: OR for Bypass    1/5/24: No major issues overnight.  Heparin restarted and PTT from 79-47 the last 2 checks.  Hemoglobin 9.  Platelet count 703.  Extensive duplications in his order sets.  I reviewed and addressed.  DC A-line.  Hep-Lock IV fluids.  Had a tremendous amount of scrotal edema so I would recommend keeping the Vang catheter for today and have DC'd the order for removal.  May benefit from diuresis.  Will plan to remove the dressings tomorrow.  Excellent multiphasic signal at the PT level.  Hyperemic foot.    1/6/24: Doing well.  Can palpate a left PT, however is difficult due to significant edema.  Also with some oozing from his incision.  Discussed with nurse and will hold heparin drip for 8 hours today.  Should resume around 4 PM.  Okay to get out of bed, ambulate from my standpoint.  Leg wrapped with Ace wrap, and recommend keeping it wrapped to help with the edema while his incision heals.  Continue current pain regimen.  Depending on how the incision looks like we will transition to aspirin and Eliquis tomorrow.    1/7/24: Doing well overall.  Palpable left PT still.  Edema improved with Ace wrap. Will stop heparin gtt and start eliquis this evening.     1/8/24: Doing well still. Pain controlled at baseline. Incision improving and edema significantly improved. I am be ok with removing the vang catheter. Continue aspirin and eliquis. PT/OT. Will follow up placement recs.     Please call my office with any question: [phone]    Active Hospital Problems    Diagnosis  POA    **Critical limb ischemia of both lower extremities [I70.223]  Yes    Degenerative disc disease, lumbar [M51.36]  Yes      Resolved Hospital Problems   No resolved  problems to display.

## 2024-01-08 NOTE — DISCHARGE SUMMARY
[facility]  Discharge Summary    Date of Service: 2024  Patient Name: Oscar Wray  : 1950  MRN: [id number]    Date of Admission: 2023  Discharge Diagnosis: see below  Date of Discharge:  2024  Primary Care Physician: Kajal Horowitz PA    Presenting Problem:   Peripheral arterial occlusive disease [I77.9]  Critical limb ischemia of both lower extremities [I70.223]    Active and Resolved Hospital Problems:  Active Hospital Problems    Diagnosis POA    Peripheral arterial occlusive disease [I77.9] Yes    Essential hypertension [I10] Yes    Dyslipidemia [E78.5] Yes    BPH with obstruction/lower urinary tract symptoms [N40.1, N13.8] Yes    Scrotal edema [N50.89] Yes    History of cigarette smoking [Z87.891] Not Applicable    Degenerative disc disease, lumbar [M51.36] Yes    Stage 3a chronic kidney disease [N18.31] Yes    Claudication [I73.9] Yes    Moderate early onset Alzheimer's dementia without behavioral disturbance, psychotic disturbance, mood disturbance, or anxiety [G30.0, F02.B0] Yes    Back pain [M54.9] Yes    Wedge compression fracture of second lumbar vertebra, subsequent encounter for fracture with routine healing [S32.020D] Not Applicable      Resolved Hospital Problems    Diagnosis POA    **Critical limb ischemia of both lower extremities [I70.223] Yes    Acute kidney injury superimposed on CKD [N17.9, N18.9] Yes     Hospital Course     HPI:  Oscar Wray is a 73 y.o. male with a CMH of PAD who presented to [facility] on 2023 with pain in the left foot more than right.  ABIs 0 bilaterally but did have some tibial flow noted, however it was quite minimal and is developing ischemic ulcers on toes, is having severe pain at rest.   He has a CTA already ordered by the ER  Vascular surgery has reviewed his prior CTA and he likely will need L femoral endarterectomy and bilateral iliac stents, possible bypass. check vein mapping and  plan to get stress test soon as well, started on heparin gtt for now.    Hospital course:  He was admitted in consultation with vascular surgery and nephrology for further evaluation and treatment.  Antiplatelet and anticoagulation therapies were held, and he underwent 2 procedures on 12/29 and 1/4 as described below with excellent outcomes.  His pain and swelling have improved postoperatively.  He continues to have significant scrotal edema, but this has started to improve more so with diuretics.  We removed his Mata catheter today, and I would recommend keeping him on some daily diuretic for at least the next couple of weeks.  He has a history of BPH with lower obstructive symptoms, and he is on Flomax at baseline.  He had an acute kidney injury upon presentation.  This improved during his hospital stay with IV fluid hydration.  He did have some lower and blood pressures on admission, so his beta-blocker has been held throughout his hospital stay.  We were actually going to restart this today because his blood pressures have been running in the 140s systolic.  He is doing much better overall and will discharge for skilled rehab today with close outpatient vascular surgery and primary care follow-up.    Day of Discharge     Vital Signs:  Temp:  [96.9 °F (36.1 °C)-98 °F (36.7 °C)] 96.9 °F (36.1 °C)  Resp:  [16-17] 17  BP: (113-146)/(68-71) 146/69    Physical Exam:  GEN: Obese elderly gentleman, sitting in the bedside chair, no acute distress  HEENT: NCAT, PERRLA, moist mucous membranes  NECK: Supple, midline trachea  CARDIAC: RRR, no appreciable M/R/G, peripheral edema  PULM: CTAB, nondistressed  ABD: soft, NTND, normoactive bowel sounds throughout  SKIN: Warm, dry--see below for nursing skin/wound survey  : Mata in place, improving scrotal edema  NEURO: Grossly intact, nonfocal exam  PSYCH: Pleasant    Pertinent  and/or Most Recent Results     LAB RESULTS:      Lab 01/08/24  0150 01/06/24  9834 01/06/24  4612  01/06/24  0623 01/06/24  0016 01/05/24  1753 01/05/24  1047 01/05/24  0353 01/04/24  0739   WBC 10.10 9.60  --   --  12.40*  --   --  12.70* 10.60   HEMOGLOBIN 10.8* 9.6*  --   --  9.6*  --   --  9.0* 10.2*   HEMATOCRIT 32.9* 29.4*  --   --  28.4*  --   --  28.0* 31.7*   PLATELETS 864* 617*  --   --  715*  --   --  703* 634*   NEUTROS ABS 7.47* 6.82  --   --  9.80*  --   --  11.56* 8.20*   LYMPHS ABS  --   --   --   --   --   --   --   --  1.20   MONOS ABS  --   --   --   --   --   --   --   --  0.70   EOS ABS 0.40 0.67*  --   --  0.12  --   --  0.13 0.30   MCV 85.1 84.9  --   --  86.8  --   --  85.0 85.7   PROTIME  --   --   --   --   --   --   --  11.5  --    APTT  --  >139.0* 26.9* 62.4 85.4* 49.3*   < > 47.1*  --     < > = values in this interval not displayed.         Lab 01/08/24  0150 01/06/24  2254 01/06/24  0016 01/05/24  0353 01/04/24  0739   SODIUM 137 139 137 135* 138   POTASSIUM 4.9 4.2 4.3 4.6 4.2   CHLORIDE 96* 104 101 100 101   CO2 31.0* 26.0 28.0 24.0 26.0   ANION GAP 10.0 9.0 8.0 11.0 11.0   BUN 14 12 16 13 11   CREATININE 1.23 0.96 1.18 1.01 1.05   EGFR 62.8 84.5 66.0 79.5 75.9   GLUCOSE 92 104* 156* 114* 85   CALCIUM 9.3 7.9* 8.3* 8.3* 8.3*             Lab 01/05/24  0353   PROTIME 11.5   INR 1.06             Lab 01/04/24  1032   ABO TYPING O   RH TYPING Positive   ANTIBODY SCREEN Negative         Brief Urine Lab Results  (Last result in the past 365 days)        Color   Clarity   Blood   Leuk Est   Nitrite   Protein   CREAT   Urine HCG        10/12/23 1531 Yellow   Clear   Negative   Negative   Negative   Trace                 Microbiology Results (last 10 days)       Procedure Component Value - Date/Time    Blood Culture - Blood, Arm, Right [[id number]]  (Normal) Collected: 01/01/24 0942    Lab Status: Final result Specimen: Blood from Arm, Right Updated: 01/06/24 1015     Blood Culture No growth at 5 days    Blood Culture - Blood, Hand, Left [[id number]]  (Normal) Collected: 01/01/24 0940    Lab  Status: Final result Specimen: Blood from Hand, Left Updated: 01/06/24 1015     Blood Culture No growth at 5 days            XR Chest 1 View    Result Date: 12/30/2023  Impression: Impression: No active cardiopulmonary disease Electronically Signed: Willie Alcantara  12/30/2023 10:51 AM EST  Workstation ID: [id number]    CT Angio Abdominal Aorta Bilateral Iliofem Runoff    Result Date: 12/27/2023  Impression: Impression: 1. 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 Bilateral common, internal and external iliac arteries are nonopacified. Distal reconstitution at the level of the bilateral common femoral arteries. 2. Poor runoff to left foot with peroneal artery and posterior tibial artery visualized to lower leg with no significant contrast below the level of the left ankle, worsened from prior study. 3. Two-vessel runoff to the right foot via peroneal and posterior tibial arteries. 4. Segmental occlusion of left and right superficial femoral arteries and left popliteal artery as seen on prior study. 5. Additional incidental findings above. Electronically Signed: Cristian Woo MD  12/27/2023 7:22 PM EST  Workstation ID: [id number]     Results for orders placed during the hospital encounter of 12/27/23    Doppler Ankle Brachial Index Single Level CAR    Interpretation Summary    Right Conclusion: The right MELANIE is severely reduced. Severe digital ischemia.    Left Conclusion: The left MELANIE is severely reduced. Severe digital ischemia.      Results for orders placed during the hospital encounter of 12/27/23    Doppler Ankle Brachial Index Single Level CAR    Interpretation Summary    Right Conclusion: The right MELANIE is severely reduced. Severe digital ischemia.    Left Conclusion: The left MELANIE is severely reduced. Severe digital ischemia.      Procedures Performed  BILATERAL FEMORAL  ENDARTECTOMY AND ILIAC ARTERY STENTS, LEFT LEG ANGIOGRAM   FEMORAL TIBIAL BYPASS    ==============================================================================================================================================    Case Time: Procedures: Surgeons:   12/29/23 1014 BILATERAL FEMORAL ENDARTECTOMY AND ILIAC ARTERY STENTS, LEFT LEG ANGIOGRAM    Trisha Coffman MD Bush, Charles A II, MD               Signed         Date of Admission:  12/27/2023  Today's Date:  12/29/23  Nura Salgado II, MD  [facility]     Preoperative Diagnosis:   Peripheral vascular disease with bilateral leg rest pain and acute limb ischemia left leg     Postoperative Diagnosis:   Same     Procedure Performed:   Bilateral common femoral endarterectomy with bovine pericardial patch angioplasty  Thrombectomy left external iliac artery  Nonselective pelvic arteriogram  Bilateral iliac stents, 3Kb20zx [place] VBX bilateral kissing iliac stents extending into aorta,  two 8b912gk and two 8x75mm [place] Viabahn extending from common iliac down to distal external iliac bilateral  Left leg arteriogram     CPT:  57526-74 thromboendarterectomy with patch if performed; common femoral, bilateral  60840 - Thromboendarterectomy, with patch if performed; deep(profunda) femoral.  46284-30 Embolectomy/thrombectomy, with or without catheter, femoral-popliteal, aortoiliac artery. By LEG incision.   86468-75 Iliac stent initial bilateral  12065-13 Iliac stent additional bilateral  62851-81 Unilateral extremity runoff arteriogram (no diagnostic quality arterial imaging prior or significant change in vascular status)           Surgeon:   Nura Salgado II, MD     Assistant:    Trisha Coffman MD, Provided critical assistance in exposure, retraction, and suction that overall decrease blood loss and operative time.     Anesthesia:   General     Estimated Blood Loss:   550     Findings:    Patient with completely occluded external iliac arteries bilaterally and  high-grade stenosis of common femoral arteries with calcified plaque bilaterally.  Bilateral common femoral endarterectomies performed, endarterectomy with patch angioplasty of left profundofemoral artery performed as well.  Ester mechanical thrombectomy performed approximately from left side with removal of subacute appearing thrombus, still no antegrade in flow.  Ester mechanical thrombectomy attempted from right femoral artery with no return of thrombus and unable to pass Ester proximally.  We then accessed the open pericardial patches bilaterally and were able to get wires and catheters into the true lumen of the aorta bilaterally.  Balloon angioplasty performed and then bilateral kissing iliac stents were placed stenting down into the distal external iliac arteries bilaterally  Completion angiography showed thrombus within the right iliac stents and so Ester mechanical thrombectomy was performed there with thrombus removed.  Completion angiogram showed patent bilateral kissing iliac stents.  Following revascularization the patient had improved color in his left leg with 3-second cap refill, but no signals.  Angiogram performed which showed flow to the foot via posterior tibial artery.      Pelvic arteriogram: Proximal infrarenal aorta is patent with calcified atherosclerotic disease of distal aorta and aortic bifurcation noted.  Distal infrarenal aorta completely occludes just distal to the inferior mesenteric artery with combination of chronic plaque and acute thrombus.  This was treated with kissing iliac stents up to the level of the inferior mesenteric artery.  Completion angiography showed diminished flow through the inferior mesenteric artery.  Unclear if this was due to partial occlusion of the origin or having embolized distally from some of the thrombus.  Good collateral flow through the pelvis seen via a very large right lumbar artery and bilateral profundofemoral arteries with flow noted in  mesentery of sigmoid colon.  Completion angiography also showed thrombus within the right iliac stents.  This was treated with Ester thrombectomy and completion angiogram following this showed no residual thrombus and good flow through iliac stents bilaterally.     Left leg arteriogram: Widely patent common femoral artery, widely patent profundofemoral artery, no apparent defect with endarterectomy and patch angioplasty.  Superficial femoral artery completely occluded but does reconstitute above the knee via numerous collaterals.  Below-knee popliteal artery completely occludes distally with numerous collaterals in the lower leg.  Appears to reconstitute only a posterior tibial artery and the lower leg via large collateral with single-vessel runoff to the foot.     Implants:    Implant Name Type Inv. Item Serial No.  Lot No. LRB No. Used Action   CLIPAPPLR M/ ENDO LIGACLIP 20CLP 11IN MD - [id number] Implant CLIPAPPLR M/ ENDO LIGACLIP 20CLP 11IN MD   [facility]  AND J [id number]   1 Implanted   CLIPAPPLR M/ ENDO LIGACLIP 9 3/8IN SM - [id number] Implant CLIPAPPLR M/ ENDO LIGACLIP 9 3/8IN SM   [employer]  DIV OF  AND J [id number]   1 Implanted   PTCH VASC VASCUGUARD TPR/END 0.8X8CM STRL - [id number] Implant PTCH VASC VASCUGUARD TPR/END 0.8X8CM STRL   [employer] [id number]   1 Implanted   PTCH VASC VASCUGUARD TPR/END 0.8X8CM STRL - [id number] Implant PTCH VASC VASCUGUARD TPR/END 0.8X8CM STRL   [employer] [id number]   1 Implanted   CLIPAPPLR M/ ENDO LIGACLIP 20CLP 11IN MD - [id number] Implant CLIPAPPLR M/ ENDO LIGACLIP 20CLP 11IN MD   [employer] END[employer]  DIV OF  AND J [id number]   1 Implanted   HEMOST ABS SURGICEL FIBRILLAR 4X4IN - [id number] Implant HEMOST ABS SURGICEL FIBRILLAR 4X4IN   [employer]  DIV OF  AND J [id number]   1 Implanted   STENTGR ENDOPROSTH VIABAHN VBX EXP 8F [id number] 135CM - [id number] - [id number] Implant STENTGR ENDOPROSTH VIABAHN VBX  EXP 8F [id number] 135CM [id number] [employer] .   1 Implanted   STENTGR ENDOPROSTH VIABAHN VBX EXP 8F [id number] 135CM - [id number] - [id number] Implant STENTGR ENDOPROSTH VIABAHN VBX EXP 8F [id number] 135CM [id number] [employer] .   1 Implanted   STENTGR ENDOPROSTH VIABAHN RO HEP 7F 8MM [id number] - [id number] - [id number] Implant STENTGR ENDOPROSTH VIABAHN RO HEP 7F 8MM [id number] [employer] .   1 Implanted   STENTGR ENDOPROSTH VIABAHN RO HEP 7F 8MM [id number] - [id number] - [id number] Implant STENTGR ENDOPROSTH VIABAHN RO HEP 7F 8MM [id number] [employer] .   1 Implanted         Staff:   Cell Saver : Kin Soliz  Circulator: Som Maloney RN; Ijeoma Villarreal RN  Radiology Technologist: Mahamed Braun, BRYAN; Dejah Ellsworth  Scrub Person: Jacquelyn Diaz; Mi Goyal     Specimen:   none     Complications:   none     Dispo:   to PACU     Indication for procedure:   73 y.o. male with history of chronic peripheral arterial disease who had acute worsening of pain in his lower legs within the last month, especially in the left leg.  The patient was seen in the ER after he presented for routine follow-up ABIs earlier this week.  Was having rest pain in his lower legs and was admitted to the hospital and started on heparin drip.  Initially he improved symptomatically and plans were made for surgery this morning however interestingly on the day of surgery he was noted to have a significantly worse pain in the left lower leg which appeared mottled from about the mid calf down with some motor weakness consistent with Mckayla 2B ischemia.  This had not been present on prior exams.  He did have a preoperative cardiac stress test performed which I personally discussed the results of with Dr. Serrano and it was negative.  The nature of the procedure as well as risks benefits and alternatives were discussed with the patient (who has dementia), and his significant other  Jeanette.     Description of procedure:   Patient was taken to the OR and placed supine on the OR table.  General endotracheal anesthesia was begun by the anesthesia service.  He had an arterial line placed.  He also had a Mata catheter placed.  His groins were prepped with chlorhexidine bilaterally and his legs were prepped with chlorhexidine bilaterally circumferentially.  He was draped in sterile fashion.  A timeout was performed prior to beginning the surgery.  We began procedure working into teams making a oblique incisions over each groin.  Electrocautery was used to dissect down through the subcutaneous tissue and the external abdominal oblique aponeurosis was identified.  I then carried dissection down to the inguinal ligament which was dissected free medially and laterally.  The patient did not have a palpable femoral pulse on either side but due to the heavily calcified nature of his common femoral arteries we were able to easily find them and dissect down directly onto them with Metzenbaum scissors.  This dissection was then carried proximally and distally from the external iliac artery down to the femoral bifurcation circumferential control was obtained of superficial femoral, profundofemoral artery, distal external iliac arteries bilaterally.  The crossing circumflex femoral veins were identified bilaterally and ligated and divided.   The patient clearly had significant chronic disease extending down through the external iliac arteries bilaterally.  The patient was given 10,000 units of heparin and ACT was confirmed over 250 and maintained over 250 for the remainder of the procedure.  Clamps were placed on the distal external iliac artery on the left as well as profundofemoral artery.  Longitudinal arteriotomy was made in the common femoral artery which was extended with Lamb scissors.  This arteriotomy was extended down onto the profundofemoral artery to facilitate endarterectomy there.  An  endarterectomy was performed of the common femoral artery up to the external iliac artery and down to, and including the profundofemoral artery.  Plaque was removed and discarded.  We then performed eversion endarterectomy of the distal left external iliac artery.  There was still no antegrade inflow from the external iliac artery.  A Ester was passed approximately for 15 cm and thrombectomy was performed that returned some subacute appearing thrombus, however there was still no antegrade flow through the external iliac.  There was great backbleeding from the profunda, but there was a small plaque that was tacked up with a tacking suture..  We then brought up our bovine pericardial patch and performed patch angioplasty of the common femoral artery down onto the profundofemoral artery with 6-0 Prolene suture in continuous running fashion..  I did insonate the artery with Doppler and there was flow however it was a weak and nonpulsatile.  We then turned attention to the right common femoral artery where a longitudinal arteriotomy was made after we placed clamps.  The SFA had to be clamped on the right as there was some backbleeding in addition to the profunda.  The arteriotomy was extended with Lamb scissors from the common femoral artery down onto the superficial femoral artery.  We then performed a endarterectomy from common femoral artery, and eversion endarterectomy is of superficial femoral artery origin and profundofemoral artery origin.  We then performed eversion endarterectomy of the distal external iliac artery.  As was the case on the left side,  there was still no inline flow from the external iliac on the right side.  We attempted to pass Ester catheter proximal but it would only proceed approximately 7 cm and no thrombus was returned.  We then brought up another 0.8x8cm bovine pericardial patch and performed patch angioplasty of the right side with 6-0 Prolene suture in continuous running fashion.   There was good backbleeding from the profunda prior to completing this patch angioplasty.  There was some flow through the artery from collaterals but no pulse, similar to the left side.  We then accessed each bovine pericardial patch with micro needles and microwire's were inserted.  Micro sheath were advanced over the wires.  We then used 0.035 stiff Glidewire to advance up the right microsheath and were able to advance this into the aorta with relatively little difficulty.  The microsheath was exchanged for a 10cm 8 [place] sheath.  An angled glide cath was then advanced over the wire into the aorta with good pulsatile backbleeding.  Arteriogram was performed.  A stiff angled Glidewire was advanced up the left iliac system that appeared to be in dissection plane.  The microsheath was exchanged for an 8 [place] sheath.  A Martinez catheter used to attempt to navigate the iliac system but still appeared to be in a dissection plane.  Martinez catheter was exchanged for angled glide cath and eventually we were able to get this into the true lumen of the infrarenal aorta.  Angiogram was performed that showed severe chronic disease in the infrarenal aorta and bilateral iliac arteries as well as some thrombus, as well as the dissections in the bilateral iliacs which were not unexpected.  Lateral views were taken to pollo the origin of the inferior mesenteric artery.  Proximal extent of the occlusion, which proceeded up to the inferior aspect of the inferior mesenteric artery was marked.  We then were able to advance 5 mm angioplasty balloons up to each iliac and perform balloon angioplasty of bilateral common and external iliac arteries, extending into the infrarenal aorta.  We then remove these balloons and exchanged our 10 cm 8 [place] sheath for a 45 cm 8 [place] sheaths.  Two 8L x 59mm [place] VBX stents were obtained and advanced into position based on our angiography extending into the infrarenal aorta at the level of  the inferior mesenteric artery.  These were deployed without complication.  We then obtained two 8 x 100 mm [place] Viabahn stents and these were advanced into position bilaterally and deployed.  We then obtained two 8x 75mm [place] viabahn stents and these were positioned extending stents from the infrarenal aorta down into the distal external iliac arteries bilaterally.  They were deployed.  We then postdilated with 8 x 100 mm angioplasty balloons.  Completion angiography was performed that showed some thrombus within the right iliac stents in the midportion, as well as diminished flow through the MASOOD.  There did appear to be good collateralization from lumbars and profundofemoral arteries to the pelvis.  There was some mild narrowing in the common iliac on the left side that was treated with repeat balloon angioplasty with a 10 mm angioplasty balloon.  Completion angiography following this showed no residual stenosis.  At this point there was excellent palpable pulsating flow through the distal external iliacs and into the common femoral arteries.  Our endovascular work in the aortic bifurcation was done with our outflow arteries clamped bilaterally to prevent atheroembolization.  Following this we removed the wire and catheters then all then the 8 [place] sheath from the left common femoral artery.  It was allowed to flush antegrade with excellent flow.  It was then clamped proximally allowed to backbleed from the profunda again with excellent flow.  The area where we accessed the patch was closed with 5-0 Prolene suture in a figure-of-eight fashion.  The patch was then hemostatic and there was an excellent Doppler signal in the common femoral artery and profundofemoral artery.  We then removed the wire and catheter and 8 [place] sheath from the right common femoral artery.  The area we accessed the patch was extended with Lamb scissors to facilitate Ester thrombectomy of the right iliac stent.  There was thrombus  removed.  There was then pulsatile excellent flow through this artery as well as good backbleeding from the profunda and superficial femoral arteries on the right side.  Using a J-tip wire we inserted a sheath back into the right common femoral artery into the external iliac artery to perform completion angiography to confirm no residual thrombus in the right stent and good flow through iliac stents bilaterally, which was obtained.  We then removed the sheath and the patch defect was closed with 5-0 Prolene suture in continuous running fashion.  It was hemostatic.  We then checked signals in the feet.  The patient had a strong monophasic signal in the right posterior tibial artery, but no signals in the left foot.  The left lower leg and foot were both normal appearing, no longer mottled with decent cap refill.  Ultimately we decided to perform an angiogram of the left leg.  Microneedle was used to access the patch proximal to the previous area we had access and a microwire was inserted.  Needle was exchanged for microsheath and this microsheath was used to perform an angiogram.  This showed excellent flow through the common femoral artery and profundofemoral artery with the known occlusion of the superficial femoral artery but reconstitution of above-knee popliteal artery.  Distal popliteal artery and tibial arteries all appear occluded proximately there is a very large collateral extending from popliteal down to the posterior tibial artery and the lower leg.  The PT did extend through the ankle and into the foot.  With this I was satisfied that he had adequate blood flow to his foot.  The biker sheath was removed and this defect was closed with a 5-0 Prolene suture in a figure-of-eight fashion.  Both wounds were irrigated with warm normal saline and inspected meticulously for hemostasis.  Platelet rich plasma was instilled into both wounds.  Once we are satisfied with hemostasis we then closed the wound in layers  with 2-0 Vicryl suture followed by 3-0 Vicryl suture and then 4-0 Vicryl suture in a running subcuticular layer for the skin.  Skin glue was used for dressing in the groins.  Patient was then aroused from anesthesia and transferred to PACU in stable condition.  Patient tolerated procedure well there were no IntraOp complications and all wires catheters sheaths and other devices were removed and found to be whole and intact prior to conclusion of the procedure and all sponge instrument and needle counts were correct at the end of the case.  After the surgery I discussed the procedure with the patient's significant other Jeanette, and another adult male I presume to be the patient's brother.  Since completion of the procedure I have checked on the patient in the ICU and he now has warm feet bilaterally with a PT signal on the left and PT and peroneal signals on the right, with no evidence of postoperative complication in either of his groins, no leg pain, no abdominal pain.     Nura Salgado II, MD  12/29/23               =======================================================================    Case Time: Procedures: Surgeons:   01/04/24 1059 FEMORAL TIBIAL BYPASS    Nura Salgado II, MD Thomas, Bradley Glynn, MD               Signed         Date of Admission:  12/27/2023  Today's Date:  01/04/24  Nura Salgado II, MD  [facility]     Preoperative Diagnosis:   Peripheral vascular disease with rest pain and minor tissue loss     Postoperative Diagnosis:   Same     Procedure Performed:   Left femoral to posterior tibial artery bypass with in situ greater saphenous vein     CPT:  31709 Fem-Tibial bypass with in situ saphenous        Surgeon:   Nura Salgado II, MD     Assistant:    Heath Kwon MD, Provided critical assistance in exposure, retraction, and suction that overall decrease blood loss and operative time.     Anesthesia:   General     Estimated Blood Loss:   200mL     Findings:    Left greater  saphenous vein adequate for bypass.  Posterior tibial artery in the lower leg appeared to be good target for bypass.  Left femoral to posterior tibial artery bypass performed with in situ greater saphenous vein.  Excellent multiphasic signal in posterior tibial artery that augmented very well with the bypass.  No intraoperative complications.     Implants:    Implant Name Type Inv. Item Serial No.  Lot No. LRB No. Used Action   CLIPAPPLR M/ ENDO LIGACLIP 9 3/8IN SM - [id number] Implant CLIPAPPLR M/ ENDO LIGACLIP 9 3/8IN SM   [employer]  DIV OF J AND J [id number] Left 1 Implanted   CLIPAPPLR M/ ENDO LIGACLIP 20CLP 11IN MD - [id number] Implant CLIPAPPLR M/ ENDO LIGACLIP 20CLP 11IN MD   [employer]  DIV OF J AND J [id number] Left 1 Implanted   CLIPAPPLR M/ ENDO LIGACLIP 9 3/8IN SM - [id number] Implant CLIPAPPLR M/ ENDO LIGACLIP 9 3/8IN SM   [employer]  DIV OF J AND J [id number] Left 1 Implanted         Staff:   Circulator: Theresa Kasper RN; Pablito Chin RN  Scrub Person: Bev Perez; Deepthi Fernandez  Assistant: Ju Roth, CST     Specimen:   none     Complications:   none     Dispo:   to ICU     Indication for procedure:   73 y.o. male with history of acute on chronic peripheral arterial disease he was admitted last week with rest pain in his left foot.  He underwent bilateral femoral endarterectomies with iliac stents last Friday.  He had done well following the surgery however has had persistent pain in his left foot.  ABIs showed it it only increased to 0.2 from 0 and although his foot was nice and warm his toes still appeared cyanotic.  After long discussion had with patient and his girlfriend the decision was made to proceed with femoral to posterior tibial artery bypass for rest pain and ulceration on his toes.  Risk benefits and alternatives to the procedure were discussed with patient verbalized understanding and agreed to proceed.     Description of procedure:    Patient was brought to the OR and placed supine on the OR table.  General endotracheal anesthesia was begun by the anesthesia service.  Once patient was appropriately asleep his left leg was prepped circumferentially with chlorhexidine and right groin was prepped with chlorhexidine as well.  He was draped in sterile fashion.  A timeout was performed.  We began procedure by using ultrasound with a sterile probe cover to evaluate the greater saphenous vein in the left leg.  It appeared to be adequate size for bypass throughout the entirety of the left leg.  The site of the saphenous vein was marked.  I then made a longitudinal incision over the lower leg at the site of the greater saphenous vein.  Electrocautery was used to dissect down through the subcutaneous tissue.  The greater saphenous vein was easily identified here and circumferentially dissected.  Dissection was carried proximally and distally for distance of approximately 12 cm.  The circumferential dissection of the saphenous vein was carried proximally and distally as well.  I then retracted the saphenous vein inferiorly made incision through the fascia there just inferior to the medial border of the tibia.  I then dissected down between the muscle into the plane of the posterior tibial artery.  The posterior tibial artery and veins were identified.  It was palpated and felt soft and reasonable as a target for bypass.  I did insonate it with a Doppler to ensure flow in the area we were bypassing to.  Proximal and distal to the area which I would be sewing to I did dissect out enough of the artery to place bulldog clamps.  I then carried my dissection of the saphenous vein proximally up the leg.  Incisions were made with the 15 blade scalpel and electrocautery used to dissect down through subcutaneous tissue.  Metzenbaum scissors were used to dissect down onto the superficial surface of the greater saphenous vein.  All visible vein branches were ligated  with either 3-0 silk suture or surgical clips.  This dissection was carried up the leg to the level of his previously made femoral incision.  I then opened up his left femoral incision.  Metzenbaum scissors were used to cut the 4-0 Vicryl subcuticular suture and the 3 oh deep dermal suture.  These were all removed.  Then placed self-retaining retractors and exposed the femoral artery by putting the 2-0 Vicryl suture that been securing the deep tissues overlying it.  The patient's patch angioplasty appeared to be in good working order.  There is a nice strong palpable pulse in the left common femoral artery.  There is no evidence of infection.  There were numerous branches off of the proximal greater saphenous vein that required ligation with 3-0 silk suture.  Once we had the proximal greater saphenous vein dissected circumferentially the patient was given 10,000 units of heparin.  ACT was 239 after that and so he was given another 5000 units of heparin which did bring his ACT over 250.  ACT's were checked routinely and he was redosed with heparin as needed to keep an ACT over 250 for the remainder of the procedure.  A Satinsky clamp was used to clamp the base of the greater saphenous vein as it inserted onto the common femoral vein.  It was transected with an 11 blade scalpel and then the venotomy was closed with 5-0 Prolene suture in a Julio Cesar stitch fashion.  We then examined the proximal greater saphenous vein and the first valve was lysed with Lamb scissors.  It was spatulated and trimmed to the appropriate for our proximal anastomosis.  Clamps were placed on the left profunda femoral artery and common femoral artery, distal to the previously placed stent.  Longitudinal arteriotomy was made through the bovine pericardial patch at the distal common femoral artery.  A small amount of the patch was resected and an ellipse orientation.  We then performed our proximal anastomosis with 6-0 Prolene suture in  continuous running fashion.  2 repair stitches were needed after completing the anastomosis for hemostasis.  There is a good palpable pulse on the proximal greater saphenous vein at this time.  I then turned my attention down to the distal greater saphenous vein.  It was transected distally and the distal end was ligated with 2-0 silk suture.  We then used a LeMaitre valvulotome to lyse all of the valves.  There was excellent pulsatile flow through the vein bypass at this time.  We then oriented the vein appropriate for the bypass and it was marked to prevent twisting.  Clamps were placed on the posterior tibial artery proximal and distal to the site of our anastomosis and a longitudinal arteriotomy was ablated with 11 blade scalpel.  It was carried proximally and distally with Lamb scissors.  The leg was straightened and the greater saphenous vein was cut to an appropriate length and then opened up with Lamb scissors.  I then performed the distal anastomosis with 6-0 Prolene suture in continuous running fashion.  It was hemostatic after completing the anastomosis and it was flushed appropriately prior to completing the anastomosis.  I then checked a signal in the bypass graft and in the posterior tibial artery distal to the bypass graft and he had excellent multiphasic flow through each of them.  I uncinated a posterior tibial artery signal at the level of the ankle and it was nice and multiphasic and augmented appropriately with the bypass.  The patient was then given 40 mg of protamine.  We then used a Doppler to insonate the bypass to check for any vein branches and all vein branches were ligated with either 3-0 silk suture or surgical clips.  The wound was then irrigated with warm normal saline.  Both the groin wound and the leg wound were all evaluated meticulously for hemostasis.  Irrisept was used to irrigate the wound mainly up in the groin wound where the bovine pericardial patch was present.  Platelet  rich plasma was instilled into the wounds as well.  Once we are satisfied with hemostasis we then closed the wound in layers with 2-0 Vicryl suture in a deep layer followed by 3-0 Vicryl suture and then staples for the skin.  After closing the incision I did insonate a signal in the posterior tibial artery again which was strong and multiphasic.  The leg was then cleaned and then the incision was dressed with island dressings.  Patient was aroused from anesthesia and transported back to his ICU in stable condition.  He tolerated procedure well there were no intraoperative complications and all sponge instrument and needle counts were correct at the conclusion of the procedure.  I discussed the outcome of the procedure with the patient's girlfriend and brother.     Nura Salgado II, MD  01/04/24                 ==============================================================================================================================================      Consults:   Consults       Date and Time Order Name Status Description    1/5/2024  8:19 AM Inpatient Hospitalist Consult      12/30/2023  9:45 AM Inpatient Hospitalist Consult Completed     12/29/2023  5:50 PM Inpatient Intensivist Consult      12/27/2023  5:28 PM Inpatient Nephrology Consult      12/27/2023  4:51 PM Hospitalist (on-call MD unless specified)      12/27/2023  3:09 PM Inpatient Vascular Surgery Consult Completed             Discharge Details        Discharge Medications        New Medications        Instructions Start Date   apixaban 5 MG tablet tablet  Commonly known as: ELIQUIS   5 mg, Oral, Every 12 Hours Scheduled      furosemide 20 MG tablet  Commonly known as: Lasix   20 mg, Oral, Daily      oxyCODONE 5 MG immediate release tablet  Commonly known as: ROXICODONE   5 mg, Oral, Every 4 Hours PRN             Continue These Medications        Instructions Start Date   acetaminophen 325 MG tablet  Commonly known as: TYLENOL   650 mg, Oral, Every 6  Hours PRN      atenolol 25 MG tablet  Commonly known as: TENORMIN   12.5 mg, Oral, Daily      atorvastatin 20 MG tablet  Commonly known as: LIPITOR   20 mg, Oral, Daily      celecoxib 100 MG capsule  Commonly known as: CeleBREX   100 mg, Oral, 2 Times Daily      docusate sodium 100 MG capsule  Commonly known as: COLACE   100 mg, Oral, Daily      donepezil 23 MG tablet  Commonly known as: ARICEPT   23 mg, Oral, Nightly      Farxiga 10 MG tablet  Generic drug: dapagliflozin Propanediol   10 mg, Oral, Daily      fish oil 1000 MG capsule capsule   2,000 mg, Oral, 2 Times Daily With Meals      memantine 28 MG capsule sustained-release 24 hr extended release capsule  Commonly known as: NAMENDA XR   28 mg, Oral, Daily      multivitamin with minerals tablet tablet   1 tablet, Oral, Daily      pregabalin 25 MG capsule  Commonly known as: Lyrica   25 mg, Oral, 2 Times Daily      risperiDONE 0.5 MG tablet  Commonly known as: risperDAL   0.5 mg, Oral, Nightly      risperiDONE 0.25 MG tablet  Commonly known as: risperDAL   0.25 mg, Oral, Daily      tamsulosin 0.4 MG capsule 24 hr capsule  Commonly known as: FLOMAX   1 capsule, Oral, Daily      Vitamin D3 50 MCG (2000 UT) capsule   2,000 Units, Oral, Daily             Stop These Medications      cilostazol 100 MG tablet  Commonly known as: PLETAL              No Known Allergies    Discharge Disposition:   Skilled Nursing Facility (DC - External)    Diet:  Hospital:  Diet Order   Procedures    Diet: Regular/House Diet; Texture: Regular Texture (IDDSI 7); Fluid Consistency: Thin (IDDSI 0)     Discharge Activity:   Activity Instructions       Activity as Tolerated      Gradually Increase Activity Until at Pre-Hospitalization Level      Other Activity Instructions      Activity Instructions: As per vascular surgery    Up WIth Assist            CODE STATUS:  Code Status and Medical Interventions:   Ordered at: 12/28/23 0752     Level Of Support Discussed With:    Patient     Code  Status (Patient has no pulse and is not breathing):    CPR (Attempt to Resuscitate)     Medical Interventions (Patient has pulse or is breathing):    Full Support       Future Appointments   Date Time Provider Department Center   4/12/2024  1:45 PM Kajal Horowitz PA [facility]       Additional Instructions for the Follow-ups that You Need to Schedule       Call MD With Problems / Concerns   As directed      Instructions: PCM vs appropriate specialist    Order Comments: Instructions: PCM vs appropriate specialist         Discharge Follow-up with PCP   As directed       Currently Documented PCP:    Kajal Horowitz PA    PCP Phone Number:    [phone]     Follow Up Details: within one week of discharge        Discharge Follow-up with Specialty: Vascular surgery and Nephrology follow up as per their service   As directed      Specialty: Vascular surgery and Nephrology follow up as per their service              Time spent on Discharge including face to face service: 40 minutes    Signature: Electronically signed by Pablito Deleon MD, 01/08/24, 11:19 EST.  [facility]ist Team

## 2024-01-08 NOTE — PLAN OF CARE
Goal Outcome Evaluation:  Plan of Care Reviewed With: patient           Outcome Evaluation: Pt is a 73 y.o. M adm with chronic severe ischemia in BLE. Pt s/p  bilateral common femoral endarterectomies, aortic and bilateral iliac stents on 12/29 & fem-PT with vein on 1/4. At baseline, pt lives alone in long-term & is ind with mobility & all BADLs & IADLs. He cooks using only microwave in the apt. He does own a cat as well. Upon eval, pt A&O X3 requiring v.c. for time. He requires assist for LB below knee dressing & toiling monika-care. He requires min A for sit>sup to lift legs into bed  & completes ADL transfers with CGA using RW. He has generalized weakness & dec activity tolerance, standing endurance & balance post-up & is functioning below his baseline status. OT will continue to follow & recommends SNF upon discharge for further PT/OT rehab needs.      Anticipated Discharge Disposition (OT): skilled nursing facility

## 2024-01-08 NOTE — CASE MANAGEMENT/SOCIAL WORK
Case Management Discharge Note      Final Note: [facility].      Transportation Services  Private: Car    Final Discharge Disposition Code: 03 - skilled nursing facility (SNF)

## 2024-01-08 NOTE — PLAN OF CARE
Goal Outcome Evaluation:  Plan of Care Reviewed With: patient, significant other             [facility]. Report called to [place]. Patient will follow up with vascular at next scheduled appt.

## 2024-01-08 NOTE — PLAN OF CARE
Goal Outcome Evaluation:              Outcome Evaluation: Pt remains stable with minimal complaints of pain. Pain treated per MAR. Mata catheter still in place. Pt very forgetful but is able to make needs known. Family at bedside. Will continue to monitor.

## 2024-01-08 NOTE — THERAPY EVALUATION
Patient Name: Oscar Wray  : 1950    MRN: [id number]                              Today's Date: 2024       Admit Date: 2023    Visit Dx:     ICD-10-CM ICD-9-CM   1. Peripheral arterial occlusive disease  I77.9 444.22   2. Ischemia of both lower extremities  I99.8 459.89   3. PVD (peripheral vascular disease)  I73.9 443.9     Patient Active Problem List   Diagnosis    Back pain    Wedge compression fracture of second lumbar vertebra, subsequent encounter for fracture with routine healing    Moderate early onset Alzheimer's dementia without behavioral disturbance, psychotic disturbance, mood disturbance, or anxiety    Claudication    Stage 3a chronic kidney disease    Degenerative disc disease, lumbar    Peripheral arterial occlusive disease    Essential hypertension    Dyslipidemia    BPH with obstruction/lower urinary tract symptoms    Scrotal edema    History of cigarette smoking     Past Medical History:   Diagnosis Date    BPH with obstruction/lower urinary tract symptoms 2024    Dementia     Dyslipidemia 2024    Essential hypertension 2024    History of cigarette smoking 2024    Peripheral arterial occlusive disease 2024     Past Surgical History:   Procedure Laterality Date    ANGIOPLASTY ILIAC ARTERY Bilateral 2023    Procedure: BILATERAL FEMORAL ENDARTECTOMY AND ILIAC ARTERY STENTS, LEFT LEG ANGIOGRAM;  Surgeon: Nura Salgado II, MD;  Location: [facility] HYBRID OR;  Service: Vascular;  Laterality: Bilateral;    FEMORAL TIBIAL BYPASS Left 2024    Procedure: FEMORAL TIBIAL BYPASS;  Surgeon: Nura Salgado II, MD;  Location: [facility] MAIN OR;  Service: Vascular;  Laterality: Left;      General Information       Row Name 24 1243          OT Time and Intention    Document Type evaluation  -DT     Mode of Treatment occupational therapy  -DT       Row Name 24 1243          General Information    Patient Profile Reviewed yes  -DT     Prior Level  of Function independent:;all household mobility;ADL's  girlfriend gets pt's groceries. Pt uses microwave only for cooking. Has walk-in shower with seat. No other AE/DME. He does own a cat.  -DT       Row Name 01/08/24 1243          Living Environment    People in Home alone  Lives in [facility]  -DT       Row Name 01/08/24 1243          Home Main Entrance    Number of Stairs, Main Entrance none  -DT       Row Name 01/08/24 1243          Stairs Within Home, Primary    Number of Stairs, Within Home, Primary none  -DT       Row Name 01/08/24 1243          Cognition    Orientation Status (Cognition) oriented x 3;disoriented to;time  -DT       Row Name 01/08/24 1243          Safety Issues, Functional Mobility    Impairments Affecting Function (Mobility) balance;endurance/activity tolerance;pain;strength  -DT               User Key  (r) = Recorded By, (t) = Taken By, (c) = Cosigned By      Initials Name Provider Type    DT Sofi Guerra, OT Occupational Therapist                     Mobility/ADL's       Row Name 01/08/24 1245          Bed Mobility    Bed Mobility supine-sit;sit-supine  -DT     All Activities, [place] (Bed Mobility) modified independence  -DT     Supine-Sit [place] (Bed Mobility) minimum assist (75% patient effort)  to lift LEs back into bed  -DT     Assistive Device (Bed Mobility) bed rails  -DT       Row Name 01/08/24 1245          Transfers    Transfers sit-stand transfer;stand-sit transfer;toilet transfer  -DT       Row Name 01/08/24 1245          Bed-Chair Transfer    Bed-Chair [place] (Transfers) contact guard  -DT     Assistive Device (Bed-Chair Transfers) walker, front-wheeled  -DT       Row Name 01/08/24 1245          Sit-Stand Transfer    Sit-Stand [place] (Transfers) contact guard  -DT     Assistive Device (Sit-Stand Transfers) walker, front-wheeled  -DT       Row Name 01/08/24 1245          Stand-Sit Transfer    Stand-Sit [place] (Transfers) contact guard   -DT     Assistive Device (Stand-Sit Transfers) walker, front-wheeled  -DT       Row Name 01/08/24 1245          Toilet Transfer    [place] Level (Toilet Transfer) contact guard  -DT     Assistive Device (Toilet Transfer) walker, front-wheeled;grab bars/safety frame  -DT       Row Name 01/08/24 1245          Functional Mobility    Patient was able to Ambulate yes  -DT       Row Name 01/08/24 1245          Activities of Daily Living    BADL Assessment/Intervention lower body dressing;toileting  -DT       Row Name 01/08/24 1245          Lower Body Dressing Assessment/Training    [place] Level (Lower Body Dressing) lower body dressing skills;minimum assist (75% patient effort)  -DT     Position (Lower Body Dressing) edge of bed sitting;supported standing  -DT       Row Name 01/08/24 1245          Toileting Assessment/Training    [place] Level (Toileting) toileting skills;minimum assist (75% patient effort);perform perineal hygiene  -DT     Assistive Devices (Toileting) commode  -DT     Position (Toileting) supported sitting;supported standing  -DT               User Key  (r) = Recorded By, (t) = Taken By, (c) = Cosigned By      Initials Name Provider Type    DT Sofi Guerra, OT Occupational Therapist                   Obj/Interventions       Row Name 01/08/24 1247          Range of Motion Comprehensive    General Range of Motion bilateral upper extremity ROM WNL  -DT       Row Name 01/08/24 1247          Strength Comprehensive (MMT)    General Manual Muscle Testing (MMT) Assessment upper extremity strength deficits identified  -DT     Comment, General Manual Muscle Testing (MMT) Assessment BUE= 4/5  -DT       Row Name 01/08/24 1247          Balance    Balance Assessment sitting static balance;sitting dynamic balance;standing static balance;standing dynamic balance  -DT     Static Sitting Balance independent  -DT     Dynamic Sitting Balance standby assist  -DT     Position, Sitting Balance  sitting edge of bed  -DT     Static Standing Balance standby assist  -DT     Dynamic Standing Balance contact guard  -DT     Position/Device Used, Standing Balance walker, front-wheeled  -DT               User Key  (r) = Recorded By, (t) = Taken By, (c) = Cosigned By      Initials Name Provider Type    DT Sofi Guerra, OT Occupational Therapist                   Goals/Plan       Row Name 01/08/24 1259          Bathing Goal 1 (OT)    Activity/Device (Bathing Goal 1, OT) bathing skills, all  -DT     [place] Level/Cues Needed (Bathing Goal 1, OT) supervision required  -DT     Time Frame (Bathing Goal 1, OT) 2 weeks  -DT       Row Name 01/08/24 1259          Dressing Goal 1 (OT)    Activity/Device (Dressing Goal 1, OT) dressing skills, all;lower body dressing  -DT     [place]/Cues Needed (Dressing Goal 1, OT) modified independence  -DT     Time Frame (Dressing Goal 1, OT) 2 weeks  -DT       Row Name 01/08/24 1259          Toileting Goal 1 (OT)    Activity/Device (Toileting Goal 1, OT) toileting skills, all  -DT     [place] Level/Cues Needed (Toileting Goal 1, OT) modified independence  -DT     Time Frame (Toileting Goal 1, OT) 2 weeks  -DT       Row Name 01/08/24 1259          Therapy Assessment/Plan (OT)    Planned Therapy Interventions (OT) activity tolerance training;BADL retraining;functional balance retraining;neuromuscular control/coordination retraining;occupation/activity based interventions;patient/caregiver education/training;strengthening exercise;transfer/mobility retraining  -DT               User Key  (r) = Recorded By, (t) = Taken By, (c) = Cosigned By      Initials Name Provider Type    DT Sofi Guerra, OT Occupational Therapist                   Clinical Impression       Row Name 01/08/24 1249          Pain Assessment    Additional Documentation Pain Scale: FACES Pre/Post-Treatment (Group)  -DT       Row Name 01/08/24 1248          Pain Scale: FACES  Pre/Post-Treatment    Pain: FACES Scale, Pretreatment 0-->no hurt  -DT     Posttreatment Pain Rating 2-->hurts little bit  -DT     Pain Location - Side/Orientation Left  -DT     Pain Location lower  -DT     Pain Location - extremity  -DT     Pre/Posttreatment Pain Comment back of thigh when sitting EOB performing LB dressing ADLs  -DT       Row Name 01/08/24 1248          Plan of Care Review    Plan of Care Reviewed With patient  -DT     Outcome Evaluation Pt is a 73 y.o. M adm with chronic severe ischemia in BLE. Pt s/p  bilateral common femoral endarterectomies, aortic and bilateral iliac stents on 12/29 & fem-PT with vein on 1/4. At baseline, [facility] [place] [facility] & is ind with mobility & all BADLs & IADLs. He cooks using only microwave in the apt. He does own a cat as well. Upon eval, pt A&O X3 requiring v.c. for time. He requires assist for LB below knee dressing & toiling monika-care. He requires min A for sit>sup to lift legs into bed  & completes ADL transfers with CGA using RW. He has generalized weakness & dec activity tolerance, standing endurance & balance post-up & is functioning below his baseline status. OT will continue to follow & recommends SNF upon discharge for further PT/OT rehab needs.  -DT       Row Name 01/08/24 3253          Therapy Assessment/Plan (OT)    Rehab Potential (OT) good, to achieve stated therapy goals  -DT     Criteria for Skilled Therapeutic Interventions Met (OT) yes;meets criteria;skilled treatment is necessary  -DT     Therapy Frequency (OT) 3 times/wk  -DT     Predicted Duration of Therapy Intervention (OT) until d/c  -DT       Row Name 01/08/24 1245          Therapy Plan Review/Discharge Plan (OT)    Equipment Needs Upon Discharge (OT) walker, rolling  -DT     Anticipated Discharge Disposition (OT) skilled nursing facility  -DT       Row Name 01/08/24 124          Vital Signs    O2 Delivery Pre Treatment room air  -DT     O2 Delivery Intra Treatment room air  -DT       Row  Name 01/08/24 1248          Positioning and Restraints    Pre-Treatment Position in bed  -DT     Post Treatment Position bed  -DT     In Bed notified nsg;supine;call light within reach;encouraged to call for assist;exit alarm on;side rails up x2  Pt had been up with PT as well recently & asked to return to bed after OT eval to rest until lunch.  -DT               User Key  (r) = Recorded By, (t) = Taken By, (c) = Cosigned By      Initials Name Provider Type     Sofi Guerra, OT Occupational Therapist                   Outcome Measures       Row Name 01/08/24 0817          How much help from another person do you currently need...    Turning from your back to your side while in flat bed without using bedrails? 3  -AH     Moving from lying on back to sitting on the side of a flat bed without bedrails? 3  -AH     Moving to and from a bed to a chair (including a wheelchair)? 3  -AH     Standing up from a chair using your arms (e.g., wheelchair, bedside chair)? 3  -AH     Climbing 3-5 steps with a railing? 3  -AH     To walk in hospital room? 3  -AH     AM-PAC 6 Clicks Score (PT) 18  -AH     Highest Level of Mobility Goal 6 --> Walk 10 steps or more  -               User Key  (r) = Recorded By, (t) = Taken By, (c) = Cosigned By      Initials Name Provider Type     Esthela Quevedo LPN Licensed Nurse                    Occupational Therapy Education       Title: PT OT SLP Therapies (In Progress)       Topic: Occupational Therapy (Done)       Point: ADL training (Done)       Description:   Instruct learner(s) on proper safety adaptation and remediation techniques during self care or transfers.   Instruct in proper use of assistive devices.                  Learning Progress Summary             Patient Acceptance, E,TB, VU by DT at 1/8/2024 1301    Comment: Role of OT,goals & POC, safety prec.                         Point: Precautions (Done)       Description:   Instruct learner(s) on prescribed precautions  during self-care and functional transfers.                  Learning Progress Summary             Patient Acceptance, E,TB, VU by DT at 1/8/2024 1301    Comment: Role of OT,goals & POC, safety prec.                         Point: Body mechanics (Done)       Description:   Instruct learner(s) on proper positioning and spine alignment during self-care, functional mobility activities and/or exercises.                  Learning Progress Summary             Patient Acceptance, E,TB, VU by DT at 1/8/2024 1301    Comment: Role of OT,goals & POC, safety prec.                                         User Key       Initials Effective Dates Name Provider Type Discipline    DT 07/11/23 -  Sofi Guerra, OT Occupational Therapist OT                  OT Recommendation and Plan  Planned Therapy Interventions (OT): activity tolerance training, BADL retraining, functional balance retraining, neuromuscular control/coordination retraining, occupation/activity based interventions, patient/caregiver education/training, strengthening exercise, transfer/mobility retraining  Therapy Frequency (OT): 3 times/wk  Plan of Care Review  Plan of Care Reviewed With: patient  Outcome Evaluation: Pt is a 73 y.o. M adm with chronic severe ischemia in BLE. Pt s/p  bilateral common femoral endarterectomies, aortic and bilateral iliac stents on 12/29 & fem-PT with vein on 1/4. At baseline, pt lives alone in [facility] & is ind with mobility & all BADLs & IADLs. He cooks using only microwave in the apt. He does own a cat as well. Upon eval, pt A&O X3 requiring v.c. for time. He requires assist for LB below knee dressing & toiling monika-care. He requires min A for sit>sup to lift legs into bed  & completes ADL transfers with CGA using RW. He has generalized weakness & dec activity tolerance, standing endurance & balance post-up & is functioning below his baseline status. OT will continue to follow & recommends SNF upon discharge for further PT/OT rehab  needs.     Time Calculation:         Time Calculation- OT       Row Name 01/08/24 1301             Time Calculation- OT    OT Start Time 1035  -DT      OT Stop Time 1110  -DT      OT Time Calculation (min) 35 min  -DT      OT Received On 01/08/24  -DT      OT - Next Appointment 01/10/24  -DT      OT Goal Re-Cert Due Date 01/22/24  -DT                User Key  (r) = Recorded By, (t) = Taken By, (c) = Cosigned By      Initials Name Provider Type    Sofi Arteaga, OT Occupational Therapist                           Sofi Guerra, OT  1/8/2024

## 2024-01-08 NOTE — SIGNIFICANT NOTE
Case Management/Social Work    Patient Name:  Oscar Wray  YOB: 1950  MRN: [id number]  Admit Date:  12/27/2023 01/08/24 0918   Post Acute Pre-Cert Documentation   Date Post Acute Pre-Cert Completed 01/08/24   All Clinicals Submitted? Yes   Response Received from Insurance? Approval   Post Acute Pre-Cert Initiated Comment NEREIDA verified SNF precert approval via [employer]. Authorization ID [id number]. Valid 1/6-1/8. NEREIDA spoke with Jayleen ([phone]) to verify admitting date, and patient can admit to SNF under this precert until 11:59PM 1/8/24. Approval letter indexed to media. CM made aware.           Electronically signed by:  Antonio Duffy CMA  01/08/24 09:24 EST    Antonio Duffy  Case Management Associate  [address] [address]  P: [phone]  F: [phone]

## 2024-01-23 ENCOUNTER — TRANSCRIBE ORDERS (OUTPATIENT)
Dept: VASCULAR SURGERY | Facility: HOSPITAL | Age: 74
End: 2024-01-23
Payer: MEDICARE

## 2024-01-23 ENCOUNTER — APPOINTMENT (OUTPATIENT)
Dept: VASCULAR SURGERY | Facility: HOSPITAL | Age: 74
End: 2024-01-23
Payer: MEDICARE

## 2024-01-23 PROCEDURE — G0463 HOSPITAL OUTPT CLINIC VISIT: HCPCS

## 2024-01-30 ENCOUNTER — APPOINTMENT (OUTPATIENT)
Dept: VASCULAR SURGERY | Facility: HOSPITAL | Age: 74
End: 2024-01-30
Payer: MEDICARE

## 2024-01-30 ENCOUNTER — TRANSCRIBE ORDERS (OUTPATIENT)
Dept: VASCULAR SURGERY | Facility: HOSPITAL | Age: 74
End: 2024-01-30
Payer: MEDICARE

## 2024-01-30 DIAGNOSIS — I73.9 PVD (PERIPHERAL VASCULAR DISEASE): Primary | ICD-10-CM

## 2024-01-30 DIAGNOSIS — I71.43 INFRARENAL ABDOMINAL AORTIC ANEURYSM (AAA) WITHOUT RUPTURE: ICD-10-CM

## 2024-01-30 PROCEDURE — G0463 HOSPITAL OUTPT CLINIC VISIT: HCPCS

## 2024-02-16 ENCOUNTER — HOSPITAL ENCOUNTER (OUTPATIENT)
Dept: CARDIOLOGY | Facility: HOSPITAL | Age: 74
Discharge: HOME | End: 2024-02-16
Payer: MEDICARE

## 2024-02-16 DIAGNOSIS — I71.43 INFRARENAL ABDOMINAL AORTIC ANEURYSM (AAA) WITHOUT RUPTURE: ICD-10-CM

## 2024-02-16 DIAGNOSIS — I73.9 PVD (PERIPHERAL VASCULAR DISEASE): ICD-10-CM

## 2024-02-16 LAB
ABDOMINAL DIST AORTA VEL: 72 CM/S
BH CV BAS DIALYSIS STENT TWO DIST STENT PSV: 75 CM/SEC
BH CV GRAFT 1 - DISTAL ANASTAMOSIS PSV-LEFT: 103 CM/S
BH CV GRAFT 1 - DISTAL GRAFT PSV-LEFT: 78 CM/S
BH CV GRAFT 1 - MID DISTAL GRAFT PSV-LEFT: 70 CM/S
BH CV GRAFT 1 - MID GRAFT PSV-LEFT: 69 CM/S
BH CV GRAFT 1 - OUTFLOW ARTERY PSV-LEFT: 53 CM/S
BH CV GRAFT 1 - PROX GRAFT PSV-LEFT: 78 CM/S
BH CV GRAFT 1 - PROXIMAL ANASTAMOSIS PSV-LEFT: 88 CM/S
BH CV LEA LEFT ANT TIBIAL A MID PSV: 0 CM/S
BH CV LEA LEFT PERONEAL  MID PSV: 0 CM/S
BH CV LEA LEFT PTA DISTAL PSV: 53 CM/S
BH CV LOWER ARTERIAL LEFT ABI RATIO: 0.98
BH CV LOWER ARTERIAL LEFT ABI RATIO: 0.98
BH CV LOWER ARTERIAL LEFT DORSALIS PEDIS SYS MAX: 100
BH CV LOWER ARTERIAL LEFT GREAT TOE SYS MAX: 0
BH CV LOWER ARTERIAL LEFT POST TIBIAL SYS MAX: 134
BH CV LOWER ARTERIAL LEFT TBI RATIO: 0
BH CV LOWER ARTERIAL RIGHT ABI RATIO: 0.7
BH CV LOWER ARTERIAL RIGHT ABI RATIO: 0.7
BH CV LOWER ARTERIAL RIGHT DORSALIS PEDIS SYS MAX: 79
BH CV LOWER ARTERIAL RIGHT GREAT TOE SYS MAX: 19
BH CV LOWER ARTERIAL RIGHT POST TIBIAL SYS MAX: 96
BH CV LOWER ARTERIAL RIGHT TBI RATIO: 0.14
BH CV VAS DIALYSIS STENT ONE  DIST STENT PSV: 198 CM/SEC
BH CV VAS DIALYSIS STENT ONE MID STENT PSV: 198 CM/SEC
BH CV VAS DIALYSIS STENT ONE POST STENT PSV: 137 CM/SEC
BH CV VAS DIALYSIS STENT ONE PRE STENT PSV: 72 CM/SEC
BH CV VAS DIALYSIS STENT ONE PROX STENT PSV: 134 CM/SEC
BH CV VAS DIALYSIS STENT TWO MID STENT PSV: 163 CM/SEC
BH CV VAS DIALYSIS STENT TWO POST STENT PSV: 104 CM/SEC
BH CV VAS DIALYSIS STENT TWO PRE STENT PSV: 72 CM/SEC
BH CV VAS DIALYSIS STENT TWO PROX STENT PSV: 598 CM/SEC
BH CV VAS FREE TEXT STENT ONE: NORMAL
BH CV VAS FREE TEXT STENT TWO: NORMAL
UPPER ARTERIAL LEFT ARM BRACHIAL SYS MAX: 137
UPPER ARTERIAL RIGHT ARM BRACHIAL SYS MAX: 136

## 2024-02-16 PROCEDURE — 93978 VASCULAR STUDY: CPT

## 2024-02-16 PROCEDURE — 93926 LOWER EXTREMITY STUDY: CPT

## 2024-02-16 PROCEDURE — 93922 UPR/L XTREMITY ART 2 LEVELS: CPT

## 2024-02-20 ENCOUNTER — APPOINTMENT (OUTPATIENT)
Dept: VASCULAR SURGERY | Facility: HOSPITAL | Age: 74
End: 2024-02-20
Payer: MEDICARE

## 2024-02-20 ENCOUNTER — TRANSCRIBE ORDERS (OUTPATIENT)
Dept: ADMINISTRATIVE | Facility: HOSPITAL | Age: 74
End: 2024-02-20
Payer: MEDICARE

## 2024-02-20 DIAGNOSIS — I71.40 ABDOMINAL AORTIC ANEURYSM (AAA) WITHOUT RUPTURE, UNSPECIFIED PART: Primary | ICD-10-CM

## 2024-02-20 DIAGNOSIS — I73.9 PAD (PERIPHERAL ARTERY DISEASE): ICD-10-CM

## 2024-02-20 PROCEDURE — G0463 HOSPITAL OUTPT CLINIC VISIT: HCPCS

## 2024-03-06 ENCOUNTER — TELEPHONE (OUTPATIENT)
Dept: FAMILY MEDICINE CLINIC | Facility: CLINIC | Age: 74
End: 2024-03-06
Payer: MEDICARE

## 2024-03-12 ENCOUNTER — OFFICE VISIT (OUTPATIENT)
Dept: FAMILY MEDICINE CLINIC | Facility: CLINIC | Age: 74
End: 2024-03-12
Payer: MEDICARE

## 2024-03-12 VITALS
HEIGHT: 67 IN | SYSTOLIC BLOOD PRESSURE: 100 MMHG | TEMPERATURE: 97 F | WEIGHT: 169.4 LBS | HEART RATE: 72 BPM | DIASTOLIC BLOOD PRESSURE: 63 MMHG | BODY MASS INDEX: 26.59 KG/M2 | RESPIRATION RATE: 18 BRPM | OXYGEN SATURATION: 96 %

## 2024-03-12 DIAGNOSIS — S31.109A WOUND OF LEFT GROIN, INITIAL ENCOUNTER: Primary | ICD-10-CM

## 2024-03-12 DIAGNOSIS — G30.0 MODERATE EARLY ONSET ALZHEIMER'S DEMENTIA WITHOUT BEHAVIORAL DISTURBANCE, PSYCHOTIC DISTURBANCE, MOOD DISTURBANCE, OR ANXIETY: ICD-10-CM

## 2024-03-12 DIAGNOSIS — S91.105A OPEN WOUND OF THIRD TOE OF LEFT FOOT, INITIAL ENCOUNTER: ICD-10-CM

## 2024-03-12 DIAGNOSIS — F02.B0 MODERATE EARLY ONSET ALZHEIMER'S DEMENTIA WITHOUT BEHAVIORAL DISTURBANCE, PSYCHOTIC DISTURBANCE, MOOD DISTURBANCE, OR ANXIETY: ICD-10-CM

## 2024-03-12 DIAGNOSIS — I73.9 PAD (PERIPHERAL ARTERY DISEASE): ICD-10-CM

## 2024-03-12 RX ORDER — UREA 10 %
1 LOTION (ML) TOPICAL NIGHTLY
COMMUNITY
Start: 2024-02-12

## 2024-03-12 RX ORDER — CILOSTAZOL 100 MG/1
100 TABLET ORAL 2 TIMES DAILY
COMMUNITY
Start: 2024-02-25

## 2024-03-12 RX ORDER — MAGNESIUM HYDROXIDE 1200 MG/15ML
5 SUSPENSION ORAL DAILY
COMMUNITY
Start: 2024-02-29

## 2024-03-12 NOTE — PROGRESS NOTES
Subjective   Oscar Wray is a 73 y.o. male.     History of Present Illness  Pt presents for wound check and to get assistance at his place of living.    He had bilateral iliac artery angioplasty on 12/29/23 and then had left femoral tibial bypass on 1/4/24.  He was discharged to [facility] and then was discharged home to [facility].  His significant other has been providing wound care.  He has appointment with Dr. Salgado on 4/4/24.  He has CT scan scheduled later this month for Dr. Salgado.    His third toe on his left foot continues to have a wound/scab on it.  He denies any pain.  He also has wound in left groin.  No fevers.     Hospital course:  Oscar Wray is a 73 y.o. male with a CMH of PAD who presented to [facility] on 12/27/2023 with pain in the left foot more than right.  ABIs 0 bilaterally but did have some tibial flow noted, however it was quite minimal and is developing ischemic ulcers on toes, is having severe pain at rest.   He has a CTA already ordered by the ER  Vascular surgery has reviewed his prior CTA and he likely will need L femoral endarterectomy and bilateral iliac stents, possible bypass. check vein mapping and plan to get stress test soon as well, started on heparin gtt for now.     Hospital course:  He was admitted in consultation with vascular surgery and nephrology for further evaluation and treatment.  Antiplatelet and anticoagulation therapies were held, and he underwent 2 procedures on 12/29 and 1/4 as described below with excellent outcomes.  His pain and swelling have improved postoperatively.  He continues to have significant scrotal edema, but this has started to improve more so with diuretics.  We removed his Mata catheter today, and I would recommend keeping him on some daily diuretic for at least the next couple of weeks.  He has a history of BPH with lower obstructive symptoms, and he is on Flomax at baseline.  He had an acute kidney  injury upon presentation.  This improved during his hospital stay with IV fluid hydration.  He did have some lower and blood pressures on admission, so his beta-blocker has been held throughout his hospital stay.  We were actually going to restart this today because his blood pressures have been running in the 140s systolic.  He is doing much better overall and will discharge for skilled rehab today with close outpatient vascular surgery and primary care follow-up.           Past Medical History:   Diagnosis Date    BPH with obstruction/lower urinary tract symptoms 01/08/2024    Dementia     Dyslipidemia 01/08/2024    Essential hypertension 01/08/2024    History of cigarette smoking 01/08/2024    Peripheral arterial occlusive disease 01/08/2024     Past Surgical History:   Procedure Laterality Date    ANGIOPLASTY ILIAC ARTERY Bilateral 12/29/2023    Procedure: BILATERAL FEMORAL ENDARTECTOMY AND ILIAC ARTERY STENTS, LEFT LEG ANGIOGRAM;  Surgeon: Nura Salgado II, MD;  Location: [facility] HYBRID OR;  Service: Vascular;  Laterality: Bilateral;    FEMORAL TIBIAL BYPASS Left 1/4/2024    Procedure: FEMORAL TIBIAL BYPASS;  Surgeon: Nura Salgado II, MD;  Location: [facility] MAIN OR;  Service: Vascular;  Laterality: Left;     No family history on file.  Social History     Socioeconomic History    Marital status: Single   Tobacco Use    Smoking status: Former     Types: Cigarettes    Smokeless tobacco: Never   Vaping Use    Vaping status: Never Used   Substance and Sexual Activity    Alcohol use: Not Currently    Drug use: Never    Sexual activity: Defer         Current Outpatient Medications:     acetaminophen (TYLENOL) 325 MG tablet, Take 2 tablets by mouth Every 6 (Six) Hours As Needed for Mild Pain., Disp: , Rfl:     apixaban (ELIQUIS) 5 MG tablet tablet, Take 1 tablet by mouth Every 12 (Twelve) Hours. Indications: Other - full anticoagulation, pad/bypass stents, Disp: 60 tablet, Rfl: 11    atenolol (TENORMIN) 25 MG  tablet, Take 0.5 tablets by mouth Daily., Disp: , Rfl:     atorvastatin (LIPITOR) 20 MG tablet, Take 1 tablet by mouth Daily., Disp: 90 tablet, Rfl: 1    celecoxib (CeleBREX) 100 MG capsule, Take 1 capsule by mouth 2 (Two) Times a Day., Disp: 60 capsule, Rfl: 1    Cholecalciferol (Vitamin D3) 50 MCG (2000 UT) capsule, Take 1 capsule by mouth Daily., Disp: , Rfl:     cilostazol (PLETAL) 100 MG tablet, Take 1 tablet by mouth 2 (Two) Times a Day., Disp: , Rfl:     dapagliflozin Propanediol (Farxiga) 10 MG tablet, Take 10 mg by mouth Daily., Disp: 30 tablet, Rfl: 5    docusate sodium (COLACE) 100 MG capsule, Take 1 capsule by mouth Daily., Disp: , Rfl:     donepezil (ARICEPT) 23 MG tablet, Take 1 tablet by mouth Every Night., Disp: , Rfl:     furosemide (Lasix) 20 MG tablet, Take 1 tablet by mouth Daily., Disp: 30 tablet, Rfl: 0    melatonin 1 MG tablet, Take 1 tablet by mouth Every Night., Disp: , Rfl:     memantine (NAMENDA XR) 28 MG capsule sustained-release 24 hr extended release capsule, Take 1 capsule by mouth Daily., Disp: , Rfl:     Milk of Magnesia 400 MG/5ML suspension, Take 5 mL by mouth Daily., Disp: , Rfl:     multivitamin with minerals tablet tablet, Take 1 tablet by mouth Daily., Disp: , Rfl:     Omega-3 Fatty Acids (fish oil) 1000 MG capsule capsule, Take 2 capsules by mouth 2 (Two) Times a Day With Meals., Disp: , Rfl:     pregabalin (Lyrica) 25 MG capsule, Take 1 capsule by mouth 2 (Two) Times a Day., Disp: 60 capsule, Rfl: 2    risperiDONE (risperDAL) 0.25 MG tablet, Take 1 tablet by mouth Daily., Disp: , Rfl:     risperiDONE (risperDAL) 0.5 MG tablet, Take 1 tablet by mouth Every Night., Disp: , Rfl:     tamsulosin (FLOMAX) 0.4 MG capsule 24 hr capsule, Take 1 capsule by mouth Daily., Disp: , Rfl:     Current Facility-Administered Medications:     aspirin EC tablet 81 mg, 81 mg, Oral, Daily, Scherrer, David T, MD    Review of Systems   Constitutional:  Negative for activity change, appetite change,  "chills, diaphoresis, fatigue, fever, unexpected weight gain and unexpected weight loss.   Respiratory:  Negative for chest tightness and shortness of breath.    Cardiovascular:  Positive for leg swelling. Negative for chest pain and palpitations.   Musculoskeletal:  Negative for gait problem.   Neurological:  Negative for weakness.     /63 (BP Location: Left arm, Patient Position: Sitting, Cuff Size: Large Adult)   Pulse 72   Temp 97 °F (36.1 °C) (Temporal)   Resp 18   Ht 170.3 cm (67.05\")   Wt 76.8 kg (169 lb 6.4 oz)   SpO2 96%   BMI 26.49 kg/m²       Objective   Physical Exam  Vitals and nursing note reviewed.   Constitutional:       Appearance: Normal appearance.   Cardiovascular:      Rate and Rhythm: Normal rate and regular rhythm.      Heart sounds: Normal heart sounds.   Pulmonary:      Effort: Pulmonary effort is normal.      Breath sounds: Normal breath sounds.   Musculoskeletal:         General: No tenderness.      Cervical back: Normal range of motion and neck supple.      Left lower leg: Edema present.      Left foot: Normal range of motion. Normal pulse.      Comments: Crusted wound on left third toe dorsal aspect. Sensation intact. No signs of infection   Skin:     General: Skin is warm and dry.      Comments: Left groin wound healing well without signs of infection.   Neurological:      General: No focal deficit present.      Mental Status: He is alert. Mental status is at baseline.   Psychiatric:         Mood and Affect: Mood normal.         Behavior: Behavior normal.         Procedures     Assessment    Diagnoses and all orders for this visit:    1. Wound of left groin, initial encounter (Primary)  Comments:  Surgical wound healing left groin.  Will get wound care to come to home at [facility] for care.  Orders:  -     Ambulatory Referral to Home Health    2. Moderate early onset Alzheimer's dementia without behavioral disturbance, psychotic disturbance, mood disturbance, or " anxiety  Comments:  Stable.  Pt is now back at home at [facility] and will require wound care to care for his wounds since he doesn't drive and has limited transportation.    3. PAD (peripheral artery disease)  Comments:  Follow up with Dr. Salgado as scheduled.  Orders:  -     Ambulatory Referral to Home Health    4. Open wound of third toe of left foot, initial encounter  Comments:  No signs of infection and good perfusion on exam.  Will have wound care see pt at [facility] and he will also follow up with Dr. Salgado as scheduled.  Orders:  -     Ambulatory Referral to Home Health

## 2024-03-28 ENCOUNTER — HOSPITAL ENCOUNTER (OUTPATIENT)
Dept: CT IMAGING | Facility: HOSPITAL | Age: 74
Discharge: HOME | End: 2024-03-28
Admitting: STUDENT IN AN ORGANIZED HEALTH CARE EDUCATION/TRAINING PROGRAM
Payer: MEDICARE

## 2024-03-28 DIAGNOSIS — I73.9 PAD (PERIPHERAL ARTERY DISEASE): ICD-10-CM

## 2024-03-28 DIAGNOSIS — I71.40 ABDOMINAL AORTIC ANEURYSM (AAA) WITHOUT RUPTURE, UNSPECIFIED PART: ICD-10-CM

## 2024-03-28 LAB
CREAT BLDA-MCNC: 1.7 MG/DL (ref 0.6–1.3)
EGFRCR SERPLBLD CKD-EPI 2021: 42.6 ML/MIN/1.73

## 2024-03-28 PROCEDURE — 74174 CTA ABD&PLVS W/CONTRAST: CPT

## 2024-03-28 PROCEDURE — 82565 ASSAY OF CREATININE: CPT

## 2024-03-28 PROCEDURE — 25510000001 IOPAMIDOL PER 1 ML: Performed by: STUDENT IN AN ORGANIZED HEALTH CARE EDUCATION/TRAINING PROGRAM

## 2024-03-28 RX ADMIN — IOPAMIDOL 100 ML: 755 INJECTION, SOLUTION INTRAVENOUS at 16:33

## 2024-04-02 ENCOUNTER — OFFICE VISIT (OUTPATIENT)
Age: 74
End: 2024-04-02
Payer: MEDICARE

## 2024-04-02 VITALS
DIASTOLIC BLOOD PRESSURE: 55 MMHG | BODY MASS INDEX: 25.43 KG/M2 | HEIGHT: 67 IN | WEIGHT: 162 LBS | SYSTOLIC BLOOD PRESSURE: 98 MMHG | HEART RATE: 69 BPM

## 2024-04-02 DIAGNOSIS — S91.109S OPEN TOE WOUND, SEQUELA: ICD-10-CM

## 2024-04-02 DIAGNOSIS — I73.9 CLAUDICATION: ICD-10-CM

## 2024-04-02 DIAGNOSIS — I77.9 PERIPHERAL ARTERIAL OCCLUSIVE DISEASE: Primary | Chronic | ICD-10-CM

## 2024-04-02 NOTE — PROGRESS NOTES
"Chief Complaint  Follow-up and Leg Pain    Follow-up for peripheral arterial disease  Status post bilateral femoral endarterectomies with bilateral iliac stents, left femoral to posterior tibial artery bypass with in situ greater saphenous vein 1/4/24    Arina Wray presents to [facility] VASCULAR SURGERY  History of Present Illness    Patient is a pleasant 73-year-old gentleman with a history of peripheral arterial disease causing rest pain and gangrenous changes on his toes on the left who underwent bilateral femoral endarterectomies with bilateral iliac stents on 12/29/23.  Patient still had rest pain in his left foot after this surgery so he subsequently underwent left femoral to posterior tibial artery bypass with in situ greater saphenous vein on 1/4/24.  Patient recovered well from this procedure but surveillance imaging showed high-grade stenosis at the proximal aspect of his iliac stents.  He follows up today with a CTA of his abdomen pelvis to evaluate the stents.    Patient reports doing well overall.  He does report persistent pain in his left leg when ambulating long distances.  No rest pain.        Objective   Vital Signs:  BP 98/55 (BP Location: Right arm, Patient Position: Sitting, Cuff Size: Adult)   Pulse 69   Ht 170.2 cm (67\")   Wt 73.5 kg (162 lb)   BMI 25.37 kg/m²   Estimated body mass index is 25.37 kg/m² as calculated from the following:    Height as of this encounter: 170.2 cm (67\").    Weight as of this encounter: 73.5 kg (162 lb).               Physical Exam   No acute distress  Respirations unlabored  Regular rate and rhythm  Readily palpable left brachial and radial pulses.  Left leg incision healed, groin wound completely healed.  Left third toe wound on the lateral aspect appears to be secondary to the nail of the fourth toe.  The ischemic ulceration at the tip of the toe is healed.    Palpable PT pulse, PT signal present, no DP signal in the " midfoot, has AT signal and signal in the first toe interspace as well as digital artery signals at the base of his third toe.    Result Review :                                     Assessment and Plan       73-year-old gentleman with history of severe peripheral arterial disease requiring complex revascularization with bilateral femoral endarterectomies with retrograde iliac stents extending up to the level of the MASOOD, left femoral to posterior tibial artery bypass with in situ greater saphenous vein.  Patient had significant improvement in his ABIs after revascularization.  Surveillance imaging suggest stenosis at proximal aspect of iliac stents however this is not really seen on his CTA.  There is however a at least 50% stenosis within the proximal aspect of his Fem-PT bypass.    Patient overall is doing very well.  His groin wound is completely healed there and can be left open to air.  The ischemic ulceration on the tip of his third toe is healed now as well.  He does have a new ulceration on the lateral aspect of his third toe that appears to be secondary to his fourth toe nail protruding into the side of the third toe.  It does not appear infected at this time.  Treated with some Betadine and applied a Band-Aid to try to protect it from the toenail.  Will refer patient to Dr. Vizcarra with podiatry to have this wound evaluated and for ongoing nail care moving forward.  Patient previously saw podiatrist in the [place] area but cannot recall who it was.    I reviewed the images of the patient's CTA.  There is perhaps a small blip of thrombus at the proximal aspect of his left iliac stent of unclear chronicity, however it does not appear to be causing significant stenosis.  There is however at least 50% stenosis at the very proximal aspect of his Fem-PT bypass.  He is having persistent claudication.   I think based on the results of the CTA  As well as his previous aortic duplex the patient would benefit from an  arteriogram to evaluate stenosis within the left leg bypass with iliac stents and potentially treat with angioplasty.  I did discuss with patient that with his normal ABIs I cannot say with a high degree of confidence that this procedure would improve his claudication, but the tibial goal of the procedure would be to improve long-term patency of this bypass and decrease the risk that it thromboses.  1 could consider ongoing active surveillance, however the patient has no further revascularization options for his left leg if this bypass were to go down and so I think the patient would benefit from a slightly more aggressive approach.   Details of this procedure as well as risk benefits alternatives were discussed with the patient and his girlfriend Jeanette and they verbalized understanding and agreed to proceed  Hold Eliquis for 2 days before the procedure.          Diagnoses and all orders for this visit:    1. Peripheral arterial occlusive disease (Primary)  Assessment & Plan:  Continue Lipitor, apixaban, aspirin.   Planning for lower extremity arteriogram via left arm access.     Orders:  -     Case Request; Standing  -     ceFAZolin (ANCEF) 2,000 mg in sodium chloride 0.9 % 100 mL IVPB  -     Case Request  -     Ambulatory Referral to Podiatry    2. Open toe wound, sequela  -     Ambulatory Referral to Podiatry    Other orders  -     Follow Anesthesia Guidelines / Protocol; Future  -     Follow Anesthesia Guidelines / Protocol; Standing  -     Obtain Informed Consent; Standing  -     Obtain Informed Consent; Future  -     Provide NPO Instructions to Patient; Future  -     Chlorhexidine Skin Prep; Future  -     Provide Patient With Instructions on NPO Status; Future  -     Instruct Patient to Hold Medications (Specify); Future  -     Basic Metabolic Panel; Standing  -     Protime-INR; Standing  -     aPTT; Standing                   Follow Up     No follow-ups on file.  Patient was given instructions and  counseling regarding his condition or for health maintenance advice. Please see specific information pulled into the AVS if appropriate.

## 2024-04-02 NOTE — ASSESSMENT & PLAN NOTE
Continue Lipitor, apixaban, aspirin.   Planning for lower extremity arteriogram via left arm access.

## 2024-04-02 NOTE — ASSESSMENT & PLAN NOTE
As noted above   Detail Level: Detailed Patient Specific Counseling (Will Not Stick From Patient To Patient): rec pixel 8 $750 per session at least 2 sessions\\nor to see Dr. Rey Deras for profound consult gave range of $1,200-1,500

## 2024-04-05 ENCOUNTER — LAB (OUTPATIENT)
Dept: LAB | Facility: HOSPITAL | Age: 74
End: 2024-04-05
Payer: MEDICARE

## 2024-04-05 ENCOUNTER — HOSPITAL ENCOUNTER (OUTPATIENT)
Dept: CARDIOLOGY | Facility: HOSPITAL | Age: 74
Discharge: HOME | End: 2024-04-05
Payer: MEDICARE

## 2024-04-05 LAB
ANION GAP SERPL CALCULATED.3IONS-SCNC: 12 MMOL/L (ref 5–15)
APTT PPP: 32.7 SECONDS (ref 24–31)
BUN SERPL-MCNC: 22 MG/DL (ref 8–23)
BUN/CREAT SERPL: 14.9 (ref 7–25)
CALCIUM SPEC-SCNC: 9.4 MG/DL (ref 8.6–10.5)
CHLORIDE SERPL-SCNC: 101 MMOL/L (ref 98–107)
CO2 SERPL-SCNC: 26 MMOL/L (ref 22–29)
CREAT SERPL-MCNC: 1.48 MG/DL (ref 0.76–1.27)
DEPRECATED RDW RBC AUTO: 39 FL (ref 37–54)
EGFRCR SERPLBLD CKD-EPI 2021: 50.3 ML/MIN/1.73
ERYTHROCYTE [DISTWIDTH] IN BLOOD BY AUTOMATED COUNT: 14.5 % (ref 12.3–15.4)
GLUCOSE SERPL-MCNC: 91 MG/DL (ref 65–99)
HCT VFR BLD AUTO: 37.6 % (ref 37.5–51)
HGB BLD-MCNC: 11.5 G/DL (ref 13–17.7)
INR PPP: 1.14 (ref 0.93–1.1)
MCH RBC QN AUTO: 23 PG (ref 26.6–33)
MCHC RBC AUTO-ENTMCNC: 30.6 G/DL (ref 31.5–35.7)
MCV RBC AUTO: 75.2 FL (ref 79–97)
PLATELET # BLD AUTO: 380 10*3/MM3 (ref 140–450)
PMV BLD AUTO: 10.7 FL (ref 6–12)
POTASSIUM SERPL-SCNC: 4.5 MMOL/L (ref 3.5–5.2)
PROTHROMBIN TIME: 12.3 SECONDS (ref 9.6–11.7)
RBC # BLD AUTO: 5 10*6/MM3 (ref 4.14–5.8)
SODIUM SERPL-SCNC: 139 MMOL/L (ref 136–145)
WBC NRBC COR # BLD AUTO: 8.08 10*3/MM3 (ref 3.4–10.8)

## 2024-04-05 PROCEDURE — 93005 ELECTROCARDIOGRAM TRACING: CPT | Performed by: STUDENT IN AN ORGANIZED HEALTH CARE EDUCATION/TRAINING PROGRAM

## 2024-04-05 PROCEDURE — 85610 PROTHROMBIN TIME: CPT

## 2024-04-05 PROCEDURE — 85027 COMPLETE CBC AUTOMATED: CPT

## 2024-04-05 PROCEDURE — 80048 BASIC METABOLIC PNL TOTAL CA: CPT

## 2024-04-05 PROCEDURE — 85730 THROMBOPLASTIN TIME PARTIAL: CPT

## 2024-04-07 LAB
QT INTERVAL: 407 MS
QTC INTERVAL: 376 MS

## 2024-04-08 ENCOUNTER — OFFICE VISIT (OUTPATIENT)
Dept: PODIATRY | Facility: CLINIC | Age: 74
End: 2024-04-08
Payer: MEDICARE

## 2024-04-08 VITALS — HEIGHT: 67 IN | WEIGHT: 162 LBS | RESPIRATION RATE: 20 BRPM | BODY MASS INDEX: 25.43 KG/M2

## 2024-04-08 DIAGNOSIS — L97.521 CHRONIC ULCER OF LEFT FOOT LIMITED TO BREAKDOWN OF SKIN: Primary | ICD-10-CM

## 2024-04-08 DIAGNOSIS — L85.3 XEROSIS OF SKIN: ICD-10-CM

## 2024-04-08 DIAGNOSIS — I73.9 PERIPHERAL ARTERIAL DISEASE: ICD-10-CM

## 2024-04-08 PROCEDURE — 1159F MED LIST DOCD IN RCRD: CPT | Performed by: PODIATRIST

## 2024-04-08 PROCEDURE — 99203 OFFICE O/P NEW LOW 30 MIN: CPT | Performed by: PODIATRIST

## 2024-04-08 PROCEDURE — 1160F RVW MEDS BY RX/DR IN RCRD: CPT | Performed by: PODIATRIST

## 2024-04-09 ENCOUNTER — ANESTHESIA EVENT (OUTPATIENT)
Dept: PERIOP | Facility: HOSPITAL | Age: 74
End: 2024-04-09
Payer: MEDICARE

## 2024-04-09 NOTE — ANESTHESIA PREPROCEDURE EVALUATION
Anesthesia Evaluation     Patient summary reviewed and Nursing notes reviewed   NPO Solid Status: > 8 hours  NPO Liquid Status: > 8 hours           Airway   Mallampati: II  TM distance: >3 FB  Neck ROM: full  No difficulty expected  Dental - normal exam   (+) edentulous    Pulmonary - normal exam   Cardiovascular - normal exam    ECG reviewed    (+) hypertension, PVD, hyperlipidemia      Neuro/Psych  (+) psychiatric history, dementia  GI/Hepatic/Renal/Endo    (+) renal disease-    Musculoskeletal     Abdominal  - normal exam    Bowel sounds: normal.   Substance History      OB/GYN          Other   arthritis,     ROS/Med Hx Other: Sinus bradycardia      12/23  ·  Myocardial perfusion imaging indicates a normal myocardial perfusion study with no evidence of ischemia. Impressions are consistent with a low risk study.  ·  Left ventricular ejection fraction is normal (Calculated EF = 69%).  ·  There is no prior study available for comparison.  ·  Findings consistent with a normal ECG stress test.                      Anesthesia Plan    ASA 4     general and MAC     intravenous induction     Anesthetic plan, risks, benefits, and alternatives have been provided, discussed and informed consent has been obtained with: patient.    Plan discussed with CRNA.      CODE STATUS:

## 2024-04-09 NOTE — PROGRESS NOTES
04/08/2024  Foot and Ankle Surgery - New Patient   Provider: Dr. Ambrosio Vizcarra DPM  Location: [facility]    Subjective:  Oscar Wray is a 73 y.o. male.     Chief Complaint   Patient presents with    Left Foot - Wound Check, Initial Evaluation     3rd toe    Initial Evaluation     GEGE gomez 3/12/2024       History of Present Illness  The patient presents for evaluation of small superficial toe wounds on his left foot. He is accompanied by an adult female.    The patient was referred to me by his vascular specialist due to persistent wounds on his left foot, specifically the small superficial toe, which have been present since 12/2023 following an open bypass procedure. Prior to the surgery, he had no prior foot-related issues. The wound appears to be showing signs of improvement. The accompanying adult female reports that the wound appeared upon an ultrasound examination, but the patient herself did not personally observe it. The wound was described as a scab. The patient is scheduled for another surgery on his arm on Wednesday. He reports an improvement in his foot and leg pain. He is not diabetic and has been applying Betadine to his left third toe.       No Known Allergies    Past Medical History:   Diagnosis Date    Atherosclerosis of native arteries of extremities with intermittent claudication, bilateral legs 03/08/2023    Atherosclerosis of native arteries of right leg with ulceration of ankle 01/26/2024    BPH with obstruction/lower urinary tract symptoms 01/08/2024    Chronic kidney disease, stage 3 unspecified     Dementia     Dyslipidemia 01/08/2024    Essential hypertension 01/08/2024    History of cigarette smoking 01/08/2024    Hyperlipidemia, unspecified     Other specified postprocedural states 01/26/2024    Peripheral arterial occlusive disease 01/08/2024       Past Surgical History:   Procedure Laterality Date    ANGIOPLASTY ILIAC ARTERY Bilateral 12/29/2023    Procedure: BILATERAL  FEMORAL ENDARTECTOMY AND ILIAC ARTERY STENTS, LEFT LEG ANGIOGRAM;  Surgeon: Nura Salgado II, MD;  Location: [facility] HYBRID OR;  Service: Vascular;  Laterality: Bilateral;    FEMORAL TIBIAL BYPASS Left 1/4/2024    Procedure: FEMORAL TIBIAL BYPASS;  Surgeon: Nura Salgado II, MD;  Location: [facility] MAIN OR;  Service: Vascular;  Laterality: Left;       Family History   Family history unknown: Yes       Social History     Socioeconomic History    Marital status: Single   Tobacco Use    Smoking status: Former     Types: Cigarettes     Passive exposure: Past    Smokeless tobacco: Never   Vaping Use    Vaping status: Never Used   Substance and Sexual Activity    Alcohol use: Not Currently    Drug use: Never    Sexual activity: Defer        Current Outpatient Medications on File Prior to Visit   Medication Sig Dispense Refill    acetaminophen (TYLENOL) 325 MG tablet Take 2 tablets by mouth Every 6 (Six) Hours As Needed for Mild Pain.      apixaban (ELIQUIS) 5 MG tablet tablet Take 1 tablet by mouth Every 12 (Twelve) Hours. Indications: Other - full anticoagulation, pad/bypass stents 60 tablet 11    atenolol (TENORMIN) 25 MG tablet Take 0.5 tablets by mouth Daily.      atorvastatin (LIPITOR) 20 MG tablet Take 1 tablet by mouth Daily. 90 tablet 1    celecoxib (CeleBREX) 100 MG capsule Take 1 capsule by mouth 2 (Two) Times a Day. 60 capsule 1    Cholecalciferol (Vitamin D3) 50 MCG (2000 UT) capsule Take 1 capsule by mouth Daily.      cilostazol (PLETAL) 100 MG tablet Take 1 tablet by mouth 2 (Two) Times a Day.      dapagliflozin Propanediol (Farxiga) 10 MG tablet Take 10 mg by mouth Daily. 30 tablet 5    docusate sodium (COLACE) 100 MG capsule Take 1 capsule by mouth Daily.      donepezil (ARICEPT) 23 MG tablet Take 1 tablet by mouth Every Night.      furosemide (Lasix) 20 MG tablet Take 1 tablet by mouth Daily. 30 tablet 0    melatonin 1 MG tablet Take 1 tablet by mouth Every Night.      memantine (NAMENDA XR) 28 MG  "capsule sustained-release 24 hr extended release capsule Take 1 capsule by mouth Daily.      Milk of Magnesia 400 MG/5ML suspension Take 5 mL by mouth Daily.      multivitamin with minerals tablet tablet Take 1 tablet by mouth Daily.      Omega-3 Fatty Acids (fish oil) 1000 MG capsule capsule Take 2 capsules by mouth 2 (Two) Times a Day With Meals.      pregabalin (Lyrica) 25 MG capsule Take 1 capsule by mouth 2 (Two) Times a Day. 60 capsule 2    risperiDONE (risperDAL) 0.25 MG tablet Take 1 tablet by mouth Daily.      risperiDONE (risperDAL) 0.5 MG tablet Take 1 tablet by mouth Every Night.      tamsulosin (FLOMAX) 0.4 MG capsule 24 hr capsule Take 1 capsule by mouth Daily.       Current Facility-Administered Medications on File Prior to Visit   Medication Dose Route Frequency Provider Last Rate Last Admin    aspirin EC tablet 81 mg  81 mg Oral Daily David Carson MD             Objective   Resp 20   Ht 170.2 cm (67\")   Wt 73.5 kg (162 lb)   BMI 25.37 kg/m²     Foot/Ankle Exam    GENERAL  Appearance:  appears stated age and elderly  Orientation:  AAOx3  Affect:  appropriate    VASCULAR     Right Foot Vascularity   Dorsalis pedis:  2+  Posterior tibial:  2+  Skin temperature:  warm  Edema gradin+  CFT:  < 3 seconds  Pedal hair growth:  Absent     Left Foot Vascularity   Dorsalis pedis:  1+  Posterior tibial:  1+  Skin temperature:  warm  Edema gradin+  CFT:  < 3 seconds  Pedal hair growth:  Absent     NEUROLOGIC     Right Foot Neurologic   Light touch sensation: normal  Hot/Cold sensation: normal  Achilles reflex:  2+     Left Foot Neurologic   Light touch sensation: normal  Hot/Cold sensation:  normal  Achilles reflex:  2+    MUSCULOSKELETAL     Right Foot Musculoskeletal   Ecchymosis:  none  Tenderness:  none    Arch:  Normal     Left Foot Musculoskeletal   Ecchymosis:  none  Tenderness:  toe 1 tenderness, toe 2 tenderness, toe 3 tenderness, toe 4 tenderness and toe 5 tenderness  Arch:  " Normal    MUSCLE STRENGTH     Right Foot Muscle Strength   Normal strength    Foot dorsiflexion:  5  Foot plantar flexion:  5  Foot inversion:  5  Foot eversion:  5     Left Foot Muscle Strength   Normal strength    Foot dorsiflexion:  5  Foot plantar flexion:  5  Foot inversion:  5  Foot eversion:  5    RANGE OF MOTION     Right Foot Range of Motion   Foot and ankle ROM within normal limits       Left Foot Range of Motion   Foot and ankle ROM within normal limits      DERMATOLOGIC      Right Foot Dermatologic   Skin  Positive for atrophy.      Left Foot Dermatologic   Skin  Left foot skin is not intact. Positive for atrophy and wound. Negative for cellulitis, erythema and gangrene.      Left foot additional comments: Superficial wound involving the dorsal aspect of the third toe with no toenail.  No signs of infection.  Mild hypersensitivity involving the digits.  No further breakdown or areas of concern.    Physical Exam         Results         Assessment & Plan   Diagnoses and all orders for this visit:    1. Chronic ulcer of left foot limited to breakdown of skin (Primary)    2. Peripheral arterial disease    3. Xerosis of skin       Assessment & Plan    Upon examination, the wounds are in a satisfactory condition with no overt signs of concern or concern. The patient's vascular condition has shown significant improvement, with less pain in the foot and leg. The patient was advised to apply an over-the-counter moisturizer such as Aquaphor to his feet once daily to promote moisture. He was also advised to monitor his feet for any open wounds or infections, ensure appropriate footwear, and avoidance of open shoes or barefoot. Daily foot checks were recommended. The most suitable course of action is a routine foot check. The patient's toenails were trimmed during this visit.  Greater than 30 minutes was spent before, during, and after evaluation for patient care.    Follow-up  The patient is scheduled for a  follow-up visit in 6 weeks with BASHIR Morgan.           Patient or patient representative verbalized consent for the use of Ambient Listening during the visit with  TERESA Vizcarra DPM for chart documentation. 4/9/2024  06:49 EDT    TERESA Vizcarra DPM

## 2024-04-10 ENCOUNTER — ANCILLARY PROCEDURE (OUTPATIENT)
Dept: PERIOP | Facility: HOSPITAL | Age: 74
End: 2024-04-10
Payer: MEDICARE

## 2024-04-10 ENCOUNTER — HOSPITAL ENCOUNTER (OUTPATIENT)
Facility: HOSPITAL | Age: 74
Setting detail: HOSPITAL OUTPATIENT SURGERY
Discharge: HOME | End: 2024-04-10
Attending: STUDENT IN AN ORGANIZED HEALTH CARE EDUCATION/TRAINING PROGRAM | Admitting: STUDENT IN AN ORGANIZED HEALTH CARE EDUCATION/TRAINING PROGRAM
Payer: MEDICARE

## 2024-04-10 ENCOUNTER — ANESTHESIA (OUTPATIENT)
Dept: PERIOP | Facility: HOSPITAL | Age: 74
End: 2024-04-10
Payer: MEDICARE

## 2024-04-10 VITALS
DIASTOLIC BLOOD PRESSURE: 68 MMHG | RESPIRATION RATE: 16 BRPM | OXYGEN SATURATION: 96 % | HEART RATE: 71 BPM | BODY MASS INDEX: 26.59 KG/M2 | HEIGHT: 67 IN | WEIGHT: 169.4 LBS | TEMPERATURE: 97.2 F | SYSTOLIC BLOOD PRESSURE: 115 MMHG

## 2024-04-10 DIAGNOSIS — I77.9 PERIPHERAL ARTERIAL OCCLUSIVE DISEASE: Primary | Chronic | ICD-10-CM

## 2024-04-10 LAB
ANION GAP SERPL CALCULATED.3IONS-SCNC: 8 MMOL/L (ref 5–15)
APTT PPP: 29 SECONDS (ref 24–31)
BUN SERPL-MCNC: 21 MG/DL (ref 8–23)
BUN/CREAT SERPL: 12.9 (ref 7–25)
CALCIUM SPEC-SCNC: 9.3 MG/DL (ref 8.6–10.5)
CHLORIDE SERPL-SCNC: 107 MMOL/L (ref 98–107)
CO2 SERPL-SCNC: 27 MMOL/L (ref 22–29)
CREAT SERPL-MCNC: 1.63 MG/DL (ref 0.76–1.27)
EGFRCR SERPLBLD CKD-EPI 2021: 44.8 ML/MIN/1.73
GLUCOSE SERPL-MCNC: 101 MG/DL (ref 65–99)
INR PPP: 1.06 (ref 0.93–1.1)
POTASSIUM SERPL-SCNC: 4.5 MMOL/L (ref 3.5–5.2)
PROTHROMBIN TIME: 11.5 SECONDS (ref 9.6–11.7)
SODIUM SERPL-SCNC: 142 MMOL/L (ref 136–145)

## 2024-04-10 PROCEDURE — 25010000002 BUPIVACAINE (PF) 0.25 % SOLUTION 10 ML VIAL: Performed by: STUDENT IN AN ORGANIZED HEALTH CARE EDUCATION/TRAINING PROGRAM

## 2024-04-10 PROCEDURE — 85610 PROTHROMBIN TIME: CPT | Performed by: STUDENT IN AN ORGANIZED HEALTH CARE EDUCATION/TRAINING PROGRAM

## 2024-04-10 PROCEDURE — 85730 THROMBOPLASTIN TIME PARTIAL: CPT | Performed by: STUDENT IN AN ORGANIZED HEALTH CARE EDUCATION/TRAINING PROGRAM

## 2024-04-10 PROCEDURE — C1894 INTRO/SHEATH, NON-LASER: HCPCS | Performed by: STUDENT IN AN ORGANIZED HEALTH CARE EDUCATION/TRAINING PROGRAM

## 2024-04-10 PROCEDURE — 75710 ARTERY X-RAYS ARM/LEG: CPT | Performed by: STUDENT IN AN ORGANIZED HEALTH CARE EDUCATION/TRAINING PROGRAM

## 2024-04-10 PROCEDURE — S0260 H&P FOR SURGERY: HCPCS | Performed by: STUDENT IN AN ORGANIZED HEALTH CARE EDUCATION/TRAINING PROGRAM

## 2024-04-10 PROCEDURE — 25010000002 PROTAMINE SULFATE PER 10 MG: Performed by: NURSE ANESTHETIST, CERTIFIED REGISTERED

## 2024-04-10 PROCEDURE — 75625 CONTRAST EXAM ABDOMINL AORTA: CPT | Performed by: STUDENT IN AN ORGANIZED HEALTH CARE EDUCATION/TRAINING PROGRAM

## 2024-04-10 PROCEDURE — 25010000002 PHENYLEPHRINE 10 MG/ML SOLUTION 5 ML VIAL: Performed by: NURSE ANESTHETIST, CERTIFIED REGISTERED

## 2024-04-10 PROCEDURE — C1769 GUIDE WIRE: HCPCS | Performed by: STUDENT IN AN ORGANIZED HEALTH CARE EDUCATION/TRAINING PROGRAM

## 2024-04-10 PROCEDURE — C1725 CATH, TRANSLUMIN NON-LASER: HCPCS | Performed by: STUDENT IN AN ORGANIZED HEALTH CARE EDUCATION/TRAINING PROGRAM

## 2024-04-10 PROCEDURE — 37224 PR REVSC OPN/PRG FEM/POP W/ANGIOPLASTY UNI: CPT | Performed by: STUDENT IN AN ORGANIZED HEALTH CARE EDUCATION/TRAINING PROGRAM

## 2024-04-10 PROCEDURE — 25810000003 SODIUM CHLORIDE 0.9 % SOLUTION 250 ML FLEX CONT: Performed by: NURSE ANESTHETIST, CERTIFIED REGISTERED

## 2024-04-10 PROCEDURE — C1887 CATHETER, GUIDING: HCPCS | Performed by: STUDENT IN AN ORGANIZED HEALTH CARE EDUCATION/TRAINING PROGRAM

## 2024-04-10 PROCEDURE — 25010000002 PHENYLEPHRINE 10 MG/ML SOLUTION: Performed by: NURSE ANESTHETIST, CERTIFIED REGISTERED

## 2024-04-10 PROCEDURE — 25010000002 CEFAZOLIN PER 500 MG: Performed by: NURSE ANESTHETIST, CERTIFIED REGISTERED

## 2024-04-10 PROCEDURE — 37220 PR REVASCULARIZATION ILIAC ARTERY ANGIOP 1ST VSL: CPT | Performed by: STUDENT IN AN ORGANIZED HEALTH CARE EDUCATION/TRAINING PROGRAM

## 2024-04-10 PROCEDURE — 25010000002 HEPARIN (PORCINE) PER 1000 UNITS: Performed by: STUDENT IN AN ORGANIZED HEALTH CARE EDUCATION/TRAINING PROGRAM

## 2024-04-10 PROCEDURE — 25810000003 SODIUM CHLORIDE 0.9 % SOLUTION: Performed by: STUDENT IN AN ORGANIZED HEALTH CARE EDUCATION/TRAINING PROGRAM

## 2024-04-10 PROCEDURE — 25010000002 FENTANYL CITRATE (PF) 100 MCG/2ML SOLUTION: Performed by: NURSE ANESTHETIST, CERTIFIED REGISTERED

## 2024-04-10 PROCEDURE — 25010000002 ONDANSETRON PER 1 MG: Performed by: NURSE ANESTHETIST, CERTIFIED REGISTERED

## 2024-04-10 PROCEDURE — 75774 ARTERY X-RAY EACH VESSEL: CPT

## 2024-04-10 PROCEDURE — 25010000002 HEPARIN (PORCINE) PER 1000 UNITS: Performed by: NURSE ANESTHETIST, CERTIFIED REGISTERED

## 2024-04-10 PROCEDURE — 80048 BASIC METABOLIC PNL TOTAL CA: CPT | Performed by: STUDENT IN AN ORGANIZED HEALTH CARE EDUCATION/TRAINING PROGRAM

## 2024-04-10 PROCEDURE — 25810000003 SODIUM CHLORIDE 0.9 % SOLUTION: Performed by: NURSE ANESTHETIST, CERTIFIED REGISTERED

## 2024-04-10 PROCEDURE — 25510000001 IOPAMIDOL PER 1 ML: Performed by: STUDENT IN AN ORGANIZED HEALTH CARE EDUCATION/TRAINING PROGRAM

## 2024-04-10 PROCEDURE — 75716 ARTERY X-RAYS ARMS/LEGS: CPT

## 2024-04-10 PROCEDURE — 25010000002 PROPOFOL 1000 MG/100ML EMULSION: Performed by: NURSE ANESTHETIST, CERTIFIED REGISTERED

## 2024-04-10 PROCEDURE — 25010000002 LIDOCAINE 1 % SOLUTION 20 ML VIAL: Performed by: STUDENT IN AN ORGANIZED HEALTH CARE EDUCATION/TRAINING PROGRAM

## 2024-04-10 DEVICE — LIGACLIP MCA MULTIPLE CLIP APPLIERS, 20 SMALL CLIPS
Type: IMPLANTABLE DEVICE | Site: ARM | Status: FUNCTIONAL
Brand: LIGACLIP

## 2024-04-10 RX ORDER — DIPHENHYDRAMINE HYDROCHLORIDE 50 MG/ML
12.5 INJECTION INTRAMUSCULAR; INTRAVENOUS
Status: DISCONTINUED | OUTPATIENT
Start: 2024-04-10 | End: 2024-04-10 | Stop reason: HOSPADM

## 2024-04-10 RX ORDER — PHENYLEPHRINE HYDROCHLORIDE 10 MG/ML
INJECTION INTRAVENOUS AS NEEDED
Status: DISCONTINUED | OUTPATIENT
Start: 2024-04-10 | End: 2024-04-10 | Stop reason: SURG

## 2024-04-10 RX ORDER — OXYCODONE HYDROCHLORIDE 5 MG/1
10 TABLET ORAL EVERY 4 HOURS PRN
Status: DISCONTINUED | OUTPATIENT
Start: 2024-04-10 | End: 2024-04-10 | Stop reason: HOSPADM

## 2024-04-10 RX ORDER — LIDOCAINE HYDROCHLORIDE 10 MG/ML
0.5 INJECTION, SOLUTION INFILTRATION; PERINEURAL ONCE AS NEEDED
Status: DISCONTINUED | OUTPATIENT
Start: 2024-04-10 | End: 2024-04-10 | Stop reason: HOSPADM

## 2024-04-10 RX ORDER — DIPHENHYDRAMINE HYDROCHLORIDE 50 MG/ML
12.5 INJECTION INTRAMUSCULAR; INTRAVENOUS ONCE AS NEEDED
Status: DISCONTINUED | OUTPATIENT
Start: 2024-04-10 | End: 2024-04-10 | Stop reason: HOSPADM

## 2024-04-10 RX ORDER — LIDOCAINE HYDROCHLORIDE 20 MG/ML
INJECTION, SOLUTION EPIDURAL; INFILTRATION; INTRACAUDAL; PERINEURAL AS NEEDED
Status: DISCONTINUED | OUTPATIENT
Start: 2024-04-10 | End: 2024-04-10 | Stop reason: SURG

## 2024-04-10 RX ORDER — EPHEDRINE SULFATE 5 MG/ML
INJECTION INTRAVENOUS AS NEEDED
Status: DISCONTINUED | OUTPATIENT
Start: 2024-04-10 | End: 2024-04-10 | Stop reason: SURG

## 2024-04-10 RX ORDER — ONDANSETRON 2 MG/ML
INJECTION INTRAMUSCULAR; INTRAVENOUS AS NEEDED
Status: DISCONTINUED | OUTPATIENT
Start: 2024-04-10 | End: 2024-04-10 | Stop reason: SURG

## 2024-04-10 RX ORDER — NALOXONE HCL 0.4 MG/ML
0.4 VIAL (ML) INJECTION AS NEEDED
Status: DISCONTINUED | OUTPATIENT
Start: 2024-04-10 | End: 2024-04-10 | Stop reason: HOSPADM

## 2024-04-10 RX ORDER — SODIUM CHLORIDE 0.9 % (FLUSH) 0.9 %
10 SYRINGE (ML) INJECTION AS NEEDED
Status: DISCONTINUED | OUTPATIENT
Start: 2024-04-10 | End: 2024-04-10 | Stop reason: HOSPADM

## 2024-04-10 RX ORDER — FLUMAZENIL 0.1 MG/ML
0.2 INJECTION INTRAVENOUS AS NEEDED
Status: DISCONTINUED | OUTPATIENT
Start: 2024-04-10 | End: 2024-04-10 | Stop reason: HOSPADM

## 2024-04-10 RX ORDER — ONDANSETRON 2 MG/ML
4 INJECTION INTRAMUSCULAR; INTRAVENOUS ONCE AS NEEDED
Status: DISCONTINUED | OUTPATIENT
Start: 2024-04-10 | End: 2024-04-10 | Stop reason: HOSPADM

## 2024-04-10 RX ORDER — HYDRALAZINE HYDROCHLORIDE 20 MG/ML
5 INJECTION INTRAMUSCULAR; INTRAVENOUS
Status: DISCONTINUED | OUTPATIENT
Start: 2024-04-10 | End: 2024-04-10 | Stop reason: HOSPADM

## 2024-04-10 RX ORDER — SODIUM CHLORIDE 9 MG/ML
INJECTION, SOLUTION INTRAVENOUS CONTINUOUS PRN
Status: DISCONTINUED | OUTPATIENT
Start: 2024-04-10 | End: 2024-04-10 | Stop reason: SURG

## 2024-04-10 RX ORDER — SODIUM CHLORIDE 9 MG/ML
25 INJECTION, SOLUTION INTRAVENOUS CONTINUOUS
Status: DISCONTINUED | OUTPATIENT
Start: 2024-04-10 | End: 2024-04-10 | Stop reason: HOSPADM

## 2024-04-10 RX ORDER — OXYCODONE HYDROCHLORIDE 5 MG/1
5 TABLET ORAL EVERY 4 HOURS PRN
Qty: 15 TABLET | Refills: 0 | Status: SHIPPED | OUTPATIENT
Start: 2024-04-10

## 2024-04-10 RX ORDER — IPRATROPIUM BROMIDE AND ALBUTEROL SULFATE 2.5; .5 MG/3ML; MG/3ML
3 SOLUTION RESPIRATORY (INHALATION) ONCE AS NEEDED
Status: DISCONTINUED | OUTPATIENT
Start: 2024-04-10 | End: 2024-04-10 | Stop reason: HOSPADM

## 2024-04-10 RX ORDER — HEPARIN SODIUM 1000 [USP'U]/ML
INJECTION, SOLUTION INTRAVENOUS; SUBCUTANEOUS AS NEEDED
Status: DISCONTINUED | OUTPATIENT
Start: 2024-04-10 | End: 2024-04-10 | Stop reason: SURG

## 2024-04-10 RX ORDER — FENTANYL CITRATE 50 UG/ML
INJECTION, SOLUTION INTRAMUSCULAR; INTRAVENOUS AS NEEDED
Status: DISCONTINUED | OUTPATIENT
Start: 2024-04-10 | End: 2024-04-10 | Stop reason: SURG

## 2024-04-10 RX ORDER — EPHEDRINE SULFATE 5 MG/ML
5 INJECTION INTRAVENOUS ONCE AS NEEDED
Status: DISCONTINUED | OUTPATIENT
Start: 2024-04-10 | End: 2024-04-10 | Stop reason: HOSPADM

## 2024-04-10 RX ORDER — PROTAMINE SULFATE 10 MG/ML
INJECTION, SOLUTION INTRAVENOUS AS NEEDED
Status: DISCONTINUED | OUTPATIENT
Start: 2024-04-10 | End: 2024-04-10 | Stop reason: SURG

## 2024-04-10 RX ORDER — PROPOFOL 10 MG/ML
INJECTION, EMULSION INTRAVENOUS CONTINUOUS PRN
Status: DISCONTINUED | OUTPATIENT
Start: 2024-04-10 | End: 2024-04-10 | Stop reason: SURG

## 2024-04-10 RX ORDER — OXYCODONE HYDROCHLORIDE 5 MG/1
5 TABLET ORAL ONCE AS NEEDED
Status: DISCONTINUED | OUTPATIENT
Start: 2024-04-10 | End: 2024-04-10 | Stop reason: HOSPADM

## 2024-04-10 RX ADMIN — PHENYLEPHRINE HYDROCHLORIDE 200 MCG: 10 INJECTION INTRAVENOUS at 13:04

## 2024-04-10 RX ADMIN — LIDOCAINE HYDROCHLORIDE 80 MG: 20 INJECTION, SOLUTION EPIDURAL; INFILTRATION; INTRACAUDAL; PERINEURAL at 12:21

## 2024-04-10 RX ADMIN — CEFAZOLIN 2000 MG: 10 INJECTION, POWDER, FOR SOLUTION INTRAVENOUS at 12:26

## 2024-04-10 RX ADMIN — PHENYLEPHRINE HYDROCHLORIDE 200 MCG: 10 INJECTION INTRAVENOUS at 13:19

## 2024-04-10 RX ADMIN — SODIUM CHLORIDE 25 ML/HR: 9 INJECTION, SOLUTION INTRAVENOUS at 07:54

## 2024-04-10 RX ADMIN — FENTANYL CITRATE 25 MCG: 50 INJECTION, SOLUTION INTRAMUSCULAR; INTRAVENOUS at 12:27

## 2024-04-10 RX ADMIN — EPHEDRINE SULFATE 10 MG: 5 INJECTION INTRAVENOUS at 12:47

## 2024-04-10 RX ADMIN — HEPARIN SODIUM 7000 UNITS: 1000 INJECTION, SOLUTION INTRAVENOUS; SUBCUTANEOUS at 12:52

## 2024-04-10 RX ADMIN — SODIUM CHLORIDE: 9 INJECTION, SOLUTION INTRAVENOUS at 12:16

## 2024-04-10 RX ADMIN — PHENYLEPHRINE HYDROCHLORIDE 0.5 MCG/KG/MIN: 10 INJECTION INTRAVENOUS at 12:31

## 2024-04-10 RX ADMIN — ONDANSETRON 4 MG: 2 INJECTION INTRAMUSCULAR; INTRAVENOUS at 13:49

## 2024-04-10 RX ADMIN — PROTAMINE SULFATE 20 MG: 10 INJECTION, SOLUTION INTRAVENOUS at 13:40

## 2024-04-10 RX ADMIN — FENTANYL CITRATE 25 MCG: 50 INJECTION, SOLUTION INTRAMUSCULAR; INTRAVENOUS at 12:21

## 2024-04-10 RX ADMIN — PHENYLEPHRINE HYDROCHLORIDE 200 MCG: 10 INJECTION INTRAVENOUS at 12:52

## 2024-04-10 RX ADMIN — PHENYLEPHRINE HYDROCHLORIDE 200 MCG: 10 INJECTION INTRAVENOUS at 13:43

## 2024-04-10 RX ADMIN — PROPOFOL INJECTABLE EMULSION 100 MCG/KG/MIN: 10 INJECTION, EMULSION INTRAVENOUS at 12:21

## 2024-04-10 NOTE — DISCHARGE INSTRUCTIONS
**Resume your Eliquis on the evening of 4/11/24**      [facility]  Ricardo Hernandes, Taj, Alli Camacho,Phil, Meghna  [address]  [place] [id number] [phone]      Discharge Instructions for Angiogram    Go home, rest and take it easy today.      You may experience some dizziness or memory loss from the anesthesia.  This may last for the next 24 hours.  Someone should plan on staying with you for the first 24 hours for your safety.    Do not make any important legal decisions or sign any legal papers for the next 24 hours.      Eat and drink lightly today.  Start off with liquids, jello, soup, crackers or other bland foods at first. You may advance your diet tomorrow as tolerated as long as you do not experience any nausea or vomiting.    You may resume routine medications including blood thinners. However, Glucophage should be not be started for 72 hours after the dye is given.      No lifting over 10 pounds and no strenuous activity for the next week..    Try not to strain or bear down when your bowels move or when you empty your bladder.    Limit going up and down steps for 2 days.    No driving for the remainder of the day.  You may resume limited driving tomorrow if necessary.      You may shower tomorrow.  No bath or hot tubs for at least 2 days after the procedure.          You have skin glue on your incision. It will fall off over the next 10-14 days. Please do not pick it off prematurely.        Check your arm regularly for bleeding, swelling, or redness.  A small amount of blood contained by the gauze is normal; the whole dressing should not be filled with blood or leaking out under the sides.     If you experience bleeding through the gauze/clear sticky dressing, lay flat and have someone apply direct pressure for 15 minutes.  If bleeding has stopped after this, you may put a clean gauze and tape over the area.  Continue to lie flat for 1-2 hours.  If  bleeding continues after 15 minutes of pressure, call us at the number listed above.  There is an MD available after hours.      If you experience heavy bleeding or large swelling, continue to hold firm pressure and              call 911.  Do not call the MD on call.      If you experience pain or discomfort you may take Tylenol or Ibuprofen, whichever you normally use for minor discomfort, unless otherwise instructed.        If the MD gives you a prescription for narcotic pain pills (Tylenol #3, Vicodin, Hydrocodone, Oxycodone or Percocet), you cannot drive a vehicle or operate machinery while taking these.     Severe pain is not expected after this procedure.  If you experience severe pain, please call our office at [phone].     Remember to contact our office for any of the following:    Fever > 101 degrees  Severe pain that cannot be controlled by taking your pain pills  Severe nausea or vomiting   Significant bleeding of your incisions  Drainage that has a bad smell or is yellow or green in appearance  Any other questions or concerns

## 2024-04-10 NOTE — OP NOTE
Date of Admission:  4/10/2024  Today's Date:  04/10/24  Nura Salgado II, MD  [facility]    Preoperative Diagnosis:   Peripheral arterial disease with previous iliac stents and left leg bypass, surveillance imaging concerning for stenosis at stents, bypass.     Postoperative Diagnosis:   same    Procedure Performed:   Left brachial artery cutdown  Abdominal aortogram  Left leg arteriogram  Balloon angioplasty left femoral to tibial bypass with 6 mm angioplasty balloon  Balloon angioplasty left iliac stent with 8 mm angioplasty balloon for    Surgeon:   Nura Salgado II, MD    Assistant:    Renea Gibbs RN, Provided critical assistance in exposure, retraction, and suction that overall decrease blood loss and operative time.    Anesthesia:   MAC with local    Estimated Blood Loss:   Minimal    Findings:    Abdominal aortogram: Patent iliac stents bilaterally.  Left iliac stent extends approximately 5 mm further into the aorta than the right iliac stent.  No clear evidence of stenosis within either of the iliac stents with good equal flow through each of them.  It does appear that there might be a plaque from the lateral aspect of the aorta impinging slightly on the orifice of the left iliac stent.  This was treated with 8 mm balloon angioplasty with minimal waist on the balloon.  External iliac arteries patent bilaterally.  Internal iliac arteries occluded bilaterally, flow to pelvis provided primarily through collaterals from MASOOD and lumbars.     Left leg arteriogram: Common femoral artery widely patent, profundofemoral artery widely patent, femoral to posterior tibial artery bypass widely patent with no evidence of stenosis at distal anastomosis.  Posterior tibial artery provides primary runoff to the left foot.  Due to CT imaging showing stenosis at the proximal Femoral - PT bypass we did inflate a 6 mm angioplasty balloon in this area with minimal waist on the balloon.     Implants:    Implant Name Type Inv. Item  Serial No.  Lot No. LRB No. Used Action   CLIPAPPLR M/ ENDO LIGACLIP 9 3/8IN SM - [id number] Implant CLIPAPPLR M/ ENDO LIGACLIP 9 3/8IN SM  [employer]  DIV OF J AND J [id number] Left 1 Implanted       Staff:   Circulator: Kristina Chowdary, RN  Radiology Technologist: Mahamed Braun, RRT; Dejah Ellsworth  Scrub Person: Ofelia Childs    Specimen:   none    Complications:   none    Dispo:   to PACU    Indication for procedure:   73 y.o. male with history of severe peripheral arterial disease underwent bilateral femoral endarterectomies with retrograde iliac stents in late December of last year.  The patient subsequently required a left femoral to PT bypass with in situ greater saphenous vein due to persistent rest pain and digital ischemia on January 3.   Surveillance imaging showed significantly increased velocities at the proximal left iliac stent concerning for possible stenosis so a CTA was ordered.  CTA shows minimal disease at the proximal iliac stent but did show stenosis at the proximal Weinberg PT bypass.  Patient has persistent claudication and he has no further options for revascularization should this bypass fail, plans were made for arteriogram with possible intervention at either of these 2 sites.  So procedure including risk benefits and alternatives were discussed with the patient and his girlfriend Jeanette and they both verbalized understanding and agreed to proceed.    Description of procedure:   Patient was taken to the OR placed supine on the table.  Sedation was begun by the anesthesia service.  His left arm was prepped circumferentially with chlorhexidine and he was draped in sterile fashion.  Timeout was performed.  I began procedure by infiltrating a combination 1% lidocaine and 0.25% Marcaine over the left brachial artery.  Stable incision was made in the distal left upper arm over the brachial artery.  Electrocautery was used to dissect down through the subcutaneous  tissue to the brachial artery.  Circumferential control in the brachial artery was obtained and a vessel loop was passed.  Microneedle was used to access the brachial artery and a microwire was inserted and confirmed in appropriate position on fluoroscopy.  We then exchanged the microneedle for microsheath and performed an angiogram confirming appropriate placement within the brachial artery.  We then advanced a 0.035 Glidewire through the micro sheath and into the ascending aorta.  The microsheath was exchanged for a 10 cm 5 Portuguese sheath.  The patient was given 7000 units of heparin.  We then advanced a Martinez catheter over the wire and this was used to select the descending thoracic aorta and the wire was advanced into the infrarenal abdominal aorta.  Our 10 cm 5 Portuguese sheath was removed and exchanged for a 65 cm 6 Portuguese destination sheath.  We then performed an abdominal aortogram through the sheath with the above listed findings.  An angled Wynn cross cath was used to select the left iliac stents and advanced into the left external iliac artery.  We then remove the wire and performed a left leg arteriogram through this Wynn cross catheter with the above listed findings.  The Wynn cross catheter was then used to select the femoral to posterior tibial artery bypass and angiogram was performed through this to confirm appropriate intraluminal placement.  We then removed the Wynn cross catheter and advanced a 6 x 40 mm angioplasty balloon over the wire and performed balloon angioplasty of the proximal anastomosis.  There was not much in the way of a waist here and it appeared widely patent on completion angiography.  There was some complications.  We then remove this balloon and advanced a 8 x 60 mm angioplasty balloon over the wire and performed 8 mm balloon angioplasty of the proximal left iliac stent as it extends into the aorta.  There is a very small waist here but it did not appear to indicate high-grade  stenosis.  Completion angiography after this angioplasty showed good flow through both iliac stents.  There is appear to be perhaps plaque from the aortic wall impinging on the orifice of the left iliac stent slightly however it is unclear if this is perhaps angiographically an effect from the takeoff of the MASOOD.  Regardless as the patient's so pelvic flows through collaterals from his MASOOD and lumbars, and the flow through the stents appeared good with minimal stenosis I do not think it would be prudent to extend stents any more proximally and risk losing the MASOOD.  There is no evidence of complications on completion angiography.  We then removed the wire and clamps were placed on the brachial artery.  The sheath was removed and the arteriotomy was closed with 6-0 Prolene suture in a figure-of-eight fashion.  It was hemostatic and had an excellent pulse proximal to and distal to our arteriotomy.  Patient also had a bounding left radial pulse.  The wound was irrigated with warm normal saline and inspected meticulously for hemostasis which was able to be obtained with electrocautery.  The patient was given 20 mg of protamine.  The arm incision was then closed in layers with 3-0 Vicryl suture and 4-0 Vicryl suture.  It was dressed with skin glue.  The patient was then awakened from sedation and transported PACU in stable condition.  I discussed the outcome of the procedure with the patient's girlfriend Jeanette.  The patient tolerated the procedure well and there were no intraoperative complications.  All wires catheters sheaths and other devices were removed and found to be whole and intact prior to the conclusion of the procedure.  At case completion all instrument count, needle count, and sponge counts were correct x2.    Nura Salgado II, MD  04/10/24     Active Hospital Problems   No active problems to display.      Resolved Hospital Problems    Diagnosis Date Resolved POA    **Peripheral arterial occlusive disease  [I77.9] 04/10/2024 Yes

## 2024-04-10 NOTE — H&P
Name: Oscar Wray ADMIT: 4/10/2024   : 1950  PCP: Kajal Horowitz PA    MRN: [id number] LOS: 0 days   AGE/SEX: 73 y.o. male  ROOM:  [facility] MAIN OR/MAIN OR     Vascular H&P Update.     Patient seen in preop area.  Doing well today with no significant changes since last seen in clinic last week.  The toe is left foot actually looks a lot better  Planning for bilateral lower extremity arteriogram with possible intervention via left arm access via brachial artery cutdown.  Details of procedure discussed with patient including risk benefits and alternatives, as well as with his girlfriend Jeanette.  They both verbalized understanding and agreed to proceed.      Nura Salgado II, MD  04/10/24  10:28 EDT  Office Number [phone]    [facility]

## 2024-04-10 NOTE — ANESTHESIA POSTPROCEDURE EVALUATION
Patient: Oscar Wray    Procedure Summary       Date: 04/10/24 Room / Location: [facility]  / [facility] HYBRID OR    Anesthesia Start: 1216 Anesthesia Stop: 1405    Procedure: ARTERIOGRAM LOWER EXTREMITY WITH ANGIOPLASTY via left arm access (Bilateral) Diagnosis:       Peripheral arterial occlusive disease      (Peripheral arterial occlusive disease [I77.9])    Surgeons: Nura Salgado II, MD Provider: Calderon Headley MD    Anesthesia Type: general, MAC ASA Status: 4            Anesthesia Type: general, MAC    Vitals  Vitals Value Taken Time   /68 04/10/24 1444   Temp 97.2 °F (36.2 °C) 04/10/24 1406   Pulse 64 04/10/24 1450   Resp 16 04/10/24 1444   SpO2 90 % 04/10/24 1450   Vitals shown include unfiled device data.        Post Anesthesia Care and Evaluation    Patient location during evaluation: PACU  Patient participation: complete - patient participated  Level of consciousness: awake  Pain scale: See nurse's notes for pain score.  Pain management: adequate    Airway patency: patent  Anesthetic complications: No anesthetic complications  PONV Status: none  Cardiovascular status: acceptable  Respiratory status: acceptable and spontaneous ventilation  Hydration status: acceptable    Comments: Patient seen and examined postoperatively; vital signs stable; SpO2 greater than or equal to 90%; cardiopulmonary status stable; nausea/vomiting adequately controlled; pain adequately controlled; no apparent anesthesia complications; patient discharged from anesthesia care when discharge criteria were met

## 2024-04-12 ENCOUNTER — LAB (OUTPATIENT)
Dept: FAMILY MEDICINE CLINIC | Facility: CLINIC | Age: 74
End: 2024-04-12
Payer: MEDICARE

## 2024-04-12 ENCOUNTER — OFFICE VISIT (OUTPATIENT)
Dept: FAMILY MEDICINE CLINIC | Facility: CLINIC | Age: 74
End: 2024-04-12
Payer: MEDICARE

## 2024-04-12 VITALS
HEART RATE: 83 BPM | RESPIRATION RATE: 18 BRPM | HEIGHT: 67 IN | BODY MASS INDEX: 27.15 KG/M2 | DIASTOLIC BLOOD PRESSURE: 66 MMHG | WEIGHT: 173 LBS | TEMPERATURE: 97 F | SYSTOLIC BLOOD PRESSURE: 105 MMHG

## 2024-04-12 DIAGNOSIS — D64.9 ANEMIA, UNSPECIFIED TYPE: ICD-10-CM

## 2024-04-12 DIAGNOSIS — G30.0 MODERATE EARLY ONSET ALZHEIMER'S DEMENTIA WITHOUT BEHAVIORAL DISTURBANCE, PSYCHOTIC DISTURBANCE, MOOD DISTURBANCE, OR ANXIETY: ICD-10-CM

## 2024-04-12 DIAGNOSIS — F02.B0 MODERATE EARLY ONSET ALZHEIMER'S DEMENTIA WITHOUT BEHAVIORAL DISTURBANCE, PSYCHOTIC DISTURBANCE, MOOD DISTURBANCE, OR ANXIETY: ICD-10-CM

## 2024-04-12 DIAGNOSIS — R79.89 ELEVATED SERUM CREATININE: Primary | ICD-10-CM

## 2024-04-12 LAB
ANION GAP SERPL CALCULATED.3IONS-SCNC: 13.1 MMOL/L (ref 5–15)
ANISOCYTOSIS BLD QL: ABNORMAL
BILIRUB BLD-MCNC: NEGATIVE MG/DL
BUN SERPL-MCNC: 17 MG/DL (ref 8–23)
BUN/CREAT SERPL: 11.3 (ref 7–25)
CALCIUM SPEC-SCNC: 9.3 MG/DL (ref 8.6–10.5)
CHLORIDE SERPL-SCNC: 103 MMOL/L (ref 98–107)
CLARITY, POC: CLEAR
CO2 SERPL-SCNC: 23.9 MMOL/L (ref 22–29)
COLOR UR: YELLOW
CREAT SERPL-MCNC: 1.5 MG/DL (ref 0.76–1.27)
DEPRECATED RDW RBC AUTO: 38.4 FL (ref 37–54)
EGFRCR SERPLBLD CKD-EPI 2021: 49.5 ML/MIN/1.73
EOSINOPHIL # BLD MANUAL: 0.4 10*3/MM3 (ref 0–0.4)
EOSINOPHIL NFR BLD MANUAL: 5.1 % (ref 0.3–6.2)
ERYTHROCYTE [DISTWIDTH] IN BLOOD BY AUTOMATED COUNT: 14.5 % (ref 12.3–15.4)
EXPIRATION DATE: ABNORMAL
GLUCOSE SERPL-MCNC: 75 MG/DL (ref 65–99)
GLUCOSE UR STRIP-MCNC: ABNORMAL MG/DL
HCT VFR BLD AUTO: 37 % (ref 37.5–51)
HGB BLD-MCNC: 11.3 G/DL (ref 13–17.7)
KETONES UR QL: ABNORMAL
LEUKOCYTE EST, POC: NEGATIVE
LYMPHOCYTES # BLD MANUAL: 1.1 10*3/MM3 (ref 0.7–3.1)
LYMPHOCYTES NFR BLD MANUAL: 5.1 % (ref 5–12)
Lab: ABNORMAL
MCH RBC QN AUTO: 22.6 PG (ref 26.6–33)
MCHC RBC AUTO-ENTMCNC: 30.5 G/DL (ref 31.5–35.7)
MCV RBC AUTO: 73.9 FL (ref 79–97)
MICROCYTES BLD QL: ABNORMAL
MONOCYTES # BLD: 0.4 10*3/MM3 (ref 0.1–0.9)
NEUTROPHILS # BLD AUTO: 5.9 10*3/MM3 (ref 1.7–7)
NEUTROPHILS NFR BLD MANUAL: 75.8 % (ref 42.7–76)
NITRITE UR-MCNC: NEGATIVE MG/ML
NRBC BLD AUTO-RTO: 0 /100 WBC (ref 0–0.2)
OVALOCYTES BLD QL SMEAR: ABNORMAL
PH UR: 6 [PH] (ref 5–8)
PLAT MORPH BLD: NORMAL
PLATELET # BLD AUTO: 344 10*3/MM3 (ref 140–450)
PMV BLD AUTO: 10.3 FL (ref 6–12)
POLYCHROMASIA BLD QL SMEAR: ABNORMAL
POTASSIUM SERPL-SCNC: 3.9 MMOL/L (ref 3.5–5.2)
PROT UR STRIP-MCNC: ABNORMAL MG/DL
RBC # BLD AUTO: 5.01 10*6/MM3 (ref 4.14–5.8)
RBC # UR STRIP: NEGATIVE /UL
SODIUM SERPL-SCNC: 140 MMOL/L (ref 136–145)
SP GR UR: 1.02 (ref 1–1.03)
UROBILINOGEN UR QL: NORMAL
VARIANT LYMPHS NFR BLD MANUAL: 14.1 % (ref 19.6–45.3)
WBC MORPH BLD: NORMAL
WBC NRBC COR # BLD AUTO: 7.78 10*3/MM3 (ref 3.4–10.8)

## 2024-04-12 PROCEDURE — 85007 BL SMEAR W/DIFF WBC COUNT: CPT | Performed by: PHYSICIAN ASSISTANT

## 2024-04-12 PROCEDURE — 36415 COLL VENOUS BLD VENIPUNCTURE: CPT | Performed by: PHYSICIAN ASSISTANT

## 2024-04-12 PROCEDURE — 85025 COMPLETE CBC W/AUTO DIFF WBC: CPT | Performed by: PHYSICIAN ASSISTANT

## 2024-04-12 PROCEDURE — 80048 BASIC METABOLIC PNL TOTAL CA: CPT | Performed by: PHYSICIAN ASSISTANT

## 2024-04-12 NOTE — PROGRESS NOTES
Arina Wray is a 73 y.o. male.     History of Present Illness  Pt presents to follow up from recent surgery for bilateral lower extremity arteriogram via left arm access.  He is doing well and denies any pain or fever.  He does still pain when he walks after a little while but improved from prior to surgery.  He is supposed to follow up with vascular surgeon in 2 weeks.  He had labs done which showed his kidney function was a little decreased more than his baseline. His blood pressure has been running on the lower side but he denies any symptoms of light headedness or dizziness.         Past Medical History:   Diagnosis Date    Atherosclerosis of native arteries of extremities with intermittent claudication, bilateral legs 03/08/2023    Atherosclerosis of native arteries of right leg with ulceration of ankle 01/26/2024    BPH with obstruction/lower urinary tract symptoms 01/08/2024    Chronic kidney disease, stage 3 unspecified     Dementia     Dyslipidemia 01/08/2024    Essential hypertension 01/08/2024    History of cigarette smoking 01/08/2024    Hyperlipidemia, unspecified     Other specified postprocedural states 01/26/2024    Peripheral arterial occlusive disease 01/08/2024     Past Surgical History:   Procedure Laterality Date    ANGIOPLASTY ILIAC ARTERY Bilateral 12/29/2023    Procedure: BILATERAL FEMORAL ENDARTECTOMY AND ILIAC ARTERY STENTS, LEFT LEG ANGIOGRAM;  Surgeon: Nura Salgado II, MD;  Location: [facility] HYBRID OR;  Service: Vascular;  Laterality: Bilateral;    ARTERIOGRAM Bilateral 4/10/2024    Procedure: ARTERIOGRAM LOWER EXTREMITY WITH ANGIOPLASTY via left arm access;  Surgeon: Nura Salgado II, MD;  Location: [facility] HYBRID OR;  Service: Vascular;  Laterality: Bilateral;    FEMORAL TIBIAL BYPASS Left 1/4/2024    Procedure: FEMORAL TIBIAL BYPASS;  Surgeon: Nura Salgado II, MD;  Location: [facility] MAIN OR;  Service: Vascular;  Laterality: Left;     Family History   Family  history unknown: Yes     Social History     Socioeconomic History    Marital status: Single   Tobacco Use    Smoking status: Former     Types: Cigarettes     Passive exposure: Past    Smokeless tobacco: Never   Vaping Use    Vaping status: Never Used   Substance and Sexual Activity    Alcohol use: Not Currently    Drug use: Never    Sexual activity: Defer         Current Outpatient Medications:     acetaminophen (TYLENOL) 325 MG tablet, Take 2 tablets by mouth Every 6 (Six) Hours As Needed for Mild Pain., Disp: , Rfl:     apixaban (ELIQUIS) 5 MG tablet tablet, Take 1 tablet by mouth Every 12 (Twelve) Hours. Indications: Other - full anticoagulation, pad/bypass stents, Disp: 60 tablet, Rfl: 11    atenolol (TENORMIN) 25 MG tablet, Take 0.5 tablets by mouth Daily., Disp: , Rfl:     atorvastatin (LIPITOR) 20 MG tablet, Take 1 tablet by mouth Daily., Disp: 90 tablet, Rfl: 1    celecoxib (CeleBREX) 100 MG capsule, Take 1 capsule by mouth 2 (Two) Times a Day., Disp: 60 capsule, Rfl: 1    Cholecalciferol (Vitamin D3) 50 MCG (2000 UT) capsule, Take 1 capsule by mouth Daily., Disp: , Rfl:     cilostazol (PLETAL) 100 MG tablet, Take 1 tablet by mouth 2 (Two) Times a Day., Disp: , Rfl:     dapagliflozin Propanediol (Farxiga) 10 MG tablet, Take 10 mg by mouth Daily., Disp: 30 tablet, Rfl: 5    docusate sodium (COLACE) 100 MG capsule, Take 1 capsule by mouth Daily., Disp: , Rfl:     donepezil (ARICEPT) 23 MG tablet, Take 1 tablet by mouth Every Night., Disp: , Rfl:     furosemide (Lasix) 20 MG tablet, Take 1 tablet by mouth Daily., Disp: 30 tablet, Rfl: 0    melatonin 1 MG tablet, Take 1 tablet by mouth Every Night., Disp: , Rfl:     memantine (NAMENDA XR) 28 MG capsule sustained-release 24 hr extended release capsule, Take 1 capsule by mouth Daily., Disp: , Rfl:     Milk of Magnesia 400 MG/5ML suspension, Take 5 mL by mouth Daily., Disp: , Rfl:     multivitamin with minerals tablet tablet, Take 1 tablet by mouth Daily., Disp: ,  "Rfl:     Omega-3 Fatty Acids (fish oil) 1000 MG capsule capsule, Take 2 capsules by mouth 2 (Two) Times a Day With Meals., Disp: , Rfl:     oxyCODONE (Roxicodone) 5 MG immediate release tablet, Take 1 tablet by mouth Every 4 (Four) Hours As Needed for Severe Pain., Disp: 15 tablet, Rfl: 0    pregabalin (Lyrica) 25 MG capsule, Take 1 capsule by mouth 2 (Two) Times a Day., Disp: 60 capsule, Rfl: 2    risperiDONE (risperDAL) 0.25 MG tablet, Take 1 tablet by mouth Daily., Disp: , Rfl:     risperiDONE (risperDAL) 0.5 MG tablet, Take 1 tablet by mouth Every Night., Disp: , Rfl:     tamsulosin (FLOMAX) 0.4 MG capsule 24 hr capsule, Take 1 capsule by mouth Daily., Disp: , Rfl:     Current Facility-Administered Medications:     aspirin EC tablet 81 mg, 81 mg, Oral, Daily, David Carson MD    Review of Systems   Constitutional:  Positive for fatigue. Negative for activity change, appetite change, chills, fever, unexpected weight gain and unexpected weight loss.   HENT:  Negative for trouble swallowing.    Eyes:  Negative for visual disturbance.   Respiratory:  Negative for chest tightness and shortness of breath.    Cardiovascular:  Negative for chest pain, palpitations and leg swelling.   Gastrointestinal:  Negative for abdominal pain, nausea and vomiting.   Genitourinary:  Negative for difficulty urinating and dysuria.   Musculoskeletal:  Negative for gait problem.   Skin:  Positive for wound. Negative for rash.   Neurological:  Negative for dizziness, seizures, syncope, speech difficulty, weakness, light-headedness, headache and confusion.   Psychiatric/Behavioral:  Negative for sleep disturbance.      /66 (BP Location: Left arm, Patient Position: Sitting, Cuff Size: Adult)   Pulse 83   Temp 97 °F (36.1 °C) (Temporal)   Resp 18   Ht 170.2 cm (67.01\")   Wt 78.5 kg (173 lb)   BMI 27.09 kg/m²       Objective   Physical Exam  Vitals and nursing note reviewed.   Constitutional:       Appearance: Normal " appearance.   HENT:      Head: Normocephalic and atraumatic.   Eyes:      Conjunctiva/sclera: Conjunctivae normal.      Pupils: Pupils are equal, round, and reactive to light.   Neck:      Thyroid: No thyroid mass, thyromegaly or thyroid tenderness.      Vascular: No carotid bruit.   Cardiovascular:      Rate and Rhythm: Normal rate and regular rhythm.      Pulses: Normal pulses.      Heart sounds: Normal heart sounds.   Pulmonary:      Effort: Pulmonary effort is normal.      Breath sounds: Normal breath sounds.   Musculoskeletal:         General: Normal range of motion.      Cervical back: Normal range of motion and neck supple.      Right lower leg: No edema.      Left lower leg: No edema.   Skin:     General: Skin is warm and dry.      Comments: Left upper extremity surgical wound healing well with no signs of infection.   Neurological:      General: No focal deficit present.      Mental Status: He is alert and oriented to person, place, and time.   Psychiatric:         Mood and Affect: Mood normal.         Behavior: Behavior normal.         Thought Content: Thought content normal.         Procedures     Assessment    Diagnoses and all orders for this visit:    1. Elevated serum creatinine (Primary)  Comments:  Will recheck labs.  If still worse compared to baseline, will send to nephrology. Continue Farxiga..  Orders:  -     Basic metabolic panel  -     POCT urinalysis dipstick, automated    2. 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    3. Anemia, unspecified type  Comments:  Will recheck to ensure no worsening since bp is a little low.  Orders:  -     CBC w AUTO Differential  -     Manual Differential

## 2024-04-22 NOTE — H&P
Name: Oscar Wray ADMIT: 4/10/2024   : 1950  PCP: Kajal Horowitz PA    MRN: [id number] LOS: 0 days   AGE/SEX: 73 y.o. male  ROOM: [facility] MAIN OR/MAIN OR     No chief complaint on file.      Subjective   Patient is a 73 y.o. male presents for an arteriogram of his left leg.   Doing well and no changes since seen in clinic last week.     History of Present Illness    Past Medical History:   Diagnosis Date    Atherosclerosis of native arteries of extremities with intermittent claudication, bilateral legs 2023    Atherosclerosis of native arteries of right leg with ulceration of ankle 2024    BPH with obstruction/lower urinary tract symptoms 2024    Chronic kidney disease, stage 3 unspecified     Dementia     Dyslipidemia 2024    Essential hypertension 2024    History of cigarette smoking 2024    Hyperlipidemia, unspecified     Other specified postprocedural states 2024    Peripheral arterial occlusive disease 2024     Past Surgical History:   Procedure Laterality Date    ANGIOPLASTY ILIAC ARTERY Bilateral 2023    Procedure: BILATERAL FEMORAL ENDARTECTOMY AND ILIAC ARTERY STENTS, LEFT LEG ANGIOGRAM;  Surgeon: Nura Salgado II, MD;  Location: [facility] OR;  Service: Vascular;  Laterality: Bilateral;    ARTERIOGRAM Bilateral 4/10/2024    Procedure: ARTERIOGRAM LOWER EXTREMITY WITH ANGIOPLASTY via left arm access;  Surgeon: Nura Salgado II, MD;  Location: [facility] OR;  Service: Vascular;  Laterality: Bilateral;    FEMORAL TIBIAL BYPASS Left 2024    Procedure: FEMORAL TIBIAL BYPASS;  Surgeon: Nura Salgado II, MD;  Location: [facility] OR;  Service: Vascular;  Laterality: Left;     Family History   Family history unknown: Yes     Social History     Tobacco Use    Smoking status: Former     Types: Cigarettes     Passive exposure: Past    Smokeless tobacco: Never   Vaping Use    Vaping status: Never Used   Substance Use Topics     Alcohol use: Not Currently    Drug use: Never     No medications prior to admission.     Allergies:  Patient has no known allergies.    Review of Systems     Objective    Vital Signs        on   ;      Body mass index is 26.53 kg/m².    Physical Exam  NAD  Incisionis from bypass healed.     Results Review:   I reviewed the patient's new clinical results.  Imaging Results (Last 24 Hours)       Procedure Component Value Units Date/Time    [facility] V[facility] OR Imaging ([employer]) [[id number]] Resulted: 04/10/24 1401     Updated: 04/10/24 1401    Narrative:      Please see performing physician's note for result.            Assessment & Plan       * No active hospital problems. *      Assessment & Plan  73-year-old man presenting for bilateral lower extremity arteriogram via brachial artery cutdown with previous bilateral iliac stents and left femoral to posterior tibial artery bypass.  Risk benefits and alternatives discussed with patient and his girlfriend verbalized understanding and agreed to proceed.      I discussed the patients findings and my recommendations with patient, family, and nursing staff.          Nura Salgado II, MD  [facility]  [phone]  04/22/24  10:17 EDT

## 2024-04-30 ENCOUNTER — TRANSCRIBE ORDERS (OUTPATIENT)
Dept: ADMINISTRATIVE | Facility: HOSPITAL | Age: 74
End: 2024-04-30
Payer: MEDICARE

## 2024-04-30 ENCOUNTER — LAB (OUTPATIENT)
Dept: LAB | Facility: HOSPITAL | Age: 74
End: 2024-04-30
Payer: MEDICARE

## 2024-04-30 DIAGNOSIS — G30.9 ALZHEIMER'S DISEASE: Primary | ICD-10-CM

## 2024-04-30 DIAGNOSIS — F02.80 ALZHEIMER'S DISEASE: Primary | ICD-10-CM

## 2024-04-30 DIAGNOSIS — G30.9 ALZHEIMER'S DISEASE: ICD-10-CM

## 2024-04-30 DIAGNOSIS — F02.80 ALZHEIMER'S DISEASE: ICD-10-CM

## 2024-04-30 LAB
ALBUMIN SERPL-MCNC: 4.4 G/DL (ref 3.5–5.2)
ALBUMIN/GLOB SERPL: 1.3 G/DL
ALP SERPL-CCNC: 112 U/L (ref 39–117)
ALT SERPL W P-5'-P-CCNC: 11 U/L (ref 1–41)
ANION GAP SERPL CALCULATED.3IONS-SCNC: 12.5 MMOL/L (ref 5–15)
ANISOCYTOSIS BLD QL: ABNORMAL
AST SERPL-CCNC: 20 U/L (ref 1–40)
BASOPHILS # BLD MANUAL: 0.1 10*3/MM3 (ref 0–0.2)
BASOPHILS NFR BLD MANUAL: 1 % (ref 0–1.5)
BILIRUB SERPL-MCNC: 0.5 MG/DL (ref 0–1.2)
BUN SERPL-MCNC: 16 MG/DL (ref 8–23)
BUN/CREAT SERPL: 11.4 (ref 7–25)
CALCIUM SPEC-SCNC: 9.7 MG/DL (ref 8.6–10.5)
CHLORIDE SERPL-SCNC: 106 MMOL/L (ref 98–107)
CHROMATIN AB SERPL-ACNC: <10 IU/ML (ref 0–14)
CO2 SERPL-SCNC: 22.5 MMOL/L (ref 22–29)
CREAT SERPL-MCNC: 1.4 MG/DL (ref 0.76–1.27)
CRP SERPL-MCNC: <0.3 MG/DL (ref 0–0.5)
DACRYOCYTES BLD QL SMEAR: ABNORMAL
DEPRECATED RDW RBC AUTO: 38.9 FL (ref 37–54)
EGFRCR SERPLBLD CKD-EPI 2021: 53.7 ML/MIN/1.73
EOSINOPHIL # BLD MANUAL: 0.1 10*3/MM3 (ref 0–0.4)
EOSINOPHIL NFR BLD MANUAL: 1 % (ref 0.3–6.2)
ERYTHROCYTE [DISTWIDTH] IN BLOOD BY AUTOMATED COUNT: 14.9 % (ref 12.3–15.4)
ERYTHROCYTE [SEDIMENTATION RATE] IN BLOOD: 35 MM/HR (ref 0–20)
FOLATE SERPL-MCNC: 7.34 NG/ML (ref 4.78–24.2)
GLOBULIN UR ELPH-MCNC: 3.5 GM/DL
GLUCOSE SERPL-MCNC: 78 MG/DL (ref 65–99)
HCT VFR BLD AUTO: 40.1 % (ref 37.5–51)
HGB BLD-MCNC: 11.7 G/DL (ref 13–17.7)
LYMPHOCYTES # BLD MANUAL: 1.78 10*3/MM3 (ref 0.7–3.1)
LYMPHOCYTES NFR BLD MANUAL: 5.2 % (ref 5–12)
MCH RBC QN AUTO: 21.5 PG (ref 26.6–33)
MCHC RBC AUTO-ENTMCNC: 29.2 G/DL (ref 31.5–35.7)
MCV RBC AUTO: 73.6 FL (ref 79–97)
MICROCYTES BLD QL: ABNORMAL
MONOCYTES # BLD: 0.5 10*3/MM3 (ref 0.1–0.9)
NEUTROPHILS # BLD AUTO: 7.11 10*3/MM3 (ref 1.7–7)
NEUTROPHILS NFR BLD MANUAL: 74.2 % (ref 42.7–76)
NRBC BLD AUTO-RTO: 0 /100 WBC (ref 0–0.2)
PLAT MORPH BLD: NORMAL
PLATELET # BLD AUTO: 342 10*3/MM3 (ref 140–450)
PMV BLD AUTO: 10.7 FL (ref 6–12)
POIKILOCYTOSIS BLD QL SMEAR: ABNORMAL
POLYCHROMASIA BLD QL SMEAR: ABNORMAL
POTASSIUM SERPL-SCNC: 4.5 MMOL/L (ref 3.5–5.2)
PROT SERPL-MCNC: 7.9 G/DL (ref 6–8.5)
RBC # BLD AUTO: 5.45 10*6/MM3 (ref 4.14–5.8)
SODIUM SERPL-SCNC: 141 MMOL/L (ref 136–145)
SPHEROCYTES BLD QL SMEAR: ABNORMAL
TSH SERPL DL<=0.05 MIU/L-ACNC: 1.8 UIU/ML (ref 0.27–4.2)
VARIANT LYMPHS NFR BLD MANUAL: 18.6 % (ref 19.6–45.3)
VIT B12 BLD-MCNC: 262 PG/ML (ref 211–946)
WBC MORPH BLD: NORMAL
WBC NRBC COR # BLD AUTO: 9.58 10*3/MM3 (ref 3.4–10.8)

## 2024-04-30 PROCEDURE — 82746 ASSAY OF FOLIC ACID SERUM: CPT

## 2024-04-30 PROCEDURE — 86431 RHEUMATOID FACTOR QUANT: CPT

## 2024-04-30 PROCEDURE — 80053 COMPREHEN METABOLIC PANEL: CPT

## 2024-04-30 PROCEDURE — 36415 COLL VENOUS BLD VENIPUNCTURE: CPT

## 2024-04-30 PROCEDURE — 86140 C-REACTIVE PROTEIN: CPT

## 2024-04-30 PROCEDURE — 85025 COMPLETE CBC W/AUTO DIFF WBC: CPT

## 2024-04-30 PROCEDURE — 86592 SYPHILIS TEST NON-TREP QUAL: CPT

## 2024-04-30 PROCEDURE — 84165 PROTEIN E-PHORESIS SERUM: CPT

## 2024-04-30 PROCEDURE — 85007 BL SMEAR W/DIFF WBC COUNT: CPT

## 2024-04-30 PROCEDURE — 85652 RBC SED RATE AUTOMATED: CPT

## 2024-04-30 PROCEDURE — 83921 ORGANIC ACID SINGLE QUANT: CPT

## 2024-04-30 PROCEDURE — 82607 VITAMIN B-12: CPT

## 2024-04-30 PROCEDURE — 84443 ASSAY THYROID STIM HORMONE: CPT

## 2024-04-30 PROCEDURE — 86038 ANTINUCLEAR ANTIBODIES: CPT

## 2024-05-01 LAB
ALBUMIN SERPL ELPH-MCNC: 3.8 G/DL (ref 2.9–4.4)
ALBUMIN/GLOB SERPL: 1.1 {RATIO} (ref 0.7–1.7)
ALPHA1 GLOB SERPL ELPH-MCNC: 0.3 G/DL (ref 0–0.4)
ALPHA2 GLOB SERPL ELPH-MCNC: 0.9 G/DL (ref 0.4–1)
ANA SER QL: NEGATIVE
B-GLOBULIN SERPL ELPH-MCNC: 1.2 G/DL (ref 0.7–1.3)
GAMMA GLOB SERPL ELPH-MCNC: 1.3 G/DL (ref 0.4–1.8)
GLOBULIN SER CALC-MCNC: 3.6 G/DL (ref 2.2–3.9)
LABORATORY COMMENT REPORT: NORMAL
M PROTEIN SERPL ELPH-MCNC: NORMAL G/DL
PROT PATTERN SERPL ELPH-IMP: NORMAL
PROT SERPL-MCNC: 7.4 G/DL (ref 6–8.5)
RPR SER QL: NORMAL

## 2024-05-06 LAB — METHYLMALONATE SERPL-SCNC: 353 NMOL/L (ref 0–378)

## 2024-05-07 ENCOUNTER — OFFICE VISIT (OUTPATIENT)
Age: 74
End: 2024-05-07
Payer: MEDICARE

## 2024-05-07 VITALS
WEIGHT: 173 LBS | OXYGEN SATURATION: 97 % | HEART RATE: 68 BPM | HEIGHT: 67 IN | SYSTOLIC BLOOD PRESSURE: 108 MMHG | DIASTOLIC BLOOD PRESSURE: 72 MMHG | BODY MASS INDEX: 27.15 KG/M2

## 2024-05-07 DIAGNOSIS — I70.0 ATHEROSCLEROSIS OF AORTA: ICD-10-CM

## 2024-05-07 DIAGNOSIS — I73.9 PERIPHERAL ARTERIAL DISEASE WITH HISTORY OF REVASCULARIZATION: Primary | ICD-10-CM

## 2024-05-07 DIAGNOSIS — Z98.890 PERIPHERAL ARTERIAL DISEASE WITH HISTORY OF REVASCULARIZATION: Primary | ICD-10-CM

## 2024-05-07 RX ORDER — ASPIRIN 81 MG/1
81 TABLET ORAL DAILY
COMMUNITY

## 2024-05-21 ENCOUNTER — OFFICE VISIT (OUTPATIENT)
Dept: PODIATRY | Facility: CLINIC | Age: 74
End: 2024-05-21
Payer: MEDICARE

## 2024-05-21 VITALS
RESPIRATION RATE: 20 BRPM | WEIGHT: 173 LBS | HEIGHT: 67 IN | BODY MASS INDEX: 27.15 KG/M2 | HEART RATE: 60 BPM | OXYGEN SATURATION: 96 %

## 2024-05-21 DIAGNOSIS — M79.674 PAIN IN TOES OF BOTH FEET: ICD-10-CM

## 2024-05-21 DIAGNOSIS — I73.9 PERIPHERAL ARTERIAL DISEASE: ICD-10-CM

## 2024-05-21 DIAGNOSIS — L60.3 ONYCHODYSTROPHY: ICD-10-CM

## 2024-05-21 DIAGNOSIS — M79.675 PAIN IN TOES OF BOTH FEET: ICD-10-CM

## 2024-05-21 DIAGNOSIS — L97.521 CHRONIC ULCER OF LEFT FOOT LIMITED TO BREAKDOWN OF SKIN: Primary | ICD-10-CM

## 2024-05-21 DIAGNOSIS — L85.3 XEROSIS OF SKIN: ICD-10-CM

## 2024-05-21 DIAGNOSIS — R26.81 UNSTEADINESS ON FEET: ICD-10-CM

## 2024-05-21 DIAGNOSIS — G62.9 NEUROPATHY: ICD-10-CM

## 2024-05-21 NOTE — PROGRESS NOTES
05/21/2024  Foot and Ankle Surgery - Established Patient/Follow-up  Provider: ELOY Thornton   Location: [facility]    Subjective:  Oscar Wray is a 73 y.o. male.     Chief Complaint   Patient presents with    Left Foot - Wound Check, Initial Evaluation     Saw jeff 04/08/2024. 3rd toe wound    Initial Evaluation     GEGE Horowitz last pcp visit 03/20/2024       History of Present Illness  The patient is here today for evaluation of left 3rd toe ulcer. He is accompanied by an adult female.    The patient underwent a revascularization procedure with Dr. Salgado and a recent consultation with Dr. Salgado, who expressed satisfaction with the patient's progress. He is not diabetic and reports feeling well. He perceives the healing of his left 3rd toe as satisfactory. He has been maintaining cleanliness of the ulcer without applying any topical treatments. His right foot is unaffected. He denies experiencing any burning, tingling, or numbness in his feet.       No Known Allergies    Current Outpatient Medications on File Prior to Visit   Medication Sig Dispense Refill    acetaminophen (TYLENOL) 325 MG tablet Take 2 tablets by mouth Every 6 (Six) Hours As Needed for Mild Pain.      apixaban (ELIQUIS) 5 MG tablet tablet Take 1 tablet by mouth Every 12 (Twelve) Hours. Indications: Other - full anticoagulation, pad/bypass stents 60 tablet 11    aspirin 81 MG EC tablet Take 1 tablet by mouth Daily.      atenolol (TENORMIN) 25 MG tablet Take 0.5 tablets by mouth Daily.      atorvastatin (LIPITOR) 20 MG tablet Take 1 tablet by mouth Daily. 90 tablet 1    celecoxib (CeleBREX) 100 MG capsule Take 1 capsule by mouth 2 (Two) Times a Day. 60 capsule 1    Cholecalciferol (Vitamin D3) 50 MCG (2000 UT) capsule Take 1 capsule by mouth Daily.      cilostazol (PLETAL) 100 MG tablet Take 1 tablet by mouth 2 (Two) Times a Day.      dapagliflozin Propanediol (Farxiga) 10 MG tablet Take 10 mg by mouth Daily. 30 tablet 5     "docusate sodium (COLACE) 100 MG capsule Take 1 capsule by mouth Daily.      donepezil (ARICEPT) 23 MG tablet Take 1 tablet by mouth Every Night.      furosemide (Lasix) 20 MG tablet Take 1 tablet by mouth Daily. 30 tablet 0    melatonin 1 MG tablet Take 1 tablet by mouth Every Night.      memantine (NAMENDA XR) 28 MG capsule sustained-release 24 hr extended release capsule Take 1 capsule by mouth Daily.      Milk of Magnesia 400 MG/5ML suspension Take 5 mL by mouth Daily.      multivitamin with minerals tablet tablet Take 1 tablet by mouth Daily.      Omega-3 Fatty Acids (fish oil) 1000 MG capsule capsule Take 2 capsules by mouth 2 (Two) Times a Day With Meals.      pregabalin (Lyrica) 25 MG capsule Take 1 capsule by mouth 2 (Two) Times a Day. 60 capsule 2    risperiDONE (risperDAL) 0.25 MG tablet Take 1 tablet by mouth Daily.      risperiDONE (risperDAL) 0.5 MG tablet Take 1 tablet by mouth Every Night.      tamsulosin (FLOMAX) 0.4 MG capsule 24 hr capsule Take 1 capsule by mouth Daily.      oxyCODONE (Roxicodone) 5 MG immediate release tablet Take 1 tablet by mouth Every 4 (Four) Hours As Needed for Severe Pain. (Patient not taking: Reported on 5/21/2024) 15 tablet 0     Current Facility-Administered Medications on File Prior to Visit   Medication Dose Route Frequency Provider Last Rate Last Admin    aspirin EC tablet 81 mg  81 mg Oral Daily David Carson MD           Objective   Pulse 60   Resp 20   Ht 170.2 cm (67\")   Wt 78.5 kg (173 lb)   SpO2 96%   BMI 27.10 kg/m²     Foot/Ankle Exam    GENERAL  Appearance:  appears stated age and elderly  Orientation:  AAOx3  Affect:  appropriate  Gait:  unimpaired  Assistance:  independent  Right shoe gear: casual shoe and sock  Left shoe gear: casual shoe and sock    VASCULAR     Right Foot Vascularity   Dorsalis pedis:  1+  Skin temperature:  warm  Edema grading:  None  CFT:  < 3 seconds  Pedal hair growth:  Absent  Varicosities:  none     Left Foot Vascularity "   Dorsalis pedis:  1+  Skin temperature:  warm  Edema grading:  None  CFT:  < 3 seconds  Pedal hair growth:  Absent  Varicosities:  none     NEUROLOGIC     Right Foot Neurologic   Light touch sensation: normal  Protective Sensation using [place]-Kam Monofilament:   Sites intact: 10  Sites tested: 10     Left Foot Neurologic   Light touch sensation: normal  Protective Sensation using [place]-Kam Monofilament:   Sites intact: 7  Sites tested: 10    MUSCULOSKELETAL     Right Foot Musculoskeletal   Ecchymosis:  none  Tenderness:  toenail problem       Left Foot Musculoskeletal   Ecchymosis:  none  Tenderness:  toenail problem    DERMATOLOGIC      Right Foot Dermatologic   Skin  Positive for dryness and skin changes.   Nails  1.  Positive for elongated, abnormal thickness and dystrophic nail.  2.  Positive for elongated, abnormal thickness and dystrophic nail.  3.  Positive for elongated, abnormal thickness and dystrophic nail.  4.  Positive for elongated, abnormal thickness and dystrophic nail.  5.  Positive for elongated, abnormal thickness and dystrophic nail.     Left Foot Dermatologic   Skin  Positive for dryness and skin changes.   Nails  1.  Positive for elongated, abnormal thickness and dystrophic nail.  2.  Positive for elongated, abnormal thickness and dystrophic nail.  3.  Positive for elongated, abnormal thickness and dystrophic nail.  4.  Positive for elongated, abnormally thick and dystrophic nail.  5.  Positive for elongated, abnormally thick and dystrophic nail.     Left foot additional comments: Ulcer left 3rd toe  Physical Exam  There is an ulcer on the left 3rd toe with stable eschar measuring approximately 0.3 x 0.3 x 0.1 cm, adherent, black, brown eschar. It is dry. There are no signs of infection or drainage.  There is decreased sensation in the left foot.       Results       Assessment & Plan   Diagnoses and all orders for this visit:    1. Chronic ulcer of left foot limited to breakdown  of skin (Primary)    2. Peripheral arterial disease    3. Xerosis of skin    4. Unsteadiness on feet    5. Neuropathy    6. Pain in toes of both feet    7. Onychodystrophy      Assessment & Plan  1. Ulceration of the left 3rd toe.  The patient is advised to apply Betadine daily, maintain the area clean and dry, and closely monitor the feet. Today, the nails were debrided.    Nail debridement: Both feet x10    Consent and time out was performed before proceeding with the procedure. Nails were debrided with a nail nipper without complication.  No anesthesia was required.  Indications for procedure were thickened, dystrophic, and fungal appearing nails which are difficult to trim.  Proper self-care and technique was discussed with patient.  Patient was stable after procedure.     Follow-up  The patient is scheduled for a follow-up visit in 6 weeks.       No orders of the defined types were placed in this encounter.         Patient or patient representative verbalized consent for the use of Ambient Listening during the visit with  ELOY Garcia for chart documentation. 5/21/2024  13:40 EDT

## 2024-07-09 ENCOUNTER — OFFICE VISIT (OUTPATIENT)
Dept: PODIATRY | Facility: CLINIC | Age: 74
End: 2024-07-09
Payer: MEDICARE

## 2024-07-09 VITALS
WEIGHT: 173 LBS | OXYGEN SATURATION: 98 % | RESPIRATION RATE: 20 BRPM | HEIGHT: 67 IN | HEART RATE: 66 BPM | BODY MASS INDEX: 27.15 KG/M2

## 2024-07-09 DIAGNOSIS — L85.3 XEROSIS OF SKIN: ICD-10-CM

## 2024-07-09 DIAGNOSIS — M79.674 PAIN IN TOES OF BOTH FEET: ICD-10-CM

## 2024-07-09 DIAGNOSIS — I73.9 PERIPHERAL ARTERIAL DISEASE: Primary | ICD-10-CM

## 2024-07-09 DIAGNOSIS — L60.3 ONYCHODYSTROPHY: ICD-10-CM

## 2024-07-09 DIAGNOSIS — Z87.2 HISTORY OF FOOT ULCER: ICD-10-CM

## 2024-07-09 DIAGNOSIS — R26.81 UNSTEADINESS ON FEET: ICD-10-CM

## 2024-07-09 DIAGNOSIS — G62.9 NEUROPATHY: ICD-10-CM

## 2024-07-09 DIAGNOSIS — M79.675 PAIN IN TOES OF BOTH FEET: ICD-10-CM

## 2024-07-09 PROCEDURE — 99213 OFFICE O/P EST LOW 20 MIN: CPT

## 2024-07-09 PROCEDURE — 1159F MED LIST DOCD IN RCRD: CPT

## 2024-07-09 PROCEDURE — 1160F RVW MEDS BY RX/DR IN RCRD: CPT

## 2024-07-09 PROCEDURE — 11721 DEBRIDE NAIL 6 OR MORE: CPT

## 2024-07-09 NOTE — PROGRESS NOTES
07/09/2024  Foot and Ankle Surgery - Established Patient/Follow-up  Provider: ELOY Thornton   Location: [facility]    Subjective:  Oscar Wray is a 73 y.o. male.     Chief Complaint   Patient presents with    Left Foot - Follow-up, Wound Check     3rd toe wound    Follow-up     GEGE Horowitz last pcp visit 03/20/2024       History of Present Illness  The patient is a 72-year-old male who is here today for reevaluation of toe wound. He is requesting nail debridement.    The patient reports satisfactory healing of his toes, having undergone surgical intervention performed by Dr. Salgado to aid in blood circulation. He denies the presence of diabetes. He expresses a desire for nail trimming today, having previously trimmed his nails during his last visit. He has a scheduled follow-up appointment with Dr. Salgado, however, the exact date is unknown. The accompanying adult female has observed that the patient's legs become tight during prolonged periods of walking.       No Known Allergies    Current Outpatient Medications on File Prior to Visit   Medication Sig Dispense Refill    acetaminophen (TYLENOL) 325 MG tablet Take 2 tablets by mouth Every 6 (Six) Hours As Needed for Mild Pain.      apixaban (ELIQUIS) 5 MG tablet tablet Take 1 tablet by mouth Every 12 (Twelve) Hours. Indications: Other - full anticoagulation, pad/bypass stents 60 tablet 11    aspirin 81 MG EC tablet Take 1 tablet by mouth Daily.      atenolol (TENORMIN) 25 MG tablet Take 0.5 tablets by mouth Daily.      atorvastatin (LIPITOR) 20 MG tablet Take 1 tablet by mouth Daily. 90 tablet 1    celecoxib (CeleBREX) 100 MG capsule Take 1 capsule by mouth 2 (Two) Times a Day. 60 capsule 1    Cholecalciferol (Vitamin D3) 50 MCG (2000 UT) capsule Take 1 capsule by mouth Daily.      cilostazol (PLETAL) 100 MG tablet Take 1 tablet by mouth 2 (Two) Times a Day.      dapagliflozin Propanediol (Farxiga) 10 MG tablet Take 10 mg by mouth Daily. 30 tablet 5  "   docusate sodium (COLACE) 100 MG capsule Take 1 capsule by mouth Daily.      donepezil (ARICEPT) 23 MG tablet Take 1 tablet by mouth Every Night.      furosemide (Lasix) 20 MG tablet Take 1 tablet by mouth Daily. 30 tablet 0    melatonin 1 MG tablet Take 1 tablet by mouth Every Night.      memantine (NAMENDA XR) 28 MG capsule sustained-release 24 hr extended release capsule Take 1 capsule by mouth Daily.      Milk of Magnesia 400 MG/5ML suspension Take 5 mL by mouth Daily.      multivitamin with minerals tablet tablet Take 1 tablet by mouth Daily.      Omega-3 Fatty Acids (fish oil) 1000 MG capsule capsule Take 2 capsules by mouth 2 (Two) Times a Day With Meals.      oxyCODONE (Roxicodone) 5 MG immediate release tablet Take 1 tablet by mouth Every 4 (Four) Hours As Needed for Severe Pain. 15 tablet 0    pregabalin (Lyrica) 25 MG capsule Take 1 capsule by mouth 2 (Two) Times a Day. 60 capsule 2    risperiDONE (risperDAL) 0.25 MG tablet Take 1 tablet by mouth Daily.      risperiDONE (risperDAL) 0.5 MG tablet Take 1 tablet by mouth Every Night.      tamsulosin (FLOMAX) 0.4 MG capsule 24 hr capsule Take 1 capsule by mouth Daily.       Current Facility-Administered Medications on File Prior to Visit   Medication Dose Route Frequency Provider Last Rate Last Admin    aspirin EC tablet 81 mg  81 mg Oral Daily David Carson MD           Objective   Pulse 66   Resp 20   Ht 170.2 cm (67\")   Wt 78.5 kg (173 lb)   SpO2 98%   BMI 27.10 kg/m²     Foot/Ankle Exam  Physical Exam     Appearance:  appears stated age and elderly  Orientation:  AAOx3  Affect:  appropriate  Gait:  unimpaired  Assistance:  independent  Right shoe gear: casual shoe and sock  Left shoe gear: casual shoe and sock     VASCULAR      Right Foot Vascularity   Dorsalis pedis:  1+  Skin temperature:  warm  Edema grading:  None  CFT:  < 3 seconds  Pedal hair growth:  Absent  Varicosities:  none      Left Foot Vascularity   Dorsalis pedis:  1+  Skin " temperature:  warm  Edema grading:  None  CFT:  < 3 seconds  Pedal hair growth:  Absent  Varicosities:  none     NEUROLOGIC      Right Foot Neurologic   Light touch sensation: normal  Protective Sensation using [place]-Kam Monofilament:   Sites intact: 10  Sites tested: 10      Left Foot Neurologic   Light touch sensation: normal  Protective Sensation using [place]-Kam Monofilament:   Sites intact: 7  Sites tested: 10     MUSCULOSKELETAL      Right Foot Musculoskeletal   Ecchymosis:  none  Tenderness:  toenail problem        Left Foot Musculoskeletal   Ecchymosis:  none  Tenderness:  toenail problem     DERMATOLOGIC       Right Foot Dermatologic   Skin  Positive for dryness and skin changes.   Nails  1.  Positive for elongated, abnormal thickness and dystrophic nail.  2.  Positive for elongated, abnormal thickness and dystrophic nail.  3.  Positive for elongated, abnormal thickness and dystrophic nail.  4.  Positive for elongated, abnormal thickness and dystrophic nail.  5.  Positive for elongated, abnormal thickness and dystrophic nail.      Left Foot Dermatologic   Skin  Positive for dryness and skin changes.   Nails  1.  Positive for elongated, abnormal thickness and dystrophic nail.  2.  Positive for elongated, abnormal thickness and dystrophic nail.  3.  Positive for elongated, abnormal thickness and dystrophic nail.  4.  Positive for elongated, abnormally thick and dystrophic nail.  5.  Positive for elongated, abnormally thick and dystrophic rand    Results       Assessment & Plan   Diagnoses and all orders for this visit:    1. Peripheral arterial disease (Primary)    2. Xerosis of skin    3. Unsteadiness on feet    4. Neuropathy    5. Pain in toes of both feet    6. Onychodystrophy    7. History of foot ulcer      Assessment & Plan  1. Routine foot check.  The patient's bilateral feet exhibit stable stability, with no open wounds or signs of active acute infection. The left third toe has healed.  Given the patient's history of arterial disease and neuropathy, nails are also noted. Today, 10 nails were debrided, and the patient tolerated the procedure well without complication.    Nail debridement: Both feet x10    Consent and time out was performed before proceeding with the procedure. Nails were debrided with a nail nipper without complication.  No anesthesia was required.  Indications for procedure were thickened, dystrophic, and fungal appearing nails which are difficult to trim.  Proper self-care and technique was discussed with patient.  Patient was stable after procedure.     Follow-up  A follow-up appointment is scheduled for 10 weeks from now for a routine foot check.       No orders of the defined types were placed in this encounter.         Patient or patient representative verbalized consent for the use of Ambient Listening during the visit with  ELOY Garcia for chart documentation. 7/9/2024  15:51 EDT

## 2024-07-12 DIAGNOSIS — M79.2 NEUROPATHIC PAIN: ICD-10-CM

## 2024-07-12 NOTE — TELEPHONE ENCOUNTER
Caller: [facility]    Relationship:     Best call back number: [phone]     Requested Prescriptions:   Requested Prescriptions     Pending Prescriptions Disp Refills    pregabalin (Lyrica) 25 MG capsule 60 capsule 2     Sig: Take 1 capsule by mouth 2 (Two) Times a Day.        Pharmacy where request should be sent: [place] [facility] [place] [address] [phone] [facility] [phone]      Last office visit with prescribing clinician: 4/12/2024   Last telemedicine visit with prescribing clinician: Visit date not found   Next office visit with prescribing clinician: 10/14/2024     Additional details provided by patient:     Does the patient have less than a 3 day supply:  [] Yes  [] No    Would you like a call back once the refill request has been completed: [] Yes [] No    If the office needs to give you a call back, can they leave a voicemail: [] Yes [] No    David Lam   07/12/24 16:29 EDT

## 2024-07-15 RX ORDER — PREGABALIN 25 MG/1
25 CAPSULE ORAL 2 TIMES DAILY
Qty: 60 CAPSULE | Refills: 2 | Status: SHIPPED | OUTPATIENT
Start: 2024-07-15

## 2024-07-23 ENCOUNTER — OFFICE VISIT (OUTPATIENT)
Dept: FAMILY MEDICINE CLINIC | Facility: CLINIC | Age: 74
End: 2024-07-23
Payer: MEDICARE

## 2024-07-23 ENCOUNTER — LAB (OUTPATIENT)
Dept: FAMILY MEDICINE CLINIC | Facility: CLINIC | Age: 74
End: 2024-07-23
Payer: MEDICARE

## 2024-07-23 VITALS
OXYGEN SATURATION: 97 % | DIASTOLIC BLOOD PRESSURE: 67 MMHG | HEART RATE: 61 BPM | TEMPERATURE: 97.6 F | RESPIRATION RATE: 18 BRPM | BODY MASS INDEX: 27.31 KG/M2 | WEIGHT: 174 LBS | HEIGHT: 67 IN | SYSTOLIC BLOOD PRESSURE: 110 MMHG

## 2024-07-23 DIAGNOSIS — N18.31 STAGE 3A CHRONIC KIDNEY DISEASE: ICD-10-CM

## 2024-07-23 DIAGNOSIS — Z00.00 MEDICARE ANNUAL WELLNESS VISIT, SUBSEQUENT: Primary | ICD-10-CM

## 2024-07-23 DIAGNOSIS — E78.5 DYSLIPIDEMIA: Chronic | ICD-10-CM

## 2024-07-23 DIAGNOSIS — D64.9 ANEMIA, UNSPECIFIED TYPE: ICD-10-CM

## 2024-07-23 DIAGNOSIS — Z11.59 NEED FOR HEPATITIS C SCREENING TEST: ICD-10-CM

## 2024-07-23 LAB
ALBUMIN SERPL-MCNC: 4.1 G/DL (ref 3.5–5.2)
ALBUMIN/GLOB SERPL: 1.3 G/DL
ALP SERPL-CCNC: 111 U/L (ref 39–117)
ALT SERPL W P-5'-P-CCNC: 18 U/L (ref 1–41)
ANION GAP SERPL CALCULATED.3IONS-SCNC: 11.9 MMOL/L (ref 5–15)
ANISOCYTOSIS BLD QL: ABNORMAL
AST SERPL-CCNC: 17 U/L (ref 1–40)
BASOPHILS # BLD MANUAL: 0 10*3/MM3 (ref 0–0.2)
BASOPHILS NFR BLD MANUAL: 0 % (ref 0–1.5)
BILIRUB BLD-MCNC: NEGATIVE MG/DL
BILIRUB SERPL-MCNC: 0.2 MG/DL (ref 0–1.2)
BUN SERPL-MCNC: 17 MG/DL (ref 8–23)
BUN/CREAT SERPL: 11.6 (ref 7–25)
BURR CELLS BLD QL SMEAR: ABNORMAL
CALCIUM SPEC-SCNC: 9.4 MG/DL (ref 8.6–10.5)
CHLORIDE SERPL-SCNC: 105 MMOL/L (ref 98–107)
CHOLEST SERPL-MCNC: 113 MG/DL (ref 0–200)
CLARITY, POC: CLEAR
CO2 SERPL-SCNC: 26.1 MMOL/L (ref 22–29)
COLOR UR: YELLOW
CREAT SERPL-MCNC: 1.46 MG/DL (ref 0.76–1.27)
DACRYOCYTES BLD QL SMEAR: ABNORMAL
DEPRECATED RDW RBC AUTO: 42.2 FL (ref 37–54)
EGFRCR SERPLBLD CKD-EPI 2021: 51.1 ML/MIN/1.73
ELLIPTOCYTES BLD QL SMEAR: ABNORMAL
EOSINOPHIL # BLD MANUAL: 0.27 10*3/MM3 (ref 0–0.4)
EOSINOPHIL NFR BLD MANUAL: 3 % (ref 0.3–6.2)
ERYTHROCYTE [DISTWIDTH] IN BLOOD BY AUTOMATED COUNT: 17.1 % (ref 12.3–15.4)
EXPIRATION DATE: ABNORMAL
FERRITIN SERPL-MCNC: 38.6 NG/ML (ref 30–400)
GLOBULIN UR ELPH-MCNC: 3.2 GM/DL
GLUCOSE SERPL-MCNC: 63 MG/DL (ref 65–99)
GLUCOSE UR STRIP-MCNC: ABNORMAL MG/DL
HCT VFR BLD AUTO: 36.9 % (ref 37.5–51)
HCV AB SER QL: NORMAL
HDLC SERPL-MCNC: 48 MG/DL (ref 40–60)
HGB BLD-MCNC: 10.9 G/DL (ref 13–17.7)
IRON 24H UR-MRATE: 19 MCG/DL (ref 59–158)
KETONES UR QL: NEGATIVE
LDLC SERPL CALC-MCNC: 42 MG/DL (ref 0–100)
LDLC/HDLC SERPL: 0.79 {RATIO}
LEUKOCYTE EST, POC: NEGATIVE
LYMPHOCYTES # BLD MANUAL: 1.1 10*3/MM3 (ref 0.7–3.1)
LYMPHOCYTES NFR BLD MANUAL: 8 % (ref 5–12)
Lab: ABNORMAL
MCH RBC QN AUTO: 20.7 PG (ref 26.6–33)
MCHC RBC AUTO-ENTMCNC: 29.5 G/DL (ref 31.5–35.7)
MCV RBC AUTO: 70 FL (ref 79–97)
MICROCYTES BLD QL: ABNORMAL
MONOCYTES # BLD: 0.73 10*3/MM3 (ref 0.1–0.9)
NEUTROPHILS # BLD AUTO: 7.03 10*3/MM3 (ref 1.7–7)
NEUTROPHILS NFR BLD MANUAL: 77 % (ref 42.7–76)
NITRITE UR-MCNC: NEGATIVE MG/ML
NRBC BLD AUTO-RTO: 0 /100 WBC (ref 0–0.2)
OVALOCYTES BLD QL SMEAR: ABNORMAL
PH UR: 6 [PH] (ref 5–8)
PLAT MORPH BLD: NORMAL
PLATELET # BLD AUTO: 375 10*3/MM3 (ref 140–450)
PMV BLD AUTO: 10.2 FL (ref 6–12)
POIKILOCYTOSIS BLD QL SMEAR: ABNORMAL
POLYCHROMASIA BLD QL SMEAR: ABNORMAL
POTASSIUM SERPL-SCNC: 4.4 MMOL/L (ref 3.5–5.2)
PROT SERPL-MCNC: 7.3 G/DL (ref 6–8.5)
PROT UR STRIP-MCNC: NEGATIVE MG/DL
RBC # BLD AUTO: 5.27 10*6/MM3 (ref 4.14–5.8)
RBC # UR STRIP: NEGATIVE /UL
SODIUM SERPL-SCNC: 143 MMOL/L (ref 136–145)
SP GR UR: 1.01 (ref 1–1.03)
SPHEROCYTES BLD QL SMEAR: ABNORMAL
TRIGL SERPL-MCNC: 135 MG/DL (ref 0–150)
UROBILINOGEN UR QL: NORMAL
VARIANT LYMPHS NFR BLD MANUAL: 12 % (ref 19.6–45.3)
VLDLC SERPL-MCNC: 23 MG/DL (ref 5–40)
WBC MORPH BLD: NORMAL
WBC NRBC COR # BLD AUTO: 9.13 10*3/MM3 (ref 3.4–10.8)

## 2024-07-23 PROCEDURE — 80061 LIPID PANEL: CPT | Performed by: PHYSICIAN ASSISTANT

## 2024-07-23 PROCEDURE — 3074F SYST BP LT 130 MM HG: CPT | Performed by: PHYSICIAN ASSISTANT

## 2024-07-23 PROCEDURE — 81003 URINALYSIS AUTO W/O SCOPE: CPT | Performed by: PHYSICIAN ASSISTANT

## 2024-07-23 PROCEDURE — 1170F FXNL STATUS ASSESSED: CPT | Performed by: PHYSICIAN ASSISTANT

## 2024-07-23 PROCEDURE — 85025 COMPLETE CBC W/AUTO DIFF WBC: CPT | Performed by: PHYSICIAN ASSISTANT

## 2024-07-23 PROCEDURE — 83540 ASSAY OF IRON: CPT | Performed by: PHYSICIAN ASSISTANT

## 2024-07-23 PROCEDURE — 36415 COLL VENOUS BLD VENIPUNCTURE: CPT | Performed by: PHYSICIAN ASSISTANT

## 2024-07-23 PROCEDURE — 82728 ASSAY OF FERRITIN: CPT | Performed by: PHYSICIAN ASSISTANT

## 2024-07-23 PROCEDURE — G0439 PPPS, SUBSEQ VISIT: HCPCS | Performed by: PHYSICIAN ASSISTANT

## 2024-07-23 PROCEDURE — 1126F AMNT PAIN NOTED NONE PRSNT: CPT | Performed by: PHYSICIAN ASSISTANT

## 2024-07-23 PROCEDURE — 80053 COMPREHEN METABOLIC PANEL: CPT | Performed by: PHYSICIAN ASSISTANT

## 2024-07-23 PROCEDURE — 1159F MED LIST DOCD IN RCRD: CPT | Performed by: PHYSICIAN ASSISTANT

## 2024-07-23 PROCEDURE — 3078F DIAST BP <80 MM HG: CPT | Performed by: PHYSICIAN ASSISTANT

## 2024-07-23 PROCEDURE — 85007 BL SMEAR W/DIFF WBC COUNT: CPT | Performed by: PHYSICIAN ASSISTANT

## 2024-07-23 PROCEDURE — 86803 HEPATITIS C AB TEST: CPT | Performed by: PHYSICIAN ASSISTANT

## 2024-07-23 PROCEDURE — 1160F RVW MEDS BY RX/DR IN RCRD: CPT | Performed by: PHYSICIAN ASSISTANT

## 2024-07-23 NOTE — PROGRESS NOTES
Subjective   The ABCs of the Annual Wellness Visit  Medicare Wellness Visit      Oscar Wray is a 73 y.o. patient who presents for a Medicare Wellness Visit.    The following portions of the patient's history were reviewed and   updated as appropriate: allergies, current medications, past family history, past medical history, past social history, past surgical history, and problem list.    Compared to one year ago, the patient's physical   health is the same.  Compared to one year ago, the patient's mental   health is worse.    Recent Hospitalizations:  This patient has had a [facility] admission record on file within the last 365 days.  Current Medical Providers:  Patient Care Team:  Kajal Horowitz PA as PCP - General (Physician Assistant)  David Carson MD as Surgeon (Vascular Surgery)  Nura Salgado II, MD as Surgeon (Vascular Surgery)  Luca King MD as Consulting Physician (Neurology)    Outpatient Medications Prior to Visit   Medication Sig Dispense Refill    acetaminophen (TYLENOL) 325 MG tablet Take 2 tablets by mouth Every 6 (Six) Hours As Needed for Mild Pain.      apixaban (ELIQUIS) 5 MG tablet tablet Take 1 tablet by mouth Every 12 (Twelve) Hours. Indications: Other - full anticoagulation, pad/bypass stents 60 tablet 11    aspirin 81 MG EC tablet Take 1 tablet by mouth Daily.      atenolol (TENORMIN) 25 MG tablet Take 0.5 tablets by mouth Daily.      atorvastatin (LIPITOR) 20 MG tablet Take 1 tablet by mouth Daily. 90 tablet 1    Cholecalciferol (Vitamin D3) 50 MCG (2000 UT) capsule Take 1 capsule by mouth Daily.      cilostazol (PLETAL) 100 MG tablet Take 1 tablet by mouth 2 (Two) Times a Day.      dapagliflozin Propanediol (Farxiga) 10 MG tablet Take 10 mg by mouth Daily. 30 tablet 5    docusate sodium (COLACE) 100 MG capsule Take 1 capsule by mouth Daily.      donepezil (ARICEPT) 23 MG tablet Take 1 tablet by mouth Every Night.      melatonin 1 MG tablet Take 1 tablet by mouth  Every Night.      memantine (NAMENDA XR) 28 MG capsule sustained-release 24 hr extended release capsule Take 1 capsule by mouth Daily.      Milk of Magnesia 400 MG/5ML suspension Take 5 mL by mouth Daily.      multivitamin with minerals tablet tablet Take 1 tablet by mouth Daily.      Omega-3 Fatty Acids (fish oil) 1000 MG capsule capsule Take 2 capsules by mouth 2 (Two) Times a Day With Meals.      pregabalin (Lyrica) 25 MG capsule Take 1 capsule by mouth 2 (Two) Times a Day. 60 capsule 2    risperiDONE (risperDAL) 0.25 MG tablet Take 1 tablet by mouth Daily.      risperiDONE (risperDAL) 0.5 MG tablet Take 1 tablet by mouth Every Night.      tamsulosin (FLOMAX) 0.4 MG capsule 24 hr capsule Take 1 capsule by mouth Daily.      celecoxib (CeleBREX) 100 MG capsule Take 1 capsule by mouth 2 (Two) Times a Day. 60 capsule 1    furosemide (Lasix) 20 MG tablet Take 1 tablet by mouth Daily. 30 tablet 0    oxyCODONE (Roxicodone) 5 MG immediate release tablet Take 1 tablet by mouth Every 4 (Four) Hours As Needed for Severe Pain. (Patient not taking: Reported on 7/23/2024) 15 tablet 0     Facility-Administered Medications Prior to Visit   Medication Dose Route Frequency Provider Last Rate Last Admin    aspirin EC tablet 81 mg  81 mg Oral Daily David Carson MD         Opioid medication/s are on active medication list.  and I have evaluated his active treatment plan and pain score trends (see table).  Vitals:    07/23/24 1259   PainSc: 0-No pain     I have reviewed the chart for potential of high risk medication and harmful drug interactions in the elderly.        Aspirin is on active medication list. Aspirin use is indicated based on review of current medical condition/s. Pros and cons of this therapy have been discussed today. Benefits of this medication outweigh potential harm.  Patient has been encouraged to continue taking this medication.  .      Patient Active Problem List   Diagnosis    Back pain    Wedge compression  "fracture of second lumbar vertebra, subsequent encounter for fracture with routine healing    Moderate early onset Alzheimer's dementia without behavioral disturbance, psychotic disturbance, mood disturbance, or anxiety    Claudication    Stage 3a chronic kidney disease    Degenerative disc disease, lumbar    Essential hypertension    Dyslipidemia    BPH with obstruction/lower urinary tract symptoms    Scrotal edema    History of cigarette smoking     Advance Care Planning (Click this link to access ACP Navigator)  Advance Directive is not on file.  ACP discussion was held with the patient during this visit. Patient does not have an advance directive, information provided.        Objective   Vitals:    24 1259   BP: 110/67   BP Location: Left arm   Patient Position: Sitting   Cuff Size: Adult   Pulse: 61   Resp: 18   Temp: 97.6 °F (36.4 °C)   TempSrc: Temporal   SpO2: 97%   Weight: 78.9 kg (174 lb)   Height: 170.2 cm (67.01\")   PainSc: 0-No pain       Estimated body mass index is 27.25 kg/m² as calculated from the following:    Height as of this encounter: 170.2 cm (67.01\").    Weight as of this encounter: 78.9 kg (174 lb).    BMI is >= 25 and <30. (Overweight) The following options were offered after discussion;: exercise counseling/recommendations and nutrition counseling/recommendations        Does the patient have evidence of cognitive impairment? Yes                                                                                               Health  Risk Assessment    Smoking Status:  Social History     Tobacco Use   Smoking Status Former    Types: Cigarettes    Passive exposure: Past   Smokeless Tobacco Never     Alcohol Consumption:  Social History     Substance and Sexual Activity   Alcohol Use Not Currently     Fall Risk Screen:  STEADI Fall Risk Assessment was completed, and patient is at LOW risk for falls.Assessment completed on:2024    Depression Screenin/12/2024     1:46 PM "   PHQ-2/PHQ-9 Depression Screening   Little Interest or Pleasure in Doing Things 0-->not at all   Feeling Down, Depressed or Hopeless 0-->not at all   PHQ-9: Brief Depression Severity Measure Score 0     Health Habits and Functional and Cognitive Screenin/23/2024     1:03 PM   Functional & Cognitive Status   Do you have difficulty preparing food and eating? Yes   Do you have difficulty bathing yourself, getting dressed or grooming yourself? No   Do you have difficulty using the toilet? No   Do you have difficulty moving around from place to place? No   Do you have trouble with steps or getting out of a bed or a chair? No   Current Diet Well Balanced Diet   Dental Exam Up to date   Eye Exam Not up to date        Eye Exam Comment Exam next month   Exercise (times per week) 0 times per week   Current Exercises Include No Regular Exercise   Do you need help using the phone?  No   Are you deaf or do you have serious difficulty hearing?  No   Do you need help to go to places out of walking distance? No   Do you need help shopping? Yes   Do you need help preparing meals?  Yes   Do you need help with housework?  Yes   Do you need help with laundry? Yes   Do you need help taking your medications? Yes   Do you need help managing money? Yes   Do you ever drive or ride in a car without wearing a seat belt? No   Have you felt unusual stress, anger or loneliness in the last month? No   Who do you live with? Community   If you need help, do you have trouble finding someone available to you? No   Have you been bothered in the last four weeks by sexual problems? No   Do you have difficulty concentrating, remembering or making decisions? No             Age-appropriate Screening Schedule:  Refer to the list below for future screening recommendations based on patient's age, sex and/or medical conditions. Orders for these recommended tests are listed in the plan section. The patient has been provided with a written  plan.    Health Maintenance List  Health Maintenance   Topic Date Due    TDAP/TD VACCINES (1 - Tdap) Never done    HEPATITIS C SCREENING  Never done    ZOSTER VACCINE (2 of 2) 02/02/2024    COVID-19 Vaccine (6 - 2023-24 season) 03/02/2024    LIPID PANEL  03/10/2024    ANNUAL WELLNESS VISIT  04/07/2024    BMI FOLLOWUP  04/07/2024    INFLUENZA VACCINE  08/01/2024    COLORECTAL CANCER SCREENING  01/20/2027    Pneumococcal Vaccine 65+  Completed    AAA SCREEN (ONE-TIME)  Completed                                                                                                                                                CMS Preventative Services Quick Reference  Risk Factors Identified During Encounter  Immunizations Discussed/Encouraged: Tdap, Shingrix, and COVID19  Inactivity/Sedentary: Patient was advised to exercise at least 150 minutes a week per CDC recommendations.    The above risks/problems have been discussed with the patient.  Pertinent information has been shared with the patient in the After Visit Summary.  An After Visit Summary and PPPS were made available to the patient.    Follow Up:   Next Medicare Wellness visit to be scheduled in 1 year.     Assessment & Plan  Medicare annual wellness visit, subsequent  Work on staying as active as possible with regular exercise at least 150 minutes per week. Work on healthy diet.   Need for hepatitis C screening test  Will check labs.   Stage 3a chronic kidney disease  Will recheck labs. Also having urinary frequency but no pain. Will check ua today.   Dyslipidemia  Will recheck labs. Continue statin.   Anemia, unspecified type  Will recheck labs.  Likely anemia of chronic disease.    Orders Placed This Encounter   Procedures    Lipid Panel    Hepatitis C Antibody    Comprehensive metabolic panel    Iron level    Ferritin    CBC Auto Differential    POCT urinalysis dipstick, automated    CBC w AUTO Differential             Follow Up:   Return in about 6 months  (around 1/23/2025), or if symptoms worsen or fail to improve, for Recheck.

## 2024-07-26 ENCOUNTER — TELEPHONE (OUTPATIENT)
Dept: FAMILY MEDICINE CLINIC | Facility: CLINIC | Age: 74
End: 2024-07-26
Payer: MEDICARE

## 2024-07-26 DIAGNOSIS — R79.9 ABNORMAL BLOOD SMEAR: ICD-10-CM

## 2024-07-26 DIAGNOSIS — D64.9 ANEMIA, UNSPECIFIED TYPE: Primary | ICD-10-CM

## 2024-07-26 NOTE — TELEPHONE ENCOUNTER
Caller: GINA COLLAZO    Relationship: Emergency Contact    Best call back number: [phone]    Caller requesting test results: PATIENT'S SIGNIFICANT OTHER    What test was performed: BLOOD WORK    When was the test performed: 07/23/24    Where was the test performed: OFFICE     Additional notes: REQUESTED CALLBACK WITH LAB RESULTS

## 2024-07-26 NOTE — TELEPHONE ENCOUNTER
Please let pt's significant other know that his kidney function is stable bu his anemia is a little worse.  I would like him to see a hematologist for further workup.  I have put in referral and they should contact to schedule.

## 2024-07-30 ENCOUNTER — TELEPHONE (OUTPATIENT)
Dept: ONCOLOGY | Facility: CLINIC | Age: 74
End: 2024-07-30
Payer: MEDICARE

## 2024-08-19 NOTE — PROGRESS NOTES
HEMATOLOGY ONCOLOGY OUTPATIENT CONSULTATION       Patient name: Oscar Wray  : 1950  MRN: [id number]  Primary Care Physician: Kajal Horowitz PA  Referring Physician: Kajal Horowitz PA  Reason For Consult:       History of Present Illness:  Patient is a 73 y.o. male who has been referred to us for anemia. Pertinent recent outside labs are reported as follows:    24:  CBC: 9.13/10.9/36.9/375 (MCV 70.0, MCH 20.7, RDW 17.1)  CMP: BUN/Creat: 17/1.46 (gfr 51.1)  Iron levels: 19, Ferritin 38.6    Peripheral smear reported consistent with features of ANDREI including anisocytosis, poikilocytosis, microcytosis, polychromasia etc. Mild spherocytosis (1+) noted. No schistocytes reported.      Patient has underlying history of Alzheimer's disease, CKD and Peripheral vascular disease. He is on anticoagulation with Eliquis as well as antiplatelet therapy with Aspirin.         Subjective:  Patient presents for initial consultation today. Accompanied by his Wife.  He appears to be pleasantly confused and denied any acute complaints presently. Unable to provide any significant details about medical history, most of history obtained form Chart and his wife.     Patient does not appear to be on  any nutritional supplements at present.         Past Medical History:   Diagnosis Date    Atherosclerosis of native arteries of extremities with intermittent claudication, bilateral legs 2023    Atherosclerosis of native arteries of right leg with ulceration of ankle 2024    BPH with obstruction/lower urinary tract symptoms 2024    Chronic kidney disease, stage 3 unspecified     Dementia     Dyslipidemia 2024    Essential hypertension 2024    History of cigarette smoking 2024    Hyperlipidemia, unspecified     Other specified postprocedural states 2024    Peripheral arterial occlusive disease 2024       Past Surgical History:   Procedure Laterality Date     ANGIOPLASTY ILIAC ARTERY Bilateral 12/29/2023    Procedure: BILATERAL FEMORAL ENDARTECTOMY AND ILIAC ARTERY STENTS, LEFT LEG ANGIOGRAM;  Surgeon: Nura Salgado II, MD;  [facility];  Service: Vascular;  Laterality: Bilateral;    ARTERIOGRAM Bilateral 4/10/2024    Procedure: ARTERIOGRAM LOWER EXTREMITY WITH ANGIOPLASTY via left arm access;  Surgeon: Nura Salgado II, MD;  [facility];  Service: Vascular;  Laterality: Bilateral;    FEMORAL TIBIAL BYPASS Left 1/4/2024    Procedure: FEMORAL TIBIAL BYPASS;  Surgeon: Nura Salgado II, MD;  [facility];  Service: Vascular;  Laterality: Left;         Current Outpatient Medications:     acetaminophen (TYLENOL) 325 MG tablet, Take 2 tablets by mouth Every 6 (Six) Hours As Needed for Mild Pain., Disp: , Rfl:     apixaban (ELIQUIS) 5 MG tablet tablet, Take 1 tablet by mouth Every 12 (Twelve) Hours. Indications: Other - full anticoagulation, pad/bypass stents, Disp: 60 tablet, Rfl: 11    aspirin 81 MG EC tablet, Take 1 tablet by mouth Daily., Disp: , Rfl:     atenolol (TENORMIN) 25 MG tablet, Take 0.5 tablets by mouth Daily., Disp: , Rfl:     atorvastatin (LIPITOR) 20 MG tablet, Take 1 tablet by mouth Daily., Disp: 90 tablet, Rfl: 1    Cholecalciferol (Vitamin D3) 50 MCG (2000 UT) capsule, Take 1 capsule by mouth Daily., Disp: , Rfl:     cilostazol (PLETAL) 100 MG tablet, Take 1 tablet by mouth 2 (Two) Times a Day., Disp: , Rfl:     dapagliflozin Propanediol (Farxiga) 10 MG tablet, Take 10 mg by mouth Daily., Disp: 30 tablet, Rfl: 5    docusate sodium (COLACE) 100 MG capsule, Take 1 capsule by mouth Daily., Disp: , Rfl:     donepezil (ARICEPT) 23 MG tablet, Take 1 tablet by mouth Every Night., Disp: , Rfl:     melatonin 1 MG tablet, Take 1 tablet by mouth Every Night., Disp: , Rfl:     memantine (NAMENDA XR) 28 MG capsule sustained-release 24 hr extended release capsule, Take 1 capsule by mouth Daily., Disp: , Rfl:     Milk of  Magnesia 400 MG/5ML suspension, Take 5 mL by mouth Daily., Disp: , Rfl:     multivitamin with minerals tablet tablet, Take 1 tablet by mouth Daily., Disp: , Rfl:     Omega-3 Fatty Acids (fish oil) 1000 MG capsule capsule, Take 2 capsules by mouth 2 (Two) Times a Day With Meals., Disp: , Rfl:     oxyCODONE (Roxicodone) 5 MG immediate release tablet, Take 1 tablet by mouth Every 4 (Four) Hours As Needed for Severe Pain. (Patient not taking: Reported on 7/23/2024), Disp: 15 tablet, Rfl: 0    pregabalin (Lyrica) 25 MG capsule, Take 1 capsule by mouth 2 (Two) Times a Day., Disp: 60 capsule, Rfl: 2    risperiDONE (risperDAL) 0.25 MG tablet, Take 1 tablet by mouth Daily., Disp: , Rfl:     risperiDONE (risperDAL) 0.5 MG tablet, Take 1 tablet by mouth Every Night., Disp: , Rfl:     tamsulosin (FLOMAX) 0.4 MG capsule 24 hr capsule, Take 1 capsule by mouth Daily., Disp: , Rfl:     Current Facility-Administered Medications:     aspirin EC tablet 81 mg, 81 mg, Oral, Daily, David Carson MD    No Known Allergies    Family History   Family history unknown: Yes       Family history is unknown by patient.      Social History     Tobacco Use    Smoking status: Former     Types: Cigarettes     Passive exposure: Past    Smokeless tobacco: Never   Vaping Use    Vaping status: Never Used   Substance Use Topics    Alcohol use: Not Currently    Drug use: Never     Social History     Social History Narrative    Not on file       ROS:   Review of Systems   Constitutional: Negative.    HENT: Negative.     Eyes: Negative.    Respiratory: Negative.     Cardiovascular: Negative.    Gastrointestinal: Negative.    Endocrine: Negative.    Genitourinary: Negative.    Musculoskeletal: Negative.    Skin: Negative.    Allergic/Immunologic: Negative.    Neurological: Negative.    Hematological: Negative.    Psychiatric/Behavioral:  Positive for confusion.          Objective:    Vital Signs:  Vitals:    08/20/24 1253   BP: 109/60   Pulse: 59   SpO2:  "96%   Weight: 79.5 kg (175 lb 3.2 oz)   Height: 170.2 cm (67\")   PainSc: 0-No pain     Body mass index is 27.44 kg/m².    ECOG  (2) Ambulatory and capable of self care, unable to carry out work activity, up and about > 50% or waking hours    Physical Exam:   Physical Exam  Constitutional:       Appearance: Normal appearance. He is normal weight.   HENT:      Head: Normocephalic and atraumatic.      Right Ear: External ear normal.      Left Ear: External ear normal.      Nose: Nose normal.      Mouth/Throat:      Mouth: Mucous membranes are moist.      Pharynx: Oropharynx is clear.   Eyes:      Extraocular Movements: Extraocular movements intact.      Conjunctiva/sclera: Conjunctivae normal.      Pupils: Pupils are equal, round, and reactive to light.   Cardiovascular:      Rate and Rhythm: Normal rate.      Pulses: Normal pulses.   Pulmonary:      Effort: Pulmonary effort is normal.   Abdominal:      General: Abdomen is flat.      Palpations: Abdomen is soft.   Musculoskeletal:         General: Normal range of motion.      Cervical back: Normal range of motion and neck supple.   Skin:     General: Skin is warm.   Neurological:      Mental Status: He is alert.   Psychiatric:         Mood and Affect: Mood normal.         Behavior: Behavior normal.         Thought Content: Thought content normal.         Judgment: Judgment normal.         Lab Results - Last 18 Months   Lab Units 08/20/24  1339 07/23/24  1350 04/30/24  1601   WBC 10*3/mm3 10.00 9.13 9.58   HEMOGLOBIN g/dL 11.5* 10.9* 11.7*   HEMATOCRIT % 38.2 36.9* 40.1   PLATELETS 10*3/mm3 344 375 342   MCV fL 71.1* 70.0* 73.6*     Lab Results - Last 18 Months   Lab Units 08/20/24  1339 07/23/24  1350 04/30/24  1601   SODIUM mmol/L 144 143 141   POTASSIUM mmol/L 4.5 4.4 4.5   CHLORIDE mmol/L 108* 105 106   CO2 mmol/L 25.3 26.1 22.5   BUN mg/dL 21 17 16   CREATININE mg/dL 1.80* 1.46* 1.40*   CALCIUM mg/dL 9.4 9.4 9.7   BILIRUBIN mg/dL 0.3 0.2 0.5   ALK PHOS U/L 117 111 " "112   ALT (SGPT) U/L 13 18 11   AST (SGOT) U/L 20 17 20   GLUCOSE mg/dL 63* 63* 78       Lab Results   Component Value Date    GLUCOSE 63 (L) 07/23/2024    BUN 17 07/23/2024    CREATININE 1.46 (H) 07/23/2024    BCR 11.6 07/23/2024    K 4.4 07/23/2024    CO2 26.1 07/23/2024    CALCIUM 9.4 07/23/2024    PROTENTOTREF 7.4 04/30/2024    ALBUMIN 4.1 07/23/2024    LABIL2 1.1 04/30/2024    AST 17 07/23/2024    ALT 18 07/23/2024       Lab Results - Last 18 Months   Lab Units 04/10/24  0730 04/05/24  1321 01/06/24  2254 01/05/24  1047 01/05/24  0353   INR  1.06 1.14*  --   --  1.06   APTT seconds 29.0 32.7* >139.0*   < > 47.1*    < > = values in this interval not displayed.       Lab Results   Component Value Date    IRON 23 (L) 08/20/2024    TIBC 495 08/20/2024    FERRITIN 11.60 (L) 08/20/2024       Lab Results   Component Value Date    FOLATE 7.38 08/20/2024       No results found for: \"OCCULTBLD\"    No results found for: \"RETICCTPCT\"  Lab Results   Component Value Date    KEGCUONT38 262 04/30/2024     Lab Results   Component Value Date    SPEP Comment 04/30/2024     LDH   Date Value Ref Range Status   08/20/2024 184 135 - 225 U/L Final     Lab Results   Component Value Date    ANDREA Negative 04/30/2024    SEDRATE 35 (H) 04/30/2024     No results found for: \"FIBRINOGEN\", \"HAPTOGLOBIN\"  Lab Results   Component Value Date    PTT 29.0 04/10/2024    INR 1.06 04/10/2024     No results found for: \"\"  No results found for: \"CEA\"  No components found for: \"CA-19-9\"  Lab Results   Component Value Date    PSA 0.392 10/12/2023     Lab Results   Component Value Date    SEDRATE 35 (H) 04/30/2024          Assessment & Plan     Microcytic anemia:  Iron Deficiency anemia:  -Outside labs are suggestive of Iron deficiency anemia  -Repeat CBC today showed mild anemia with Hb/hct 11.5/38.5, Low MCV.   -iron panel showed low TSAT 5% and low ferritin 11.6 consistent with iron deficiency anemia. Elevated Soluble Transferrin receptor " levels.  -other labs including hemolysis labs, plasma cell workup, B12, Folate levels, Retic counts, reported WNL  -CMP is consistent with Stage II/III CKD which may also be a contributing factor for anemia.  -started patient on Oral iron supplementation. Will monitor response and tolerance, if any issues, to consider IV iron supplementation  -given ongoing Antiplatelet therapy and anticoagulation there is concern about possible GI blood loss. Will discuss about GI referral for EGD/Colonoscopy on follow up.      Follow up in  3 months with repeat labs, sooner as needed.    Thank you very much for providing the opportunity to participate in this patient’s care. Please do not hesitate to call if there are any other questions.

## 2024-08-20 ENCOUNTER — CONSULT (OUTPATIENT)
Dept: ONCOLOGY | Facility: CLINIC | Age: 74
End: 2024-08-20
Payer: MEDICARE

## 2024-08-20 ENCOUNTER — LAB (OUTPATIENT)
Dept: LAB | Facility: HOSPITAL | Age: 74
End: 2024-08-20
Payer: MEDICARE

## 2024-08-20 VITALS
HEIGHT: 67 IN | DIASTOLIC BLOOD PRESSURE: 60 MMHG | HEART RATE: 59 BPM | OXYGEN SATURATION: 96 % | WEIGHT: 175.2 LBS | BODY MASS INDEX: 27.5 KG/M2 | SYSTOLIC BLOOD PRESSURE: 109 MMHG

## 2024-08-20 DIAGNOSIS — F02.B0 MODERATE EARLY ONSET ALZHEIMER'S DEMENTIA WITHOUT BEHAVIORAL DISTURBANCE, PSYCHOTIC DISTURBANCE, MOOD DISTURBANCE, OR ANXIETY: ICD-10-CM

## 2024-08-20 DIAGNOSIS — D64.9 ANEMIA, UNSPECIFIED TYPE: ICD-10-CM

## 2024-08-20 DIAGNOSIS — G30.0 MODERATE EARLY ONSET ALZHEIMER'S DEMENTIA WITHOUT BEHAVIORAL DISTURBANCE, PSYCHOTIC DISTURBANCE, MOOD DISTURBANCE, OR ANXIETY: ICD-10-CM

## 2024-08-20 DIAGNOSIS — D50.0 IRON DEFICIENCY ANEMIA DUE TO CHRONIC BLOOD LOSS: ICD-10-CM

## 2024-08-20 DIAGNOSIS — D64.9 ANEMIA, UNSPECIFIED TYPE: Primary | ICD-10-CM

## 2024-08-20 DIAGNOSIS — I73.9 PERIPHERAL VASCULAR DISEASE: ICD-10-CM

## 2024-08-20 LAB
ALBUMIN SERPL-MCNC: 4.5 G/DL (ref 3.5–5.2)
ALBUMIN/GLOB SERPL: 1.5 G/DL
ALP SERPL-CCNC: 117 U/L (ref 39–117)
ALT SERPL W P-5'-P-CCNC: 13 U/L (ref 1–41)
ANION GAP SERPL CALCULATED.3IONS-SCNC: 10.7 MMOL/L (ref 5–15)
AST SERPL-CCNC: 20 U/L (ref 1–40)
BASOPHILS # BLD AUTO: 0.08 10*3/MM3 (ref 0–0.2)
BASOPHILS NFR BLD AUTO: 0.8 % (ref 0–1.5)
BILIRUB SERPL-MCNC: 0.3 MG/DL (ref 0–1.2)
BUN SERPL-MCNC: 21 MG/DL (ref 8–23)
BUN/CREAT SERPL: 11.7 (ref 7–25)
CALCIUM SPEC-SCNC: 9.4 MG/DL (ref 8.6–10.5)
CHLORIDE SERPL-SCNC: 108 MMOL/L (ref 98–107)
CO2 SERPL-SCNC: 25.3 MMOL/L (ref 22–29)
CREAT SERPL-MCNC: 1.8 MG/DL (ref 0.76–1.27)
DEPRECATED RDW RBC AUTO: 50.3 FL (ref 37–54)
EGFRCR SERPLBLD CKD-EPI 2021: 39.7 ML/MIN/1.73
EOSINOPHIL # BLD AUTO: 0.2 10*3/MM3 (ref 0–0.4)
EOSINOPHIL NFR BLD AUTO: 2 % (ref 0.3–6.2)
ERYTHROCYTE [DISTWIDTH] IN BLOOD BY AUTOMATED COUNT: 20.3 % (ref 12.3–15.4)
FERRITIN SERPL-MCNC: 11.6 NG/ML (ref 30–400)
FOLATE SERPL-MCNC: 7.38 NG/ML (ref 4.78–24.2)
GLOBULIN UR ELPH-MCNC: 3 GM/DL
GLUCOSE SERPL-MCNC: 63 MG/DL (ref 65–99)
HAPTOGLOB SERPL-MCNC: 235 MG/DL (ref 30–200)
HCT VFR BLD AUTO: 38.2 % (ref 37.5–51)
HGB BLD-MCNC: 11.5 G/DL (ref 13–17.7)
IRON 24H UR-MRATE: 23 MCG/DL (ref 59–158)
IRON SATN MFR SERPL: 5 % (ref 20–50)
LDH SERPL-CCNC: 184 U/L (ref 135–225)
LYMPHOCYTES # BLD AUTO: 1.41 10*3/MM3 (ref 0.7–3.1)
LYMPHOCYTES NFR BLD AUTO: 14.1 % (ref 19.6–45.3)
MCH RBC QN AUTO: 21.4 PG (ref 26.6–33)
MCHC RBC AUTO-ENTMCNC: 30.1 G/DL (ref 31.5–35.7)
MCV RBC AUTO: 71.1 FL (ref 79–97)
MONOCYTES # BLD AUTO: 0.82 10*3/MM3 (ref 0.1–0.9)
MONOCYTES NFR BLD AUTO: 8.2 % (ref 5–12)
NEUTROPHILS NFR BLD AUTO: 7.49 10*3/MM3 (ref 1.7–7)
NEUTROPHILS NFR BLD AUTO: 74.9 % (ref 42.7–76)
PLATELET # BLD AUTO: 344 10*3/MM3 (ref 140–450)
PMV BLD AUTO: 10.1 FL (ref 6–12)
POTASSIUM SERPL-SCNC: 4.5 MMOL/L (ref 3.5–5.2)
PROT SERPL-MCNC: 7.5 G/DL (ref 6–8.5)
RBC # BLD AUTO: 5.37 10*6/MM3 (ref 4.14–5.8)
RETICS # AUTO: 0.09 10*6/MM3 (ref 0.02–0.13)
RETICS/RBC NFR AUTO: 1.61 % (ref 0.7–1.9)
SODIUM SERPL-SCNC: 144 MMOL/L (ref 136–145)
TIBC SERPL-MCNC: 495 MCG/DL (ref 298–536)
TRANSFERRIN SERPL-MCNC: 332 MG/DL (ref 200–360)
VIT B12 BLD-MCNC: 518 PG/ML (ref 211–946)
WBC NRBC COR # BLD AUTO: 10 10*3/MM3 (ref 3.4–10.8)

## 2024-08-20 PROCEDURE — 85025 COMPLETE CBC W/AUTO DIFF WBC: CPT

## 2024-08-20 PROCEDURE — 36415 COLL VENOUS BLD VENIPUNCTURE: CPT

## 2024-08-20 PROCEDURE — 85045 AUTOMATED RETICULOCYTE COUNT: CPT | Performed by: STUDENT IN AN ORGANIZED HEALTH CARE EDUCATION/TRAINING PROGRAM

## 2024-08-20 PROCEDURE — 82746 ASSAY OF FOLIC ACID SERUM: CPT | Performed by: STUDENT IN AN ORGANIZED HEALTH CARE EDUCATION/TRAINING PROGRAM

## 2024-08-20 PROCEDURE — 80053 COMPREHEN METABOLIC PANEL: CPT | Performed by: STUDENT IN AN ORGANIZED HEALTH CARE EDUCATION/TRAINING PROGRAM

## 2024-08-20 PROCEDURE — 83540 ASSAY OF IRON: CPT | Performed by: STUDENT IN AN ORGANIZED HEALTH CARE EDUCATION/TRAINING PROGRAM

## 2024-08-20 PROCEDURE — 83615 LACTATE (LD) (LDH) ENZYME: CPT | Performed by: STUDENT IN AN ORGANIZED HEALTH CARE EDUCATION/TRAINING PROGRAM

## 2024-08-20 PROCEDURE — 84466 ASSAY OF TRANSFERRIN: CPT | Performed by: STUDENT IN AN ORGANIZED HEALTH CARE EDUCATION/TRAINING PROGRAM

## 2024-08-20 PROCEDURE — 83010 ASSAY OF HAPTOGLOBIN QUANT: CPT | Performed by: STUDENT IN AN ORGANIZED HEALTH CARE EDUCATION/TRAINING PROGRAM

## 2024-08-20 PROCEDURE — 82728 ASSAY OF FERRITIN: CPT | Performed by: STUDENT IN AN ORGANIZED HEALTH CARE EDUCATION/TRAINING PROGRAM

## 2024-08-20 PROCEDURE — 82607 VITAMIN B-12: CPT | Performed by: STUDENT IN AN ORGANIZED HEALTH CARE EDUCATION/TRAINING PROGRAM

## 2024-08-20 RX ORDER — CELECOXIB 100 MG/1
CAPSULE ORAL
COMMUNITY
Start: 2024-08-11

## 2024-08-20 RX ORDER — FERROUS SULFATE 325(65) MG
325 TABLET ORAL
Qty: 90 TABLET | Refills: 1 | Status: SHIPPED | OUTPATIENT
Start: 2024-08-20

## 2024-08-20 RX ORDER — FUROSEMIDE 20 MG/1
TABLET ORAL
COMMUNITY
Start: 2024-08-12

## 2024-08-22 LAB — STFR SERPL-SCNC: 38.6 NMOL/L (ref 12.2–27.3)

## 2024-09-17 ENCOUNTER — OFFICE VISIT (OUTPATIENT)
Dept: PODIATRY | Facility: CLINIC | Age: 74
End: 2024-09-17
Payer: MEDICARE

## 2024-09-17 VITALS
WEIGHT: 175 LBS | RESPIRATION RATE: 20 BRPM | HEIGHT: 67 IN | HEART RATE: 54 BPM | BODY MASS INDEX: 27.47 KG/M2 | OXYGEN SATURATION: 96 %

## 2024-09-17 DIAGNOSIS — I73.9 PERIPHERAL ARTERIAL DISEASE: ICD-10-CM

## 2024-09-17 DIAGNOSIS — Z87.2 HISTORY OF FOOT ULCER: ICD-10-CM

## 2024-09-17 DIAGNOSIS — L60.3 ONYCHODYSTROPHY: Primary | ICD-10-CM

## 2024-09-17 DIAGNOSIS — R26.81 UNSTEADINESS ON FEET: ICD-10-CM

## 2024-09-17 DIAGNOSIS — M79.674 PAIN IN TOES OF BOTH FEET: ICD-10-CM

## 2024-09-17 DIAGNOSIS — L85.3 XEROSIS OF SKIN: ICD-10-CM

## 2024-09-17 DIAGNOSIS — G62.9 NEUROPATHY: ICD-10-CM

## 2024-09-17 DIAGNOSIS — M79.675 PAIN IN TOES OF BOTH FEET: ICD-10-CM

## 2024-10-02 DIAGNOSIS — M79.2 NEUROPATHIC PAIN: ICD-10-CM

## 2024-10-02 RX ORDER — PREGABALIN 25 MG/1
25 CAPSULE ORAL 2 TIMES DAILY
Qty: 60 CAPSULE | Refills: 2 | Status: SHIPPED | OUTPATIENT
Start: 2024-10-02

## 2024-10-14 ENCOUNTER — OFFICE VISIT (OUTPATIENT)
Dept: FAMILY MEDICINE CLINIC | Facility: CLINIC | Age: 74
End: 2024-10-14
Payer: MEDICARE

## 2024-10-14 ENCOUNTER — LAB (OUTPATIENT)
Dept: FAMILY MEDICINE CLINIC | Facility: CLINIC | Age: 74
End: 2024-10-14
Payer: MEDICARE

## 2024-10-14 VITALS
OXYGEN SATURATION: 98 % | HEIGHT: 67 IN | TEMPERATURE: 97.7 F | DIASTOLIC BLOOD PRESSURE: 64 MMHG | HEART RATE: 54 BPM | RESPIRATION RATE: 18 BRPM | BODY MASS INDEX: 28.12 KG/M2 | SYSTOLIC BLOOD PRESSURE: 114 MMHG | WEIGHT: 179.2 LBS

## 2024-10-14 DIAGNOSIS — I73.9 PAD (PERIPHERAL ARTERY DISEASE): ICD-10-CM

## 2024-10-14 DIAGNOSIS — G89.29 CHRONIC BILATERAL LOW BACK PAIN WITHOUT SCIATICA: Primary | ICD-10-CM

## 2024-10-14 DIAGNOSIS — R79.89 ELEVATED SERUM CREATININE: ICD-10-CM

## 2024-10-14 DIAGNOSIS — Z12.5 PROSTATE CANCER SCREENING: ICD-10-CM

## 2024-10-14 DIAGNOSIS — M54.50 CHRONIC BILATERAL LOW BACK PAIN WITHOUT SCIATICA: Primary | ICD-10-CM

## 2024-10-14 LAB
ANION GAP SERPL CALCULATED.3IONS-SCNC: 12.3 MMOL/L (ref 5–15)
BUN SERPL-MCNC: 9 MG/DL (ref 8–23)
BUN/CREAT SERPL: 5.9 (ref 7–25)
CALCIUM SPEC-SCNC: 9.3 MG/DL (ref 8.6–10.5)
CHLORIDE SERPL-SCNC: 105 MMOL/L (ref 98–107)
CO2 SERPL-SCNC: 25.7 MMOL/L (ref 22–29)
CREAT SERPL-MCNC: 1.53 MG/DL (ref 0.76–1.27)
EGFRCR SERPLBLD CKD-EPI 2021: 48 ML/MIN/1.73
GLUCOSE SERPL-MCNC: 70 MG/DL (ref 65–99)
POTASSIUM SERPL-SCNC: 4 MMOL/L (ref 3.5–5.2)
PSA SERPL-MCNC: 0.36 NG/ML (ref 0–4)
SODIUM SERPL-SCNC: 143 MMOL/L (ref 136–145)

## 2024-10-14 PROCEDURE — 1126F AMNT PAIN NOTED NONE PRSNT: CPT | Performed by: PHYSICIAN ASSISTANT

## 2024-10-14 PROCEDURE — 80048 BASIC METABOLIC PNL TOTAL CA: CPT | Performed by: PHYSICIAN ASSISTANT

## 2024-10-14 PROCEDURE — 1159F MED LIST DOCD IN RCRD: CPT | Performed by: PHYSICIAN ASSISTANT

## 2024-10-14 PROCEDURE — G2211 COMPLEX E/M VISIT ADD ON: HCPCS | Performed by: PHYSICIAN ASSISTANT

## 2024-10-14 PROCEDURE — 1160F RVW MEDS BY RX/DR IN RCRD: CPT | Performed by: PHYSICIAN ASSISTANT

## 2024-10-14 PROCEDURE — G0103 PSA SCREENING: HCPCS | Performed by: PHYSICIAN ASSISTANT

## 2024-10-14 PROCEDURE — 3078F DIAST BP <80 MM HG: CPT | Performed by: PHYSICIAN ASSISTANT

## 2024-10-14 PROCEDURE — 3074F SYST BP LT 130 MM HG: CPT | Performed by: PHYSICIAN ASSISTANT

## 2024-10-14 PROCEDURE — 99214 OFFICE O/P EST MOD 30 MIN: CPT | Performed by: PHYSICIAN ASSISTANT

## 2024-10-14 PROCEDURE — 36415 COLL VENOUS BLD VENIPUNCTURE: CPT

## 2024-10-14 RX ORDER — TRAMADOL HYDROCHLORIDE 50 MG/1
50 TABLET ORAL EVERY 12 HOURS PRN
Qty: 60 TABLET | Refills: 2 | Status: SHIPPED | OUTPATIENT
Start: 2024-10-14

## 2024-10-14 NOTE — PROGRESS NOTES
Arina GIBBS Carrier is a 74 y.o. male.     History of Present Illness  Pt presents for his 6 month follow up.  He is having calf pain bilaterally that occurs when he walks more than 2 blocks.  He is having more scans done soon to check on his PVD.  He also complains of back pain in upper lumbar area of back.  His last labs also showed his kidney function was decreased more than typical.         Past Medical History:   Diagnosis Date    Atherosclerosis of native arteries of extremities with intermittent claudication, bilateral legs 03/08/2023    Atherosclerosis of native arteries of right leg with ulceration of ankle 01/26/2024    BPH with obstruction/lower urinary tract symptoms 01/08/2024    Chronic kidney disease, stage 3 unspecified     Dementia     Dyslipidemia 01/08/2024    Essential hypertension 01/08/2024    History of cigarette smoking 01/08/2024    Hyperlipidemia, unspecified     Other specified postprocedural states 01/26/2024    Peripheral arterial occlusive disease 01/08/2024     Past Surgical History:   Procedure Laterality Date    ANGIOPLASTY ILIAC ARTERY Bilateral 12/29/2023    Procedure: BILATERAL FEMORAL ENDARTECTOMY AND ILIAC ARTERY STENTS, LEFT LEG ANGIOGRAM;  Surgeon: Nura Salgado II, MD;  Location: [facility] OR;  Service: Vascular;  Laterality: Bilateral;    ARTERIOGRAM Bilateral 4/10/2024    Procedure: ARTERIOGRAM LOWER EXTREMITY WITH ANGIOPLASTY via left arm access;  Surgeon: Nura Salgado II, MD;  Location: [facility] HYBRID OR;  Service: Vascular;  Laterality: Bilateral;    FEMORAL TIBIAL BYPASS Left 1/4/2024    Procedure: FEMORAL TIBIAL BYPASS;  Surgeon: Nura Salgado II, MD;  Location: [facility] MAIN OR;  Service: Vascular;  Laterality: Left;     Family History   Family history unknown: Yes     Social History     Socioeconomic History    Marital status: Single   Tobacco Use    Smoking status: Former     Types: Cigarettes     Passive exposure: Past    Smokeless tobacco: Never    Vaping Use    Vaping status: Never Used   Substance and Sexual Activity    Alcohol use: Not Currently    Drug use: Never    Sexual activity: Defer         Current Outpatient Medications:     acetaminophen (TYLENOL) 325 MG tablet, Take 2 tablets by mouth Every 6 (Six) Hours As Needed for Mild Pain., Disp: , Rfl:     apixaban (ELIQUIS) 5 MG tablet tablet, Take 1 tablet by mouth Every 12 (Twelve) Hours. Indications: Other - full anticoagulation, pad/bypass stents, Disp: 60 tablet, Rfl: 11    aspirin 81 MG EC tablet, Take 1 tablet by mouth Daily., Disp: , Rfl:     atenolol (TENORMIN) 25 MG tablet, Take 0.5 tablets by mouth Daily., Disp: , Rfl:     atorvastatin (LIPITOR) 20 MG tablet, Take 1 tablet by mouth Daily., Disp: 90 tablet, Rfl: 1    Cholecalciferol (Vitamin D3) 50 MCG (2000 UT) capsule, Take 1 capsule by mouth Daily., Disp: , Rfl:     cilostazol (PLETAL) 100 MG tablet, Take 1 tablet by mouth 2 (Two) Times a Day., Disp: , Rfl:     dapagliflozin Propanediol (Farxiga) 10 MG tablet, Take 10 mg by mouth Daily., Disp: 30 tablet, Rfl: 5    docusate sodium (COLACE) 100 MG capsule, Take 1 capsule by mouth Daily., Disp: , Rfl:     donepezil (ARICEPT) 23 MG tablet, Take 1 tablet by mouth Every Night., Disp: , Rfl:     ferrous sulfate 325 (65 FE) MG tablet, Take 1 tablet by mouth Daily With Breakfast., Disp: 90 tablet, Rfl: 1    furosemide (LASIX) 20 MG tablet, , Disp: , Rfl:     melatonin 1 MG tablet, Take 1 tablet by mouth Every Night., Disp: , Rfl:     memantine (NAMENDA XR) 28 MG capsule sustained-release 24 hr extended release capsule, Take 1 capsule by mouth Daily., Disp: , Rfl:     Milk of Magnesia 400 MG/5ML suspension, Take 5 mL by mouth Daily., Disp: , Rfl:     multivitamin with minerals tablet tablet, Take 1 tablet by mouth Daily., Disp: , Rfl:     Omega-3 Fatty Acids (fish oil) 1000 MG capsule capsule, Take 2 capsules by mouth 2 (Two) Times a Day With Meals., Disp: , Rfl:     risperiDONE (risperDAL) 0.25 MG  "tablet, Take 1 tablet by mouth Daily., Disp: , Rfl:     risperiDONE (risperDAL) 0.5 MG tablet, Take 1 tablet by mouth Every Night., Disp: , Rfl:     tamsulosin (FLOMAX) 0.4 MG capsule 24 hr capsule, Take 1 capsule by mouth Daily., Disp: , Rfl:     traMADol (ULTRAM) 50 MG tablet, Take 1 tablet by mouth Every 12 (Twelve) Hours As Needed for Moderate Pain., Disp: 60 tablet, Rfl: 2    Current Facility-Administered Medications:     aspirin EC tablet 81 mg, 81 mg, Oral, Daily, David Carson MD    Review of Systems   Constitutional:  Negative for activity change, appetite change, chills, diaphoresis, fatigue, fever, unexpected weight gain and unexpected weight loss.   HENT:  Negative for trouble swallowing.    Respiratory:  Negative for chest tightness, shortness of breath and wheezing.    Cardiovascular:  Negative for chest pain, palpitations and leg swelling.   Gastrointestinal:  Negative for abdominal pain, nausea and vomiting.   Genitourinary:  Negative for difficulty urinating and dysuria.   Musculoskeletal:  Positive for back pain and myalgias. Negative for gait problem.   Neurological:  Positive for memory problem. Negative for dizziness, light-headedness and headache.   Psychiatric/Behavioral:  Negative for sleep disturbance.      /64 (BP Location: Left arm, Patient Position: Sitting, Cuff Size: Adult)   Pulse 54   Temp 97.7 °F (36.5 °C) (Temporal)   Resp 18   Ht 170.2 cm (67.01\")   Wt 81.3 kg (179 lb 3.2 oz)   SpO2 98%   BMI 28.06 kg/m²       Objective   Physical Exam  Vitals and nursing note reviewed.   Constitutional:       Appearance: Normal appearance.   HENT:      Head: Normocephalic and atraumatic.   Neck:      Thyroid: No thyroid mass, thyromegaly or thyroid tenderness.      Vascular: No carotid bruit.   Cardiovascular:      Rate and Rhythm: Normal rate and regular rhythm.      Heart sounds: Normal heart sounds.   Pulmonary:      Effort: Pulmonary effort is normal.      Breath sounds: " Normal breath sounds.   Musculoskeletal:         General: No swelling or tenderness. Normal range of motion.      Cervical back: Normal range of motion and neck supple.      Lumbar back: Negative right straight leg raise test and negative left straight leg raise test.      Right lower leg: No edema.      Left lower leg: No edema.   Skin:     General: Skin is warm and dry.   Neurological:      General: No focal deficit present.      Mental Status: He is alert and oriented to person, place, and time.   Psychiatric:         Mood and Affect: Mood normal.         Behavior: Behavior normal.         Thought Content: Thought content normal.         Procedures     Assessment    Diagnoses and all orders for this visit:    1. Chronic bilateral low back pain without sciatica (Primary)  Comments:  Lyrica not helping so will discontinue.  Add tramadol as needed twice daily for pain.  Let me know if not helping.  Orders:  -     traMADol (ULTRAM) 50 MG tablet; Take 1 tablet by mouth Every 12 (Twelve) Hours As Needed for Moderate Pain.  Dispense: 60 tablet; Refill: 2    2. Prostate cancer screening  Comments:  Will recheck labs.  Orders:  -     PSA SCREENING; Future    3. Elevated serum creatinine  Comments:  Will recheck.  Stop Celebrex.  Orders:  -     Basic metabolic panel; Future    4. PAD (peripheral artery disease)  Comments:  Continue to follow up with vascular.                I spent 38 minutes caring for Oscar on this date of service. This time includes time spent by me in the following activities: preparing for the visit, reviewing tests, performing a medically appropriate examination and/or evaluation, counseling and educating the patient/family/caregiver, documenting information in the medical record, ordering medications, and ordering test(s).

## 2024-11-14 ENCOUNTER — LAB (OUTPATIENT)
Dept: FAMILY MEDICINE CLINIC | Facility: CLINIC | Age: 74
End: 2024-11-14
Payer: MEDICARE

## 2024-11-14 ENCOUNTER — OFFICE VISIT (OUTPATIENT)
Dept: FAMILY MEDICINE CLINIC | Facility: CLINIC | Age: 74
End: 2024-11-14
Payer: MEDICARE

## 2024-11-14 ENCOUNTER — TELEPHONE (OUTPATIENT)
Dept: FAMILY MEDICINE CLINIC | Facility: CLINIC | Age: 74
End: 2024-11-14

## 2024-11-14 VITALS
OXYGEN SATURATION: 97 % | BODY MASS INDEX: 27 KG/M2 | RESPIRATION RATE: 20 BRPM | TEMPERATURE: 98.7 F | HEART RATE: 86 BPM | HEIGHT: 67 IN | WEIGHT: 172 LBS

## 2024-11-14 DIAGNOSIS — G30.0 MODERATE EARLY ONSET ALZHEIMER'S DEMENTIA WITHOUT BEHAVIORAL DISTURBANCE, PSYCHOTIC DISTURBANCE, MOOD DISTURBANCE, OR ANXIETY: ICD-10-CM

## 2024-11-14 DIAGNOSIS — M51.362 DEGENERATION OF INTERVERTEBRAL DISC OF LUMBAR REGION WITH DISCOGENIC BACK PAIN AND LOWER EXTREMITY PAIN: ICD-10-CM

## 2024-11-14 DIAGNOSIS — G89.29 CHRONIC BILATERAL LOW BACK PAIN WITHOUT SCIATICA: ICD-10-CM

## 2024-11-14 DIAGNOSIS — E78.00 HYPERCHOLESTEROLEMIA: ICD-10-CM

## 2024-11-14 DIAGNOSIS — N18.31 STAGE 3A CHRONIC KIDNEY DISEASE: ICD-10-CM

## 2024-11-14 DIAGNOSIS — M54.50 CHRONIC BILATERAL LOW BACK PAIN WITHOUT SCIATICA: ICD-10-CM

## 2024-11-14 DIAGNOSIS — R79.89 ELEVATED SERUM CREATININE: Primary | ICD-10-CM

## 2024-11-14 DIAGNOSIS — F02.B0 MODERATE EARLY ONSET ALZHEIMER'S DEMENTIA WITHOUT BEHAVIORAL DISTURBANCE, PSYCHOTIC DISTURBANCE, MOOD DISTURBANCE, OR ANXIETY: ICD-10-CM

## 2024-11-14 LAB
ANION GAP SERPL CALCULATED.3IONS-SCNC: 12.1 MMOL/L (ref 5–15)
BUN SERPL-MCNC: 9 MG/DL (ref 8–23)
BUN/CREAT SERPL: 5.9 (ref 7–25)
CALCIUM SPEC-SCNC: 9.3 MG/DL (ref 8.6–10.5)
CHLORIDE SERPL-SCNC: 102 MMOL/L (ref 98–107)
CO2 SERPL-SCNC: 24.9 MMOL/L (ref 22–29)
CREAT SERPL-MCNC: 1.53 MG/DL (ref 0.76–1.27)
EGFRCR SERPLBLD CKD-EPI 2021: 48 ML/MIN/1.73
GLUCOSE SERPL-MCNC: 66 MG/DL (ref 65–99)
POTASSIUM SERPL-SCNC: 3.5 MMOL/L (ref 3.5–5.2)
SODIUM SERPL-SCNC: 139 MMOL/L (ref 136–145)

## 2024-11-14 PROCEDURE — 80048 BASIC METABOLIC PNL TOTAL CA: CPT | Performed by: PHYSICIAN ASSISTANT

## 2024-11-14 PROCEDURE — 36415 COLL VENOUS BLD VENIPUNCTURE: CPT | Performed by: PHYSICIAN ASSISTANT

## 2024-11-14 RX ORDER — ATORVASTATIN CALCIUM 20 MG/1
20 TABLET, FILM COATED ORAL DAILY
Qty: 30 TABLET | Refills: 2 | Status: SHIPPED | OUTPATIENT
Start: 2024-11-14

## 2024-11-14 RX ORDER — DAPAGLIFLOZIN 10 MG/1
1 TABLET, FILM COATED ORAL DAILY
Qty: 30 TABLET | Refills: 2 | Status: SHIPPED | OUTPATIENT
Start: 2024-11-14

## 2024-11-14 RX ORDER — TRAMADOL HYDROCHLORIDE 50 MG/1
50 TABLET ORAL EVERY 12 HOURS PRN
Qty: 60 TABLET | Refills: 2 | Status: SHIPPED | OUTPATIENT
Start: 2024-11-14

## 2024-11-14 NOTE — TELEPHONE ENCOUNTER
Can you call [facility] and make sure the staff is watching him take his morning meds when giving them since he sometimes forgets and meds are still there when his significant other gets there in evenings?

## 2024-11-14 NOTE — PROGRESS NOTES
Arina Wray is a 74 y.o. male.     History of Present Illness  Pt presents to follow up on his back pain and leg pain.  He is taking the tramadol now and seems to be doing well with it.  He hasn't been complaining of pain unless he is walking a lot and then his legs might hurt.  Denies any side effects with medications.  Lab Results       Component                Value               Date                       GLUCOSE                  70                  10/14/2024                 CALCIUM                  9.3                 10/14/2024                 NA                       143                 10/14/2024                 K                        4.0                 10/14/2024                 CO2                      25.7                10/14/2024                 CL                       105                 10/14/2024                 BUN                      9                   10/14/2024                 CREATININE               1.53 (H)            10/14/2024                 EGFR                     48.0 (L)            10/14/2024                 BCR                      5.9 (L)             10/14/2024                 ANIONGAP                 12.3                10/14/2024            His lyrica and celebrex were discontinued at last visit.         Past Medical History:   Diagnosis Date    Atherosclerosis of native arteries of extremities with intermittent claudication, bilateral legs 03/08/2023    Atherosclerosis of native arteries of right leg with ulceration of ankle 01/26/2024    BPH with obstruction/lower urinary tract symptoms 01/08/2024    Chronic kidney disease, stage 3 unspecified     Dementia     Dyslipidemia 01/08/2024    Essential hypertension 01/08/2024    History of cigarette smoking 01/08/2024    Hyperlipidemia, unspecified     Other specified postprocedural states 01/26/2024    Peripheral arterial occlusive disease 01/08/2024     Past Surgical History:   Procedure Laterality Date     ANGIOPLASTY ILIAC ARTERY Bilateral 12/29/2023    Procedure: BILATERAL FEMORAL ENDARTECTOMY AND ILIAC ARTERY STENTS, LEFT LEG ANGIOGRAM;  Surgeon: Nura Salgado II, MD;  Location: [facility] HYBRID OR;  Service: Vascular;  Laterality: Bilateral;    ARTERIOGRAM Bilateral 4/10/2024    Procedure: ARTERIOGRAM LOWER EXTREMITY WITH ANGIOPLASTY via left arm access;  Surgeon: Nura Salgado II, MD;  Location: [facility] HYBRID OR;  Service: Vascular;  Laterality: Bilateral;    FEMORAL TIBIAL BYPASS Left 1/4/2024    Procedure: FEMORAL TIBIAL BYPASS;  Surgeon: Nura Salgado II, MD;  Location: [facility] MAIN OR;  Service: Vascular;  Laterality: Left;     Family History   Family history unknown: Yes     Social History     Socioeconomic History    Marital status: Single   Tobacco Use    Smoking status: Former     Types: Cigarettes     Passive exposure: Past    Smokeless tobacco: Never   Vaping Use    Vaping status: Never Used   Substance and Sexual Activity    Alcohol use: Not Currently    Drug use: Never    Sexual activity: Defer         Current Outpatient Medications:     acetaminophen (TYLENOL) 325 MG tablet, Take 2 tablets by mouth Every 6 (Six) Hours As Needed for Mild Pain., Disp: , Rfl:     apixaban (ELIQUIS) 5 MG tablet tablet, Take 1 tablet by mouth Every 12 (Twelve) Hours. Indications: Other - full anticoagulation, pad/bypass stents, Disp: 60 tablet, Rfl: 11    aspirin 81 MG EC tablet, Take 1 tablet by mouth Daily., Disp: , Rfl:     atenolol (TENORMIN) 25 MG tablet, Take 0.5 tablets by mouth Daily., Disp: , Rfl:     atorvastatin (LIPITOR) 20 MG tablet, Take 1 tablet by mouth Daily., Disp: 30 tablet, Rfl: 2    Cholecalciferol (Vitamin D3) 50 MCG (2000 UT) capsule, Take 1 capsule by mouth Daily., Disp: , Rfl:     cilostazol (PLETAL) 100 MG tablet, Take 1 tablet by mouth 2 (Two) Times a Day., Disp: , Rfl:     dapagliflozin Propanediol (Farxiga) 10 MG tablet, Take 10 mg by mouth Daily., Disp: 30 tablet, Rfl: 2    docusate sodium  (COLACE) 100 MG capsule, Take 1 capsule by mouth Daily., Disp: , Rfl:     donepezil (ARICEPT) 23 MG tablet, Take 1 tablet by mouth Every Night., Disp: , Rfl:     ferrous sulfate 325 (65 FE) MG tablet, Take 1 tablet by mouth Daily With Breakfast., Disp: 90 tablet, Rfl: 1    furosemide (LASIX) 20 MG tablet, , Disp: , Rfl:     melatonin 1 MG tablet, Take 1 tablet by mouth Every Night., Disp: , Rfl:     memantine (NAMENDA XR) 28 MG capsule sustained-release 24 hr extended release capsule, Take 1 capsule by mouth Daily., Disp: , Rfl:     Milk of Magnesia 400 MG/5ML suspension, Take 5 mL by mouth Daily., Disp: , Rfl:     multivitamin with minerals tablet tablet, Take 1 tablet by mouth Daily., Disp: , Rfl:     Omega-3 Fatty Acids (fish oil) 1000 MG capsule capsule, Take 2 capsules by mouth 2 (Two) Times a Day With Meals., Disp: , Rfl:     risperiDONE (risperDAL) 0.25 MG tablet, Take 1 tablet by mouth Daily., Disp: , Rfl:     risperiDONE (risperDAL) 0.5 MG tablet, Take 1 tablet by mouth Every Night., Disp: , Rfl:     tamsulosin (FLOMAX) 0.4 MG capsule 24 hr capsule, Take 1 capsule by mouth Daily., Disp: , Rfl:     traMADol (ULTRAM) 50 MG tablet, Take 1 tablet by mouth Every 12 (Twelve) Hours As Needed for Moderate Pain., Disp: 60 tablet, Rfl: 2    Current Facility-Administered Medications:     aspirin EC tablet 81 mg, 81 mg, Oral, Daily, David Carson MD    Review of Systems   Constitutional:  Negative for activity change, appetite change, chills, diaphoresis, fatigue, fever, unexpected weight gain and unexpected weight loss.   HENT:  Negative for trouble swallowing.    Cardiovascular:  Negative for chest pain, palpitations and leg swelling.   Gastrointestinal:  Negative for abdominal pain, constipation, diarrhea, nausea and vomiting.   Musculoskeletal:  Positive for back pain. Negative for gait problem.   Neurological:  Positive for memory problem. Negative for weakness.   Psychiatric/Behavioral:  Positive for sleep  "disturbance.      Pulse 86   Temp 98.7 °F (37.1 °C) (Temporal)   Resp 20   Ht 170.2 cm (67.01\")   Wt 78 kg (172 lb)   SpO2 97%   BMI 26.93 kg/m²       Objective   Physical Exam  Vitals and nursing note reviewed.   Constitutional:       Appearance: Normal appearance.   HENT:      Head: Normocephalic and atraumatic.   Neck:      Thyroid: No thyroid mass, thyromegaly or thyroid tenderness.      Vascular: No carotid bruit.   Cardiovascular:      Rate and Rhythm: Normal rate and regular rhythm.      Heart sounds: Normal heart sounds.   Pulmonary:      Effort: Pulmonary effort is normal.      Breath sounds: Normal breath sounds.   Musculoskeletal:         General: Normal range of motion.      Cervical back: Normal range of motion and neck supple.      Right lower leg: No edema.      Left lower leg: No edema.   Skin:     General: Skin is warm and dry.   Neurological:      General: No focal deficit present.      Mental Status: He is alert and oriented to person, place, and time.   Psychiatric:         Mood and Affect: Mood normal.         Behavior: Behavior normal.         Thought Content: Thought content normal.       Procedures     Assessment    Diagnoses and all orders for this visit:    1. Elevated serum creatinine (Primary)  Comments:  will recheck today.  Orders:  -     Basic metabolic panel    2. Moderate early onset Alzheimer's dementia without behavioral disturbance, psychotic disturbance, mood disturbance, or anxiety  Comments:  There is some concern about whether he is  actually taking all of his medications.  Will contact [facility] to make sure they witness him taking meds when giving.    3. Degeneration of intervertebral disc of lumbar region with discogenic back pain and lower extremity pain  Comments:  Improved with tramadol twice daily without adverse effects.    4. Stage 3a chronic kidney disease  -     dapagliflozin Propanediol (Farxiga) 10 MG tablet; Take 10 mg by mouth Daily.  Dispense: 30 tablet; " Refill: 2    5. Hypercholesterolemia  -     atorvastatin (LIPITOR) 20 MG tablet; Take 1 tablet by mouth Daily.  Dispense: 30 tablet; Refill: 2    6. Chronic bilateral low back pain without sciatica  Comments:  Lyrica not helping so will discontinue.  Add tramadol as needed twice daily for pain.  Let me know if not helping.  Orders:  -     traMADol (ULTRAM) 50 MG tablet; Take 1 tablet by mouth Every 12 (Twelve) Hours As Needed for Moderate Pain.  Dispense: 60 tablet; Refill: 2

## 2024-11-19 ENCOUNTER — OFFICE VISIT (OUTPATIENT)
Age: 74
End: 2024-11-19
Payer: MEDICARE

## 2024-11-19 ENCOUNTER — HOSPITAL ENCOUNTER (OUTPATIENT)
Dept: CARDIOLOGY | Facility: HOSPITAL | Age: 74
Discharge: HOME | End: 2024-11-19
Payer: MEDICARE

## 2024-11-19 VITALS
DIASTOLIC BLOOD PRESSURE: 59 MMHG | HEIGHT: 67 IN | BODY MASS INDEX: 27.09 KG/M2 | WEIGHT: 172.6 LBS | SYSTOLIC BLOOD PRESSURE: 123 MMHG | HEART RATE: 61 BPM

## 2024-11-19 DIAGNOSIS — Z98.890 PERIPHERAL ARTERIAL DISEASE WITH HISTORY OF REVASCULARIZATION: ICD-10-CM

## 2024-11-19 DIAGNOSIS — I73.9 PERIPHERAL ARTERIAL DISEASE WITH HISTORY OF REVASCULARIZATION: ICD-10-CM

## 2024-11-19 DIAGNOSIS — Z98.890 PERIPHERAL ARTERIAL DISEASE WITH HISTORY OF REVASCULARIZATION: Primary | ICD-10-CM

## 2024-11-19 DIAGNOSIS — I73.9 PERIPHERAL ARTERIAL DISEASE WITH HISTORY OF REVASCULARIZATION: Primary | ICD-10-CM

## 2024-11-19 DIAGNOSIS — I70.0 ATHEROSCLEROSIS OF AORTA: ICD-10-CM

## 2024-11-19 LAB
ABDOMINAL DIST AORTA AP: 1.7 CM
ABDOMINAL DIST AORTA TRANS: 1.7 CM
ABDOMINAL DIST AORTA VEL: 68.2 CM/S
BH CV BAS DIALYSIS STENT TWO DIST STENT PSV: 76.9 CM/SEC
BH CV GRAFT 1 - DISTAL ANASTAMOSIS PSV-LEFT: 106 CM/S
BH CV GRAFT 1 - DISTAL GRAFT PSV-LEFT: 48.3 CM/S
BH CV GRAFT 1 - INFLOW ARTERY PSV- LEFT: 153 CM/S
BH CV GRAFT 1 - MID DISTAL GRAFT PSV-LEFT: 53.2 CM/S
BH CV GRAFT 1 - MID GRAFT PSV-LEFT: 72.2 CM/S
BH CV GRAFT 1 - OUTFLOW ARTERY PSV-LEFT: 124 CM/S
BH CV GRAFT 1 - PROX GRAFT PSV-LEFT: 72.9 CM/S
BH CV GRAFT 1 - PROX MID GRAFT PSV-LEFT: 71.5 CM/S
BH CV GRAFT 1 - PROXIMAL ANASTAMOSIS PSV-LEFT: 154 CM/S
BH CV GRAFT 1- DISTAL ANASTAMOSIS RATIO-LEFT: 2.19
BH CV LEA LEFT ANT TIBIAL A MID PSV: 0 CM/S
BH CV LEA LEFT CFA DISTAL PSV: 153 CM/S
BH CV LEA LEFT DFA PROX PSV: 107 CM/S
BH CV LEA LEFT PTA DISTAL PSV: 124 CM/S
BH CV LOWER ARTERIAL LEFT ABI RATIO: 1.16
BH CV LOWER ARTERIAL LEFT ABI RATIO: 1.16
BH CV LOWER ARTERIAL LEFT DORSALIS PEDIS SYS MAX: 123
BH CV LOWER ARTERIAL LEFT GREAT TOE SYS MAX: 65
BH CV LOWER ARTERIAL LEFT HIGHEST VELOCITY RATIO: 2.19
BH CV LOWER ARTERIAL LEFT POST TIBIAL SYS MAX: 130
BH CV LOWER ARTERIAL LEFT TBI RATIO: 0.58
BH CV LOWER ARTERIAL LEFT VELOCITY RATIO LOCATION: NORMAL
BH CV LOWER ARTERIAL RIGHT ABI RATIO: 0.82
BH CV LOWER ARTERIAL RIGHT ABI RATIO: 0.82
BH CV LOWER ARTERIAL RIGHT DORSALIS PEDIS SYS MAX: 64
BH CV LOWER ARTERIAL RIGHT GREAT TOE SYS MAX: 43
BH CV LOWER ARTERIAL RIGHT POST TIBIAL SYS MAX: 92
BH CV LOWER ARTERIAL RIGHT TBI RATIO: 0.38
BH CV VAS DIALYSIS STENT ONE  DIST STENT PSV: 77 CM/SEC
BH CV VAS DIALYSIS STENT ONE MID STENT PSV: 133 CM/SEC
BH CV VAS DIALYSIS STENT ONE POST STENT PSV: 216 CM/SEC
BH CV VAS DIALYSIS STENT ONE PRE STENT PSV: 68.2 CM/SEC
BH CV VAS DIALYSIS STENT ONE PROX STENT PSV: 79.1 CM/SEC
BH CV VAS DIALYSIS STENT TWO MID STENT PSV: 121 CM/SEC
BH CV VAS DIALYSIS STENT TWO POST STENT PSV: 205 CM/SEC
BH CV VAS DIALYSIS STENT TWO PRE STENT PSV: 68.2 CM/SEC
BH CV VAS DIALYSIS STENT TWO PROX STENT PSV: 161 CM/SEC
BH CV VAS FREE TEXT STENT ONE: NORMAL
BH CV VAS FREE TEXT STENT TWO: NORMAL
BH CV VAS LEA LEFT HIDDEN GRAFT ALERT: 33
UPPER ARTERIAL LEFT ARM BRACHIAL SYS MAX: 112
UPPER ARTERIAL RIGHT ARM BRACHIAL SYS MAX: 111

## 2024-11-19 PROCEDURE — 99214 OFFICE O/P EST MOD 30 MIN: CPT | Performed by: STUDENT IN AN ORGANIZED HEALTH CARE EDUCATION/TRAINING PROGRAM

## 2024-11-19 PROCEDURE — G2211 COMPLEX E/M VISIT ADD ON: HCPCS | Performed by: STUDENT IN AN ORGANIZED HEALTH CARE EDUCATION/TRAINING PROGRAM

## 2024-11-19 PROCEDURE — 93926 LOWER EXTREMITY STUDY: CPT | Performed by: STUDENT IN AN ORGANIZED HEALTH CARE EDUCATION/TRAINING PROGRAM

## 2024-11-19 PROCEDURE — 1160F RVW MEDS BY RX/DR IN RCRD: CPT | Performed by: STUDENT IN AN ORGANIZED HEALTH CARE EDUCATION/TRAINING PROGRAM

## 2024-11-19 PROCEDURE — 93978 VASCULAR STUDY: CPT

## 2024-11-19 PROCEDURE — 93978 VASCULAR STUDY: CPT | Performed by: STUDENT IN AN ORGANIZED HEALTH CARE EDUCATION/TRAINING PROGRAM

## 2024-11-19 PROCEDURE — 1159F MED LIST DOCD IN RCRD: CPT | Performed by: STUDENT IN AN ORGANIZED HEALTH CARE EDUCATION/TRAINING PROGRAM

## 2024-11-19 PROCEDURE — 3074F SYST BP LT 130 MM HG: CPT | Performed by: STUDENT IN AN ORGANIZED HEALTH CARE EDUCATION/TRAINING PROGRAM

## 2024-11-19 PROCEDURE — 93922 UPR/L XTREMITY ART 2 LEVELS: CPT

## 2024-11-19 PROCEDURE — 3078F DIAST BP <80 MM HG: CPT | Performed by: STUDENT IN AN ORGANIZED HEALTH CARE EDUCATION/TRAINING PROGRAM

## 2024-11-19 PROCEDURE — 93926 LOWER EXTREMITY STUDY: CPT

## 2024-11-19 PROCEDURE — 93922 UPR/L XTREMITY ART 2 LEVELS: CPT | Performed by: STUDENT IN AN ORGANIZED HEALTH CARE EDUCATION/TRAINING PROGRAM

## 2024-11-19 NOTE — PROGRESS NOTES
"Chief Complaint  abdominal aortic aneurysm     Follow-up for peripheral arterial disease status post complex revascularization.    Subjective        Oscar Wray presents to [facility] VASCULAR SURGERY  History of Present Illness    Patient is a pleasant 73-year-old gentleman with a history of peripheral arterial disease causing rest pain and gangrenous changes on his toes on the left who underwent bilateral femoral endarterectomies with bilateral iliac stents on 12/29/23.  Patient still had rest pain in his left foot after this surgery so he subsequently underwent left femoral to posterior tibial artery bypass with in situ greater saphenous vein on 1/4/24.  Patient recovered well from this procedure but surveillance imaging showed high-grade stenosis at the proximal aspect of his iliac stents CTA was less impressive but was concerning for stenosis near the origin of his anastomosis. decision was made to move forward with arteriogram.  Angioplasty was performed of the proximal.  Iliac stents as well as the proximal bypass.  There was no significant waist on the balloon in either of these areas.    Patient returns today for 6-month follow-up with repeat noninvasive studies.    He reports he has been doing well overall.  He is continuing walking exercise at 14 can make a lap around the mall before he has significant claudication.  All the wounds on his feet have healed.  His incision is healed up nicely.  He has no acute complaints today.      Objective   Vital Signs:  /59 (BP Location: Left arm)   Pulse 61   Ht 170.2 cm (67.01\")   Wt 78.3 kg (172 lb 9.6 oz)   BMI 27.03 kg/m²   Estimated body mass index is 27.03 kg/m² as calculated from the following:    Height as of this encounter: 170.2 cm (67.01\").    Weight as of this encounter: 78.3 kg (172 lb 9.6 oz).             Physical Exam     NAD  Respirations unlabored  Palpable pulse in left leg bypass graft and left PT pulse  No wounds " sores or ulcerations on either of his lower extremities.    Result Review :                     Assessment and Plan       74-year-old gentleman with history of severe peripheral arterial disease requiring complex revascularization with bilateral femoral endarterectomies with retrograde iliac stents extending up to the level of the MASOOD, left femoral to posterior tibial artery bypass with in situ greater saphenous vein. Patient had significant improvement in his ABIs after revascularization, and his wounds have healed.   There was some concern for stenosis at his iliac stents as well as proximal bypass based on CTA and duplex ultrasound.  Arteriogram in April demonstrated less significant stenoses in these areas, but the patient did improve clinically after angioplasty.      He has stable mild claudication symptoms.  ABIs are normal on the left, mildly reduced on the right, with no tissue loss. Has mild to moderate elevation of velocities at distal stents bilaterally concerning for possible stenosis. This area looked excellent on angiography earlier this year and is located within the common femoral artery. He has no reduction in ABIs, and they actually are improved from the last time they were checked. I dont think repeating any angiography or more invasive testing is warranted at this time.     Will plan to repeat aortoiliac duplex, left leg arterial duplex, and ABIs in 6 months.  Patient encouraged to continue his Eliquis and aspirin and atorvastatin.    Patient encouraged to continue walking exercise.    Patient, and his significant other Liane verbalized understanding and agreement with this plan      Diagnoses and all orders for this visit:    1. Peripheral arterial disease with history of revascularization (Primary)  Assessment & Plan:  Bilateral iliac stents, bilateral femoral endarterectomy, left femoral to PT bypass with insitu greater saphenous vein in December-January of last year. ([id number]).         Orders:  -     Doppler Ankle Brachial Index Single Level CAR; Future  -     Duplex Aorta IVC Iliac Graft Limited CAR; Future  -     Duplex Lower Extremity Art / Grafts - Left CAR; Future    2. Atherosclerosis of aorta  -     Duplex Aorta IVC Iliac Graft Limited CAR; Future                     Follow Up     Return in about 6 months (around 5/19/2025).  Patient was given instructions and counseling regarding his condition or for health maintenance advice. Please see specific information pulled into the AVS if appropriate.

## 2024-11-19 NOTE — ASSESSMENT & PLAN NOTE
Bilateral iliac stents, bilateral femoral endarterectomy, left femoral to PT bypass with insitu greater saphenous vein in December-January of last year. ([id number]).

## 2024-12-02 ENCOUNTER — LAB (OUTPATIENT)
Dept: LAB | Facility: HOSPITAL | Age: 74
End: 2024-12-02
Payer: MEDICARE

## 2024-12-02 ENCOUNTER — OFFICE VISIT (OUTPATIENT)
Dept: ONCOLOGY | Facility: CLINIC | Age: 74
End: 2024-12-02
Payer: MEDICARE

## 2024-12-02 VITALS
DIASTOLIC BLOOD PRESSURE: 69 MMHG | WEIGHT: 172.4 LBS | HEART RATE: 56 BPM | OXYGEN SATURATION: 96 % | BODY MASS INDEX: 27.06 KG/M2 | SYSTOLIC BLOOD PRESSURE: 130 MMHG | HEIGHT: 67 IN

## 2024-12-02 DIAGNOSIS — I73.9 PERIPHERAL VASCULAR DISEASE: ICD-10-CM

## 2024-12-02 DIAGNOSIS — D50.0 IRON DEFICIENCY ANEMIA DUE TO CHRONIC BLOOD LOSS: ICD-10-CM

## 2024-12-02 DIAGNOSIS — D64.9 ANEMIA, UNSPECIFIED TYPE: ICD-10-CM

## 2024-12-02 DIAGNOSIS — D64.9 ANEMIA, UNSPECIFIED TYPE: Primary | ICD-10-CM

## 2024-12-02 LAB
BASOPHILS # BLD AUTO: 0.05 10*3/MM3 (ref 0–0.2)
BASOPHILS NFR BLD AUTO: 0.6 % (ref 0–1.5)
DEPRECATED RDW RBC AUTO: 57.4 FL (ref 37–54)
EOSINOPHIL # BLD AUTO: 0.18 10*3/MM3 (ref 0–0.4)
EOSINOPHIL NFR BLD AUTO: 2.1 % (ref 0.3–6.2)
ERYTHROCYTE [DISTWIDTH] IN BLOOD BY AUTOMATED COUNT: 18.7 % (ref 12.3–15.4)
FERRITIN SERPL-MCNC: 60.6 NG/ML (ref 30–400)
FOLATE SERPL-MCNC: 5.15 NG/ML (ref 4.78–24.2)
HCT VFR BLD AUTO: 45.2 % (ref 37.5–51)
HGB BLD-MCNC: 14.5 G/DL (ref 13–17.7)
IRON 24H UR-MRATE: 117 MCG/DL (ref 59–158)
IRON SATN MFR SERPL: 35 % (ref 20–50)
LYMPHOCYTES # BLD AUTO: 1.1 10*3/MM3 (ref 0.7–3.1)
LYMPHOCYTES NFR BLD AUTO: 13 % (ref 19.6–45.3)
MCH RBC QN AUTO: 27.3 PG (ref 26.6–33)
MCHC RBC AUTO-ENTMCNC: 32.1 G/DL (ref 31.5–35.7)
MCV RBC AUTO: 85 FL (ref 79–97)
MONOCYTES # BLD AUTO: 0.67 10*3/MM3 (ref 0.1–0.9)
MONOCYTES NFR BLD AUTO: 7.9 % (ref 5–12)
NEUTROPHILS NFR BLD AUTO: 6.49 10*3/MM3 (ref 1.7–7)
NEUTROPHILS NFR BLD AUTO: 76.4 % (ref 42.7–76)
PLATELET # BLD AUTO: 294 10*3/MM3 (ref 140–450)
PMV BLD AUTO: 9.8 FL (ref 6–12)
RBC # BLD AUTO: 5.32 10*6/MM3 (ref 4.14–5.8)
RETICS # AUTO: 0.06 10*6/MM3 (ref 0.02–0.13)
RETICS/RBC NFR AUTO: 1.2 % (ref 0.7–1.9)
TIBC SERPL-MCNC: 338 MCG/DL (ref 298–536)
TRANSFERRIN SERPL-MCNC: 227 MG/DL (ref 200–360)
VIT B12 BLD-MCNC: 920 PG/ML (ref 211–946)
WBC NRBC COR # BLD AUTO: 8.49 10*3/MM3 (ref 3.4–10.8)

## 2024-12-02 PROCEDURE — 36415 COLL VENOUS BLD VENIPUNCTURE: CPT

## 2024-12-02 PROCEDURE — 85025 COMPLETE CBC W/AUTO DIFF WBC: CPT

## 2024-12-02 PROCEDURE — 3078F DIAST BP <80 MM HG: CPT | Performed by: STUDENT IN AN ORGANIZED HEALTH CARE EDUCATION/TRAINING PROGRAM

## 2024-12-02 PROCEDURE — 82728 ASSAY OF FERRITIN: CPT | Performed by: STUDENT IN AN ORGANIZED HEALTH CARE EDUCATION/TRAINING PROGRAM

## 2024-12-02 PROCEDURE — 82746 ASSAY OF FOLIC ACID SERUM: CPT | Performed by: STUDENT IN AN ORGANIZED HEALTH CARE EDUCATION/TRAINING PROGRAM

## 2024-12-02 PROCEDURE — 99214 OFFICE O/P EST MOD 30 MIN: CPT | Performed by: STUDENT IN AN ORGANIZED HEALTH CARE EDUCATION/TRAINING PROGRAM

## 2024-12-02 PROCEDURE — 82607 VITAMIN B-12: CPT | Performed by: STUDENT IN AN ORGANIZED HEALTH CARE EDUCATION/TRAINING PROGRAM

## 2024-12-02 PROCEDURE — 3075F SYST BP GE 130 - 139MM HG: CPT | Performed by: STUDENT IN AN ORGANIZED HEALTH CARE EDUCATION/TRAINING PROGRAM

## 2024-12-02 PROCEDURE — 1126F AMNT PAIN NOTED NONE PRSNT: CPT | Performed by: STUDENT IN AN ORGANIZED HEALTH CARE EDUCATION/TRAINING PROGRAM

## 2024-12-02 PROCEDURE — 85045 AUTOMATED RETICULOCYTE COUNT: CPT | Performed by: STUDENT IN AN ORGANIZED HEALTH CARE EDUCATION/TRAINING PROGRAM

## 2024-12-02 PROCEDURE — 84466 ASSAY OF TRANSFERRIN: CPT | Performed by: STUDENT IN AN ORGANIZED HEALTH CARE EDUCATION/TRAINING PROGRAM

## 2024-12-02 PROCEDURE — 83540 ASSAY OF IRON: CPT | Performed by: STUDENT IN AN ORGANIZED HEALTH CARE EDUCATION/TRAINING PROGRAM

## 2024-12-02 NOTE — PROGRESS NOTES
HEMATOLOGY ONCOLOGY OUTPATIENT FOLLOW UP       Patient name: Oscar Wray  : 1950  MRN: [id number]  Primary Care Physician: Kajal Horowitz PA  Referring Physician: Kajal Horowitz PA  Reason For Consult:       History of Present Illness:  Patient is a 74 y.o. male who has been referred to us for anemia. Pertinent recent outside labs are reported as follows:    24:  CBC: 9.13/10.9/36.9/375 (MCV 70.0, MCH 20.7, RDW 17.1)  CMP: BUN/Creat: 17/1.46 (gfr 51.1)  Iron levels: 19, Ferritin 38.6    Peripheral smear reported consistent with features of ANDREI including anisocytosis, poikilocytosis, microcytosis, polychromasia etc. Mild spherocytosis (1+) noted. No schistocytes reported.      Patient has underlying history of Alzheimer's disease, CKD and Peripheral vascular disease. He is on anticoagulation with Eliquis as well as antiplatelet therapy with Aspirin.         2024: Patient presents for initial consultation today. Accompanied by his Wife.  He appears to be pleasantly confused and denied any acute complaints presently. Unable to provide any significant details about medical history, most of history obtained form Chart and his wife.     Patient does not appear to be on  any nutritional supplements at present.     Subjective:  24: Patient seen for follow-up today.  No new complaints.  Compliant with oral iron supplementation.  Denied any bleeding issues with Eliquis.    Past Medical History:   Diagnosis Date    Atherosclerosis of native arteries of extremities with intermittent claudication, bilateral legs 2023    Atherosclerosis of native arteries of right leg with ulceration of ankle 2024    BPH with obstruction/lower urinary tract symptoms 2024    Chronic kidney disease, stage 3 unspecified     Dementia     Dyslipidemia 2024    Essential hypertension 2024    History of cigarette smoking 2024    Hyperlipidemia, unspecified     Other  specified postprocedural states 01/26/2024    Peripheral arterial occlusive disease 01/08/2024       Past Surgical History:   Procedure Laterality Date    ANGIOPLASTY ILIAC ARTERY Bilateral 12/29/2023    Procedure: BILATERAL FEMORAL ENDARTECTOMY AND ILIAC ARTERY STENTS, LEFT LEG ANGIOGRAM;  Surgeon: Nura Salgado II, MD;  Location: [facility] HYBRID OR;  Service: Vascular;  Laterality: Bilateral;    ARTERIOGRAM Bilateral 4/10/2024    Procedure: ARTERIOGRAM LOWER EXTREMITY WITH ANGIOPLASTY via left arm access;  Surgeon: Nura Salgado II, MD;  Location: [facility] HYBRID OR;  Service: Vascular;  Laterality: Bilateral;    FEMORAL TIBIAL BYPASS Left 1/4/2024    Procedure: FEMORAL TIBIAL BYPASS;  Surgeon: Nura Salgado II, MD;  Location: [facility] MAIN OR;  Service: Vascular;  Laterality: Left;         Current Outpatient Medications:     acetaminophen (TYLENOL) 325 MG tablet, Take 2 tablets by mouth Every 6 (Six) Hours As Needed for Mild Pain., Disp: , Rfl:     apixaban (ELIQUIS) 5 MG tablet tablet, Take 1 tablet by mouth Every 12 (Twelve) Hours. Indications: Other - full anticoagulation, pad/bypass stents, Disp: 60 tablet, Rfl: 11    aspirin 81 MG EC tablet, Take 1 tablet by mouth Daily., Disp: , Rfl:     atenolol (TENORMIN) 25 MG tablet, Take 0.5 tablets by mouth Daily., Disp: , Rfl:     atorvastatin (LIPITOR) 20 MG tablet, Take 1 tablet by mouth Daily., Disp: 30 tablet, Rfl: 2    Cholecalciferol (Vitamin D3) 50 MCG (2000 UT) capsule, Take 1 capsule by mouth Daily., Disp: , Rfl:     cilostazol (PLETAL) 100 MG tablet, Take 1 tablet by mouth 2 (Two) Times a Day., Disp: , Rfl:     dapagliflozin Propanediol (Farxiga) 10 MG tablet, Take 10 mg by mouth Daily., Disp: 30 tablet, Rfl: 2    docusate sodium (COLACE) 100 MG capsule, Take 1 capsule by mouth Daily., Disp: , Rfl:     donepezil (ARICEPT) 23 MG tablet, Take 1 tablet by mouth Every Night., Disp: , Rfl:     ferrous sulfate 325 (65 FE) MG tablet, Take 1 tablet by mouth Daily  With Breakfast., Disp: 90 tablet, Rfl: 1    furosemide (LASIX) 20 MG tablet, , Disp: , Rfl:     melatonin 1 MG tablet, Take 1 tablet by mouth Every Night., Disp: , Rfl:     memantine (NAMENDA XR) 28 MG capsule sustained-release 24 hr extended release capsule, Take 1 capsule by mouth Daily., Disp: , Rfl:     Milk of Magnesia 400 MG/5ML suspension, Take 5 mL by mouth Daily., Disp: , Rfl:     multivitamin with minerals tablet tablet, Take 1 tablet by mouth Daily., Disp: , Rfl:     Omega-3 Fatty Acids (fish oil) 1000 MG capsule capsule, Take 2 capsules by mouth 2 (Two) Times a Day With Meals., Disp: , Rfl:     risperiDONE (risperDAL) 0.25 MG tablet, Take 1 tablet by mouth Daily., Disp: , Rfl:     risperiDONE (risperDAL) 0.5 MG tablet, Take 1 tablet by mouth Every Night., Disp: , Rfl:     tamsulosin (FLOMAX) 0.4 MG capsule 24 hr capsule, Take 1 capsule by mouth Daily., Disp: , Rfl:     traMADol (ULTRAM) 50 MG tablet, Take 1 tablet by mouth Every 12 (Twelve) Hours As Needed for Moderate Pain., Disp: 60 tablet, Rfl: 2    Current Facility-Administered Medications:     aspirin EC tablet 81 mg, 81 mg, Oral, Daily, David Carson MD    No Known Allergies    Family History   Family history unknown: Yes       Family history is unknown by patient.      Social History     Tobacco Use    Smoking status: Former     Types: Cigarettes     Passive exposure: Past    Smokeless tobacco: Never   Vaping Use    Vaping status: Never Used   Substance Use Topics    Alcohol use: Not Currently    Drug use: Never     Social History     Social History Narrative    Not on file       ROS:   Review of Systems   Constitutional: Negative.    HENT: Negative.     Eyes: Negative.    Respiratory: Negative.     Cardiovascular: Negative.    Gastrointestinal: Negative.    Endocrine: Negative.    Genitourinary: Negative.    Musculoskeletal: Negative.    Skin: Negative.    Allergic/Immunologic: Negative.    Neurological: Negative.    Hematological: Negative.   "  Psychiatric/Behavioral:  Positive for confusion.          Objective:    Vital Signs:  Vitals:    12/02/24 1356   BP: 130/69   Pulse: 56   SpO2: 96%   Weight: 78.2 kg (172 lb 6.4 oz)   Height: 170.2 cm (67.01\")   PainSc: 0-No pain       Body mass index is 26.99 kg/m².    ECOG  (2) Ambulatory and capable of self care, unable to carry out work activity, up and about > 50% or waking hours    Physical Exam:   Physical Exam  Constitutional:       Appearance: Normal appearance. He is normal weight.   HENT:      Head: Normocephalic and atraumatic.      Right Ear: External ear normal.      Left Ear: External ear normal.      Nose: Nose normal.      Mouth/Throat:      Mouth: Mucous membranes are moist.      Pharynx: Oropharynx is clear.   Eyes:      Extraocular Movements: Extraocular movements intact.      Conjunctiva/sclera: Conjunctivae normal.      Pupils: Pupils are equal, round, and reactive to light.   Cardiovascular:      Rate and Rhythm: Normal rate.      Pulses: Normal pulses.   Pulmonary:      Effort: Pulmonary effort is normal.   Abdominal:      General: Abdomen is flat.      Palpations: Abdomen is soft.   Musculoskeletal:         General: Normal range of motion.      Cervical back: Normal range of motion and neck supple.   Skin:     General: Skin is warm.   Neurological:      Mental Status: He is alert.   Psychiatric:         Mood and Affect: Mood normal.         Behavior: Behavior normal.         Thought Content: Thought content normal.         Judgment: Judgment normal.         Lab Results - Last 18 Months   Lab Units 12/02/24  1313 08/20/24  1339 07/23/24  1350   WBC 10*3/mm3 8.49 10.00 9.13   HEMOGLOBIN g/dL 14.5 11.5* 10.9*   HEMATOCRIT % 45.2 38.2 36.9*   PLATELETS 10*3/mm3 294 344 375   MCV fL 85.0 71.1* 70.0*     Lab Results - Last 18 Months   Lab Units 11/14/24  1357 10/14/24  1507 08/20/24  1339 07/23/24  1350 04/30/24  1601   SODIUM mmol/L 139 143 144 143 141   POTASSIUM mmol/L 3.5 4.0 4.5 4.4 4.5 " "  CHLORIDE mmol/L 102 105 108* 105 106   CO2 mmol/L 24.9 25.7 25.3 26.1 22.5   BUN mg/dL 9 9 21 17 16   CREATININE mg/dL 1.53* 1.53* 1.80* 1.46* 1.40*   CALCIUM mg/dL 9.3 9.3 9.4 9.4 9.7   BILIRUBIN mg/dL  --   --  0.3 0.2 0.5   ALK PHOS U/L  --   --  117 111 112   ALT (SGPT) U/L  --   --  13 18 11   AST (SGOT) U/L  --   --  20 17 20   GLUCOSE mg/dL 66 70 63* 63* 78       Lab Results   Component Value Date    GLUCOSE 66 11/14/2024    BUN 9 11/14/2024    CREATININE 1.53 (H) 11/14/2024    BCR 5.9 (L) 11/14/2024    K 3.5 11/14/2024    CO2 24.9 11/14/2024    CALCIUM 9.3 11/14/2024    PROTENTOTREF 7.4 04/30/2024    ALBUMIN 4.5 08/20/2024    LABIL2 1.1 04/30/2024    AST 20 08/20/2024    ALT 13 08/20/2024       Lab Results - Last 18 Months   Lab Units 04/10/24  0730 04/05/24  1321 01/06/24  2254 01/05/24  1047 01/05/24  0353   INR  1.06 1.14*  --   --  1.06   APTT seconds 29.0 32.7* >139.0*   < > 47.1*    < > = values in this interval not displayed.       Lab Results   Component Value Date    IRON 23 (L) 08/20/2024    TIBC 495 08/20/2024    FERRITIN 11.60 (L) 08/20/2024       Lab Results   Component Value Date    FOLATE 7.38 08/20/2024       No results found for: \"OCCULTBLD\"    Lab Results   Component Value Date    RETICCTPCT 1.61 08/20/2024     Lab Results   Component Value Date    LEGXRMLO94 518 08/20/2024     Lab Results   Component Value Date    SPEP Comment 04/30/2024     LDH   Date Value Ref Range Status   08/20/2024 184 135 - 225 U/L Final     Lab Results   Component Value Date    ANDREA Negative 04/30/2024    SEDRATE 35 (H) 04/30/2024     Lab Results   Component Value Date    HAPTOGLOBIN 235 (H) 08/20/2024     Lab Results   Component Value Date    PTT 29.0 04/10/2024    INR 1.06 04/10/2024     No results found for: \"\"  No results found for: \"CEA\"  No components found for: \"CA-19-9\"  Lab Results   Component Value Date    PSA 0.356 10/14/2024     Lab Results   Component Value Date    SEDRATE 35 (H) 04/30/2024    "       Assessment & Plan     Microcytic anemia:  Iron Deficiency anemia:  -Outside labs are suggestive of Iron deficiency anemia  -Repeat CBC today showed mild anemia with Hb/hct 11.5/38.5, Low MCV.   -iron panel showed low TSAT 5% and low ferritin 11.6 consistent with iron deficiency anemia. Elevated Soluble Transferrin receptor levels.  -other labs including hemolysis labs, plasma cell workup, B12, Folate levels, Retic counts, reported WNL  -CMP is consistent with Stage II/III CKD which may also be a contributing factor for anemia.  -started patient on Oral iron supplementation.  Tolerating well.  Noted improvement in Hb to 14.5  -given ongoing Antiplatelet therapy and anticoagulation there is concern about possible GI blood loss.   -Will continue to monitor labs and will consider GI referral for EGD/Colonoscopy if noted recurrent iron deficiency.      Follow up in  4 months with repeat labs, sooner as needed.    Thank you very much for providing the opportunity to participate in this patient’s care. Please do not hesitate to call if there are any other questions.

## 2024-12-17 ENCOUNTER — OFFICE VISIT (OUTPATIENT)
Age: 74
End: 2024-12-17
Payer: MEDICARE

## 2024-12-17 VITALS — HEIGHT: 67 IN | HEART RATE: 58 BPM | BODY MASS INDEX: 27.18 KG/M2 | OXYGEN SATURATION: 95 % | WEIGHT: 173.2 LBS

## 2024-12-17 DIAGNOSIS — I73.9 PERIPHERAL ARTERIAL DISEASE: ICD-10-CM

## 2024-12-17 DIAGNOSIS — L85.3 XEROSIS OF SKIN: ICD-10-CM

## 2024-12-17 DIAGNOSIS — M79.674 PAIN IN TOES OF BOTH FEET: ICD-10-CM

## 2024-12-17 DIAGNOSIS — L60.3 ONYCHODYSTROPHY: Primary | ICD-10-CM

## 2024-12-17 DIAGNOSIS — G62.9 NEUROPATHY: ICD-10-CM

## 2024-12-17 DIAGNOSIS — M79.675 PAIN IN TOES OF BOTH FEET: ICD-10-CM

## 2024-12-17 DIAGNOSIS — R26.81 UNSTEADINESS ON FEET: ICD-10-CM

## 2024-12-17 DIAGNOSIS — Z87.2 HISTORY OF FOOT ULCER: ICD-10-CM

## 2024-12-20 NOTE — PROGRESS NOTES
12/17/2024  Foot and Ankle Surgery - Established Patient/Follow-up  Provider: ELOY Thornton   Location: [facility]    Subjective:  Oscar Wray is a 74 y.o. male.     Chief Complaint   Patient presents with    Left Foot - Follow-up     Foot check ,nail trim non dm     Right Foot - Follow-up     Foot check ,nail trim non dm     Follow-up     [place]ull-11/14/24       History of Present Illness  The patient is a 74-year-old male who presents for a routine diabetic foot check.    He reports no issues with his feet and has come in today for nail trimming. He confirms that he is not diabetic.       No Known Allergies    Current Outpatient Medications on File Prior to Visit   Medication Sig Dispense Refill    acetaminophen (TYLENOL) 325 MG tablet Take 2 tablets by mouth Every 6 (Six) Hours As Needed for Mild Pain.      apixaban (ELIQUIS) 5 MG tablet tablet Take 1 tablet by mouth Every 12 (Twelve) Hours. Indications: Other - full anticoagulation, pad/bypass stents 60 tablet 11    aspirin 81 MG EC tablet Take 1 tablet by mouth Daily.      atenolol (TENORMIN) 25 MG tablet Take 0.5 tablets by mouth Daily.      atorvastatin (LIPITOR) 20 MG tablet Take 1 tablet by mouth Daily. 30 tablet 2    Cholecalciferol (Vitamin D3) 50 MCG (2000 UT) capsule Take 1 capsule by mouth Daily.      cilostazol (PLETAL) 100 MG tablet Take 1 tablet by mouth 2 (Two) Times a Day.      dapagliflozin Propanediol (Farxiga) 10 MG tablet Take 10 mg by mouth Daily. 30 tablet 2    docusate sodium (COLACE) 100 MG capsule Take 1 capsule by mouth Daily.      donepezil (ARICEPT) 23 MG tablet Take 1 tablet by mouth Every Night.      ferrous sulfate 325 (65 FE) MG tablet Take 1 tablet by mouth Daily With Breakfast. 90 tablet 1    furosemide (LASIX) 20 MG tablet       melatonin 1 MG tablet Take 1 tablet by mouth Every Night.      memantine (NAMENDA XR) 28 MG capsule sustained-release 24 hr extended release capsule Take 1 capsule by mouth Daily.       "Milk of Magnesia 400 MG/5ML suspension Take 5 mL by mouth Daily.      multivitamin with minerals tablet tablet Take 1 tablet by mouth Daily.      Omega-3 Fatty Acids (fish oil) 1000 MG capsule capsule Take 2 capsules by mouth 2 (Two) Times a Day With Meals.      risperiDONE (risperDAL) 0.25 MG tablet Take 1 tablet by mouth Daily.      risperiDONE (risperDAL) 0.5 MG tablet Take 1 tablet by mouth Every Night.      tamsulosin (FLOMAX) 0.4 MG capsule 24 hr capsule Take 1 capsule by mouth Daily.      traMADol (ULTRAM) 50 MG tablet Take 1 tablet by mouth Every 12 (Twelve) Hours As Needed for Moderate Pain. 60 tablet 2     Current Facility-Administered Medications on File Prior to Visit   Medication Dose Route Frequency Provider Last Rate Last Admin    aspirin EC tablet 81 mg  81 mg Oral Daily David Carson MD           Objective   Pulse 58   Ht 170.2 cm (67\")   Wt 78.6 kg (173 lb 3.2 oz)   SpO2 95%   BMI 27.13 kg/m²     Foot/Ankle Exam    Appearance:  appears stated age and elderly  Orientation:  AAOx3  Affect:  appropriate  Gait:  unimpaired  Assistance:  independent  Right shoe gear: casual shoe and sock  Left shoe gear: casual shoe and sock     VASCULAR      Right Foot Vascularity   Dorsalis pedis:  1+  Skin temperature:  warm  Edema grading:  None  CFT:  < 3 seconds  Pedal hair growth:  Absent  Varicosities:  none      Left Foot Vascularity   Dorsalis pedis:  1+  Skin temperature:  warm  Edema grading:  None  CFT:  < 3 seconds  Pedal hair growth:  Absent  Varicosities:  none     NEUROLOGIC      Right Foot Neurologic   Light touch sensation: normal  Protective Sensation using Rexford-Kam Monofilament:   Sites intact: 10  Sites tested: 10      Left Foot Neurologic   Light touch sensation: normal  Protective Sensation using Rexford-Kam Monofilament:   Sites intact: 7  Sites tested: 10     MUSCULOSKELETAL      Right Foot Musculoskeletal   Ecchymosis:  none  Tenderness:  toenail problem        Left Foot " Musculoskeletal   Ecchymosis:  none  Tenderness:  toenail problem     DERMATOLOGIC       Right Foot Dermatologic   Skin  Positive for dryness and skin changes.   Nails  1.  Positive for elongated, abnormal thickness and dystrophic nail.  2.  Positive for elongated, abnormal thickness and dystrophic nail.  3.  Positive for elongated, abnormal thickness and dystrophic nail.  4.  Positive for elongated, abnormal thickness and dystrophic nail.  5.  Positive for elongated, abnormal thickness and dystrophic nail.      Left Foot Dermatologic   Skin  Positive for dryness and skin changes.   Nails  1.  Positive for elongated, abnormal thickness and dystrophic nail.  2.  Positive for elongated, abnormal thickness and dystrophic nail.  3.  Positive for elongated, abnormal thickness and dystrophic nail.  4.  Positive for elongated, abnormally thick and dystrophic nail.  5.  Positive for elongated, abnormally thick and dystrophic rand  Physical Exam         Results       Assessment & Plan   Diagnoses and all orders for this visit:    1. Onychodystrophy (Primary)    2. Unsteadiness on feet    3. Neuropathy    4. Xerosis of skin    5. Pain in toes of both feet    6. Peripheral arterial disease    7. History of foot ulcer      Assessment & Plan  On exam bilateral feet are stable with no open wounds or signs of active acute infection or inflammation.  Encourage patient to continue with appropriate follow-up with vascular surgery.  Encourage patient to continue to monitor his feet closely and avoid not walking barefoot, checking shoes before placing them, gradually increasing any wear time of new footwear and to call with any concerns or new issues with his feet.    Nail debridement: Both feet x10    Consent and time out was performed before proceeding with the procedure. Nails were debrided with a nail nipper without complication.  No anesthesia was required.  Indications for procedure were thickened, dystrophic, and fungal appearing  nails which are difficult to trim.  Proper self-care and technique was discussed with patient.  Patient was stable after procedure.     Follow-up  The patient will follow up in 3 months.    PROCEDURE  Debridement of all 10 toenails was performed today.       No orders of the defined types were placed in this encounter.         Patient or patient representative verbalized consent for the use of Ambient Listening during the visit with  ELOY Garcia for chart documentation. 12/20/2024  07:17 EST

## 2025-02-14 ENCOUNTER — LAB (OUTPATIENT)
Dept: FAMILY MEDICINE CLINIC | Facility: CLINIC | Age: 75
End: 2025-02-14
Payer: MEDICARE

## 2025-02-14 ENCOUNTER — OFFICE VISIT (OUTPATIENT)
Dept: FAMILY MEDICINE CLINIC | Facility: CLINIC | Age: 75
End: 2025-02-14
Payer: MEDICARE

## 2025-02-14 VITALS
OXYGEN SATURATION: 96 % | TEMPERATURE: 97.5 F | HEART RATE: 55 BPM | WEIGHT: 168 LBS | SYSTOLIC BLOOD PRESSURE: 114 MMHG | DIASTOLIC BLOOD PRESSURE: 67 MMHG | BODY MASS INDEX: 26.37 KG/M2 | HEIGHT: 67 IN

## 2025-02-14 DIAGNOSIS — M54.50 CHRONIC BILATERAL LOW BACK PAIN WITHOUT SCIATICA: ICD-10-CM

## 2025-02-14 DIAGNOSIS — E78.00 HYPERCHOLESTEROLEMIA: ICD-10-CM

## 2025-02-14 DIAGNOSIS — G89.29 CHRONIC BILATERAL LOW BACK PAIN WITHOUT SCIATICA: ICD-10-CM

## 2025-02-14 DIAGNOSIS — N18.31 STAGE 3A CHRONIC KIDNEY DISEASE: ICD-10-CM

## 2025-02-14 PROCEDURE — 1159F MED LIST DOCD IN RCRD: CPT | Performed by: PHYSICIAN ASSISTANT

## 2025-02-14 PROCEDURE — 3074F SYST BP LT 130 MM HG: CPT | Performed by: PHYSICIAN ASSISTANT

## 2025-02-14 PROCEDURE — 1126F AMNT PAIN NOTED NONE PRSNT: CPT | Performed by: PHYSICIAN ASSISTANT

## 2025-02-14 PROCEDURE — 1160F RVW MEDS BY RX/DR IN RCRD: CPT | Performed by: PHYSICIAN ASSISTANT

## 2025-02-14 PROCEDURE — 99213 OFFICE O/P EST LOW 20 MIN: CPT | Performed by: PHYSICIAN ASSISTANT

## 2025-02-14 PROCEDURE — 3078F DIAST BP <80 MM HG: CPT | Performed by: PHYSICIAN ASSISTANT

## 2025-02-14 RX ORDER — DAPAGLIFLOZIN 10 MG/1
1 TABLET, FILM COATED ORAL DAILY
Qty: 30 TABLET | Refills: 5 | Status: SHIPPED | OUTPATIENT
Start: 2025-02-14

## 2025-02-14 RX ORDER — TRAMADOL HYDROCHLORIDE 50 MG/1
50 TABLET ORAL EVERY 12 HOURS PRN
Qty: 60 TABLET | Refills: 5 | Status: SHIPPED | OUTPATIENT
Start: 2025-02-14

## 2025-02-14 RX ORDER — ATORVASTATIN CALCIUM 20 MG/1
20 TABLET, FILM COATED ORAL DAILY
Qty: 30 TABLET | Refills: 5 | Status: SHIPPED | OUTPATIENT
Start: 2025-02-14

## 2025-02-14 NOTE — PROGRESS NOTES
Arina Wray is a 74 y.o. male.     History of Present Illness  Pt presents to follow up on his dementia.  He is doing well and no current concerns.  His pain is well controlled with tramadol which he takes as needed.  His appetite is good.  Denies any urinary or bowel issues.         Past Medical History:   Diagnosis Date    Atherosclerosis of native arteries of extremities with intermittent claudication, bilateral legs 03/08/2023    Atherosclerosis of native arteries of right leg with ulceration of ankle 01/26/2024    BPH with obstruction/lower urinary tract symptoms 01/08/2024    Chronic kidney disease, stage 3 unspecified     Dementia     Dyslipidemia 01/08/2024    Essential hypertension 01/08/2024    History of cigarette smoking 01/08/2024    Hyperlipidemia, unspecified     Other specified postprocedural states 01/26/2024    Peripheral arterial occlusive disease 01/08/2024     Past Surgical History:   Procedure Laterality Date    ANGIOPLASTY ILIAC ARTERY Bilateral 12/29/2023    Procedure: BILATERAL FEMORAL ENDARTECTOMY AND ILIAC ARTERY STENTS, LEFT LEG ANGIOGRAM;  Surgeon: Nura Salgado II, MD;  Location: [facility] HYBRID OR;  Service: Vascular;  Laterality: Bilateral;    ARTERIOGRAM Bilateral 4/10/2024    Procedure: ARTERIOGRAM LOWER EXTREMITY WITH ANGIOPLASTY via left arm access;  Surgeon: Nura Salgado II, MD;  Location: [facility] HYBRID OR;  Service: Vascular;  Laterality: Bilateral;    FEMORAL TIBIAL BYPASS Left 1/4/2024    Procedure: FEMORAL TIBIAL BYPASS;  Surgeon: Nura Salgado II, MD;  Location: [facility] MAIN OR;  Service: Vascular;  Laterality: Left;     Family History   Family history unknown: Yes     Social History     Socioeconomic History    Marital status: Single   Tobacco Use    Smoking status: Former     Types: Cigarettes     Passive exposure: Past    Smokeless tobacco: Never   Vaping Use    Vaping status: Never Used   Substance and Sexual Activity    Alcohol use: Not Currently     Drug use: Never    Sexual activity: Defer         Current Outpatient Medications:     acetaminophen (TYLENOL) 325 MG tablet, Take 2 tablets by mouth Every 6 (Six) Hours As Needed for Mild Pain., Disp: , Rfl:     apixaban (ELIQUIS) 5 MG tablet tablet, Take 1 tablet by mouth Every 12 (Twelve) Hours. Indications: Other - full anticoagulation, pad/bypass stents, Disp: 60 tablet, Rfl: 11    aspirin 81 MG EC tablet, Take 1 tablet by mouth Daily., Disp: , Rfl:     atenolol (TENORMIN) 25 MG tablet, Take 0.5 tablets by mouth Daily., Disp: , Rfl:     atorvastatin (LIPITOR) 20 MG tablet, Take 1 tablet by mouth Daily., Disp: 30 tablet, Rfl: 5    Cholecalciferol (Vitamin D3) 50 MCG (2000 UT) capsule, Take 1 capsule by mouth Daily., Disp: , Rfl:     cilostazol (PLETAL) 100 MG tablet, Take 1 tablet by mouth 2 (Two) Times a Day., Disp: , Rfl:     dapagliflozin Propanediol (Farxiga) 10 MG tablet, Take 10 mg by mouth Daily., Disp: 30 tablet, Rfl: 5    docusate sodium (COLACE) 100 MG capsule, Take 1 capsule by mouth Daily., Disp: , Rfl:     donepezil (ARICEPT) 23 MG tablet, Take 1 tablet by mouth Every Night., Disp: , Rfl:     ferrous sulfate 325 (65 FE) MG tablet, Take 1 tablet by mouth Daily With Breakfast., Disp: 90 tablet, Rfl: 1    furosemide (LASIX) 20 MG tablet, , Disp: , Rfl:     melatonin 1 MG tablet, Take 1 tablet by mouth Every Night., Disp: , Rfl:     memantine (NAMENDA XR) 28 MG capsule sustained-release 24 hr extended release capsule, Take 1 capsule by mouth Daily., Disp: , Rfl:     Milk of Magnesia 400 MG/5ML suspension, Take 5 mL by mouth Daily., Disp: , Rfl:     multivitamin with minerals tablet tablet, Take 1 tablet by mouth Daily., Disp: , Rfl:     Omega-3 Fatty Acids (fish oil) 1000 MG capsule capsule, Take 2 capsules by mouth 2 (Two) Times a Day With Meals., Disp: , Rfl:     risperiDONE (risperDAL) 0.25 MG tablet, Take 1 tablet by mouth Daily., Disp: , Rfl:     risperiDONE (risperDAL) 0.5 MG tablet, Take 1 tablet  "by mouth Every Night., Disp: , Rfl:     tamsulosin (FLOMAX) 0.4 MG capsule 24 hr capsule, Take 1 capsule by mouth Daily., Disp: , Rfl:     traMADol (ULTRAM) 50 MG tablet, Take 1 tablet by mouth Every 12 (Twelve) Hours As Needed for Moderate Pain., Disp: 60 tablet, Rfl: 5    Current Facility-Administered Medications:     aspirin EC tablet 81 mg, 81 mg, Oral, Daily, David Carson MD    Review of Systems   Constitutional:  Negative for activity change, appetite change, chills, diaphoresis, fatigue, fever, unexpected weight gain and unexpected weight loss.   HENT:  Negative for trouble swallowing.    Respiratory:  Negative for chest tightness and shortness of breath.    Cardiovascular:  Negative for chest pain, palpitations and leg swelling.   Gastrointestinal:  Negative for abdominal pain, constipation and diarrhea.   Genitourinary:  Negative for difficulty urinating and dysuria.   Musculoskeletal:  Positive for back pain. Negative for gait problem.   Neurological:  Positive for memory problem. Negative for dizziness, tremors, syncope, weakness, light-headedness and headache.   Psychiatric/Behavioral:  Negative for sleep disturbance and depressed mood. The patient is not nervous/anxious.      /67 (BP Location: Left arm, Patient Position: Sitting, Cuff Size: Large Adult)   Pulse 55   Temp 97.5 °F (36.4 °C) (Temporal)   Ht 170.2 cm (67.01\")   Wt 76.2 kg (168 lb)   SpO2 96%   BMI 26.31 kg/m²       Objective   Physical Exam  Vitals and nursing note reviewed.   Constitutional:       Appearance: Normal appearance.   HENT:      Head: Normocephalic and atraumatic.   Neck:      Thyroid: No thyroid mass, thyromegaly or thyroid tenderness.      Vascular: No carotid bruit.   Cardiovascular:      Rate and Rhythm: Normal rate and regular rhythm.      Heart sounds: Normal heart sounds.   Pulmonary:      Effort: Pulmonary effort is normal.      Breath sounds: Normal breath sounds.   Musculoskeletal:         General: " Normal range of motion.      Cervical back: Normal range of motion and neck supple.      Right lower leg: No edema.      Left lower leg: No edema.   Skin:     General: Skin is warm and dry.   Neurological:      General: No focal deficit present.      Mental Status: He is alert and oriented to person, place, and time.   Psychiatric:         Mood and Affect: Mood normal.         Behavior: Behavior normal.         Thought Content: Thought content normal.         Procedures     Assessment    Diagnoses and all orders for this visit:    1. Chronic bilateral low back pain without sciatica  Comments:  Continue tramadol as needed for back pain.  Refills sent to pharmacy.  Orders:  -     traMADol (ULTRAM) 50 MG tablet; Take 1 tablet by mouth Every 12 (Twelve) Hours As Needed for Moderate Pain.  Dispense: 60 tablet; Refill: 5    2. Stage 3a chronic kidney disease  Comments:  Will recheck labs today.  Stay well hydrated.  Plan to recheck in 6 months as long as labs are fine.  Continue Farxiga.  Orders:  -     dapagliflozin Propanediol (Farxiga) 10 MG tablet; Take 10 mg by mouth Daily.  Dispense: 30 tablet; Refill: 5  -     Comprehensive metabolic panel    3. Hypercholesterolemia  Comments:  Continue atorvastatin.  Will recheck labs today.  Orders:  -     atorvastatin (LIPITOR) 20 MG tablet; Take 1 tablet by mouth Daily.  Dispense: 30 tablet; Refill: 5  -     Comprehensive metabolic panel  -     Lipid panel

## 2025-02-21 ENCOUNTER — TELEPHONE (OUTPATIENT)
Dept: FAMILY MEDICINE CLINIC | Facility: CLINIC | Age: 75
End: 2025-02-21
Payer: MEDICARE

## 2025-02-21 NOTE — TELEPHONE ENCOUNTER
.    Caller: GINA COLLAZO    Relationship to patient: Emergency Contact      Best call back number: [phone]    Provider: NOAH VELEZ    Medication PA needed: donepezil (ARICEPT) 23 MG tablet    Reason for call/Prior Auth: OLD PA HAS      FAX TO [phone]

## 2025-02-21 NOTE — TELEPHONE ENCOUNTER
Spoke with Jeanette, pt needs refill and she believes a PA. He uses [facility]. I don't see that you have ever sent this in for him. Can you send refill?     Insurance info if PA is needed:    [employer]   Id:[id number]  Bin:[id number]  Pcn:[id number]  Group:[id number]    [employer]  Id:[id number]  Bin:[id number]  Pcn:IN  Group:[id number]

## 2025-02-26 DIAGNOSIS — F02.B0 MODERATE EARLY ONSET ALZHEIMER'S DEMENTIA WITHOUT BEHAVIORAL DISTURBANCE, PSYCHOTIC DISTURBANCE, MOOD DISTURBANCE, OR ANXIETY: Primary | ICD-10-CM

## 2025-02-26 DIAGNOSIS — G30.0 MODERATE EARLY ONSET ALZHEIMER'S DEMENTIA WITHOUT BEHAVIORAL DISTURBANCE, PSYCHOTIC DISTURBANCE, MOOD DISTURBANCE, OR ANXIETY: Primary | ICD-10-CM

## 2025-02-26 RX ORDER — DONEPEZIL HYDROCHLORIDE 23 MG/1
23 TABLET, FILM COATED ORAL NIGHTLY
Qty: 30 TABLET | Refills: 5 | Status: SHIPPED | OUTPATIENT
Start: 2025-02-26

## 2025-03-21 ENCOUNTER — OFFICE VISIT (OUTPATIENT)
Age: 75
End: 2025-03-21
Payer: MEDICARE

## 2025-03-21 VITALS — BODY MASS INDEX: 26.37 KG/M2 | HEIGHT: 67 IN | HEART RATE: 57 BPM | WEIGHT: 168 LBS | OXYGEN SATURATION: 96 %

## 2025-03-21 DIAGNOSIS — L60.3 ONYCHODYSTROPHY: Primary | ICD-10-CM

## 2025-03-21 DIAGNOSIS — G62.9 NEUROPATHY: ICD-10-CM

## 2025-03-21 DIAGNOSIS — R26.81 UNSTEADINESS ON FEET: ICD-10-CM

## 2025-03-21 DIAGNOSIS — L85.3 XEROSIS OF SKIN: ICD-10-CM

## 2025-03-21 DIAGNOSIS — I87.2 VENOUS INSUFFICIENCY: ICD-10-CM

## 2025-03-21 DIAGNOSIS — I73.9 PERIPHERAL ARTERIAL DISEASE: ICD-10-CM

## 2025-03-21 NOTE — PROGRESS NOTES
03/21/2025  Foot and Ankle Surgery - Established Patient/Follow-up  Provider: ELOY Thornton   Location: [facility]    Subjective:  Oscar Wray is a 74 y.o. male.     Chief Complaint   Patient presents with    Left Foot - Follow-up     Foot check ,nail trim non dm     Right Foot - Follow-up     Foot check ,nail trim non dm     Follow-Up     PCP: Kajal Horowitz PA  Last PCP Visit:   2/14/25       History of Present Illness  The patient is a 74-year-old male who presents for a routine foot check and is requesting nail debridement.    He reports no new issues with his feet and has expressed a desire to have his nails trimmed during this visit. He has a scheduled follow-up appointment with Dr. Salgado, a vascular specialist, in 06/2025.    It is reported that he has been experiencing swelling in his ankles, which is attributed to a recent surgical procedure. He is not currently undergoing rehabilitation. The ankle swelling tends to subside overnight and is particularly noticeable after periods of extensive walking, such as during a recent visit to the mall.       No Known Allergies    Current Outpatient Medications on File Prior to Visit   Medication Sig Dispense Refill    acetaminophen (TYLENOL) 325 MG tablet Take 2 tablets by mouth Every 6 (Six) Hours As Needed for Mild Pain.      apixaban (ELIQUIS) 5 MG tablet tablet Take 1 tablet by mouth Every 12 (Twelve) Hours. Indications: Other - full anticoagulation, pad/bypass stents 60 tablet 11    aspirin 81 MG EC tablet Take 1 tablet by mouth Daily.      atenolol (TENORMIN) 25 MG tablet Take 0.5 tablets by mouth Daily.      atorvastatin (LIPITOR) 20 MG tablet Take 1 tablet by mouth Daily. 30 tablet 5    Cholecalciferol (Vitamin D3) 50 MCG (2000 UT) capsule Take 1 capsule by mouth Daily.      cilostazol (PLETAL) 100 MG tablet Take 1 tablet by mouth 2 (Two) Times a Day.      dapagliflozin Propanediol (Farxiga) 10 MG tablet Take 10 mg by mouth Daily. 30  "tablet 5    docusate sodium (COLACE) 100 MG capsule Take 1 capsule by mouth Daily.      donepezil (ARICEPT) 23 MG tablet Take 1 tablet by mouth Every Night. 30 tablet 5    ferrous sulfate 325 (65 FE) MG tablet Take 1 tablet by mouth Daily With Breakfast. 90 tablet 1    furosemide (LASIX) 20 MG tablet       melatonin 1 MG tablet Take 1 tablet by mouth Every Night.      memantine (NAMENDA XR) 28 MG capsule sustained-release 24 hr extended release capsule Take 1 capsule by mouth Daily.      Milk of Magnesia 400 MG/5ML suspension Take 5 mL by mouth Daily.      multivitamin with minerals tablet tablet Take 1 tablet by mouth Daily.      Omega-3 Fatty Acids (fish oil) 1000 MG capsule capsule Take 2 capsules by mouth 2 (Two) Times a Day With Meals.      risperiDONE (risperDAL) 0.25 MG tablet Take 1 tablet by mouth Daily.      risperiDONE (risperDAL) 0.5 MG tablet Take 1 tablet by mouth Every Night.      tamsulosin (FLOMAX) 0.4 MG capsule 24 hr capsule Take 1 capsule by mouth Daily.      traMADol (ULTRAM) 50 MG tablet Take 1 tablet by mouth Every 12 (Twelve) Hours As Needed for Moderate Pain. 60 tablet 5     Current Facility-Administered Medications on File Prior to Visit   Medication Dose Route Frequency Provider Last Rate Last Admin    aspirin EC tablet 81 mg  81 mg Oral Daily David Carson MD           Objective   Pulse 57   Ht 170.2 cm (67\")   Wt 76.2 kg (168 lb)   SpO2 96%   BMI 26.31 kg/m²     Foot/Ankle Exam    Appearance:  appears stated age and elderly  Orientation:  AAOx3  Affect:  appropriate  Gait:  unimpaired  Assistance:  independent  Right shoe gear: casual shoe and sock  Left shoe gear: casual shoe and sock     VASCULAR      Right Foot Vascularity   Dorsalis pedis:  1+  Skin temperature:  warm  Edema grading:  trace  CFT:  < 3 seconds  Pedal hair growth:  Absent  Varicosities:  none      Left Foot Vascularity   Dorsalis pedis:  1+  Skin temperature:  warm  Edema grading:  trace  CFT:  < 3 " seconds  Pedal hair growth:  Absent  Varicosities:  none     NEUROLOGIC      Right Foot Neurologic   Light touch sensation: normal  Protective Sensation using [place] Monofilament:   Sites intact: 10  Sites tested: 10      Left Foot Neurologic   Light touch sensation: normal  Protective Sensation using [place] Monofilament:   Sites intact: 7  Sites tested: 10     MUSCULOSKELETAL      Right Foot Musculoskeletal   Ecchymosis:  none  Tenderness:  toenail problem        Left Foot Musculoskeletal   Ecchymosis:  none  Tenderness:  toenail problem     DERMATOLOGIC       Right Foot Dermatologic   Skin  Positive for dryness and skin changes.   Nails  1.  Positive for elongated, abnormal thickness and dystrophic nail.  2.  Positive for elongated, abnormal thickness and dystrophic nail.  3.  Positive for elongated, abnormal thickness and dystrophic nail.  4.  Positive for elongated, abnormal thickness and dystrophic nail.  5.  Positive for elongated, abnormal thickness and dystrophic nail.      Left Foot Dermatologic   Skin  Positive for dryness and skin changes.   Nails  1.  Positive for elongated, abnormal thickness and dystrophic nail.  2.  Positive for elongated, abnormal thickness and dystrophic nail.  3.  Positive for elongated, abnormal thickness and dystrophic nail.  4.  Positive for elongated, abnormally thick and dystrophic nail.  5.  Positive for elongated, abnormally thick and dystrophic rand  Physical Exam  Ankles are swollen.       Results       Assessment & Plan   Diagnoses and all orders for this visit:    1. Onychodystrophy (Primary)    2. Unsteadiness on feet    3. Neuropathy    4. Xerosis of skin    5. Peripheral arterial disease    6. Venous insufficiency      Assessment & Plan  1. Routine foot examination.  His toenails were debrided during this visit. He was advised to maintain daily inspection of his feet, ensuring they remain clean and dry. He was also instructed to contact the clinic  should any concerns arise prior to his next scheduled appointment.    2. Ankle edema.  The ankle swelling is likely due to venous insufficiency. He was advised to elevate his legs and wear light compression socks, especially when he plans to be out for the day or in the car for extended periods. The swelling is not severe and usually not a cause for great concern unless it becomes very severe.    Nail debridement: Both feet x10    Consent and time out was performed before proceeding with the procedure. Nails were debrided with a nail nipper without complication.  No anesthesia was required.  Indications for procedure were thickened, dystrophic, and fungal appearing nails which are difficult to trim.  Proper self-care and technique was discussed with patient.  Patient was stable after procedure.     Follow-up  The patient will follow up in 3 months.    PROCEDURE  Nails were debrided today times 10.       No orders of the defined types were placed in this encounter.         Patient or patient representative verbalized consent for the use of Ambient Listening during the visit with  ELOY Garcia for chart documentation. 3/21/2025  13:16 EDT

## 2025-03-26 ENCOUNTER — LAB (OUTPATIENT)
Dept: LAB | Facility: HOSPITAL | Age: 75
End: 2025-03-26
Payer: MEDICARE

## 2025-03-26 DIAGNOSIS — D50.0 IRON DEFICIENCY ANEMIA DUE TO CHRONIC BLOOD LOSS: ICD-10-CM

## 2025-03-26 LAB
BASOPHILS # BLD AUTO: 0.08 10*3/MM3 (ref 0–0.2)
BASOPHILS NFR BLD AUTO: 0.9 % (ref 0–1.5)
DEPRECATED RDW RBC AUTO: 47.2 FL (ref 37–54)
EOSINOPHIL # BLD AUTO: 0.13 10*3/MM3 (ref 0–0.4)
EOSINOPHIL NFR BLD AUTO: 1.5 % (ref 0.3–6.2)
ERYTHROCYTE [DISTWIDTH] IN BLOOD BY AUTOMATED COUNT: 14.5 % (ref 12.3–15.4)
FERRITIN SERPL-MCNC: 49.8 NG/ML (ref 30–400)
HCT VFR BLD AUTO: 46.7 % (ref 37.5–51)
HGB BLD-MCNC: 14.8 G/DL (ref 13–17.7)
IRON 24H UR-MRATE: 107 MCG/DL (ref 59–158)
IRON SATN MFR SERPL: 29 % (ref 20–50)
LYMPHOCYTES # BLD AUTO: 1.3 10*3/MM3 (ref 0.7–3.1)
LYMPHOCYTES NFR BLD AUTO: 14.6 % (ref 19.6–45.3)
MCH RBC QN AUTO: 28.9 PG (ref 26.6–33)
MCHC RBC AUTO-ENTMCNC: 31.7 G/DL (ref 31.5–35.7)
MCV RBC AUTO: 91.2 FL (ref 79–97)
MONOCYTES # BLD AUTO: 0.63 10*3/MM3 (ref 0.1–0.9)
MONOCYTES NFR BLD AUTO: 7.1 % (ref 5–12)
NEUTROPHILS NFR BLD AUTO: 6.76 10*3/MM3 (ref 1.7–7)
NEUTROPHILS NFR BLD AUTO: 75.9 % (ref 42.7–76)
PLATELET # BLD AUTO: 275 10*3/MM3 (ref 140–450)
PMV BLD AUTO: 10.2 FL (ref 6–12)
RBC # BLD AUTO: 5.12 10*6/MM3 (ref 4.14–5.8)
RETICS # AUTO: 0.07 10*6/MM3 (ref 0.02–0.13)
RETICS/RBC NFR AUTO: 1.41 % (ref 0.7–1.9)
TIBC SERPL-MCNC: 370 MCG/DL (ref 298–536)
TRANSFERRIN SERPL-MCNC: 248 MG/DL (ref 200–360)
WBC NRBC COR # BLD AUTO: 8.9 10*3/MM3 (ref 3.4–10.8)

## 2025-03-26 PROCEDURE — 36415 COLL VENOUS BLD VENIPUNCTURE: CPT

## 2025-03-26 PROCEDURE — 83540 ASSAY OF IRON: CPT | Performed by: STUDENT IN AN ORGANIZED HEALTH CARE EDUCATION/TRAINING PROGRAM

## 2025-03-26 PROCEDURE — 85025 COMPLETE CBC W/AUTO DIFF WBC: CPT

## 2025-03-26 PROCEDURE — 84466 ASSAY OF TRANSFERRIN: CPT | Performed by: STUDENT IN AN ORGANIZED HEALTH CARE EDUCATION/TRAINING PROGRAM

## 2025-03-26 PROCEDURE — 85045 AUTOMATED RETICULOCYTE COUNT: CPT | Performed by: STUDENT IN AN ORGANIZED HEALTH CARE EDUCATION/TRAINING PROGRAM

## 2025-03-26 PROCEDURE — 82728 ASSAY OF FERRITIN: CPT | Performed by: STUDENT IN AN ORGANIZED HEALTH CARE EDUCATION/TRAINING PROGRAM

## 2025-04-02 ENCOUNTER — OFFICE VISIT (OUTPATIENT)
Dept: ONCOLOGY | Facility: CLINIC | Age: 75
End: 2025-04-02
Payer: MEDICARE

## 2025-04-02 ENCOUNTER — TELEPHONE (OUTPATIENT)
Dept: FAMILY MEDICINE CLINIC | Facility: CLINIC | Age: 75
End: 2025-04-02

## 2025-04-02 VITALS
DIASTOLIC BLOOD PRESSURE: 77 MMHG | HEIGHT: 67 IN | OXYGEN SATURATION: 95 % | SYSTOLIC BLOOD PRESSURE: 135 MMHG | HEART RATE: 58 BPM | BODY MASS INDEX: 27.37 KG/M2 | WEIGHT: 174.4 LBS

## 2025-04-02 DIAGNOSIS — I73.9 PERIPHERAL VASCULAR DISEASE: ICD-10-CM

## 2025-04-02 DIAGNOSIS — D50.0 IRON DEFICIENCY ANEMIA DUE TO CHRONIC BLOOD LOSS: ICD-10-CM

## 2025-04-02 DIAGNOSIS — D64.9 ANEMIA, UNSPECIFIED TYPE: Primary | ICD-10-CM

## 2025-04-02 DIAGNOSIS — G30.0 MODERATE EARLY ONSET ALZHEIMER'S DEMENTIA WITHOUT BEHAVIORAL DISTURBANCE, PSYCHOTIC DISTURBANCE, MOOD DISTURBANCE, OR ANXIETY: ICD-10-CM

## 2025-04-02 DIAGNOSIS — F02.B0 MODERATE EARLY ONSET ALZHEIMER'S DEMENTIA WITHOUT BEHAVIORAL DISTURBANCE, PSYCHOTIC DISTURBANCE, MOOD DISTURBANCE, OR ANXIETY: ICD-10-CM

## 2025-04-02 RX ORDER — GLYCERIN, LIDOCAINE, PETROLATUM, AND PHENYLEPHRINE HYDROCHLORIDE 144; 50; 150; 2.5 MG/G; MG/G; MG/G; MG/G
1 CREAM TOPICAL 2 TIMES DAILY
Qty: 28 G | Refills: 1 | Status: SHIPPED | OUTPATIENT
Start: 2025-04-02

## 2025-04-02 NOTE — TELEPHONE ENCOUNTER
Caller: GINA COLLAZO    Relationship: Emergency Contact    Best call back number: [phone]     Who are you requesting to speak with (clinical staff, provider,  specific staff member): NOAH VELEZ    What was the call regarding: REQUESTS CALL BACK TO DISCUSS PATIENT'S BLEEDING HEMORRHOIDS.

## 2025-04-02 NOTE — PROGRESS NOTES
HEMATOLOGY ONCOLOGY OUTPATIENT FOLLOW UP       Patient name: Oscar Wray  : 1950  MRN: [id number]  Primary Care Physician: Kajal Horowitz PA  Referring Physician: No ref. provider found  Reason For Consult:       History of Present Illness:  Patient is a 74 y.o. male who has been referred to us for anemia. Pertinent recent outside labs are reported as follows:    24:  CBC: 9.13/10.9/36.9/375 (MCV 70.0, MCH 20.7, RDW 17.1)  CMP: BUN/Creat: 17/1.46 (gfr 51.1)  Iron levels: 19, Ferritin 38.6    Peripheral smear reported consistent with features of ANDREI including anisocytosis, poikilocytosis, microcytosis, polychromasia etc. Mild spherocytosis (1+) noted. No schistocytes reported.      Patient has underlying history of Alzheimer's disease, CKD and Peripheral vascular disease. He is on anticoagulation with Eliquis as well as antiplatelet therapy with Aspirin.         2024: Patient presents for initial consultation today. Accompanied by his Wife.  He appears to be pleasantly confused and denied any acute complaints presently. Unable to provide any significant details about medical history, most of history obtained form Chart and his wife.     Patient does not appear to be on  any nutritional supplements at present.     24: Patient seen for follow-up today.  No new complaints.  Compliant with oral iron supplementation.  Denied any bleeding issues with Eliquis.          Subjective:  25: Patient doing well overall clinically, per wife, he has had some hemorrhoidal bleeding recently swelling his undergarments and pants.  However, the symptoms are intermittent and not persistent.  Patient denied any rectal pain/discomfort.  Reported off-and-on constipation.    Past Medical History:   Diagnosis Date    Atherosclerosis of native arteries of extremities with intermittent claudication, bilateral legs 2023    Atherosclerosis of native arteries of right leg with ulceration  of ankle 01/26/2024    BPH with obstruction/lower urinary tract symptoms 01/08/2024    Chronic kidney disease, stage 3 unspecified     Dementia     Dyslipidemia 01/08/2024    Essential hypertension 01/08/2024    History of cigarette smoking 01/08/2024    Hyperlipidemia, unspecified     Other specified postprocedural states 01/26/2024    Peripheral arterial occlusive disease 01/08/2024       Past Surgical History:   Procedure Laterality Date    ANGIOPLASTY ILIAC ARTERY Bilateral 12/29/2023    Procedure: BILATERAL FEMORAL ENDARTECTOMY AND ILIAC ARTERY STENTS, LEFT LEG ANGIOGRAM;  Surgeon: Nura Salgado II, MD;  Location: [facility] HYBRID OR;  Service: Vascular;  Laterality: Bilateral;    ARTERIOGRAM Bilateral 4/10/2024    Procedure: ARTERIOGRAM LOWER EXTREMITY WITH ANGIOPLASTY via left arm access;  Surgeon: Nura Salgado II, MD;  Location: [facility] HYBRID OR;  Service: Vascular;  Laterality: Bilateral;    FEMORAL TIBIAL BYPASS Left 1/4/2024    Procedure: FEMORAL TIBIAL BYPASS;  Surgeon: Nura Salgado II, MD;  Location: [facility] MAIN OR;  Service: Vascular;  Laterality: Left;         Current Outpatient Medications:     acetaminophen (TYLENOL) 325 MG tablet, Take 2 tablets by mouth Every 6 (Six) Hours As Needed for Mild Pain., Disp: , Rfl:     apixaban (ELIQUIS) 5 MG tablet tablet, Take 1 tablet by mouth Every 12 (Twelve) Hours. Indications: Other - full anticoagulation, pad/bypass stents, Disp: 60 tablet, Rfl: 11    aspirin 81 MG EC tablet, Take 1 tablet by mouth Daily., Disp: , Rfl:     atenolol (TENORMIN) 25 MG tablet, Take 0.5 tablets by mouth Daily., Disp: , Rfl:     atorvastatin (LIPITOR) 20 MG tablet, Take 1 tablet by mouth Daily., Disp: 30 tablet, Rfl: 5    Cholecalciferol (Vitamin D3) 50 MCG (2000 UT) capsule, Take 1 capsule by mouth Daily., Disp: , Rfl:     cilostazol (PLETAL) 100 MG tablet, Take 1 tablet by mouth 2 (Two) Times a Day., Disp: , Rfl:     dapagliflozin Propanediol (Farxiga) 10 MG tablet, Take 10  mg by mouth Daily., Disp: 30 tablet, Rfl: 5    docusate sodium (COLACE) 100 MG capsule, Take 1 capsule by mouth Daily., Disp: , Rfl:     donepezil (ARICEPT) 23 MG tablet, Take 1 tablet by mouth Every Night., Disp: 30 tablet, Rfl: 5    ferrous sulfate 325 (65 FE) MG tablet, Take 1 tablet by mouth Daily With Breakfast., Disp: 90 tablet, Rfl: 1    furosemide (LASIX) 20 MG tablet, , Disp: , Rfl:     melatonin 1 MG tablet, Take 1 tablet by mouth Every Night., Disp: , Rfl:     memantine (NAMENDA XR) 28 MG capsule sustained-release 24 hr extended release capsule, Take 1 capsule by mouth Daily., Disp: , Rfl:     Milk of Magnesia 400 MG/5ML suspension, Take 5 mL by mouth Daily., Disp: , Rfl:     multivitamin with minerals tablet tablet, Take 1 tablet by mouth Daily., Disp: , Rfl:     Omega-3 Fatty Acids (fish oil) 1000 MG capsule capsule, Take 2 capsules by mouth 2 (Two) Times a Day With Meals., Disp: , Rfl:     risperiDONE (risperDAL) 0.25 MG tablet, Take 1 tablet by mouth Daily., Disp: , Rfl:     risperiDONE (risperDAL) 0.5 MG tablet, Take 1 tablet by mouth Every Night., Disp: , Rfl:     tamsulosin (FLOMAX) 0.4 MG capsule 24 hr capsule, Take 1 capsule by mouth Daily., Disp: , Rfl:     traMADol (ULTRAM) 50 MG tablet, Take 1 tablet by mouth Every 12 (Twelve) Hours As Needed for Moderate Pain., Disp: 60 tablet, Rfl: 5    Current Facility-Administered Medications:     aspirin EC tablet 81 mg, 81 mg, Oral, Daily, David Carson MD    No Known Allergies    Family History   Family history unknown: Yes       Family history is unknown by patient.      Social History     Tobacco Use    Smoking status: Former     Types: Cigarettes     Passive exposure: Past    Smokeless tobacco: Never   Vaping Use    Vaping status: Never Used   Substance Use Topics    Alcohol use: Not Currently    Drug use: Never     Social History     Social History Narrative    Not on file       ROS:   Review of Systems   Constitutional: Negative.    HENT:  "Negative.     Eyes: Negative.    Respiratory: Negative.     Cardiovascular: Negative.    Gastrointestinal: Negative.    Endocrine: Negative.    Genitourinary: Negative.    Musculoskeletal: Negative.    Skin: Negative.    Allergic/Immunologic: Negative.    Neurological: Negative.    Hematological: Negative.    Psychiatric/Behavioral:  Positive for confusion.          Objective:    Vital Signs:  Vitals:    04/02/25 1347   BP: 135/77   Pulse: 58   SpO2: 95%   Weight: 79.1 kg (174 lb 6.4 oz)   Height: 170.2 cm (67\")   PainSc: 0-No pain         Body mass index is 27.31 kg/m².    ECOG  (2) Ambulatory and capable of self care, unable to carry out work activity, up and about > 50% or waking hours    Physical Exam:   Physical Exam  Constitutional:       Appearance: Normal appearance. He is normal weight.   HENT:      Head: Normocephalic and atraumatic.      Right Ear: External ear normal.      Left Ear: External ear normal.      Nose: Nose normal.      Mouth/Throat:      Mouth: Mucous membranes are moist.      Pharynx: Oropharynx is clear.   Eyes:      Extraocular Movements: Extraocular movements intact.      Conjunctiva/sclera: Conjunctivae normal.      Pupils: Pupils are equal, round, and reactive to light.   Cardiovascular:      Rate and Rhythm: Normal rate.      Pulses: Normal pulses.   Pulmonary:      Effort: Pulmonary effort is normal.   Abdominal:      General: Abdomen is flat.      Palpations: Abdomen is soft.   Musculoskeletal:         General: Normal range of motion.      Cervical back: Normal range of motion and neck supple.   Skin:     General: Skin is warm.   Neurological:      Mental Status: He is alert.   Psychiatric:         Mood and Affect: Mood normal.         Behavior: Behavior normal.         Thought Content: Thought content normal.         Judgment: Judgment normal.         Lab Results - Last 18 Months   Lab Units 03/26/25  1351 12/02/24  1313 08/20/24  1339   WBC 10*3/mm3 8.90 8.49 10.00   HEMOGLOBIN " "g/dL 14.8 14.5 11.5*   HEMATOCRIT % 46.7 45.2 38.2   PLATELETS 10*3/mm3 275 294 344   MCV fL 91.2 85.0 71.1*     Lab Results - Last 18 Months   Lab Units 11/14/24  1357 10/14/24  1507 08/20/24  1339 07/23/24  1350 04/30/24  1601   SODIUM mmol/L 139 143 144 143 141   POTASSIUM mmol/L 3.5 4.0 4.5 4.4 4.5   CHLORIDE mmol/L 102 105 108* 105 106   CO2 mmol/L 24.9 25.7 25.3 26.1 22.5   BUN mg/dL 9 9 21 17 16   CREATININE mg/dL 1.53* 1.53* 1.80* 1.46* 1.40*   CALCIUM mg/dL 9.3 9.3 9.4 9.4 9.7   BILIRUBIN mg/dL  --   --  0.3 0.2 0.5   ALK PHOS U/L  --   --  117 111 112   ALT (SGPT) U/L  --   --  13 18 11   AST (SGOT) U/L  --   --  20 17 20   GLUCOSE mg/dL 66 70 63* 63* 78       Lab Results   Component Value Date    GLUCOSE 66 11/14/2024    BUN 9 11/14/2024    CREATININE 1.53 (H) 11/14/2024    BCR 5.9 (L) 11/14/2024    K 3.5 11/14/2024    CO2 24.9 11/14/2024    CALCIUM 9.3 11/14/2024    ALBUMIN 4.5 08/20/2024    AST 20 08/20/2024    ALT 13 08/20/2024       Lab Results - Last 18 Months   Lab Units 04/10/24  0730 04/05/24  1321 01/06/24  2254 01/05/24  1047 01/05/24  0353   INR  1.06 1.14*  --   --  1.06   APTT seconds 29.0 32.7* >139.0*   < > 47.1*    < > = values in this interval not displayed.       Lab Results   Component Value Date    IRON 107 03/26/2025    TIBC 370 03/26/2025    FERRITIN 49.80 03/26/2025       Lab Results   Component Value Date    FOLATE 5.15 12/02/2024       No results found for: \"OCCULTBLD\"    Lab Results   Component Value Date    RETICCTPCT 1.41 03/26/2025     Lab Results   Component Value Date    JWKNIBDF08 920 12/02/2024     Lab Results   Component Value Date    SPEP Comment 04/30/2024     LDH   Date Value Ref Range Status   08/20/2024 184 135 - 225 U/L Final     Lab Results   Component Value Date    ANDREA Negative 04/30/2024    SEDRATE 35 (H) 04/30/2024     Lab Results   Component Value Date    HAPTOGLOBIN 235 (H) 08/20/2024     Lab Results   Component Value Date    PTT 29.0 04/10/2024    INR 1.06 " "04/10/2024     No results found for: \"\"  No results found for: \"CEA\"  No components found for: \"CA-19-9\"  Lab Results   Component Value Date    PSA 0.356 10/14/2024     Lab Results   Component Value Date    SEDRATE 35 (H) 04/30/2024          Assessment & Plan     Microcytic anemia:  Iron Deficiency anemia:  -Outside labs are suggestive of Iron deficiency anemia  -Repeat CBC today showed mild anemia with Hb/hct 11.5/38.5, Low MCV.   -iron panel showed low TSAT 5% and low ferritin 11.6 consistent with iron deficiency anemia. Elevated Soluble Transferrin receptor levels.  -other labs including hemolysis labs, plasma cell workup, B12, Folate levels, Retic counts, reported WNL  -CMP is consistent with Stage II/III CKD which may also be a contributing factor for anemia.  -started patient on Oral iron supplementation.  Tolerating well.  Noted improvement in Hb to 14.5  -given ongoing Antiplatelet therapy and anticoagulation there is concern about possible GI blood loss.   -Repeat labs on 4/2/2025 showed improvement in ferritin levels and TSAT levels.  Hemoglobin has improved to 14.8  -Continue oral iron supplementation.  No indication for IV iron presently  -Will continue to monitor labs and will consider GI referral for EGD/Colonoscopy if noted recurrent iron deficiency.    Hemorrhoidal bleeding:  -Likely due to constipation.  Encouraged to the patient to use daily MiraLAX  -Prescribed Preparation H today.      Follow up in  3 months with APRN with repeat labs, sooner as needed.    Thank you very much for providing the opportunity to participate in this patient’s care. Please do not hesitate to call if there are any other questions.    "

## 2025-04-03 NOTE — TELEPHONE ENCOUNTER
Please call Liane to verify how much bleeding he is having.  If he is having a lot of blood, he needs to go to the ER.  If it is minimal, he can schedule an appointment here.

## 2025-04-14 ENCOUNTER — LAB (OUTPATIENT)
Dept: LAB | Facility: HOSPITAL | Age: 75
End: 2025-04-14
Payer: MEDICARE

## 2025-04-14 ENCOUNTER — OFFICE VISIT (OUTPATIENT)
Dept: FAMILY MEDICINE CLINIC | Facility: CLINIC | Age: 75
End: 2025-04-14
Payer: MEDICARE

## 2025-04-14 VITALS
SYSTOLIC BLOOD PRESSURE: 122 MMHG | BODY MASS INDEX: 26.68 KG/M2 | HEIGHT: 67 IN | DIASTOLIC BLOOD PRESSURE: 68 MMHG | OXYGEN SATURATION: 96 % | HEART RATE: 58 BPM | WEIGHT: 170 LBS

## 2025-04-14 DIAGNOSIS — R35.0 URINARY FREQUENCY: Primary | ICD-10-CM

## 2025-04-14 DIAGNOSIS — R35.0 URINARY FREQUENCY: ICD-10-CM

## 2025-04-14 LAB
ALBUMIN SERPL-MCNC: 4.3 G/DL (ref 3.5–5.2)
ALBUMIN/GLOB SERPL: 1.5 G/DL
ALP SERPL-CCNC: 94 U/L (ref 39–117)
ALT SERPL W P-5'-P-CCNC: 9 U/L (ref 1–41)
ANION GAP SERPL CALCULATED.3IONS-SCNC: 12.1 MMOL/L (ref 5–15)
AST SERPL-CCNC: 22 U/L (ref 1–40)
BILIRUB BLD-MCNC: NEGATIVE MG/DL
BILIRUB SERPL-MCNC: 0.4 MG/DL (ref 0–1.2)
BUN SERPL-MCNC: 12 MG/DL (ref 8–23)
BUN/CREAT SERPL: 7.5 (ref 7–25)
CALCIUM SPEC-SCNC: 9.4 MG/DL (ref 8.6–10.5)
CHLORIDE SERPL-SCNC: 105 MMOL/L (ref 98–107)
CLARITY, POC: CLEAR
CO2 SERPL-SCNC: 23.9 MMOL/L (ref 22–29)
COLOR UR: YELLOW
CREAT SERPL-MCNC: 1.59 MG/DL (ref 0.76–1.27)
EGFRCR SERPLBLD CKD-EPI 2021: 45.8 ML/MIN/1.73
EXPIRATION DATE: ABNORMAL
GLOBULIN UR ELPH-MCNC: 2.8 GM/DL
GLUCOSE SERPL-MCNC: 83 MG/DL (ref 65–99)
GLUCOSE UR STRIP-MCNC: ABNORMAL MG/DL
KETONES UR QL: NEGATIVE
LEUKOCYTE EST, POC: NEGATIVE
Lab: ABNORMAL
NITRITE UR-MCNC: NEGATIVE MG/ML
PH UR: 6 [PH] (ref 5–8)
POTASSIUM SERPL-SCNC: 4 MMOL/L (ref 3.5–5.2)
PROT SERPL-MCNC: 7.1 G/DL (ref 6–8.5)
PROT UR STRIP-MCNC: NEGATIVE MG/DL
RBC # UR STRIP: NEGATIVE /UL
SODIUM SERPL-SCNC: 141 MMOL/L (ref 136–145)
SP GR UR: 1.02 (ref 1–1.03)
UROBILINOGEN UR QL: NORMAL

## 2025-04-14 PROCEDURE — 80053 COMPREHEN METABOLIC PANEL: CPT

## 2025-04-14 PROCEDURE — 99213 OFFICE O/P EST LOW 20 MIN: CPT | Performed by: NURSE PRACTITIONER

## 2025-04-14 PROCEDURE — 3074F SYST BP LT 130 MM HG: CPT | Performed by: NURSE PRACTITIONER

## 2025-04-14 PROCEDURE — 1126F AMNT PAIN NOTED NONE PRSNT: CPT | Performed by: NURSE PRACTITIONER

## 2025-04-14 PROCEDURE — 36415 COLL VENOUS BLD VENIPUNCTURE: CPT

## 2025-04-14 PROCEDURE — 81003 URINALYSIS AUTO W/O SCOPE: CPT | Performed by: NURSE PRACTITIONER

## 2025-04-14 PROCEDURE — 3078F DIAST BP <80 MM HG: CPT | Performed by: NURSE PRACTITIONER

## 2025-04-14 RX ORDER — TAMSULOSIN HYDROCHLORIDE 0.4 MG/1
2 CAPSULE ORAL DAILY
Qty: 60 CAPSULE | Refills: 0 | Status: SHIPPED | OUTPATIENT
Start: 2025-04-14

## 2025-04-14 NOTE — PROGRESS NOTES
Arina GIBBS Carrier is a 74 y.o. male.       HPI   Pt is here today with concern of frequent urination.   Symptoms have been ongoing for months.    He denies any painful urination, burning, or blood in urine.  Says he goes multiple times thru day and night.  He is on Flomax 0.4 mg daily; has taken this for years.  Not sure if it has ever helped.  His wife says they can go shopping at [employer] and he has to urinate 3-4 times in an hour period.    It is also noted he is on Lasix 20 mg daily.    He has CKD stage 3 and follows with nephrology.     He has never seen urology.      The following portions of the patient's history were reviewed and updated as appropriate: allergies, current medications, past family history, past medical history, past social history, past surgical history, and problem list.    Review of Systems   Constitutional:  Negative for chills, fatigue and fever.   Respiratory:  Negative for cough and shortness of breath.    Cardiovascular:  Negative for chest pain and palpitations.   Gastrointestinal:  Negative for abdominal pain and diarrhea.   Genitourinary:  Positive for frequency and nocturia. Negative for decreased urine volume, difficulty urinating, dysuria, flank pain, hematuria and urgency.   Neurological:  Negative for dizziness, light-headedness and headache.   Psychiatric/Behavioral:  Negative for depressed mood. The patient is not nervous/anxious.        Objective   Physical Exam  Vitals reviewed.   Constitutional:       General: He is not in acute distress.     Appearance: Normal appearance.   HENT:      Head: Normocephalic.   Cardiovascular:      Rate and Rhythm: Normal rate and regular rhythm.      Pulses: Normal pulses.      Heart sounds: Normal heart sounds.   Pulmonary:      Effort: Pulmonary effort is normal. No respiratory distress.      Breath sounds: Normal breath sounds. No wheezing, rhonchi or rales.   Chest:      Chest wall: No tenderness.   Abdominal:       Tenderness: There is no right CVA tenderness or left CVA tenderness.   Musculoskeletal:      Cervical back: Normal range of motion.      Right lower leg: No edema.      Left lower leg: No edema.   Skin:     General: Skin is warm and dry.   Neurological:      Mental Status: He is alert. Mental status is at baseline.   Psychiatric:         Mood and Affect: Mood normal.                  Procedures   Assessment & Plan   Diagnoses and all orders for this visit:    1. Urinary frequency (Primary)  Comments:  UA shows only glucose (he is on Farxgia)  CMP ordered.   Will increase Flomax to 0.8 mg daily.   Referral to Urology given.  Orders:  -     POC Urinalysis Dipstick, Automated  -     Comprehensive metabolic panel; Future  -     Ambulatory Referral to Urology  -     tamsulosin (FLOMAX) 0.4 MG capsule 24 hr capsule; Take 2 capsules by mouth Daily.  Dispense: 60 capsule; Refill: 0

## 2025-04-17 ENCOUNTER — PATIENT OUTREACH (OUTPATIENT)
Dept: CASE MANAGEMENT | Facility: CLINIC | Age: 75
End: 2025-04-17
Payer: MEDICARE

## 2025-04-17 DIAGNOSIS — N18.31 STAGE 3A CHRONIC KIDNEY DISEASE: Primary | ICD-10-CM

## 2025-04-17 NOTE — OUTREACH NOTE
"AMBULATORY CASE MANAGEMENT NOTE    Names and Relationships of Patient/Support Persons: Contact: Oscar Wray \"DESHAUN\"; Relationship: Self -     ACM called and spoke with patient. Identified self and roll. Explained and offered CCM program and patient declined at this time. Encouraged patient to reach out in future if needs arise.           Jeanne LOPZE  Ambulatory Case Management    4/17/2025, 08:19 EDT  "

## 2025-04-29 ENCOUNTER — TRANSCRIBE ORDERS (OUTPATIENT)
Dept: ADMINISTRATIVE | Facility: HOSPITAL | Age: 75
End: 2025-04-29
Payer: MEDICARE

## 2025-04-29 ENCOUNTER — TELEPHONE (OUTPATIENT)
Dept: FAMILY MEDICINE CLINIC | Facility: CLINIC | Age: 75
End: 2025-04-29
Payer: MEDICARE

## 2025-04-29 ENCOUNTER — LAB (OUTPATIENT)
Dept: LAB | Facility: HOSPITAL | Age: 75
End: 2025-04-29
Payer: MEDICARE

## 2025-04-29 ENCOUNTER — LAB (OUTPATIENT)
Dept: FAMILY MEDICINE CLINIC | Facility: CLINIC | Age: 75
End: 2025-04-29
Payer: MEDICARE

## 2025-04-29 DIAGNOSIS — E53.8 BIOTIN-(PROPIONYL-COA-CARBOXYLASE) LIGASE DEFICIENCY: Primary | ICD-10-CM

## 2025-04-29 DIAGNOSIS — E53.8 BIOTIN-(PROPIONYL-COA-CARBOXYLASE) LIGASE DEFICIENCY: ICD-10-CM

## 2025-04-29 DIAGNOSIS — R35.0 INCREASED FREQUENCY OF URINATION: Primary | ICD-10-CM

## 2025-04-29 LAB — VIT B12 BLD-MCNC: 930 PG/ML (ref 211–946)

## 2025-04-29 PROCEDURE — 36415 COLL VENOUS BLD VENIPUNCTURE: CPT

## 2025-04-29 PROCEDURE — 82607 VITAMIN B-12: CPT

## 2025-04-29 NOTE — TELEPHONE ENCOUNTER
Pt was here earlier this month, and Rika increased  the Flomax due to increased urination.  That does not seem to be working.  What should he do?  Please call pt.

## 2025-04-29 NOTE — TELEPHONE ENCOUNTER
I'd like him to see urology.  I have put in a referral for this; they will contact him to schedule.

## 2025-04-29 NOTE — TELEPHONE ENCOUNTER
LMTCB     HUB TO RELAY     Needs to see Urology, Referral put in and they will call him to schedule. Did not leave their number.

## 2025-06-19 ENCOUNTER — OFFICE VISIT (OUTPATIENT)
Age: 75
End: 2025-06-19
Payer: MEDICARE

## 2025-06-19 VITALS — BODY MASS INDEX: 26.68 KG/M2 | HEIGHT: 67 IN | RESPIRATION RATE: 20 BRPM | WEIGHT: 170 LBS

## 2025-06-19 DIAGNOSIS — G62.9 NEUROPATHY: ICD-10-CM

## 2025-06-19 DIAGNOSIS — L85.3 XEROSIS OF SKIN: ICD-10-CM

## 2025-06-19 DIAGNOSIS — M79.675 PAIN IN TOES OF BOTH FEET: ICD-10-CM

## 2025-06-19 DIAGNOSIS — M79.674 PAIN IN TOES OF BOTH FEET: ICD-10-CM

## 2025-06-19 DIAGNOSIS — L60.3 ONYCHODYSTROPHY: Primary | ICD-10-CM

## 2025-06-19 DIAGNOSIS — R26.81 UNSTEADINESS ON FEET: ICD-10-CM

## 2025-06-19 DIAGNOSIS — I73.9 PERIPHERAL ARTERIAL DISEASE: ICD-10-CM

## 2025-06-20 NOTE — PROGRESS NOTES
06/19/2025  Foot and Ankle Surgery - Established Patient/Follow-up  Provider: ELOY Thornton   Location: [facility]    Subjective:  Oscar Wray is a 74 y.o. male.     Chief Complaint   Patient presents with    Left Foot - Follow-up     Foot check ,nail trim non dm     Right Foot - Follow-up     Foot check ,nail trim non dm     Follow-up     PCP: Kajal Horowitz PA-C  Last PCP Visit: 2/14/25         History of Present Illness  The patient is a 74-year-old male who presents for a routine foot check and nail care.    He reports no new issues with his feet and expresses a desire to have his nails trimmed. He has a scheduled appointment with a vascular specialist next week.           No Known Allergies    Current Outpatient Medications on File Prior to Visit   Medication Sig Dispense Refill    acetaminophen (TYLENOL) 325 MG tablet Take 2 tablets by mouth Every 6 (Six) Hours As Needed for Mild Pain.      apixaban (ELIQUIS) 5 MG tablet tablet Take 1 tablet by mouth Every 12 (Twelve) Hours. Indications: Other - full anticoagulation, pad/bypass stents 60 tablet 11    aspirin 81 MG EC tablet Take 1 tablet by mouth Daily.      atenolol (TENORMIN) 25 MG tablet Take 0.5 tablets by mouth Daily.      atorvastatin (LIPITOR) 20 MG tablet Take 1 tablet by mouth Daily. 30 tablet 5    Cholecalciferol (Vitamin D3) 50 MCG (2000 UT) capsule Take 1 capsule by mouth Daily.      cilostazol (PLETAL) 100 MG tablet Take 1 tablet by mouth 2 (Two) Times a Day.      dapagliflozin Propanediol (Farxiga) 10 MG tablet Take 10 mg by mouth Daily. 30 tablet 5    docusate sodium (COLACE) 100 MG capsule Take 1 capsule by mouth Daily.      donepezil (ARICEPT) 23 MG tablet Take 1 tablet by mouth Every Night. 30 tablet 5    ferrous sulfate 325 (65 FE) MG tablet Take 1 tablet by mouth Daily With Breakfast. 90 tablet 1    furosemide (LASIX) 20 MG tablet       Lido-PE-Glycerin-Petrolatum (Preparation H Rapid Relief) 5-0.25-14.4-15 % cream  "Apply 1 Application topically 2 (Two) Times a Day. 28 g 1    melatonin 1 MG tablet Take 1 tablet by mouth Every Night.      memantine (NAMENDA XR) 28 MG capsule sustained-release 24 hr extended release capsule Take 1 capsule by mouth Daily.      Milk of Magnesia 400 MG/5ML suspension Take 5 mL by mouth Daily.      multivitamin with minerals tablet tablet Take 1 tablet by mouth Daily.      Omega-3 Fatty Acids (fish oil) 1000 MG capsule capsule Take 2 capsules by mouth 2 (Two) Times a Day With Meals.      risperiDONE (risperDAL) 0.25 MG tablet Take 1 tablet by mouth Daily.      risperiDONE (risperDAL) 0.5 MG tablet Take 1 tablet by mouth Every Night.      tamsulosin (FLOMAX) 0.4 MG capsule 24 hr capsule Take 2 capsules by mouth Daily. 60 capsule 0    traMADol (ULTRAM) 50 MG tablet Take 1 tablet by mouth Every 12 (Twelve) Hours As Needed for Moderate Pain. 60 tablet 5     Current Facility-Administered Medications on File Prior to Visit   Medication Dose Route Frequency Provider Last Rate Last Admin    aspirin EC tablet 81 mg  81 mg Oral Daily David Carson MD           Objective   Resp 20   Ht 170.2 cm (67\")   Wt 77.1 kg (170 lb)   BMI 26.63 kg/m²     Foot/Ankle Exam    Appearance:  appears stated age and elderly  Orientation:  AAOx3  Affect:  appropriate  Gait:  unimpaired  Assistance:  independent  Right shoe gear: casual shoe and sock  Left shoe gear: casual shoe and sock     VASCULAR      Right Foot Vascularity   Dorsalis pedis:  1+  Skin temperature:  warm  Edema grading:  trace  CFT:  < 3 seconds  Pedal hair growth:  Absent  Varicosities:  none      Left Foot Vascularity   Dorsalis pedis:  unable to confidently palpate  Skin temperature:  warm  Edema grading:  trace  CFT:  < 3 seconds  Pedal hair growth:  Absent  Varicosities:  none     NEUROLOGIC      Right Foot Neurologic   Light touch sensation: normal  Protective Sensation using [place]-Kam Monofilament:   Sites intact: 10  Sites tested: 10      " Left Foot Neurologic   Light touch sensation: normal  Protective Sensation using [place]-Kam Monofilament:   Sites intact: 7  Sites tested: 10     MUSCULOSKELETAL      Right Foot Musculoskeletal   Ecchymosis:  none  Tenderness:  toenail problem        Left Foot Musculoskeletal   Ecchymosis:  none  Tenderness:  toenail problem     DERMATOLOGIC       Right Foot Dermatologic   Skin  Positive for dryness and skin changes.   Nails  1.  Positive for elongated, abnormal thickness and dystrophic nail.  2.  Positive for elongated, abnormal thickness and dystrophic nail.  3.  Positive for elongated, abnormal thickness and dystrophic nail.  4.  Positive for elongated, abnormal thickness and dystrophic nail.  5.  Positive for elongated, abnormal thickness and dystrophic nail.      Left Foot Dermatologic   Skin  Positive for dryness and skin changes.   Nails  1.  Positive for elongated, abnormal thickness and dystrophic nail.  2.  Positive for elongated, abnormal thickness and dystrophic nail.  3.  Positive for elongated, abnormal thickness and dystrophic nail.  4.  Positive for elongated, abnormally thick and dystrophic nail.  5.  Positive for elongated, abnormally thick and dystrophic rand  Physical Exam  Integumentary: Both feet are warm and pink. Dry skin noted.    Musculoskeletal:  Toenails: Nails debrided x 10       Results       Assessment & Plan   Diagnoses and all orders for this visit:    1. Onychodystrophy (Primary)    2. Unsteadiness on feet    3. Neuropathy    4. Xerosis of skin    5. Peripheral arterial disease    6. Pain in toes of both feet      Assessment & Plan  1. Routine foot examination and nail care:    Nails were debrided today. Advised to apply moisturizer to feet when damp, such as after bathing or showering. Explained importance of  foot care, daily foot checks, and glycemic control. Patient should check both feet on a daily basis,  make sure that both feet and in between toes are towel dried after  baths or showers. Avoid barefoot walking at all times.  checking shoes before wearing them. . Call the office at the first signs of a wound or with signs of infection.     Unable to palpate left dp pedal pulse confidently- discussed with pt/family but otherwise nv intact. Pt seeing vascular next week.     Feet dry and soiled. Feet were cleansed today, dried, and Aquaphor applied in clinic.     Nail debridement: Both feet x10    Consent and time out was performed before proceeding with the procedure. Nails were debrided with a nail nipper without complication.  No anesthesia was required.  Indications for procedure were thickened, dystrophic, and fungal appearing nails which are difficult to trim.  Proper self-care and technique was discussed with patient.  Patient was stable after procedure.     Follow-up  Follow up in 3 months.    PROCEDURE  Nails were trimmed today in the office.       No orders of the defined types were placed in this encounter.         Patient or patient representative verbalized consent for the use of Ambient Listening during the visit with  ELOY Garcia for chart documentation. 6/20/2025  11:25 EDT

## 2025-06-24 ENCOUNTER — OFFICE VISIT (OUTPATIENT)
Age: 75
End: 2025-06-24
Payer: MEDICARE

## 2025-06-24 ENCOUNTER — HOSPITAL ENCOUNTER (OUTPATIENT)
Dept: CARDIOLOGY | Facility: HOSPITAL | Age: 75
Discharge: HOME | End: 2025-06-24
Payer: MEDICARE

## 2025-06-24 VITALS
BODY MASS INDEX: 26.09 KG/M2 | HEIGHT: 67 IN | WEIGHT: 166.2 LBS | DIASTOLIC BLOOD PRESSURE: 60 MMHG | SYSTOLIC BLOOD PRESSURE: 117 MMHG

## 2025-06-24 DIAGNOSIS — E66.3 OVERWEIGHT (BMI 25.0-29.9): ICD-10-CM

## 2025-06-24 DIAGNOSIS — Z98.890 PERIPHERAL ARTERIAL DISEASE WITH HISTORY OF REVASCULARIZATION: ICD-10-CM

## 2025-06-24 DIAGNOSIS — I73.9 PERIPHERAL ARTERIAL DISEASE WITH HISTORY OF REVASCULARIZATION: ICD-10-CM

## 2025-06-24 DIAGNOSIS — I73.9 PAD (PERIPHERAL ARTERY DISEASE): Primary | ICD-10-CM

## 2025-06-24 DIAGNOSIS — I70.0 ATHEROSCLEROSIS OF AORTA: ICD-10-CM

## 2025-06-24 DIAGNOSIS — Z95.828 STATUS POST INSERTION OF ILIAC ARTERY STENT: ICD-10-CM

## 2025-06-24 PROCEDURE — 3078F DIAST BP <80 MM HG: CPT | Performed by: NURSE PRACTITIONER

## 2025-06-24 PROCEDURE — 93926 LOWER EXTREMITY STUDY: CPT

## 2025-06-24 PROCEDURE — 1160F RVW MEDS BY RX/DR IN RCRD: CPT | Performed by: NURSE PRACTITIONER

## 2025-06-24 PROCEDURE — 1159F MED LIST DOCD IN RCRD: CPT | Performed by: NURSE PRACTITIONER

## 2025-06-24 PROCEDURE — 3074F SYST BP LT 130 MM HG: CPT | Performed by: NURSE PRACTITIONER

## 2025-06-24 PROCEDURE — 93922 UPR/L XTREMITY ART 2 LEVELS: CPT

## 2025-06-24 PROCEDURE — 99214 OFFICE O/P EST MOD 30 MIN: CPT | Performed by: NURSE PRACTITIONER

## 2025-06-24 PROCEDURE — 93978 VASCULAR STUDY: CPT

## 2025-06-24 NOTE — PROGRESS NOTES
[facility] VASCULAR SURGERY    Chief Complaint  Peripheral Vascular Disease    Subjective          History of Present Illness  Oscar Wray is a 74 y.o. male with peripheral arterial disease. He presents to the office today for follow up.  He is accompanied by his significant other.  He is followed by Dr. Salgado. He has had the following vascular surgery interventions performed:    Bilateral common femoral endarterectomy, thrombectomy left external iliac artery, bilateral iliac stents extending from the common iliac down to the distal external iliac bilaterally on 12/29/2023  Left femoral to PT artery bypass with in situ GSV on 1/4/2024  Left brachial artery cutdown, left leg arteriogram, balloon angioplasty of left femoral-tibial bypass and left iliac stent on 4/10/2024    He denies any hospitalizations or new diagnosis since we last saw him. He is ambulating without issue.  He can walk as far as he would like without a problem.  He denies any claudication.  He has no rest pain.  He has no wounds to his lower extremities. He is maintained on Eliquis, aspirin, Pletal, and a statin. He reports compliance with his medications. He is a former smoker.    Review of Systems   Musculoskeletal:  Negative for myalgias.   Skin:  Negative for skin lesions and wound.        Allergies: Patient has no known allergies.    Prior to Admission medications    Medication Sig Start Date End Date Taking? Authorizing Provider   acetaminophen (TYLENOL) 325 MG tablet Take 2 tablets by mouth Every 6 (Six) Hours As Needed for Mild Pain.   Yes Molly Vora MD   apixaban (ELIQUIS) 5 MG tablet tablet Take 1 tablet by mouth Every 12 (Twelve) Hours. Indications: Other - full anticoagulation, pad/bypass stents 1/8/24  Yes Pablito Deleon MD   aspirin 81 MG EC tablet Take 1 tablet by mouth Daily.   Yes ProviderMolly MD   atenolol (TENORMIN) 25 MG tablet Take 0.5 tablets by mouth Daily. 6/3/22  Yes Provider  MD Molly   atorvastatin (LIPITOR) 20 MG tablet Take 1 tablet by mouth Daily. 2/14/25  Yes Kajal Horowitz PA-C   Cholecalciferol (Vitamin D3) 50 MCG (2000 UT) capsule Take 1 capsule by mouth Daily. 12/17/22  Yes Molly Vora MD   cilostazol (PLETAL) 100 MG tablet Take 1 tablet by mouth 2 (Two) Times a Day. 2/25/24  Yes Molly Vora MD   dapagliflozin Propanediol (Farxiga) 10 MG tablet Take 10 mg by mouth Daily. 2/14/25  Yes Kajal Horowitz PA-C   docusate sodium (COLACE) 100 MG capsule Take 1 capsule by mouth Daily. 5/13/22  Yes Molly Vora MD   donepezil (ARICEPT) 23 MG tablet Take 1 tablet by mouth Every Night. 2/26/25  Yes Kajal Horowitz PA-C   ferrous sulfate 325 (65 FE) MG tablet Take 1 tablet by mouth Daily With Breakfast. 8/20/24  Yes Doyle Marinelli MD   furosemide (LASIX) 20 MG tablet  8/12/24  Yes Molly Vora MD   Lido-PE-Glycerin-Petrolatum (Preparation H Rapid Relief) 5-0.25-14.4-15 % cream Apply 1 Application topically 2 (Two) Times a Day. 4/2/25  Yes Doyle Marinelli MD   melatonin 1 MG tablet Take 1 tablet by mouth Every Night. 2/12/24  Yes Molly Vora MD   memantine (NAMENDA XR) 28 MG capsule sustained-release 24 hr extended release capsule Take 1 capsule by mouth Daily. 6/3/22  Yes Molly Vora MD   Milk of Magnesia 400 MG/5ML suspension Take 5 mL by mouth Daily. 2/29/24  Yes Molly Vora MD   multivitamin with minerals tablet tablet Take 1 tablet by mouth Daily.   Yes Molly Vora MD   Omega-3 Fatty Acids (fish oil) 1000 MG capsule capsule Take 2 capsules by mouth 2 (Two) Times a Day With Meals.   Yes Molly Vora MD   risperiDONE (risperDAL) 0.25 MG tablet Take 1 tablet by mouth Daily. 6/3/22  Yes Molly Vora MD   risperiDONE (risperDAL) 0.5 MG tablet Take 1 tablet by mouth Every Night. 6/3/22  Yes Provider, MD Molly   tamsulosin (FLOMAX) 0.4 MG capsule 24 hr capsule Take 2 capsules  "by mouth Daily. 4/14/25  Yes Rika De Jesus APRN   traMADol (ULTRAM) 50 MG tablet Take 1 tablet by mouth Every 12 (Twelve) Hours As Needed for Moderate Pain. 2/14/25  Yes Kajal Horowitz PA-C         Objective   Vital Signs:  /60 (BP Location: Left arm)   Ht 170.2 cm (67.01\")   Wt 75.4 kg (166 lb 3.2 oz)   BMI 26.02 kg/m²   Estimated body mass index is 26.02 kg/m² as calculated from the following:    Height as of this encounter: 170.2 cm (67.01\").    Weight as of this encounter: 75.4 kg (166 lb 3.2 oz).       Physical Exam  Vitals reviewed.   Constitutional:       General: He is not in acute distress.     Appearance: He is not ill-appearing.   Cardiovascular:      Pulses:           Radial pulses are 2+ on the right side and 2+ on the left side.   Pulmonary:      Effort: Pulmonary effort is normal. No respiratory distress.   Neurological:      General: No focal deficit present.      Mental Status: He is alert and oriented to person, place, and time.        Result Review :    The following data was reviewed by: ELOY Jones on 06/24/2025:  Duplex Aorta IVC Iliac Graft Complete CAR (06/24/2025 10:46)   Right: Post stent velocity up 217 cm/s  Left: Proximal stent velocity 227 cm/s    Doppler Ankle Brachial Index Single Level CAR (06/24/2025 10:46)   Right: 0.71, TBI 0.3, digit pressure 36 mmHg  Left: 1.18, TBI 0.44, digit pressure 29 mmHg    Duplex Lower Extremity Art / Grafts - Left CAR (06/24/2025 10:59)   Mid graft velocity of 95.6    Progress Notes by Doyle Marinelli MD (04/02/2025 14:00)   Progress Notes by Rika De Jesus APRN (04/14/2025 15:30)   Progress Notes by Iraida Abraham APRN (06/19/2025 13:15)      Assessment and Plan     Diagnoses and all orders for this visit:    1. PAD (peripheral artery disease) (Primary)  -     Duplex Aorta IVC Iliac Graft Complete CAR; Future  -     Doppler Ankle Brachial Index Single Level CAR; Future  -     Duplex Lower Extremity Art / Grafts " - Left CAR; Future    2. Status post insertion of iliac artery stent  -     Duplex Aorta IVC Iliac Graft Complete CAR; Future    3. Overweight (BMI 25.0-29.9)    BMI is >= 25 and <30. (Overweight) The following options were offered after discussion;: Information on healthy weight added to patient's after visit summary.         Oscar Wray is a 74 y.o. male with peripheral arterial disease status post bilateral common femoral endarterectomies, iliac stents, and left leg bypass. He denies any claudication. He has no rest pain or wounds to his  legs or feet. His most recent ABIs show moderate arterial insufficiency in the right lower extremity at 0.71 with digit pressure 36 mmHg and normal arterial circulation in the left lower extremity at 1.18 with digit pressure 29 mmHg.  His recent duplex shows patent left leg bypass with mid graft velocity of 95.6 cm/s.  Aortoiliac duplex shows patent bilateral iliac stents with elevated velocity along the right post stent at 217 cm/s and along the proximal left iliac stent at 227 cm/s.  His MELANIE remains normal on the left, digit pressures have lightly decreased.  Given that he has had no decrease in MELANIE and he remains asymptomatic, will continue with medical management and surveillance repeat imaging in 6 months.  We discussed the importance of meticulous footcare, he verbalized understanding.  He is maintained on appropriate anticoagulation, antiplatelet, statin, and antihypertensive medications which I recommend he continue.  He was instructed to call our office if he had any questions or concerns.     Follow Up     Return in about 6 months (around 12/24/2025) for MELANIE, Aortic duplex, Lower extremity duplex.    Patient was given instructions and counseling regarding his condition or for health maintenance advice. Please see specific information pulled into the AVS if appropriate.     Shilpi Dover, ELOY

## 2025-06-25 LAB
ABDOMINAL DIST AORTA AP: 1.8 CM
ABDOMINAL DIST AORTA TRANS: 1.7 CM
ABDOMINAL DIST AORTA VEL: 57.8 CM/S
BH CV BAS DIALYSIS STENT TWO DIST STENT PSV: 162 CM/SEC
BH CV GRAFT 1 - DISTAL ANASTAMOSIS PSV-LEFT: 129 CM/S
BH CV GRAFT 1 - DISTAL GRAFT PSV-LEFT: 72.1 CM/S
BH CV GRAFT 1 - INFLOW ARTERY PSV- LEFT: 198 CM/S
BH CV GRAFT 1 - MID DISTAL GRAFT PSV-LEFT: 82.3 CM/S
BH CV GRAFT 1 - MID GRAFT PSV-LEFT: 95.6 CM/S
BH CV GRAFT 1 - OUTFLOW ARTERY PSV-LEFT: 151 CM/S
BH CV GRAFT 1 - PROX GRAFT PSV-LEFT: 93.3 CM/S
BH CV GRAFT 1 - PROX MID GRAFT PSV-LEFT: 90.9 CM/S
BH CV GRAFT 1 - PROXIMAL ANASTAMOSIS PSV-LEFT: 170 CM/S
BH CV GRAFT 1- DISTAL ANASTAMOSIS RATIO-LEFT: 1.79
BH CV LEA LEFT CFA DISTAL PSV: 198 CM/S
BH CV LEA LEFT DFA PROX PSV: 84 CM/S
BH CV LEA LEFT PTA DISTAL PSV: -151 CM/S
BH CV LOWER ARTERIAL LEFT ABI RATIO: 1.18
BH CV LOWER ARTERIAL LEFT ABI RATIO: 1.18
BH CV LOWER ARTERIAL LEFT DORSALIS PEDIS SYS MAX: 129
BH CV LOWER ARTERIAL LEFT GREAT TOE SYS MAX: 29
BH CV LOWER ARTERIAL LEFT HIGHEST VELOCITY RATIO: 1.79
BH CV LOWER ARTERIAL LEFT POST TIBIAL SYS MAX: 141
BH CV LOWER ARTERIAL LEFT TBI RATIO: 0.24
BH CV LOWER ARTERIAL LEFT VELOCITY RATIO LOCATION: NORMAL
BH CV LOWER ARTERIAL RIGHT ABI RATIO: 0.71
BH CV LOWER ARTERIAL RIGHT ABI RATIO: 0.71
BH CV LOWER ARTERIAL RIGHT DORSALIS PEDIS SYS MAX: 69
BH CV LOWER ARTERIAL RIGHT GREAT TOE SYS MAX: 36
BH CV LOWER ARTERIAL RIGHT POST TIBIAL SYS MAX: 84
BH CV LOWER ARTERIAL RIGHT TBI RATIO: 0.3
BH CV VAS DIALYSIS STENT ONE  DIST STENT PSV: 153 CM/SEC
BH CV VAS DIALYSIS STENT ONE MID STENT PSV: 128 CM/SEC
BH CV VAS DIALYSIS STENT ONE POST STENT PSV: 217 CM/SEC
BH CV VAS DIALYSIS STENT ONE PRE STENT PSV: 57.8 CM/SEC
BH CV VAS DIALYSIS STENT ONE PROX STENT PSV: 84 CM/SEC
BH CV VAS DIALYSIS STENT TWO MID STENT PSV: 174 CM/SEC
BH CV VAS DIALYSIS STENT TWO POST STENT PSV: 151 CM/SEC
BH CV VAS DIALYSIS STENT TWO PRE STENT PSV: 57.8 CM/SEC
BH CV VAS DIALYSIS STENT TWO PROX STENT PSV: 227 CM/SEC
BH CV VAS FREE TEXT STENT ONE: NORMAL
BH CV VAS FREE TEXT STENT TWO: NORMAL
LEFT GROIN CFA SYS: 198 CM/SEC
Lab: 1.17
UPPER ARTERIAL LEFT ARM BRACHIAL SYS MAX: 119
UPPER ARTERIAL RIGHT ARM BRACHIAL SYS MAX: 88

## 2025-07-02 ENCOUNTER — LAB (OUTPATIENT)
Dept: LAB | Facility: HOSPITAL | Age: 75
End: 2025-07-02
Payer: MEDICARE

## 2025-07-02 DIAGNOSIS — D50.0 IRON DEFICIENCY ANEMIA DUE TO CHRONIC BLOOD LOSS: ICD-10-CM

## 2025-07-02 DIAGNOSIS — D64.9 ANEMIA, UNSPECIFIED TYPE: ICD-10-CM

## 2025-07-02 LAB
ALBUMIN SERPL-MCNC: 4.3 G/DL (ref 3.5–5.2)
ALBUMIN/GLOB SERPL: 1.7 G/DL
ALP SERPL-CCNC: 99 U/L (ref 39–117)
ALT SERPL W P-5'-P-CCNC: 8 U/L (ref 1–41)
ANION GAP SERPL CALCULATED.3IONS-SCNC: 13.2 MMOL/L (ref 5–15)
AST SERPL-CCNC: 19 U/L (ref 1–40)
BASOPHILS # BLD AUTO: 0.05 10*3/MM3 (ref 0–0.2)
BASOPHILS NFR BLD AUTO: 0.6 % (ref 0–1.5)
BILIRUB SERPL-MCNC: 0.4 MG/DL (ref 0–1.2)
BUN SERPL-MCNC: 13.6 MG/DL (ref 8–23)
BUN/CREAT SERPL: 9.4 (ref 7–25)
CALCIUM SPEC-SCNC: 9.4 MG/DL (ref 8.6–10.5)
CHLORIDE SERPL-SCNC: 107 MMOL/L (ref 98–107)
CO2 SERPL-SCNC: 22.8 MMOL/L (ref 22–29)
CREAT SERPL-MCNC: 1.45 MG/DL (ref 0.76–1.27)
DEPRECATED RDW RBC AUTO: 46.3 FL (ref 37–54)
EGFRCR SERPLBLD CKD-EPI 2021: 51.2 ML/MIN/1.73
EOSINOPHIL # BLD AUTO: 0.12 10*3/MM3 (ref 0–0.4)
EOSINOPHIL NFR BLD AUTO: 1.3 % (ref 0.3–6.2)
ERYTHROCYTE [DISTWIDTH] IN BLOOD BY AUTOMATED COUNT: 14.7 % (ref 12.3–15.4)
FERRITIN SERPL-MCNC: 61.4 NG/ML (ref 30–400)
FOLATE SERPL-MCNC: 14.5 NG/ML (ref 4.78–24.2)
GLOBULIN UR ELPH-MCNC: 2.5 GM/DL
GLUCOSE SERPL-MCNC: 130 MG/DL (ref 65–99)
HCT VFR BLD AUTO: 43.5 % (ref 37.5–51)
HGB BLD-MCNC: 14 G/DL (ref 13–17.7)
IRON 24H UR-MRATE: 80 MCG/DL (ref 59–158)
IRON SATN MFR SERPL: 23 % (ref 20–50)
LYMPHOCYTES # BLD AUTO: 1.12 10*3/MM3 (ref 0.7–3.1)
LYMPHOCYTES NFR BLD AUTO: 12.6 % (ref 19.6–45.3)
MCH RBC QN AUTO: 28.5 PG (ref 26.6–33)
MCHC RBC AUTO-ENTMCNC: 32.2 G/DL (ref 31.5–35.7)
MCV RBC AUTO: 88.6 FL (ref 79–97)
MONOCYTES # BLD AUTO: 0.49 10*3/MM3 (ref 0.1–0.9)
MONOCYTES NFR BLD AUTO: 5.5 % (ref 5–12)
NEUTROPHILS NFR BLD AUTO: 7.11 10*3/MM3 (ref 1.7–7)
NEUTROPHILS NFR BLD AUTO: 80 % (ref 42.7–76)
PLATELET # BLD AUTO: 312 10*3/MM3 (ref 140–450)
PMV BLD AUTO: 10.2 FL (ref 6–12)
POTASSIUM SERPL-SCNC: 3.9 MMOL/L (ref 3.5–5.2)
PROT SERPL-MCNC: 6.8 G/DL (ref 6–8.5)
RBC # BLD AUTO: 4.91 10*6/MM3 (ref 4.14–5.8)
RETICS # AUTO: 0.06 10*6/MM3 (ref 0.02–0.13)
RETICS/RBC NFR AUTO: 1.23 % (ref 0.7–1.9)
SODIUM SERPL-SCNC: 143 MMOL/L (ref 136–145)
TIBC SERPL-MCNC: 349 MCG/DL (ref 298–536)
TRANSFERRIN SERPL-MCNC: 234 MG/DL (ref 200–360)
VIT B12 BLD-MCNC: 1149 PG/ML (ref 211–946)
WBC NRBC COR # BLD AUTO: 8.89 10*3/MM3 (ref 3.4–10.8)

## 2025-07-02 PROCEDURE — 82607 VITAMIN B-12: CPT | Performed by: STUDENT IN AN ORGANIZED HEALTH CARE EDUCATION/TRAINING PROGRAM

## 2025-07-02 PROCEDURE — 36415 COLL VENOUS BLD VENIPUNCTURE: CPT

## 2025-07-02 PROCEDURE — 82746 ASSAY OF FOLIC ACID SERUM: CPT | Performed by: STUDENT IN AN ORGANIZED HEALTH CARE EDUCATION/TRAINING PROGRAM

## 2025-07-02 PROCEDURE — 85025 COMPLETE CBC W/AUTO DIFF WBC: CPT

## 2025-07-02 PROCEDURE — 83540 ASSAY OF IRON: CPT | Performed by: STUDENT IN AN ORGANIZED HEALTH CARE EDUCATION/TRAINING PROGRAM

## 2025-07-02 PROCEDURE — 82728 ASSAY OF FERRITIN: CPT | Performed by: STUDENT IN AN ORGANIZED HEALTH CARE EDUCATION/TRAINING PROGRAM

## 2025-07-02 PROCEDURE — 84466 ASSAY OF TRANSFERRIN: CPT | Performed by: STUDENT IN AN ORGANIZED HEALTH CARE EDUCATION/TRAINING PROGRAM

## 2025-07-02 PROCEDURE — 80053 COMPREHEN METABOLIC PANEL: CPT | Performed by: STUDENT IN AN ORGANIZED HEALTH CARE EDUCATION/TRAINING PROGRAM

## 2025-07-02 PROCEDURE — 85045 AUTOMATED RETICULOCYTE COUNT: CPT | Performed by: STUDENT IN AN ORGANIZED HEALTH CARE EDUCATION/TRAINING PROGRAM

## 2025-07-09 ENCOUNTER — OFFICE VISIT (OUTPATIENT)
Dept: ONCOLOGY | Facility: CLINIC | Age: 75
End: 2025-07-09
Payer: MEDICARE

## 2025-07-09 VITALS
TEMPERATURE: 97.2 F | DIASTOLIC BLOOD PRESSURE: 64 MMHG | BODY MASS INDEX: 26.68 KG/M2 | OXYGEN SATURATION: 97 % | SYSTOLIC BLOOD PRESSURE: 115 MMHG | HEART RATE: 54 BPM | WEIGHT: 170 LBS | HEIGHT: 67 IN

## 2025-07-09 DIAGNOSIS — D50.0 IRON DEFICIENCY ANEMIA DUE TO CHRONIC BLOOD LOSS: Primary | ICD-10-CM

## 2025-07-09 NOTE — PROGRESS NOTES
HEMATOLOGY ONCOLOGY OUTPATIENT FOLLOW UP       Patient name: Oscar Wray  : 1950  MRN: [id number]  Primary Care Physician: Kajal Horowitz PA-C  Referring Physician: Kajal Horowitz PA-C  Reason For Consult:       History of Present Illness:  Patient is a 74 y.o. male who has been referred to us for anemia. Pertinent recent outside labs are reported as follows:    24:  CBC: 9.13/10.9/36.9/375 (MCV 70.0, MCH 20.7, RDW 17.1)  CMP: BUN/Creat: 17/1.46 (gfr 51.1)  Iron levels: 19, Ferritin 38.6    Peripheral smear reported consistent with features of ANDREI including anisocytosis, poikilocytosis, microcytosis, polychromasia etc. Mild spherocytosis (1+) noted. No schistocytes reported.      Patient has underlying history of Alzheimer's disease, CKD and Peripheral vascular disease. He is on anticoagulation with Eliquis as well as antiplatelet therapy with Aspirin.     2024: Patient presents for initial consultation today. Accompanied by his Wife.  He appears to be pleasantly confused and denied any acute complaints presently. Unable to provide any significant details about medical history, most of history obtained form Chart and his wife.     Patient does not appear to be on  any nutritional supplements at present.     24: Patient seen for follow-up today.  No new complaints.  Compliant with oral iron supplementation.  Denied any bleeding issues with Eliquis.    25: Patient doing well overall clinically, per wife, he has had some hemorrhoidal bleeding recently swelling his undergarments and pants.  However, the symptoms are intermittent and not persistent.  Patient denied any rectal pain/discomfort.  Reported off-and-on constipation.      Subjective:  2025: Dwayne is here today for routine follow-up.  His girlfriend accompanies and contributes to discussion.  He reports overall feeling well.  He has been tolerating oral iron.  He states that when his constipation is  better controlled his hemorrhoidal bleeding is better.  He has not had any recent bleeding concerns.    Past Medical History:   Diagnosis Date    Atherosclerosis of native arteries of extremities with intermittent claudication, bilateral legs 03/08/2023    Atherosclerosis of native arteries of right leg with ulceration of ankle 01/26/2024    BPH with obstruction/lower urinary tract symptoms 01/08/2024    Chronic kidney disease, stage 3 unspecified     Dementia     Dyslipidemia 01/08/2024    Essential hypertension 01/08/2024    History of cigarette smoking 01/08/2024    Hyperlipidemia, unspecified     Other specified postprocedural states 01/26/2024    Peripheral arterial occlusive disease 01/08/2024       Past Surgical History:   Procedure Laterality Date    ANGIOPLASTY ILIAC ARTERY Bilateral 12/29/2023    Procedure: BILATERAL FEMORAL ENDARTECTOMY AND ILIAC ARTERY STENTS, LEFT LEG ANGIOGRAM;  Surgeon: Nura Salgado II, MD;  Location: [facility] HYBRID OR;  Service: Vascular;  Laterality: Bilateral;    ARTERIOGRAM Bilateral 4/10/2024    Procedure: ARTERIOGRAM LOWER EXTREMITY WITH ANGIOPLASTY via left arm access;  Surgeon: Nura Salgado II, MD;  Location: [facility] HYBRID OR;  Service: Vascular;  Laterality: Bilateral;    FEMORAL TIBIAL BYPASS Left 1/4/2024    Procedure: FEMORAL TIBIAL BYPASS;  Surgeon: Nura Salgado II, MD;  Location: [facility] MAIN OR;  Service: Vascular;  Laterality: Left;         Current Outpatient Medications:     acetaminophen (TYLENOL) 325 MG tablet, Take 2 tablets by mouth Every 6 (Six) Hours As Needed for Mild Pain., Disp: , Rfl:     apixaban (ELIQUIS) 5 MG tablet tablet, Take 1 tablet by mouth Every 12 (Twelve) Hours. Indications: Other - full anticoagulation, pad/bypass stents, Disp: 60 tablet, Rfl: 11    aspirin 81 MG EC tablet, Take 1 tablet by mouth Daily., Disp: , Rfl:     atenolol (TENORMIN) 25 MG tablet, Take 0.5 tablets by mouth Daily., Disp: , Rfl:     atorvastatin (LIPITOR) 20 MG  tablet, Take 1 tablet by mouth Daily., Disp: 30 tablet, Rfl: 5    Cholecalciferol (Vitamin D3) 50 MCG (2000 UT) capsule, Take 1 capsule by mouth Daily., Disp: , Rfl:     cilostazol (PLETAL) 100 MG tablet, Take 1 tablet by mouth 2 (Two) Times a Day., Disp: , Rfl:     dapagliflozin Propanediol (Farxiga) 10 MG tablet, Take 10 mg by mouth Daily., Disp: 30 tablet, Rfl: 5    docusate sodium (COLACE) 100 MG capsule, Take 1 capsule by mouth Daily., Disp: , Rfl:     donepezil (ARICEPT) 23 MG tablet, Take 1 tablet by mouth Every Night., Disp: 30 tablet, Rfl: 5    ferrous sulfate 325 (65 FE) MG tablet, Take 1 tablet by mouth Daily With Breakfast., Disp: 90 tablet, Rfl: 1    furosemide (LASIX) 20 MG tablet, , Disp: , Rfl:     Lido-PE-Glycerin-Petrolatum (Preparation H Rapid Relief) 5-0.25-14.4-15 % cream, Apply 1 Application topically 2 (Two) Times a Day., Disp: 28 g, Rfl: 1    melatonin 1 MG tablet, Take 1 tablet by mouth Every Night., Disp: , Rfl:     memantine (NAMENDA XR) 28 MG capsule sustained-release 24 hr extended release capsule, Take 1 capsule by mouth Daily., Disp: , Rfl:     Milk of Magnesia 400 MG/5ML suspension, Take 5 mL by mouth Daily., Disp: , Rfl:     multivitamin with minerals tablet tablet, Take 1 tablet by mouth Daily., Disp: , Rfl:     Omega-3 Fatty Acids (fish oil) 1000 MG capsule capsule, Take 2 capsules by mouth 2 (Two) Times a Day With Meals., Disp: , Rfl:     risperiDONE (risperDAL) 0.25 MG tablet, Take 1 tablet by mouth Daily., Disp: , Rfl:     risperiDONE (risperDAL) 0.5 MG tablet, Take 1 tablet by mouth Every Night., Disp: , Rfl:     tamsulosin (FLOMAX) 0.4 MG capsule 24 hr capsule, Take 2 capsules by mouth Daily., Disp: 60 capsule, Rfl: 0    traMADol (ULTRAM) 50 MG tablet, Take 1 tablet by mouth Every 12 (Twelve) Hours As Needed for Moderate Pain., Disp: 60 tablet, Rfl: 5    No Known Allergies    Family History   Family history unknown: Yes       Family history is unknown by patient.      Social  "History     Tobacco Use    Smoking status: Former     Current packs/day: 0.00     Average packs/day: 0.5 packs/day for 54.0 years (27.0 ttl pk-yrs)     Types: Cigarettes     Start date:      Quit date:      Years since quittin.5     Passive exposure: Past    Smokeless tobacco: Never   Vaping Use    Vaping status: Never Used   Substance Use Topics    Alcohol use: Not Currently    Drug use: Never     Social History     Social History Narrative    Not on file       ROS:   Review of Systems   Constitutional: Negative.    HENT: Negative.     Eyes: Negative.    Respiratory: Negative.     Cardiovascular: Negative.    Gastrointestinal: Negative.    Endocrine: Negative.    Genitourinary: Negative.    Musculoskeletal: Negative.    Skin: Negative.    Allergic/Immunologic: Negative.    Neurological: Negative.    Hematological: Negative.          Objective:    Vital Signs:  Vitals:    25 1352   BP: 115/64   Pulse: 54   Temp: 97.2 °F (36.2 °C)   SpO2: 97%   Weight: 77.1 kg (170 lb)   Height: 170.2 cm (67.01\")   PainSc: 0-No pain           Body mass index is 26.62 kg/m².    ECOG  (2) Ambulatory and capable of self care, unable to carry out work activity, up and about > 50% or waking hours    Physical Exam:   Physical Exam  Vitals reviewed.   Constitutional:       Appearance: Normal appearance.      Comments: Chronically ill-appearing.  No acute distress   HENT:      Head: Normocephalic and atraumatic.   Eyes:      General: No scleral icterus.     Conjunctiva/sclera: Conjunctivae normal.   Cardiovascular:      Rate and Rhythm: Normal rate and regular rhythm.      Pulses: Normal pulses.      Heart sounds: No murmur heard.  Pulmonary:      Effort: Pulmonary effort is normal.      Breath sounds: Normal breath sounds.   Abdominal:      General: There is no distension.      Palpations: Abdomen is soft. There is no mass.      Tenderness: There is no abdominal tenderness.   Musculoskeletal:         General: No swelling. " Normal range of motion.      Cervical back: Normal range of motion and neck supple.   Skin:     General: Skin is warm.      Coloration: Skin is not jaundiced or pale.      Findings: No bruising, erythema, lesion or rash.   Neurological:      General: No focal deficit present.      Mental Status: He is alert and oriented to person, place, and time.      Motor: No weakness.   Psychiatric:         Mood and Affect: Mood normal.         Judgment: Judgment normal.         Lab Results - Last 18 Months   Lab Units 07/02/25  1351 03/26/25  1351 12/02/24  1313   WBC 10*3/mm3 8.89 8.90 8.49   HEMOGLOBIN g/dL 14.0 14.8 14.5   HEMATOCRIT % 43.5 46.7 45.2   PLATELETS 10*3/mm3 312 275 294   MCV fL 88.6 91.2 85.0     Lab Results - Last 18 Months   Lab Units 07/02/25  1351 04/14/25  1624 11/14/24  1357 10/14/24  1507 08/20/24  1339   SODIUM mmol/L 143 141 139   < > 144   POTASSIUM mmol/L 3.9 4.0 3.5   < > 4.5   CHLORIDE mmol/L 107 105 102   < > 108*   CO2 mmol/L 22.8 23.9 24.9   < > 25.3   BUN mg/dL 13.6 12 9   < > 21   CREATININE mg/dL 1.45* 1.59* 1.53*   < > 1.80*   CALCIUM mg/dL 9.4 9.4 9.3   < > 9.4   BILIRUBIN mg/dL 0.4 0.4  --   --  0.3   ALK PHOS U/L 99 94  --   --  117   ALT (SGPT) U/L 8 9  --   --  13   AST (SGOT) U/L 19 22  --   --  20   GLUCOSE mg/dL 130* 83 66   < > 63*    < > = values in this interval not displayed.       Lab Results   Component Value Date    GLUCOSE 130 (H) 07/02/2025    BUN 13.6 07/02/2025    CREATININE 1.45 (H) 07/02/2025    BCR 9.4 07/02/2025    K 3.9 07/02/2025    CO2 22.8 07/02/2025    CALCIUM 9.4 07/02/2025    ALBUMIN 4.3 07/02/2025    AST 19 07/02/2025    ALT 8 07/02/2025       Lab Results - Last 18 Months   Lab Units 04/10/24  0730 04/05/24  1321   INR  1.06 1.14*   APTT seconds 29.0 32.7*       Lab Results   Component Value Date    IRON 80 07/02/2025    TIBC 349 07/02/2025    FERRITIN 61.40 07/02/2025       Lab Results   Component Value Date    FOLATE 14.50 07/02/2025       No results found  "for: \"OCCULTBLD\"    Lab Results   Component Value Date    RETICCTPCT 1.23 07/02/2025     Lab Results   Component Value Date    PDOFTYYS35 1,149 (H) 07/02/2025     Lab Results   Component Value Date    SPEP Comment 04/30/2024     LDH   Date Value Ref Range Status   08/20/2024 184 135 - 225 U/L Final     Lab Results   Component Value Date    ANDREA Negative 04/30/2024    SEDRATE 35 (H) 04/30/2024     Lab Results   Component Value Date    HAPTOGLOBIN 235 (H) 08/20/2024     Lab Results   Component Value Date    PTT 29.0 04/10/2024    INR 1.06 04/10/2024     No results found for: \"\"  No results found for: \"CEA\"  No components found for: \"CA-19-9\"  Lab Results   Component Value Date    PSA 0.356 10/14/2024     Lab Results   Component Value Date    SEDRATE 35 (H) 04/30/2024          Assessment & Plan     Microcytic anemia:  Iron Deficiency anemia:  -Outside labs are suggestive of Iron deficiency anemia  -Repeat CBC today showed mild anemia with Hb/hct 11.5/38.5, Low MCV.   -iron panel showed low TSAT 5% and low ferritin 11.6 consistent with iron deficiency anemia. Elevated Soluble Transferrin receptor levels.  -other labs including hemolysis labs, plasma cell workup, B12, Folate levels, Retic counts, reported WNL  -CMP is consistent with Stage II/III CKD which may also be a contributing factor for anemia.  -started patient on Oral iron supplementation.  Tolerating well.  Noted improvement in Hb to 14.5  -given ongoing Antiplatelet therapy and anticoagulation there is concern about possible GI blood loss.   -Repeat labs on 4/2/2025 showed improvement in ferritin levels and TSAT levels.  Hemoglobin has improved to 14.8  -Repeat labs July 2025 with resolution of anemia.  No evidence of nutritional deficiencies.  - Continue oral iron supplementation.  No indication for IV iron presently.  -Will continue to monitor labs and will consider GI referral for EGD/Colonoscopy if noted recurrent iron deficiency.    Hemorrhoidal " bleeding:  -Likely due to constipation.  Encouraged to the patient to use daily MiraLAX  -Prescribed Preparation H       Follow-up with Dr. Marinelli in 3 months.  Sooner if condition indicates.    Thank you very much for providing the opportunity to participate in this patient’s care. Please do not hesitate to call if there are any other questions.

## 2025-08-22 ENCOUNTER — OFFICE VISIT (OUTPATIENT)
Dept: FAMILY MEDICINE CLINIC | Facility: CLINIC | Age: 75
End: 2025-08-22
Payer: MEDICARE

## 2025-08-22 ENCOUNTER — LAB (OUTPATIENT)
Dept: FAMILY MEDICINE CLINIC | Facility: CLINIC | Age: 75
End: 2025-08-22
Payer: MEDICARE

## 2025-08-22 VITALS
HEIGHT: 67 IN | BODY MASS INDEX: 26.7 KG/M2 | DIASTOLIC BLOOD PRESSURE: 60 MMHG | RESPIRATION RATE: 16 BRPM | OXYGEN SATURATION: 98 % | TEMPERATURE: 98.8 F | WEIGHT: 170.1 LBS | HEART RATE: 59 BPM | SYSTOLIC BLOOD PRESSURE: 113 MMHG

## 2025-08-22 DIAGNOSIS — Z00.00 HEALTHCARE MAINTENANCE: ICD-10-CM

## 2025-08-22 DIAGNOSIS — Z12.5 SCREENING FOR PROSTATE CANCER: Primary | ICD-10-CM

## 2025-08-22 LAB
ALBUMIN SERPL-MCNC: 4.5 G/DL (ref 3.5–5.2)
ALBUMIN/GLOB SERPL: 1.5 G/DL
ALP SERPL-CCNC: 108 U/L (ref 39–117)
ALT SERPL W P-5'-P-CCNC: 11 U/L (ref 1–41)
ANION GAP SERPL CALCULATED.3IONS-SCNC: 15 MMOL/L (ref 5–15)
AST SERPL-CCNC: 19 U/L (ref 1–40)
BASOPHILS # BLD AUTO: 0.06 10*3/MM3 (ref 0–0.2)
BASOPHILS NFR BLD AUTO: 0.6 % (ref 0–1.5)
BILIRUB SERPL-MCNC: 0.4 MG/DL (ref 0–1.2)
BILIRUB UR QL STRIP: NEGATIVE
BUN SERPL-MCNC: 10 MG/DL (ref 8–23)
BUN/CREAT SERPL: 7.3 (ref 7–25)
CALCIUM SPEC-SCNC: 9.5 MG/DL (ref 8.6–10.5)
CHLORIDE SERPL-SCNC: 100 MMOL/L (ref 98–107)
CHOLEST SERPL-MCNC: 97 MG/DL (ref 0–200)
CLARITY UR: CLEAR
CO2 SERPL-SCNC: 25 MMOL/L (ref 22–29)
COLOR UR: YELLOW
CREAT SERPL-MCNC: 1.37 MG/DL (ref 0.76–1.27)
DEPRECATED RDW RBC AUTO: 41.8 FL (ref 37–54)
EGFRCR SERPLBLD CKD-EPI 2021: 54.8 ML/MIN/1.73
EOSINOPHIL # BLD AUTO: 0.15 10*3/MM3 (ref 0–0.4)
EOSINOPHIL NFR BLD AUTO: 1.4 % (ref 0.3–6.2)
ERYTHROCYTE [DISTWIDTH] IN BLOOD BY AUTOMATED COUNT: 13.3 % (ref 12.3–15.4)
FERRITIN SERPL-MCNC: 60 NG/ML (ref 30–400)
GLOBULIN UR ELPH-MCNC: 3.1 GM/DL
GLUCOSE SERPL-MCNC: 70 MG/DL (ref 65–99)
GLUCOSE UR STRIP-MCNC: ABNORMAL MG/DL
HBA1C MFR BLD: 5 % (ref 4.8–5.6)
HCT VFR BLD AUTO: 44.9 % (ref 37.5–51)
HDLC SERPL-MCNC: 45 MG/DL (ref 40–60)
HGB BLD-MCNC: 14.6 G/DL (ref 13–17.7)
HGB UR QL STRIP.AUTO: NEGATIVE
HOLD SPECIMEN: NORMAL
IMM GRANULOCYTES # BLD AUTO: 0.06 10*3/MM3 (ref 0–0.05)
IMM GRANULOCYTES NFR BLD AUTO: 0.6 % (ref 0–0.5)
IRON 24H UR-MRATE: 150 MCG/DL (ref 59–158)
IRON SATN MFR SERPL: 36 % (ref 20–50)
KETONES UR QL STRIP: NEGATIVE
LDLC SERPL CALC-MCNC: 33 MG/DL (ref 0–100)
LDLC/HDLC SERPL: 0.7 {RATIO}
LEUKOCYTE ESTERASE UR QL STRIP.AUTO: NEGATIVE
LYMPHOCYTES # BLD AUTO: 1.28 10*3/MM3 (ref 0.7–3.1)
LYMPHOCYTES NFR BLD AUTO: 12.1 % (ref 19.6–45.3)
MCH RBC QN AUTO: 28.3 PG (ref 26.6–33)
MCHC RBC AUTO-ENTMCNC: 32.5 G/DL (ref 31.5–35.7)
MCV RBC AUTO: 87.2 FL (ref 79–97)
MONOCYTES # BLD AUTO: 0.75 10*3/MM3 (ref 0.1–0.9)
MONOCYTES NFR BLD AUTO: 7.1 % (ref 5–12)
NEUTROPHILS NFR BLD AUTO: 78.2 % (ref 42.7–76)
NEUTROPHILS NFR BLD AUTO: 8.27 10*3/MM3 (ref 1.7–7)
NITRITE UR QL STRIP: NEGATIVE
NRBC BLD AUTO-RTO: 0 /100 WBC (ref 0–0.2)
PH UR STRIP.AUTO: 6 [PH] (ref 5–8)
PLATELET # BLD AUTO: 321 10*3/MM3 (ref 140–450)
PMV BLD AUTO: 10.1 FL (ref 6–12)
POTASSIUM SERPL-SCNC: 4.9 MMOL/L (ref 3.5–5.2)
PROT SERPL-MCNC: 7.6 G/DL (ref 6–8.5)
PROT UR QL STRIP: NEGATIVE
PSA SERPL-MCNC: 0.26 NG/ML (ref 0–4)
RBC # BLD AUTO: 5.15 10*6/MM3 (ref 4.14–5.8)
SODIUM SERPL-SCNC: 140 MMOL/L (ref 136–145)
SP GR UR STRIP: 1.01 (ref 1–1.03)
TIBC SERPL-MCNC: 419 MCG/DL (ref 298–536)
TRANSFERRIN SERPL-MCNC: 281 MG/DL (ref 200–360)
TRIGL SERPL-MCNC: 103 MG/DL (ref 0–150)
TSH SERPL DL<=0.05 MIU/L-ACNC: 2.93 UIU/ML (ref 0.27–4.2)
UROBILINOGEN UR QL STRIP: ABNORMAL
VLDLC SERPL-MCNC: 19 MG/DL (ref 5–40)
WBC NRBC COR # BLD AUTO: 10.57 10*3/MM3 (ref 3.4–10.8)

## 2025-08-22 RX ORDER — VIBEGRON 75 MG/1
75 TABLET, FILM COATED ORAL DAILY
COMMUNITY
Start: 2025-08-08

## (undated) DEVICE — BLANKT WARM UNDER/BDY FUL/ACC A/ 90X206CM

## (undated) DEVICE — SYR LL TP 10ML STRL

## (undated) DEVICE — ANTIBACTERIAL UNDYED BRAIDED (POLYGLACTIN 910), SYNTHETIC ABSORBABLE SUTURE: Brand: COATED VICRYL

## (undated) DEVICE — BG ISOL DRWSTR INVISISHIELD 20X20IN

## (undated) DEVICE — SPNG LAP PREWSH SFTPK 18X18IN STRL PK/5

## (undated) DEVICE — PINNACLE INTRODUCER SHEATH: Brand: PINNACLE

## (undated) DEVICE — KT FLTR GAS LN OXYGENATOR 1/4IN STRL

## (undated) DEVICE — NAVICROSS SUPPORT CATHETER: Brand: NAVICROSS

## (undated) DEVICE — VASCULAR CDS: Brand: MEDLINE INDUSTRIES, INC.

## (undated) DEVICE — RADIFOCUS TORQUE DEVICE MULTI-TORQUE VISE: Brand: RADIFOCUS TORQUE DEVICE

## (undated) DEVICE — SOL NACL 0.9PCT 1000ML

## (undated) DEVICE — UNDYED BRAIDED (POLYGLACTIN 910), SYNTHETIC ABSORBABLE SUTURE: Brand: COATED VICRYL

## (undated) DEVICE — SYR LUERLOK 30CC

## (undated) DEVICE — BLOOD TRANSFUSION FILTER: Brand: HAEMONETICS

## (undated) DEVICE — RADIFOCUS GLIDEWIRE ADVANTAGE GUIDEWIRE: Brand: GLIDEWIRE ADVANTAGE

## (undated) DEVICE — RADIFOCUS GLIDECATH: Brand: GLIDECATH

## (undated) DEVICE — DRSNG WND BORDR/ADHS NONADHR/GZ LF 4X10IN STRL

## (undated) DEVICE — SUT SILK 3/0 30IN A304H

## (undated) DEVICE — SOLUTION,WATER,IRRIGATION,1000ML,STERILE: Brand: MEDLINE

## (undated) DEVICE — SNAP KAP: Brand: UNBRANDED

## (undated) DEVICE — SUT SILK 3/0 SH CR8 18IN C013D

## (undated) DEVICE — CATH SZ ACCUVU SEG/20CM PIG .038IN 5F 70CM

## (undated) DEVICE — PENCL HND ROCKRSWTCH HOLSTR EZ CLEAN TP CRD 10FT

## (undated) DEVICE — GEL ULTRASND AQUASONIC 100 20GM STRL

## (undated) DEVICE — RADIFOCUS GLIDEWIRE: Brand: GLIDEWIRE

## (undated) DEVICE — GLV SURG BIOGEL LTX PF 7 1/2

## (undated) DEVICE — Device

## (undated) DEVICE — DEV INFL COMPAK W/ACCESSPLUS IN4530

## (undated) DEVICE — COVER,LIGHT HANDLE,FLX,1/PK: Brand: MEDLINE INDUSTRIES, INC.

## (undated) DEVICE — TOWEL,OR,DSP,ST,WHITE,DLX,4/PK,20PK/CS: Brand: MEDLINE

## (undated) DEVICE — SUT SILK 3/0 TIES 18IN A184H

## (undated) DEVICE — DRSNG SURESITE WNDW 2.38X2.75

## (undated) DEVICE — PK VASC 10

## (undated) DEVICE — IR MAJOR VASCULAR PACK: Brand: MEDLINE INDUSTRIES, INC.

## (undated) DEVICE — MICRO TIP WIPE: Brand: DEVON

## (undated) DEVICE — SUT PROLN 6/0 BV1 D/A 30IN 8709H

## (undated) DEVICE — SUT PROLN 5/0 RB1 D/A 36IN 8556H

## (undated) DEVICE — ELECTRD BLD EZ CLN MOD XLNG 2.75IN

## (undated) DEVICE — FOGARTY ARTERIAL EMBOLECTOMY CATHETER 4F 80CM: Brand: FOGARTY

## (undated) DEVICE — SUT VIC 2/0 CT1 36IN

## (undated) DEVICE — FOAM BUMP, LARGE: Brand: MEDLINE INDUSTRIES, INC.

## (undated) DEVICE — PTA BALLOON DILATATION CATHETER: Brand: MUSTANG™

## (undated) DEVICE — PROVE COVER: Brand: UNBRANDED

## (undated) DEVICE — SYS PERFUS SEP PLATLT W TIPS CUST

## (undated) DEVICE — SLV SCD CALF HEMOFORCE DVT THERP REPROC MD

## (undated) DEVICE — KT SURG TURNOVER 050

## (undated) DEVICE — BG BLD SYS

## (undated) DEVICE — ADHS SKIN PREMIERPRO EXOFIN TOPICAL HI/VISC .5ML

## (undated) DEVICE — UNDRGLV SURG BIOGEL PIMICROINDICATOR SYNTH SZ7.5PF STRL PR/2

## (undated) DEVICE — APPL CHLORAPREP HI/LITE 26ML ORNG

## (undated) DEVICE — APPL CHLORAPREP HI/LITE TINTED 10.5ML ORNG

## (undated) DEVICE — SOL IRRIG NACL 1000ML

## (undated) DEVICE — LN INJ CONTRST FLXCIL HP F/M LL 1200PSI72

## (undated) DEVICE — LP VESL MAXI 2.5X1MM RED 2PK

## (undated) DEVICE — CVR HNDL LT SURG ACCSSRY BLU STRL

## (undated) DEVICE — PROXIMATE RH ROTATING HEAD SKIN STAPLERS (35 WIDE) CONTAINS 35 STAINLESS STEEL STAPLES: Brand: PROXIMATE

## (undated) DEVICE — KT VALVULOTOME 2 AND 3MM CUT HD

## (undated) DEVICE — DRAPE,HAND,STERILE: Brand: MEDLINE

## (undated) DEVICE — SYRINGE KIT,PACKAGED,,150FT,MK 7(ANGIO-ARTERION, 150ML SYR KIT W/QFT,MC)(60729385): Brand: MEDRAD® MARK 7 ARTERION DISPOSABLE SYRINGE 150 ML WITH QUICK FILL TUBE

## (undated) DEVICE — PINNACLE R/O II INTRODUCER SHEATH WITH RADIOPAQUE MARKER: Brand: PINNACLE

## (undated) DEVICE — CATH GUIDE SOFTVU SELECT UT BR BERN .035 4F 65CM

## (undated) DEVICE — CATH GUIDE BERN 5F 65CM

## (undated) DEVICE — CATH IV INSYTE AUTOGARD BLD/CONTRL SHLD 18G 1.25IN GRN

## (undated) DEVICE — SUT PROLN 5/0 C1 D/A 36IN 8720H

## (undated) DEVICE — PCH STRL TYVK SLF/SEAL 7.5X13.5IN

## (undated) DEVICE — DESTINATION PERIPHERAL GUIDING SHEATH: Brand: DESTINATION

## (undated) DEVICE — C-ARM: Brand: UNBRANDED

## (undated) DEVICE — LP VESL MINI RED 2PK

## (undated) DEVICE — SUT PROLN 6/0 BV 30IN 8776H

## (undated) DEVICE — SUT SILK 4/0 TIES 18IN A183H

## (undated) DEVICE — 3M™ STERI-DRAPE™ INSTRUMENT POUCH 9097: Brand: 3M™ STERI-DRAPE™

## (undated) DEVICE — IRRIGATOR BULB ASEPTO 60CC STRL

## (undated) DEVICE — SUT SILK 2/0 SH CR8 18IN CR8 C012D

## (undated) DEVICE — CATH OMNI FLUSH 5FR

## (undated) DEVICE — SYR LUERLOK 20CC BX/50

## (undated) DEVICE — ST ACC MICROPUNCTURE STFF .018 ECHO/PLAT/TP 4F/10CM 21G/7CM

## (undated) DEVICE — PK ATS CUST W CARDIOTOMY RESEVOIR

## (undated) DEVICE — DRSNG SURESITE WNDW 4X4.5

## (undated) DEVICE — GOWN,REINFRCE,POLY,SIRUS,BREATH SLV,XXLG: Brand: MEDLINE

## (undated) DEVICE — SOL NS 500ML